# Patient Record
Sex: MALE | Race: WHITE | NOT HISPANIC OR LATINO
[De-identification: names, ages, dates, MRNs, and addresses within clinical notes are randomized per-mention and may not be internally consistent; named-entity substitution may affect disease eponyms.]

---

## 2017-02-16 ENCOUNTER — LABORATORY RESULT (OUTPATIENT)
Age: 72
End: 2017-02-16

## 2017-02-16 ENCOUNTER — APPOINTMENT (OUTPATIENT)
Dept: INTERNAL MEDICINE | Facility: CLINIC | Age: 72
End: 2017-02-16

## 2017-02-16 VITALS
SYSTOLIC BLOOD PRESSURE: 130 MMHG | BODY MASS INDEX: 28.04 KG/M2 | DIASTOLIC BLOOD PRESSURE: 76 MMHG | WEIGHT: 207 LBS | HEIGHT: 72 IN

## 2017-02-24 LAB
ALBUMIN SERPL ELPH-MCNC: 4.6 G/DL
ALP BLD-CCNC: 71 U/L
ALT SERPL-CCNC: 19 U/L
ANION GAP SERPL CALC-SCNC: 13 MMOL/L
AST SERPL-CCNC: 18 U/L
BILIRUB SERPL-MCNC: 0.5 MG/DL
BUN SERPL-MCNC: 13 MG/DL
CALCIUM SERPL-MCNC: 9.5 MG/DL
CHLORIDE SERPL-SCNC: 105 MMOL/L
CHOLEST SERPL-MCNC: 177 MG/DL
CHOLEST/HDLC SERPL: 2.7 RATIO
CK SERPL-CCNC: 125 U/L
CO2 SERPL-SCNC: 27 MMOL/L
CREAT SERPL-MCNC: 0.93 MG/DL
GLUCOSE SERPL-MCNC: 119 MG/DL
HBA1C MFR BLD HPLC: 6.3 %
HDLC SERPL-MCNC: 65 MG/DL
LDLC SERPL CALC-MCNC: 97 MG/DL
POTASSIUM SERPL-SCNC: 4.7 MMOL/L
PROT SERPL-MCNC: 7.2 G/DL
SODIUM SERPL-SCNC: 145 MMOL/L
TRIGL SERPL-MCNC: 73 MG/DL

## 2017-05-24 ENCOUNTER — LABORATORY RESULT (OUTPATIENT)
Age: 72
End: 2017-05-24

## 2017-05-24 ENCOUNTER — APPOINTMENT (OUTPATIENT)
Dept: INTERNAL MEDICINE | Facility: CLINIC | Age: 72
End: 2017-05-24

## 2017-05-24 VITALS
WEIGHT: 208 LBS | BODY MASS INDEX: 28.17 KG/M2 | HEIGHT: 72 IN | SYSTOLIC BLOOD PRESSURE: 120 MMHG | DIASTOLIC BLOOD PRESSURE: 72 MMHG

## 2017-05-24 DIAGNOSIS — R05 COUGH: ICD-10-CM

## 2017-05-30 LAB
ALBUMIN SERPL ELPH-MCNC: 4.2 G/DL
ALP BLD-CCNC: 86 U/L
ALT SERPL-CCNC: 21 U/L
ANION GAP SERPL CALC-SCNC: 16 MMOL/L
AST SERPL-CCNC: 16 U/L
BILIRUB SERPL-MCNC: 0.5 MG/DL
BUN SERPL-MCNC: 18 MG/DL
CALCIUM SERPL-MCNC: 9.8 MG/DL
CHLORIDE SERPL-SCNC: 101 MMOL/L
CHOLEST SERPL-MCNC: 151 MG/DL
CHOLEST/HDLC SERPL: 2.5 RATIO
CK SERPL-CCNC: 208 U/L
CO2 SERPL-SCNC: 25 MMOL/L
CREAT SERPL-MCNC: 1.1 MG/DL
GLUCOSE SERPL-MCNC: 122 MG/DL
HBA1C MFR BLD HPLC: 6.2 %
HDLC SERPL-MCNC: 61 MG/DL
LDLC SERPL CALC-MCNC: 78 MG/DL
POTASSIUM SERPL-SCNC: 5 MMOL/L
PROT SERPL-MCNC: 6.9 G/DL
SODIUM SERPL-SCNC: 142 MMOL/L
TRIGL SERPL-MCNC: 62 MG/DL

## 2017-09-20 ENCOUNTER — APPOINTMENT (OUTPATIENT)
Dept: INTERNAL MEDICINE | Facility: CLINIC | Age: 72
End: 2017-09-20
Payer: MEDICARE

## 2017-09-20 ENCOUNTER — LABORATORY RESULT (OUTPATIENT)
Age: 72
End: 2017-09-20

## 2017-09-20 VITALS
SYSTOLIC BLOOD PRESSURE: 130 MMHG | BODY MASS INDEX: 28.31 KG/M2 | WEIGHT: 209 LBS | DIASTOLIC BLOOD PRESSURE: 80 MMHG | HEIGHT: 72 IN

## 2017-09-20 PROCEDURE — G0008: CPT

## 2017-09-20 PROCEDURE — 90686 IIV4 VACC NO PRSV 0.5 ML IM: CPT

## 2017-09-20 PROCEDURE — 36415 COLL VENOUS BLD VENIPUNCTURE: CPT

## 2017-09-20 PROCEDURE — 93000 ELECTROCARDIOGRAM COMPLETE: CPT

## 2017-09-20 PROCEDURE — 94010 BREATHING CAPACITY TEST: CPT

## 2017-09-20 PROCEDURE — 99214 OFFICE O/P EST MOD 30 MIN: CPT | Mod: 25

## 2017-09-27 LAB
ALBUMIN SERPL ELPH-MCNC: 4.3 G/DL
ALP BLD-CCNC: 75 U/L
ALT SERPL-CCNC: 16 U/L
ANION GAP SERPL CALC-SCNC: 14 MMOL/L
AST SERPL-CCNC: 16 U/L
BASOPHILS # BLD AUTO: 0.03 K/UL
BASOPHILS NFR BLD AUTO: 0.4 %
BILIRUB SERPL-MCNC: 0.6 MG/DL
BUN SERPL-MCNC: 24 MG/DL
CALCIUM SERPL-MCNC: 9.7 MG/DL
CHLORIDE SERPL-SCNC: 104 MMOL/L
CHOLEST SERPL-MCNC: 190 MG/DL
CHOLEST/HDLC SERPL: 3.1 RATIO
CK SERPL-CCNC: 124 U/L
CO2 SERPL-SCNC: 24 MMOL/L
CREAT SERPL-MCNC: 1.03 MG/DL
EOSINOPHIL # BLD AUTO: 0.14 K/UL
EOSINOPHIL NFR BLD AUTO: 1.8 %
GLUCOSE SERPL-MCNC: 98 MG/DL
HBA1C MFR BLD HPLC: 6.4 %
HCT VFR BLD CALC: 44.7 %
HDLC SERPL-MCNC: 61 MG/DL
HGB BLD-MCNC: 14.2 G/DL
IMM GRANULOCYTES NFR BLD AUTO: 0.4 %
LDLC SERPL CALC-MCNC: 112 MG/DL
LYMPHOCYTES # BLD AUTO: 2.43 K/UL
LYMPHOCYTES NFR BLD AUTO: 30.5 %
MAN DIFF?: NORMAL
MCHC RBC-ENTMCNC: 29.2 PG
MCHC RBC-ENTMCNC: 31.8 GM/DL
MCV RBC AUTO: 91.8 FL
MONOCYTES # BLD AUTO: 0.76 K/UL
MONOCYTES NFR BLD AUTO: 9.5 %
NEUTROPHILS # BLD AUTO: 4.58 K/UL
NEUTROPHILS NFR BLD AUTO: 57.4 %
PLATELET # BLD AUTO: 292 K/UL
POTASSIUM SERPL-SCNC: 4.4 MMOL/L
PROT SERPL-MCNC: 7.1 G/DL
PSA SERPL-MCNC: 2.08 NG/ML
RBC # BLD: 4.87 M/UL
RBC # FLD: 14.4 %
SODIUM SERPL-SCNC: 142 MMOL/L
TRIGL SERPL-MCNC: 87 MG/DL
TSH SERPL-ACNC: 1.36 UIU/ML
WBC # FLD AUTO: 7.97 K/UL

## 2018-03-07 ENCOUNTER — APPOINTMENT (OUTPATIENT)
Dept: INTERNAL MEDICINE | Facility: CLINIC | Age: 73
End: 2018-03-07

## 2018-04-18 ENCOUNTER — LABORATORY RESULT (OUTPATIENT)
Age: 73
End: 2018-04-18

## 2018-04-18 ENCOUNTER — APPOINTMENT (OUTPATIENT)
Dept: INTERNAL MEDICINE | Facility: CLINIC | Age: 73
End: 2018-04-18
Payer: MEDICARE

## 2018-04-18 VITALS
DIASTOLIC BLOOD PRESSURE: 74 MMHG | WEIGHT: 212 LBS | SYSTOLIC BLOOD PRESSURE: 120 MMHG | BODY MASS INDEX: 28.71 KG/M2 | HEIGHT: 72 IN

## 2018-04-18 PROCEDURE — 36415 COLL VENOUS BLD VENIPUNCTURE: CPT

## 2018-04-18 PROCEDURE — 99214 OFFICE O/P EST MOD 30 MIN: CPT | Mod: 25

## 2018-04-22 LAB
ALBUMIN SERPL ELPH-MCNC: 4.1 G/DL
ALP BLD-CCNC: 65 U/L
ALT SERPL-CCNC: 18 U/L
ANION GAP SERPL CALC-SCNC: 13 MMOL/L
AST SERPL-CCNC: 9 U/L
BILIRUB SERPL-MCNC: 0.6 MG/DL
BUN SERPL-MCNC: 16 MG/DL
CALCIUM SERPL-MCNC: 9.4 MG/DL
CHLORIDE SERPL-SCNC: 101 MMOL/L
CHOLEST SERPL-MCNC: 147 MG/DL
CHOLEST/HDLC SERPL: 2.7 RATIO
CK SERPL-CCNC: 130 U/L
CO2 SERPL-SCNC: 27 MMOL/L
CREAT SERPL-MCNC: 1.05 MG/DL
CREAT SPEC-SCNC: 96 MG/DL
GLUCOSE SERPL-MCNC: 133 MG/DL
HBA1C MFR BLD HPLC: 6.5 %
HDLC SERPL-MCNC: 55 MG/DL
LDLC SERPL CALC-MCNC: 78 MG/DL
MICROALBUMIN 24H UR DL<=1MG/L-MCNC: 0.3 MG/DL
MICROALBUMIN/CREAT 24H UR-RTO: NORMAL
POTASSIUM SERPL-SCNC: 4.6 MMOL/L
PROT SERPL-MCNC: 6.8 G/DL
SODIUM SERPL-SCNC: 141 MMOL/L
TRIGL SERPL-MCNC: 69 MG/DL

## 2018-10-17 ENCOUNTER — APPOINTMENT (OUTPATIENT)
Dept: INTERNAL MEDICINE | Facility: CLINIC | Age: 73
End: 2018-10-17
Payer: MEDICARE

## 2018-10-17 VITALS
BODY MASS INDEX: 29.26 KG/M2 | DIASTOLIC BLOOD PRESSURE: 80 MMHG | SYSTOLIC BLOOD PRESSURE: 138 MMHG | WEIGHT: 216 LBS | HEIGHT: 72 IN

## 2018-10-17 DIAGNOSIS — Z00.00 ENCOUNTER FOR GENERAL ADULT MEDICAL EXAMINATION W/OUT ABNORMAL FINDINGS: ICD-10-CM

## 2018-10-17 DIAGNOSIS — R53.83 OTHER FATIGUE: ICD-10-CM

## 2018-10-17 DIAGNOSIS — Z87.01 PERSONAL HISTORY OF PNEUMONIA (RECURRENT): ICD-10-CM

## 2018-10-17 DIAGNOSIS — Z71.89 OTHER SPECIFIED COUNSELING: ICD-10-CM

## 2018-10-17 DIAGNOSIS — N40.0 BENIGN PROSTATIC HYPERPLASIA WITHOUT LOWER URINARY TRACT SYMPMS: ICD-10-CM

## 2018-10-17 PROCEDURE — 99214 OFFICE O/P EST MOD 30 MIN: CPT | Mod: 25

## 2018-10-17 PROCEDURE — 90715 TDAP VACCINE 7 YRS/> IM: CPT | Mod: GY

## 2018-10-17 PROCEDURE — 90686 IIV4 VACC NO PRSV 0.5 ML IM: CPT

## 2018-10-17 PROCEDURE — G0438: CPT

## 2018-10-17 PROCEDURE — G0008: CPT

## 2018-10-17 PROCEDURE — 36415 COLL VENOUS BLD VENIPUNCTURE: CPT

## 2018-10-17 PROCEDURE — 90472 IMMUNIZATION ADMIN EACH ADD: CPT | Mod: GY

## 2018-10-17 PROCEDURE — 93000 ELECTROCARDIOGRAM COMPLETE: CPT

## 2018-10-17 PROCEDURE — 94010 BREATHING CAPACITY TEST: CPT

## 2018-10-17 NOTE — HISTORY OF PRESENT ILLNESS
[FreeTextEntry1] : Diabetes, hyper cholesterol, and COPD [de-identified] : Wife is currently in home hospice care.\par Patient denies depression.\par Chronic fatigue\par Chronic mild SOB with exertion-no change.\par Patient has  not exercise for past 6 months secondary to wife's illness. Gaining weight recently.

## 2018-10-17 NOTE — PHYSICAL EXAM
[Testes Mass (___cm)] : there were no testicular masses [No Acute Distress] : no acute distress [Well Nourished] : well nourished [Well Developed] : well developed [Well-Appearing] : well-appearing [Normal Outer Ear/Nose] : the outer ears and nose were normal in appearance [Supple] : supple [No Respiratory Distress] : no respiratory distress  [Clear to Auscultation] : lungs were clear to auscultation bilaterally [Normal Rate] : normal rate  [Regular Rhythm] : with a regular rhythm [Soft] : abdomen soft [Non Tender] : non-tender [Normal Posterior Cervical Nodes] : no posterior cervical lymphadenopathy [Normal Anterior Cervical Nodes] : no anterior cervical lymphadenopathy [No CVA Tenderness] : no CVA  tenderness [No Spinal Tenderness] : no spinal tenderness [Cyanosis] : no cyanosis [Abnormal Temperature] : normal temperature [Normal Gait] : normal gait [Normal Affect] : the affect was normal [Normal Mood] : the mood was normal

## 2018-10-17 NOTE — HEALTH RISK ASSESSMENT
[Very Good] : ~his/her~ current health as very good [Good] : ~his/her~  mood as  good [] : No [No falls in past year] : Patient reported no falls in the past year [0] : 1) Little interest or pleasure doing things: Not at all (0) [1] : 2) Feeling down, depressed, or hopeless for several days (1) [de-identified] : 1 drink per day [Patient reported colonoscopy was normal] : Patient reported colonoscopy was normal [Change in mental status noted] : No change in mental status noted [Language] : denies difficulty with language [Behavior] : denies difficulty with behavior [Learning/Retaining New Information] : denies difficulty learning/retaining new information [Handling Complex Tasks] : denies difficulty handling complex tasks [Reasoning] : denies difficulty with reasoning [None] : None [With Significant Other] : lives with significant other [Retired] : retired [] :  [Feels Safe at Home] : Feels safe at home [Fully functional (bathing, dressing, toileting, transferring, walking, feeding)] : Fully functional (bathing, dressing, toileting, transferring, walking, feeding) [Fully functional (using the telephone, shopping, preparing meals, housekeeping, doing laundry, using] : Fully functional and needs no help or supervision to perform IADLs (using the telephone, shopping, preparing meals, housekeeping, doing laundry, using transportation, managing medications and managing finances) [Smoke Detector] : smoke detector [Seat Belt] :  uses seat belt [Designated Healthcare Proxy] : Designated healthcare proxy [Name: ___] : Health Care Proxy's Name: [unfilled]  [Relationship: ___] : Relationship: [unfilled]

## 2018-10-17 NOTE — HISTORY OF PRESENT ILLNESS
[FreeTextEntry1] : Diabetes, hyper cholesterol, and COPD [de-identified] : Wife is currently in home hospice care.\par Patient denies depression.\par Chronic fatigue\par Chronic mild SOB with exertion-no change.\par Patient has  not exercise for past 6 months secondary to wife's illness. Gaining weight recently.

## 2018-10-17 NOTE — HEALTH RISK ASSESSMENT
[Very Good] : ~his/her~ current health as very good [Good] : ~his/her~  mood as  good [] : No [No falls in past year] : Patient reported no falls in the past year [0] : 1) Little interest or pleasure doing things: Not at all (0) [1] : 2) Feeling down, depressed, or hopeless for several days (1) [de-identified] : 1 drink per day [Patient reported colonoscopy was normal] : Patient reported colonoscopy was normal [Change in mental status noted] : No change in mental status noted [Language] : denies difficulty with language [Behavior] : denies difficulty with behavior [Learning/Retaining New Information] : denies difficulty learning/retaining new information [Handling Complex Tasks] : denies difficulty handling complex tasks [Reasoning] : denies difficulty with reasoning [None] : None [With Significant Other] : lives with significant other [Retired] : retired [] :  [Feels Safe at Home] : Feels safe at home [Fully functional (bathing, dressing, toileting, transferring, walking, feeding)] : Fully functional (bathing, dressing, toileting, transferring, walking, feeding) [Fully functional (using the telephone, shopping, preparing meals, housekeeping, doing laundry, using] : Fully functional and needs no help or supervision to perform IADLs (using the telephone, shopping, preparing meals, housekeeping, doing laundry, using transportation, managing medications and managing finances) [Smoke Detector] : smoke detector [Seat Belt] :  uses seat belt [Designated Healthcare Proxy] : Designated healthcare proxy [Name: ___] : Health Care Proxy's Name: [unfilled]  [Relationship: ___] : Relationship: [unfilled]

## 2018-10-17 NOTE — ASSESSMENT
[FreeTextEntry1] : COPD-Stable. Continue same medications. Patient refuses pulmonary consult and screening CT of chest for lung cancer-too busy taking care of wife (in hospice care).\par Continue same cholesterol medication.  Fasting lipid.\par A1C\par Tdap\par RX shingrix

## 2018-10-19 LAB
ALBUMIN SERPL ELPH-MCNC: 4.4 G/DL
ALP BLD-CCNC: 70 U/L
ALT SERPL-CCNC: 21 U/L
ANION GAP SERPL CALC-SCNC: 11 MMOL/L
AST SERPL-CCNC: 16 U/L
BASOPHILS # BLD AUTO: 0.02 K/UL
BASOPHILS NFR BLD AUTO: 0.3 %
BILIRUB SERPL-MCNC: 0.5 MG/DL
BUN SERPL-MCNC: 25 MG/DL
CALCIUM SERPL-MCNC: 9.1 MG/DL
CHLORIDE SERPL-SCNC: 102 MMOL/L
CHOLEST SERPL-MCNC: 155 MG/DL
CHOLEST/HDLC SERPL: 2.8 RATIO
CK SERPL-CCNC: 112 U/L
CO2 SERPL-SCNC: 27 MMOL/L
CREAT SERPL-MCNC: 1.1 MG/DL
EOSINOPHIL # BLD AUTO: 0.08 K/UL
EOSINOPHIL NFR BLD AUTO: 1.2 %
GLUCOSE SERPL-MCNC: 152 MG/DL
HBA1C MFR BLD HPLC: 6.7 %
HCT VFR BLD CALC: 46.3 %
HDLC SERPL-MCNC: 55 MG/DL
HGB BLD-MCNC: 15.3 G/DL
IMM GRANULOCYTES NFR BLD AUTO: 0.3 %
LDLC SERPL CALC-MCNC: 89 MG/DL
LYMPHOCYTES # BLD AUTO: 1.44 K/UL
LYMPHOCYTES NFR BLD AUTO: 21.1 %
MAN DIFF?: NORMAL
MCHC RBC-ENTMCNC: 29.9 PG
MCHC RBC-ENTMCNC: 33 GM/DL
MCV RBC AUTO: 90.6 FL
MONOCYTES # BLD AUTO: 0.49 K/UL
MONOCYTES NFR BLD AUTO: 7.2 %
NEUTROPHILS # BLD AUTO: 4.77 K/UL
NEUTROPHILS NFR BLD AUTO: 69.9 %
PLATELET # BLD AUTO: 239 K/UL
POTASSIUM SERPL-SCNC: 4.2 MMOL/L
PROT SERPL-MCNC: 6.9 G/DL
PSA SERPL-MCNC: 2.13 NG/ML
RBC # BLD: 5.11 M/UL
RBC # FLD: 13.5 %
SODIUM SERPL-SCNC: 140 MMOL/L
TRIGL SERPL-MCNC: 55 MG/DL
TSH SERPL-ACNC: 1.39 UIU/ML
WBC # FLD AUTO: 6.82 K/UL

## 2019-02-11 ENCOUNTER — APPOINTMENT (OUTPATIENT)
Dept: INTERNAL MEDICINE | Facility: CLINIC | Age: 74
End: 2019-02-11
Payer: MEDICARE

## 2019-02-11 VITALS
SYSTOLIC BLOOD PRESSURE: 110 MMHG | BODY MASS INDEX: 29.66 KG/M2 | HEIGHT: 72 IN | WEIGHT: 219 LBS | DIASTOLIC BLOOD PRESSURE: 80 MMHG

## 2019-02-11 DIAGNOSIS — E78.00 PURE HYPERCHOLESTEROLEMIA, UNSPECIFIED: ICD-10-CM

## 2019-02-11 DIAGNOSIS — J44.9 CHRONIC OBSTRUCTIVE PULMONARY DISEASE, UNSPECIFIED: ICD-10-CM

## 2019-02-11 DIAGNOSIS — E11.9 TYPE 2 DIABETES MELLITUS W/OUT COMPLICATIONS: ICD-10-CM

## 2019-02-11 DIAGNOSIS — R06.02 SHORTNESS OF BREATH: ICD-10-CM

## 2019-02-11 PROCEDURE — 99214 OFFICE O/P EST MOD 30 MIN: CPT | Mod: 25

## 2019-02-11 PROCEDURE — 36415 COLL VENOUS BLD VENIPUNCTURE: CPT

## 2019-02-11 NOTE — PHYSICAL EXAM
[No Acute Distress] : no acute distress [Well Nourished] : well nourished [Well Developed] : well developed [Well-Appearing] : well-appearing [Normal Outer Ear/Nose] : the outer ears and nose were normal in appearance [Supple] : supple [No Respiratory Distress] : no respiratory distress  [Clear to Auscultation] : lungs were clear to auscultation bilaterally [Normal Rate] : normal rate  [Regular Rhythm] : with a regular rhythm [Soft] : abdomen soft [Non Tender] : non-tender [Normal Posterior Cervical Nodes] : no posterior cervical lymphadenopathy [Normal Anterior Cervical Nodes] : no anterior cervical lymphadenopathy [No CVA Tenderness] : no CVA  tenderness [No Spinal Tenderness] : no spinal tenderness [Cyanosis] : no cyanosis [Abnormal Temperature] : normal temperature [Normal Gait] : normal gait [Normal Affect] : the affect was normal [Normal Mood] : the mood was normal

## 2019-02-11 NOTE — HISTORY OF PRESENT ILLNESS
[FreeTextEntry1] : COPD, DM, and hypercholesterol\par  [de-identified] : SOB with exertion. No chest pain.

## 2019-02-11 NOTE — ASSESSMENT
[FreeTextEntry1] : wife  in 10/2018.\par A1C\par Continue same cholesterol medication.  Fasting lipid.\par COPD-stable.  O2 sat. after walking 40 yards=95%.  Patient refuses pulmonary consult at this time.

## 2019-02-13 LAB
ALBUMIN SERPL ELPH-MCNC: 4.2 G/DL
ALP BLD-CCNC: 58 U/L
ALT SERPL-CCNC: 23 U/L
ANION GAP SERPL CALC-SCNC: 10 MMOL/L
AST SERPL-CCNC: 15 U/L
BILIRUB SERPL-MCNC: 0.6 MG/DL
BUN SERPL-MCNC: 15 MG/DL
CALCIUM SERPL-MCNC: 9.5 MG/DL
CHLORIDE SERPL-SCNC: 103 MMOL/L
CHOLEST SERPL-MCNC: 141 MG/DL
CHOLEST/HDLC SERPL: 2.4 RATIO
CK SERPL-CCNC: 110 U/L
CO2 SERPL-SCNC: 29 MMOL/L
CREAT SERPL-MCNC: 1 MG/DL
CRP SERPL-MCNC: 0.24 MG/DL
GLUCOSE SERPL-MCNC: 115 MG/DL
HBA1C MFR BLD HPLC: 6.4 %
HDLC SERPL-MCNC: 58 MG/DL
LDLC SERPL CALC-MCNC: 69 MG/DL
POTASSIUM SERPL-SCNC: 4.8 MMOL/L
PROT SERPL-MCNC: 6.5 G/DL
SODIUM SERPL-SCNC: 142 MMOL/L
TRIGL SERPL-MCNC: 70 MG/DL

## 2019-06-18 ENCOUNTER — APPOINTMENT (OUTPATIENT)
Dept: INTERNAL MEDICINE | Facility: CLINIC | Age: 74
End: 2019-06-18

## 2020-04-24 ENCOUNTER — RX RENEWAL (OUTPATIENT)
Age: 75
End: 2020-04-24

## 2021-04-19 ENCOUNTER — RX RENEWAL (OUTPATIENT)
Age: 76
End: 2021-04-19

## 2023-05-05 ENCOUNTER — INPATIENT (INPATIENT)
Facility: HOSPITAL | Age: 78
LOS: 10 days | Discharge: NURSING, REHAB OR LONG-TERM CARE | DRG: 308 | End: 2023-05-16
Attending: FAMILY MEDICINE | Admitting: INTERNAL MEDICINE
Payer: MEDICARE

## 2023-05-05 VITALS
SYSTOLIC BLOOD PRESSURE: 133 MMHG | WEIGHT: 220.02 LBS | HEART RATE: 132 BPM | TEMPERATURE: 97 F | OXYGEN SATURATION: 92 % | RESPIRATION RATE: 18 BRPM | DIASTOLIC BLOOD PRESSURE: 51 MMHG | HEIGHT: 71 IN
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
## 2023-05-05 DIAGNOSIS — Z98.890 OTHER SPECIFIED POSTPROCEDURAL STATES: Chronic | ICD-10-CM

## 2023-05-05 DIAGNOSIS — I10 ESSENTIAL (PRIMARY) HYPERTENSION: ICD-10-CM

## 2023-05-05 DIAGNOSIS — J44.9 CHRONIC OBSTRUCTIVE PULMONARY DISEASE, UNSPECIFIED: ICD-10-CM

## 2023-05-05 DIAGNOSIS — Z29.9 ENCOUNTER FOR PROPHYLACTIC MEASURES, UNSPECIFIED: ICD-10-CM

## 2023-05-05 DIAGNOSIS — E86.0 DEHYDRATION: ICD-10-CM

## 2023-05-05 DIAGNOSIS — Z71.89 OTHER SPECIFIED COUNSELING: ICD-10-CM

## 2023-05-05 DIAGNOSIS — F03.90 UNSPECIFIED DEMENTIA, UNSPECIFIED SEVERITY, WITHOUT BEHAVIORAL DISTURBANCE, PSYCHOTIC DISTURBANCE, MOOD DISTURBANCE, AND ANXIETY: ICD-10-CM

## 2023-05-05 DIAGNOSIS — I48.91 UNSPECIFIED ATRIAL FIBRILLATION: ICD-10-CM

## 2023-05-05 DIAGNOSIS — N40.0 BENIGN PROSTATIC HYPERPLASIA WITHOUT LOWER URINARY TRACT SYMPTOMS: ICD-10-CM

## 2023-05-05 DIAGNOSIS — F10.10 ALCOHOL ABUSE, UNCOMPLICATED: ICD-10-CM

## 2023-05-05 DIAGNOSIS — E78.5 HYPERLIPIDEMIA, UNSPECIFIED: ICD-10-CM

## 2023-05-05 LAB
ALBUMIN SERPL ELPH-MCNC: 3.5 G/DL — SIGNIFICANT CHANGE UP (ref 3.3–5)
ALBUMIN SERPL ELPH-MCNC: 3.5 G/DL — SIGNIFICANT CHANGE UP (ref 3.3–5)
ALP SERPL-CCNC: 74 U/L — SIGNIFICANT CHANGE UP (ref 40–120)
ALP SERPL-CCNC: 76 U/L — SIGNIFICANT CHANGE UP (ref 40–120)
ALT FLD-CCNC: 21 U/L — SIGNIFICANT CHANGE UP (ref 12–78)
ALT FLD-CCNC: 22 U/L — SIGNIFICANT CHANGE UP (ref 12–78)
ANION GAP SERPL CALC-SCNC: 5 MMOL/L — SIGNIFICANT CHANGE UP (ref 5–17)
ANION GAP SERPL CALC-SCNC: 8 MMOL/L — SIGNIFICANT CHANGE UP (ref 5–17)
APPEARANCE UR: CLEAR — SIGNIFICANT CHANGE UP
APTT BLD: 33.3 SEC — SIGNIFICANT CHANGE UP (ref 27.5–35.5)
AST SERPL-CCNC: 20 U/L — SIGNIFICANT CHANGE UP (ref 15–37)
AST SERPL-CCNC: 31 U/L — SIGNIFICANT CHANGE UP (ref 15–37)
BASOPHILS # BLD AUTO: 0 K/UL — SIGNIFICANT CHANGE UP (ref 0–0.2)
BASOPHILS NFR BLD AUTO: 0 % — SIGNIFICANT CHANGE UP (ref 0–2)
BILIRUB DIRECT SERPL-MCNC: 0.3 MG/DL — SIGNIFICANT CHANGE UP (ref 0–0.3)
BILIRUB INDIRECT FLD-MCNC: 0.3 MG/DL — SIGNIFICANT CHANGE UP (ref 0.2–1)
BILIRUB SERPL-MCNC: 0.6 MG/DL — SIGNIFICANT CHANGE UP (ref 0.2–1.2)
BILIRUB SERPL-MCNC: 0.7 MG/DL — SIGNIFICANT CHANGE UP (ref 0.2–1.2)
BILIRUB UR-MCNC: NEGATIVE — SIGNIFICANT CHANGE UP
BUN SERPL-MCNC: 24 MG/DL — HIGH (ref 7–23)
BUN SERPL-MCNC: 28 MG/DL — HIGH (ref 7–23)
CALCIUM SERPL-MCNC: 8.4 MG/DL — LOW (ref 8.5–10.1)
CALCIUM SERPL-MCNC: 8.6 MG/DL — SIGNIFICANT CHANGE UP (ref 8.5–10.1)
CHLORIDE SERPL-SCNC: 108 MMOL/L — SIGNIFICANT CHANGE UP (ref 96–108)
CHLORIDE SERPL-SCNC: 109 MMOL/L — HIGH (ref 96–108)
CK SERPL-CCNC: 604 U/L — HIGH (ref 26–308)
CO2 SERPL-SCNC: 22 MMOL/L — SIGNIFICANT CHANGE UP (ref 22–31)
CO2 SERPL-SCNC: 27 MMOL/L — SIGNIFICANT CHANGE UP (ref 22–31)
COLOR SPEC: YELLOW — SIGNIFICANT CHANGE UP
CREAT SERPL-MCNC: 1.1 MG/DL — SIGNIFICANT CHANGE UP (ref 0.5–1.3)
CREAT SERPL-MCNC: 1.1 MG/DL — SIGNIFICANT CHANGE UP (ref 0.5–1.3)
DIFF PNL FLD: ABNORMAL
EGFR: 69 ML/MIN/1.73M2 — SIGNIFICANT CHANGE UP
EGFR: 69 ML/MIN/1.73M2 — SIGNIFICANT CHANGE UP
EOSINOPHIL # BLD AUTO: 0 K/UL — SIGNIFICANT CHANGE UP (ref 0–0.5)
EOSINOPHIL NFR BLD AUTO: 0 % — SIGNIFICANT CHANGE UP (ref 0–6)
ETHANOL SERPL-MCNC: <10 MG/DL — SIGNIFICANT CHANGE UP (ref 0–10)
GLUCOSE SERPL-MCNC: 108 MG/DL — HIGH (ref 70–99)
GLUCOSE SERPL-MCNC: 121 MG/DL — HIGH (ref 70–99)
GLUCOSE UR QL: NEGATIVE — SIGNIFICANT CHANGE UP
HCT VFR BLD CALC: 44.1 % — SIGNIFICANT CHANGE UP (ref 39–50)
HGB BLD-MCNC: 14.4 G/DL — SIGNIFICANT CHANGE UP (ref 13–17)
INR BLD: 1.16 RATIO — SIGNIFICANT CHANGE UP (ref 0.88–1.16)
KETONES UR-MCNC: ABNORMAL
LACTATE SERPL-SCNC: 1.7 MMOL/L — SIGNIFICANT CHANGE UP (ref 0.7–2)
LACTATE SERPL-SCNC: 2.4 MMOL/L — HIGH (ref 0.7–2)
LEUKOCYTE ESTERASE UR-ACNC: NEGATIVE — SIGNIFICANT CHANGE UP
LYMPHOCYTES # BLD AUTO: 0.55 K/UL — LOW (ref 1–3.3)
LYMPHOCYTES # BLD AUTO: 5 % — LOW (ref 13–44)
MAGNESIUM SERPL-MCNC: 1.9 MG/DL — SIGNIFICANT CHANGE UP (ref 1.6–2.6)
MCHC RBC-ENTMCNC: 30.4 PG — SIGNIFICANT CHANGE UP (ref 27–34)
MCHC RBC-ENTMCNC: 32.7 GM/DL — SIGNIFICANT CHANGE UP (ref 32–36)
MCV RBC AUTO: 93 FL — SIGNIFICANT CHANGE UP (ref 80–100)
MONOCYTES # BLD AUTO: 0.66 K/UL — SIGNIFICANT CHANGE UP (ref 0–0.9)
MONOCYTES NFR BLD AUTO: 6 % — SIGNIFICANT CHANGE UP (ref 2–14)
NEUTROPHILS # BLD AUTO: 9.82 K/UL — HIGH (ref 1.8–7.4)
NEUTROPHILS NFR BLD AUTO: 89 % — HIGH (ref 43–77)
NITRITE UR-MCNC: NEGATIVE — SIGNIFICANT CHANGE UP
NRBC # BLD: SIGNIFICANT CHANGE UP /100 WBCS (ref 0–0)
PH UR: 5 — SIGNIFICANT CHANGE UP (ref 5–8)
PHOSPHATE SERPL-MCNC: 2.8 MG/DL — SIGNIFICANT CHANGE UP (ref 2.5–4.5)
PLATELET # BLD AUTO: 231 K/UL — SIGNIFICANT CHANGE UP (ref 150–400)
POTASSIUM SERPL-MCNC: 3.8 MMOL/L — SIGNIFICANT CHANGE UP (ref 3.5–5.3)
POTASSIUM SERPL-MCNC: 4.2 MMOL/L — SIGNIFICANT CHANGE UP (ref 3.5–5.3)
POTASSIUM SERPL-SCNC: 3.8 MMOL/L — SIGNIFICANT CHANGE UP (ref 3.5–5.3)
POTASSIUM SERPL-SCNC: 4.2 MMOL/L — SIGNIFICANT CHANGE UP (ref 3.5–5.3)
PROT SERPL-MCNC: 6.6 G/DL — SIGNIFICANT CHANGE UP (ref 6–8.3)
PROT SERPL-MCNC: 6.9 G/DL — SIGNIFICANT CHANGE UP (ref 6–8.3)
PROT UR-MCNC: 30 MG/DL
PROTHROM AB SERPL-ACNC: 13.6 SEC — HIGH (ref 10.5–13.4)
RBC # BLD: 4.74 M/UL — SIGNIFICANT CHANGE UP (ref 4.2–5.8)
RBC # FLD: 13.4 % — SIGNIFICANT CHANGE UP (ref 10.3–14.5)
SODIUM SERPL-SCNC: 138 MMOL/L — SIGNIFICANT CHANGE UP (ref 135–145)
SODIUM SERPL-SCNC: 141 MMOL/L — SIGNIFICANT CHANGE UP (ref 135–145)
SP GR SPEC: 1.02 — SIGNIFICANT CHANGE UP (ref 1.01–1.02)
TROPONIN I, HIGH SENSITIVITY RESULT: 16.1 NG/L — SIGNIFICANT CHANGE UP
UROBILINOGEN FLD QL: NEGATIVE — SIGNIFICANT CHANGE UP
WBC # BLD: 11.03 K/UL — HIGH (ref 3.8–10.5)
WBC # FLD AUTO: 11.03 K/UL — HIGH (ref 3.8–10.5)

## 2023-05-05 PROCEDURE — 12002 RPR S/N/AX/GEN/TRNK2.6-7.5CM: CPT

## 2023-05-05 PROCEDURE — 71045 X-RAY EXAM CHEST 1 VIEW: CPT | Mod: 26

## 2023-05-05 PROCEDURE — 74177 CT ABD & PELVIS W/CONTRAST: CPT | Mod: 26,MA

## 2023-05-05 PROCEDURE — 99223 1ST HOSP IP/OBS HIGH 75: CPT

## 2023-05-05 PROCEDURE — 99285 EMERGENCY DEPT VISIT HI MDM: CPT | Mod: 25

## 2023-05-05 PROCEDURE — 70450 CT HEAD/BRAIN W/O DYE: CPT | Mod: 26,MA

## 2023-05-05 PROCEDURE — 72125 CT NECK SPINE W/O DYE: CPT | Mod: 26,MA

## 2023-05-05 PROCEDURE — 99222 1ST HOSP IP/OBS MODERATE 55: CPT

## 2023-05-05 PROCEDURE — 99223 1ST HOSP IP/OBS HIGH 75: CPT | Mod: GC

## 2023-05-05 RX ORDER — ACETAMINOPHEN 500 MG
1000 TABLET ORAL ONCE
Refills: 0 | Status: COMPLETED | OUTPATIENT
Start: 2023-05-05 | End: 2023-05-05

## 2023-05-05 RX ORDER — METOCLOPRAMIDE HCL 10 MG
10 TABLET ORAL ONCE
Refills: 0 | Status: DISCONTINUED | OUTPATIENT
Start: 2023-05-05 | End: 2023-05-05

## 2023-05-05 RX ORDER — DILTIAZEM HCL 120 MG
10 CAPSULE, EXT RELEASE 24 HR ORAL ONCE
Refills: 0 | Status: COMPLETED | OUTPATIENT
Start: 2023-05-05 | End: 2023-05-05

## 2023-05-05 RX ORDER — THIAMINE MONONITRATE (VIT B1) 100 MG
100 TABLET ORAL DAILY
Refills: 0 | Status: COMPLETED | OUTPATIENT
Start: 2023-05-05 | End: 2023-05-08

## 2023-05-05 RX ORDER — METOCLOPRAMIDE HCL 10 MG
5 TABLET ORAL ONCE
Refills: 0 | Status: COMPLETED | OUTPATIENT
Start: 2023-05-05 | End: 2023-05-06

## 2023-05-05 RX ORDER — BUPROPION HYDROCHLORIDE 150 MG/1
1 TABLET, EXTENDED RELEASE ORAL
Refills: 0 | DISCHARGE

## 2023-05-05 RX ORDER — DILTIAZEM HCL 120 MG
30 CAPSULE, EXT RELEASE 24 HR ORAL ONCE
Refills: 0 | Status: COMPLETED | OUTPATIENT
Start: 2023-05-05 | End: 2023-05-05

## 2023-05-05 RX ORDER — ATORVASTATIN CALCIUM 80 MG/1
10 TABLET, FILM COATED ORAL AT BEDTIME
Refills: 0 | Status: DISCONTINUED | OUTPATIENT
Start: 2023-05-05 | End: 2023-05-16

## 2023-05-05 RX ORDER — ENOXAPARIN SODIUM 100 MG/ML
100 INJECTION SUBCUTANEOUS EVERY 12 HOURS
Refills: 0 | Status: DISCONTINUED | OUTPATIENT
Start: 2023-05-05 | End: 2023-05-07

## 2023-05-05 RX ORDER — BUDESONIDE AND FORMOTEROL FUMARATE DIHYDRATE 160; 4.5 UG/1; UG/1
2 AEROSOL RESPIRATORY (INHALATION)
Refills: 0 | Status: DISCONTINUED | OUTPATIENT
Start: 2023-05-05 | End: 2023-05-07

## 2023-05-05 RX ORDER — FUROSEMIDE 40 MG
40 TABLET ORAL DAILY
Refills: 0 | Status: DISCONTINUED | OUTPATIENT
Start: 2023-05-05 | End: 2023-05-07

## 2023-05-05 RX ORDER — DILTIAZEM HCL 120 MG
60 CAPSULE, EXT RELEASE 24 HR ORAL EVERY 6 HOURS
Refills: 0 | Status: DISCONTINUED | OUTPATIENT
Start: 2023-05-05 | End: 2023-05-07

## 2023-05-05 RX ORDER — TAMSULOSIN HYDROCHLORIDE 0.4 MG/1
0.4 CAPSULE ORAL AT BEDTIME
Refills: 0 | Status: DISCONTINUED | OUTPATIENT
Start: 2023-05-05 | End: 2023-05-16

## 2023-05-05 RX ORDER — CLOPIDOGREL BISULFATE 75 MG/1
75 TABLET, FILM COATED ORAL DAILY
Refills: 0 | Status: DISCONTINUED | OUTPATIENT
Start: 2023-05-05 | End: 2023-05-05

## 2023-05-05 RX ORDER — SODIUM CHLORIDE 9 MG/ML
2300 INJECTION INTRAMUSCULAR; INTRAVENOUS; SUBCUTANEOUS ONCE
Refills: 0 | Status: COMPLETED | OUTPATIENT
Start: 2023-05-05 | End: 2023-05-05

## 2023-05-05 RX ORDER — ONDANSETRON 8 MG/1
4 TABLET, FILM COATED ORAL EVERY 8 HOURS
Refills: 0 | Status: DISCONTINUED | OUTPATIENT
Start: 2023-05-05 | End: 2023-05-16

## 2023-05-05 RX ORDER — CILOSTAZOL 100 MG/1
100 TABLET ORAL
Refills: 0 | Status: DISCONTINUED | OUTPATIENT
Start: 2023-05-05 | End: 2023-05-16

## 2023-05-05 RX ORDER — ASPIRIN/CALCIUM CARB/MAGNESIUM 324 MG
81 TABLET ORAL DAILY
Refills: 0 | Status: DISCONTINUED | OUTPATIENT
Start: 2023-05-05 | End: 2023-05-07

## 2023-05-05 RX ORDER — LANOLIN ALCOHOL/MO/W.PET/CERES
3 CREAM (GRAM) TOPICAL AT BEDTIME
Refills: 0 | Status: DISCONTINUED | OUTPATIENT
Start: 2023-05-05 | End: 2023-05-16

## 2023-05-05 RX ORDER — ACETAMINOPHEN 500 MG
650 TABLET ORAL EVERY 6 HOURS
Refills: 0 | Status: DISCONTINUED | OUTPATIENT
Start: 2023-05-05 | End: 2023-05-16

## 2023-05-05 RX ORDER — DULOXETINE HYDROCHLORIDE 30 MG/1
60 CAPSULE, DELAYED RELEASE ORAL DAILY
Refills: 0 | Status: DISCONTINUED | OUTPATIENT
Start: 2023-05-05 | End: 2023-05-16

## 2023-05-05 RX ORDER — FOLIC ACID 0.8 MG
1 TABLET ORAL DAILY
Refills: 0 | Status: DISCONTINUED | OUTPATIENT
Start: 2023-05-05 | End: 2023-05-16

## 2023-05-05 RX ORDER — LOSARTAN POTASSIUM 100 MG/1
50 TABLET, FILM COATED ORAL DAILY
Refills: 0 | Status: DISCONTINUED | OUTPATIENT
Start: 2023-05-05 | End: 2023-05-07

## 2023-05-05 RX ADMIN — Medication 2 MILLIGRAM(S): at 19:08

## 2023-05-05 RX ADMIN — Medication 400 MILLIGRAM(S): at 14:28

## 2023-05-05 RX ADMIN — Medication 400 MILLIGRAM(S): at 08:24

## 2023-05-05 RX ADMIN — Medication 10 MILLIGRAM(S): at 11:00

## 2023-05-05 RX ADMIN — Medication 10 MILLIGRAM(S): at 13:30

## 2023-05-05 RX ADMIN — Medication 30 MILLIGRAM(S): at 13:50

## 2023-05-05 RX ADMIN — SODIUM CHLORIDE 2300 MILLILITER(S): 9 INJECTION INTRAMUSCULAR; INTRAVENOUS; SUBCUTANEOUS at 08:24

## 2023-05-05 RX ADMIN — ONDANSETRON 4 MILLIGRAM(S): 8 TABLET, FILM COATED ORAL at 22:53

## 2023-05-05 RX ADMIN — ENOXAPARIN SODIUM 100 MILLIGRAM(S): 100 INJECTION SUBCUTANEOUS at 19:08

## 2023-05-05 NOTE — ED PROVIDER NOTE - CARE PLAN
Principal Discharge DX:	Dehydration  Secondary Diagnosis:	Fall in elderly patient  Secondary Diagnosis:	Laceration of right lower extremity   1 Principal Discharge DX:	Dehydration  Secondary Diagnosis:	Fall in elderly patient  Secondary Diagnosis:	Laceration of right lower extremity  Secondary Diagnosis:	Atrial fibrillation with RVR

## 2023-05-05 NOTE — ED ADULT NURSE NOTE - OBJECTIVE STATEMENT
a/ox2-3 patient BIBEMS s/p found on floor at home. Patient's son found patient on floor this AM, patient states he fell last night. Patient currently on plavix. Unknown if hit head, unknown if any LOC. Patient has laceration to right ankle. Patient has no aid overnight and states his aid only comes twice a week. Chornic wound to left a/ox2-3 patient BIBEMS s/p found on floor at home. Patient's son found patient on floor this AM, patient states he fell last night. Patient is poor historian and does not remember events leading up to fall. Patient currently on plavix. Unknown if hit head, unknown if any LOC. Patient has laceration to right ankle. Patient states has no aid overnight and his aid only comes twice a week. Chronic wound to left foot. Patient tachy @130bpm, rectal temp of 100.2. Patient states he has no complaints of pain or discomfort at this time.

## 2023-05-05 NOTE — H&P ADULT - NSHPSOCIALHISTORY_GEN_ALL_CORE
Tobacco: former 1ppd x 50 years, quit 5 years ago  EtOH:  6-7 beers daily  Recreational drug use: denies current use  Lives with: alone, has aide 2x week for 5 hours, supervises showers for falls, helps with medication compliance and drives pt to doctors appt and PT  Ambulates: rollator and cane  ADLs: mostly independent, aide as above

## 2023-05-05 NOTE — H&P ADULT - PROBLEM SELECTOR PLAN 4
Daughter denies that patient has ever been diagnosed with dementia. Pt AOx4 on exam, daughter reports he can sometimes be forgetful but no formal diagnosis of dementia has been made. Possibly 2/2 chronic alcohol abuse  - Neuro consult Daughter denies that patient has ever been diagnosed with dementia. Pt AOx4 on exam, daughter reports he can sometimes be forgetful but no formal diagnosis of dementia has been made. Possibly 2/2 chronic alcohol abuse

## 2023-05-05 NOTE — H&P ADULT - ATTENDING COMMENTS
78 y/o M w/ PMHx of  DM2, HTN, HLD, COPD, PVD, BPH presents from home s/p fall.  Found to be in AF w/ RVR. Admit for new onset AF.    HPI as above.     T(C): 37 (05-05-23 @ 17:38), Max: 37.9 (05-05-23 @ 08:19)  HR: 118 (05-05-23 @ 17:38) (108 - 132)  BP: 123/74 (05-05-23 @ 17:38) (123/74 - 141/84)  RR: 19 (05-05-23 @ 17:38) (18 - 19)  SpO2: 98% (05-05-23 @ 17:38) (92% - 98%)  Wt(kg): --    Physical Exam:   GENERAL: well-groomed, well-developed, NAD  HEENT: head NC/AT; EOM intact, PERRLA, conjunctiva & sclera clear; hearing grossly intact, moist mucous membranes  NECK: supple, no JVD  RESPIRATORY: CTA B/L, no wheezing, rales, rhonchi or rubs  CARDIOVASCULAR: S1&S2, irreg irreg  ABDOMEN: soft, non-tender, non-distended, + Bowel sounds x4 quadrants, no guarding, rebound or rigidity  MUSCULOSKELETAL: b/l LE edema  LYMPH: no cervical lymphadenopathy  VASCULAR: Radial pulses 2+ bilaterally, no varicose veins   SKIN: RLE wound  NEUROLOGIC: AA&O X3, CN2-12 intact w/ no focal deficits, no sensory loss, moving all extremities  Psych: Normal mood and affect, normal behavior    Plan:  Admit to tele  -started on lovenox  -cardio consulted  -check orthostatics, carotid duplex  --monitor renal fxn and electrolytes    Incidental imaging findings: Intraluminal thrombus noted on CT abd, vascular surgery consulted for recommendations.   1.8cm lung noudule, possible granuloma also noted, patient instructed to f.u with his PCP within 4 weeks of hospital discharge to discuss need for repeat imaging. F/u with PCP Sepideh Kearns

## 2023-05-05 NOTE — ED ADULT TRIAGE NOTE - CHIEF COMPLAINT QUOTE
Patient A/Ox3 (self, place and situation). Did not know year. BIBA from home for fall last night. Was on the floor all night and his son found him this AM. He does not remember falling, is unsure if he hit his head, and is unsure about LOC. On Plavix.  in field.

## 2023-05-05 NOTE — CONSULT NOTE ADULT - ASSESSMENT
Assessment/Plan:   This is a 78 y/o M with dementia, COPD, DM, HLD, peripheral neuropathy, and BPH brought to the ED after he was found by his son on the floor.  Was found to be tachycardic with EKG showing atrial fluter.  Given Cardizem 1 mg IV x 1 followed by 30 mg PO.  s/p IV resuscitation of 2.3L.  CPK noted to be elevated at 600's.    s/p Fall vs Syncope/New onset Atrial Flutter  - Unwitnessed fall.  Unfortunately, unable to determine whether mechanical fall or syncope  - Possibly, in the setting of tachycardia.  EKG showed atrial flutter with RVR, rates 130's.  s/p Cardizem 10 mg IV x 1 followed by 30 mg PO  - Rates improved but remains in flutter, rate 110's  - No known Hx of cardiac arrhythmias.  Follows with Dr. Alvarez, whom he saw recently (2 weeks ago)  - Would give Lopressor 5 mg IVP x 1.  Start Cardizem 60 mg q6H  - If remains tachy, would start Lopressor 25 mg q12H  - Discussed long term AC with daughter, who is amenable.  However, patient has Hx of frequent falls, though, last was >1 year ago  - For now, start FD Lovenox.  If family amenable to long term AC, would eventually transition to DOAC  - Obtain TSH  - Had a recent normal TTE (4/11/23).  No need to repeat.  Obtain records from Dr. Alvarez's office    - No evidence of volume overload except from his chronic BLE edema  - On home Lasix 40 mg and Metolazone 2.5 mg daily  - CxR not consistent with vascular congestion  - Would continue Lasix but in IV 40 mg daily.  Hold home Metolazone for now    - Monitor electrolytes, replete to keep K>4 and Mag>2  - Will continue to follow    Keya Watson DNP, NP-C, AGACNP-C  Cardiology  Spectra #[phone]

## 2023-05-05 NOTE — PATIENT PROFILE ADULT - FALL HARM RISK - RISK INTERVENTIONS

## 2023-05-05 NOTE — H&P ADULT - NSICDXPASTMEDICALHX_GEN_ALL_CORE_FT
PAST MEDICAL HISTORY:  COPD, moderate     DM (diabetes mellitus)     HLD (hyperlipidemia)     HTN (hypertension)

## 2023-05-05 NOTE — H&P ADULT - NSHPREVIEWOFSYSTEMS_GEN_ALL_CORE
CONSTITUTIONAL: denies fever, chills, fatigue, weakness  HEENT: denies blurred vision, sore throat  SKIN: denies new lesions, rash  CARDIOVASCULAR: denies chest pain, chest pressure, palpitations  RESPIRATORY: +chronic cough, denies shortness of breath, sputum production  GASTROINTESTINAL: denies nausea, vomiting, diarrhea, abdominal pain, melena or hematochezia  GENITOURINARY: denies dysuria, discharge  NEUROLOGICAL: denies numbness, headache, focal weakness  MUSCULOSKELETAL: +RLE pain at wound, denies new joint pain, muscle aches  HEMATOLOGIC: denies gross bleeding, bruising

## 2023-05-05 NOTE — H&P ADULT - PROBLEM SELECTOR PLAN 3
Pt endorsing drinking 6-7 beers per day. Counseled on how this is likely contributing to his gait instability.  - [facility] protocol ordered  - f/u B12, thiamine, folate levels  - monitor for signs/symptoms of withdrawal Pt endorsing drinking 6-7 beers per day. Counseled on how this is likely contributing to his gait instability.  - [facility] protocol ordered  - f/u B12, thiamine, folate levels  counselled on cessation  - monitor for signs/symptoms of withdrawal

## 2023-05-05 NOTE — ED PROVIDER NOTE - PHYSICAL EXAMINATION
Gen: alert, NAD  HEENT:  NC/AT, PERR, no exophthalmos  CV:  well perfused, rrr   Pulm:  normal RR, breathing comfortably, CTA b/l  Abd: s/nt/nd  MSK: moving all extremities  Neuro:  non-focal  Skin: RLE 4 cm laceration just above the medial malleolus  Psych: AOx2

## 2023-05-05 NOTE — H&P ADULT - NSHPOUTPATIENTPROVIDERS_GEN_ALL_CORE
1. The severity of signs/symptoms.(See ED/admit documents) PCP Dr. Bunn [phone]  Neuro Dr. Lawrence  Cardio Dr. Diaz

## 2023-05-05 NOTE — H&P ADULT - PROBLEM SELECTOR PLAN 1
Patient w/o known hx AF  s/p 10mg IV cardizem x 2 and 30mg PO Cardizem x 1 in ED w/ some improvement but still tachy.   Start cardizem 60mg q6h  per cardiology recommendations   Full dose a/c w/ lovenox   no role for TTE as patient has had one recently wnl  AM TSH  Replete lytes PRN   Hold home metolazone, resume lasix   Cardiology consulted, f/u recs

## 2023-05-05 NOTE — H&P ADULT - PROBLEM SELECTOR PLAN 2
Pt with hx of falls, known gait instability going to PT weekly, pt endorses drinking 6-7 beers daily, concerning for possible wernicke-korsakoff  - fall precautions  - b12, thiamine, folate levels ordered  - PT consult Pt with hx of falls, known gait instability going to PT weekly, pt endorses drinking 6-7 beers daily  - fall precautions  - b12, thiamine, folate levels ordered  -[facility] protocol    - PT consult

## 2023-05-05 NOTE — H&P ADULT - NSHPPHYSICALEXAM_GEN_ALL_CORE
T(C): 37 (05-05-23 @ 13:51), Max: 37.9 (05-05-23 @ 08:19)  HR: 108 (05-05-23 @ 13:51) (108 - 132)  BP: 133/75 (05-05-23 @ 13:51) (133/51 - 141/84)  RR: 18 (05-05-23 @ 13:51) (18 - 18)  SpO2: 96% (05-05-23 @ 13:51) (92% - 98%)    GENERAL: patient appears well, no acute distress, appropriate, pleasant  EYES: L eye scleral injection, Left lid erythema noted, no exudates  ENMT: oropharynx clear without erythema, no exudates, moist mucous membranes  LUNGS: good air entry bilaterally, clear to auscultation, symmetric breath sounds, no wheezing or rhonchi appreciated on room air  HEART: irregular, tachycardic, no murmurs noted, 2+ pitting edema b/l  GASTROINTESTINAL: abdomen is soft, nontender, nondistended, normoactive bowel sounds, no palpable masses  INTEGUMENT: good skin turgor, no lesions noted, b/l feet with chronic venous stasis dematitis, 2+ pitting edema b/l LE  MUSCULOSKELETAL: hammertoe deformities b/l, RLE wound s/p suturing, dressing with blood noted  NEUROLOGIC: awake, alert, oriented x4, CN2-12 intact, good muscle tone in 4 extremities, no obvious sensory deficits, strength 4/5 throughout, sensation intact to light touch throughout  PSYCHIATRIC: mood is good, affect is congruent, linear and logical thought process

## 2023-05-05 NOTE — CONSULT NOTE ADULT - SUBJECTIVE AND OBJECTIVE BOX
[facility] - Sarah English Pannella, Patel, Savella, Goodger  Office Number: [phone]    Initial Consult Note    CHIEF COMPLAINT: Patient is a 77y old  Male who presents with a chief complaint of     HPI:  This is a 78 y/o M with dementia, COPD, DM, HLD, peripheral neuropathy, and BPH brought to the ED after he was found by his son on the floor.      PAST MEDICAL & SURGICAL HISTORY:    SOCIAL HISTORY:  No tobacco, ethanol, or drug abuse.  FAMILY HISTORY:    No family history of acute MI or sudden cardiac death.  MEDICATIONS  (STANDING):    MEDICATIONS  (PRN):    Allergies    No Known Allergies    Intolerances    REVIEW OF SYSTEMS:  CONSTITUTIONAL: No weakness, fevers or chills  EYES/ENT: No visual changes;  No vertigo or throat pain   NECK: No pain or stiffness  RESPIRATORY: No cough, wheezing, hemoptysis; No shortness of breath  CARDIOVASCULAR: No chest pain or palpitations  GASTROINTESTINAL: No abdominal pain. No nausea, vomiting, or hematemesis; No diarrhea or constipation. No melena or hematochezia.  GENITOURINARY: No dysuria, frequency or hematuria  NEUROLOGICAL: No numbness or weakness  SKIN: No itching or rash  All other review of systems is negative unless indicated above  VITAL SIGNS:   Vital Signs Last 24 Hrs  T(C): 37 (05 May 2023 13:51), Max: 37.9 (05 May 2023 08:19)  T(F): 98.6 (05 May 2023 13:51), Max: 100.2 (05 May 2023 08:19)  HR: 108 (05 May 2023 13:51) (108 - 132)  BP: 133/75 (05 May 2023 13:51) (133/51 - 141/84)  BP(mean): --  RR: 18 (05 May 2023 13:51) (18 - 18)  SpO2: 96% (05 May 2023 13:51) (92% - 98%)    Parameters below as of 05 May 2023 13:51  Patient On (Oxygen Delivery Method): room air    I&O's Summary    On Exam:  Constitutional: NAD, alert and oriented x 3  Lungs:  Non-labored, breath sounds are clear bilaterally, No wheezing, rales or rhonchi  Cardiovascular: RRR.  S1 and S2 positive.  No murmurs, rubs, gallops or clicks  Gastrointestinal: Bowel Sounds present, soft, nontender.   Lymph: No peripheral edema. No cervical lymphadenopathy.  Neurological: Alert, no focal deficits  Skin: No rashes or ulcers   Psych:  Mood & affect appropriate.    LABS: All Labs Reviewed:                        14.4   11.03 )-----------( 231      ( 05 May 2023 08:00 )             44.1     05 May 2023 09:20    138    |  108    |  28     ----------------------------<  121    4.2     |  22     |  1.10     Ca    8.6        05 May 2023 09:20    TPro  6.9    /  Alb  3.5    /  TBili  0.7    /  DBili  x      /  AST  20     /  ALT  21     /  AlkPhos  76     05 May 2023 09:20    PT/INR - ( 05 May 2023 09:20 )   PT: 13.6 sec;   INR: 1.16 ratio         PTT - ( 05 May 2023 09:20 )  PTT:33.3 sec  CARDIAC MARKERS ( 05 May 2023 09:20 )  x     / x     / 604 U/L / x     / x        RADIOLOGY:    ACC: [id number] EXAM:  XR CHEST PORTABLE IMMED 1V   ORDERED BY: MING ANNE     PROCEDURE DATE:  05/05/2023      INTERPRETATION:  AP semierect chest on May 5, 2023 at 9:04 AM. Patient   has sepsis.    COMPARISON: None available.    Heart normal for projection.    There is slight left base atelectasis.    IMPRESSION: Slight left base atelectasis.    --- End of Report ---    EVANGELISTA RIOS MD; Attending Radiologist  This document has been electronically signed. May  5 2023 10:42AM  EKG:    Ventricular Rate 115 BPM    Atrial Rate 115 BPM    P-R Interval 200 ms    QRS Duration 124 ms    Q-T Interval 326 ms    QTC Calculation(Bazett) 450 ms    R Axis -20 degrees    T Axis -2 degrees    Diagnosis Line Sinus tachycardia  Nonspecific intraventricular conduction delay  Confirmed by MISSAEL BERMEO ([id number]) on 5/5/2023 11:05:32 AM    Assessment/Plan:   This is a 78 y/o M with dementia, COPD, DM, HLD, peripheral neuropathy, and BPH brought to the ED after he was found by his son on the floor.      s/p Fall vs Syncope  -     Keya Grantad DNP, NP-C, AGACNP-C  Cardiology  Spectra #[phone]       [facility] - Sarah English, Rodriguez Bermeo Savella, Goodger  Office Number: [phone]    Initial Consult Note    CHIEF COMPLAINT: Patient is a 77y old  Male who presents with a chief complaint of     HPI:  This is a 78 y/o M with dementia, COPD, DM, HLD, peripheral neuropathy, and BPH brought to the ED after he was found by his son on the floor.  Was found to be tachycardic, though, EKG showed Sinus tach.  Given Cardizem 1 mg IV x 1 followed by 30 mg PO.  s/p IV resuscitation of 2.3L.  CPK noted to be elevated at 600's.    PAST MEDICAL & SURGICAL HISTORY:    SOCIAL HISTORY:  No tobacco, ethanol, or drug abuse.  FAMILY HISTORY:    No family history of acute MI or sudden cardiac death.  MEDICATIONS  (STANDING):    MEDICATIONS  (PRN):    Allergies    No Known Allergies    Intolerances    REVIEW OF SYSTEMS:  CONSTITUTIONAL: No weakness, fevers or chills  EYES/ENT: No visual changes;  No vertigo or throat pain   NECK: No pain or stiffness  RESPIRATORY: No cough, wheezing, hemoptysis; No shortness of breath  CARDIOVASCULAR: No chest pain or palpitations  GASTROINTESTINAL: No abdominal pain. No nausea, vomiting, or hematemesis; No diarrhea or constipation. No melena or hematochezia.  GENITOURINARY: No dysuria, frequency or hematuria  NEUROLOGICAL: No numbness or weakness  SKIN: No itching or rash  All other review of systems is negative unless indicated above  VITAL SIGNS:   Vital Signs Last 24 Hrs  T(C): 37 (05 May 2023 13:51), Max: 37.9 (05 May 2023 08:19)  T(F): 98.6 (05 May 2023 13:51), Max: 100.2 (05 May 2023 08:19)  HR: 108 (05 May 2023 13:51) (108 - 132)  BP: 133/75 (05 May 2023 13:51) (133/51 - 141/84)  BP(mean): --  RR: 18 (05 May 2023 13:51) (18 - 18)  SpO2: 96% (05 May 2023 13:51) (92% - 98%)    Parameters below as of 05 May 2023 13:51  Patient On (Oxygen Delivery Method): room air    I&O's Summary    On Exam:  Constitutional: NAD, alert and oriented x 3  Lungs:  Non-labored, breath sounds are clear bilaterally, No wheezing, rales or rhonchi  Cardiovascular: RRR.  S1 and S2 positive.  No murmurs, rubs, gallops or clicks  Gastrointestinal: Bowel Sounds present, soft, nontender.   Lymph: No peripheral edema. No cervical lymphadenopathy.  Neurological: Alert, no focal deficits  Skin: No rashes or ulcers   Psych:  Mood & affect appropriate.    LABS: All Labs Reviewed:                        14.4   11.03 )-----------( 231      ( 05 May 2023 08:00 )             44.1     05 May 2023 09:20    138    |  108    |  28     ----------------------------<  121    4.2     |  22     |  1.10     Ca    8.6        05 May 2023 09:20    TPro  6.9    /  Alb  3.5    /  TBili  0.7    /  DBili  x      /  AST  20     /  ALT  21     /  AlkPhos  76     05 May 2023 09:20    PT/INR - ( 05 May 2023 09:20 )   PT: 13.6 sec;   INR: 1.16 ratio         PTT - ( 05 May 2023 09:20 )  PTT:33.3 sec  CARDIAC MARKERS ( 05 May 2023 09:20 )  x     / x     / 604 U/L / x     / x        RADIOLOGY:    ACC: [id number] EXAM:  XR CHEST PORTABLE IMMED 1V   ORDERED BY: MING ANNE     PROCEDURE DATE:  05/05/2023      INTERPRETATION:  AP semierect chest on May 5, 2023 at 9:04 AM. Patient   has sepsis.    COMPARISON: None available.    Heart normal for projection.    There is slight left base atelectasis.    IMPRESSION: Slight left base atelectasis.    --- End of Report ---    EVANGELISTA RIOS MD; Attending Radiologist  This document has been electronically signed. May  5 2023 10:42AM  EKG:    Ventricular Rate 115 BPM    Atrial Rate 115 BPM    P-R Interval 200 ms    QRS Duration 124 ms    Q-T Interval 326 ms    QTC Calculation(Bazett) 450 ms    R Axis -20 degrees    T Axis -2 degrees    Diagnosis Line Sinus tachycardia  Nonspecific intraventricular conduction delay  Confirmed by MISSAEL BERMEO ([id number]) on 5/5/2023 11:05:32 AM    Assessment/Plan:   This is a 78 y/o M with dementia, COPD, DM, HLD, peripheral neuropathy, and BPH brought to the ED after he was found by his son on the floor.  Was found to be tachycardic, though, EKG showed Sinus tach.  Given Cardizem 1 mg IV x 1 followed by 30 mg PO.  s/p IV resuscitation of 2.3L.  CPK noted to be elevated at 600's.    s/p Fall vs Syncope  -     Keya [place] DNP, NP-C, AGACNP-C  Cardiology  Spectra #[phone]       [facility] - Sarah English, Rodriguez Bermeo Savella, Goodger  Office Number: [phone]    Initial Consult Note    CHIEF COMPLAINT: Patient is a 77y old  Male who presents with a chief complaint of     HPI:  This is a 78 y/o M with dementia, COPD, DM, HLD, peripheral neuropathy, and BPH brought to the ED after he was found by his son on the floor. Denies CP, palpitations, SOB, PADGETT or orthopnea.  Patient is awake and alert but non-historian.  Lives by himself with an aide 3xweek during the day.  Daughter at bedside, thinks patient fell before 7 pm last night because he usually closes the front door around 7 pm before going to bed.  However, this morning, his door was open when his aide got there.  He was found on the floor awake and alert.  During the fall, he injured his lower leg resulting to a laceration requiring sutures in the ED.  No complaints upon evaluation.    Was found to be tachycardic, EKG showed atrial flutter.  Given Cardizem 1 mg IV x 1 followed by 30 mg PO.  s/p IV resuscitation of 2.3L.  CPK noted to be elevated at 600's.     PAST MEDICAL & SURGICAL HISTORY:    SOCIAL HISTORY:  No tobacco, ethanol, or drug abuse.  FAMILY HISTORY:    No family history of acute MI or sudden cardiac death.  MEDICATIONS  (STANDING):    MEDICATIONS  (PRN):    Allergies    No Known Allergies    Intolerances    REVIEW OF SYSTEMS:  CONSTITUTIONAL: No weakness, fevers or chills  EYES/ENT: No visual changes;  No vertigo or throat pain   NECK: No pain or stiffness  RESPIRATORY: No cough, wheezing, hemoptysis; No shortness of breath  CARDIOVASCULAR: No chest pain or palpitations  GASTROINTESTINAL: No abdominal pain. No nausea, vomiting, or hematemesis; No diarrhea or constipation. No melena or hematochezia.  GENITOURINARY: No dysuria, frequency or hematuria  NEUROLOGICAL: No numbness or weakness  SKIN: No itching or rash  All other review of systems is negative unless indicated above  VITAL SIGNS:   Vital Signs Last 24 Hrs  T(C): 37 (05 May 2023 13:51), Max: 37.9 (05 May 2023 08:19)  T(F): 98.6 (05 May 2023 13:51), Max: 100.2 (05 May 2023 08:19)  HR: 108 (05 May 2023 13:51) (108 - 132)  BP: 133/75 (05 May 2023 13:51) (133/51 - 141/84)  BP(mean): --  RR: 18 (05 May 2023 13:51) (18 - 18)  SpO2: 96% (05 May 2023 13:51) (92% - 98%)    Parameters below as of 05 May 2023 13:51  Patient On (Oxygen Delivery Method): room air    I&O's Summary    On Exam:  Constitutional: NAD, alert and oriented x 2, obese  Lungs:  Non-labored, breath sounds are clear bilaterally, No wheezing, rales or rhonchi  Cardiovascular: IRRR.  S1 and S2 positive.  No murmurs, rubs, gallops or clicks  Gastrointestinal: Bowel Sounds present, soft, nontender.   Lymph: 2+ BLE edema. No cervical lymphadenopathy.  Neurological: Alert, no focal deficits  Skin: No rashes.  +RLE ulcers s/p suture;  Left plantar ulcer  Psych:  Mood & affect appropriate.    LABS: All Labs Reviewed:                        14.4   11.03 )-----------( 231      ( 05 May 2023 08:00 )             44.1     05 May 2023 09:20    138    |  108    |  28     ----------------------------<  121    4.2     |  22     |  1.10     Ca    8.6        05 May 2023 09:20    TPro  6.9    /  Alb  3.5    /  TBili  0.7    /  DBili  x      /  AST  20     /  ALT  21     /  AlkPhos  76     05 May 2023 09:20    PT/INR - ( 05 May 2023 09:20 )   PT: 13.6 sec;   INR: 1.16 ratio         PTT - ( 05 May 2023 09:20 )  PTT:33.3 sec  CARDIAC MARKERS ( 05 May 2023 09:20 )  x     / x     / 604 U/L / x     / x        RADIOLOGY:    ACC: [id number] EXAM:  XR CHEST PORTABLE IMMED 1V   ORDERED BY: MING ANNE     PROCEDURE DATE:  05/05/2023      INTERPRETATION:  AP semierect chest on May 5, 2023 at 9:04 AM. Patient   has sepsis.    COMPARISON: None available.    Heart normal for projection.    There is slight left base atelectasis.    IMPRESSION: Slight left base atelectasis.    --- End of Report ---    EVANGELISTA RIOS MD; Attending Radiologist  This document has been electronically signed. May  5 2023 10:42AM  EKG:    Ventricular Rate 115 BPM    Atrial Rate 115 BPM    P-R Interval 200 ms    QRS Duration 124 ms    Q-T Interval 326 ms    QTC Calculation(Bazett) 450 ms    R Axis -20 degrees    T Axis -2 degrees    Diagnosis Line Sinus tachycardia  Nonspecific intraventricular conduction delay  Confirmed by MISSAEL BERMEO ([id number]) on 5/5/2023 11:05:32 AM

## 2023-05-05 NOTE — CONSULT NOTE ADULT - NS ATTEND AMEND GEN_ALL_CORE FT
Agree with the above. Follow up as outpatient.
Admitted with rapid AFL.  To start ccb as above for hr control, and DOAC for stroke risk reduction.  Can check echo.  Monitor on telemetry.  Discussed with daughter in detail.  To follow while admitted.

## 2023-05-05 NOTE — CONSULT NOTE ADULT - SUBJECTIVE AND OBJECTIVE BOX
SURGERY PA CONSULT NOTE:    CHIEF COMPLAINT:  Patient is a 77y old  Male who presents with a chief complaint of AF w/ RVR (05 May 2023 14:47)      HPI FROM ED:  HPI:  76 y/o M w/ PMHx of DM2, PVD, HTN, HLD, COPD, BPH presents from home s/p fall. Daughter at bedside supplements history. Patient has some forgetfulness but no documented history of dementia. Patient was found down by aide, has a life alert that he did not press. Unknown if LOC or head trauma. Estimated that patient was down from 6pm to 6am. Patient reports he spent the night on the floor, last thing he remembers is "feeling crummy on my couch" and then being woken up by his aide this morning. He also has a laceration of RLE from the fall which was repaired in the ED. Of note, patient does not have hx of AF but was found to be in AF w/ RVR at time of admission. Pt reports he drinks 6-7 beers ([employer]) each day. He has a history of falls and goes to PT weekly for gait/stability training. He has an aide who comes to the home 2x a week for 5 hours to supervise showers and prevent falls, help with medication management, and drives him to doctors appointments and physical therapy. At home pt uses a rollator and cane.     In the ED   Vitals: 133/51, , T 97, RR 12   Labs: WBC 11, , Lactate 2.4,   EKG: atrial flutter with RVR, rates 130's  Imaging  CXR: slight left base atelectasis     In the ED given:  Meds Given: 2.3L NS, 2g Ofirmev, 10mg IV Cardizem x2, 30mg PO Cardizem x1    (05 May 2023 14:47)    Interval HPI:  77yoM with PMHx prediabetes, PVD (s/p angiogram this summer with unknown intervention), HTN, HLD, COPD, BPH, prior smoker, presents to ED s/p fall at home. Patient found to be in a fib with RVR while in the ED. CT A/P performed showed incidental finding of fusiform infrarenal AAA measuring 3.8cm. Per patient and daughter at bedside patient has no hx of AAA, however does follow with outpatient vascular surgeon (neither patient nor daughter can remember MD's name) for PVD, patient had angiogram performed summer of 2022 of the lower extremity unknown if any particular intervention was done. Patient as well does physical therapy for mobility. Admits SOB and PADGETT. Patient denies any previous cardiac events, denies hx of cardiac stents/bypass. Patient denies n/v/d.     PAST MEDICAL HISTORY:  HTN (hypertension)  HLD (hyperlipidemia)  DM (diabetes mellitus)  COPD, moderate  S/P primary angioplasty    REVIEW OF SYSTEMS:  Constitutional: Denies fever, fatigue or weight loss.  Skin: Denies rash.  Eyes: Denies recent vision problems or eye pain.  ENT: Denies congestion, ear pain, or sore throat.  Endocrine: Denies thyroid problems.  Cardiovascular: Denies chest pain or palpation.  Respiratory: Denies cough, shortness of breath, congestion, or wheezing.  Gastrointestinal: Denies abdominal pain, nausea, vomiting, or diarrhea.  Genitourinary: Denies dysuria.  Musculoskeletal: +fall, + weakness, +difficulty ambulating. Denies joint swelling.  Neurologic: + fall, unknown LOC. Denies headache.      MEDICATIONS:  Home Medications:  Anoro Ellipta 62.5 mcg-25 mcg/inh inhalation powder: 1 puff(s) inhaled once a day (05 May 2023 17:07)  aspirin 81 mg oral tablet: 1 orally once a day (05 May 2023 17:07)  atorvastatin 10 mg oral tablet: 1 orally once a day (05 May 2023 17:07)  Breo Ellipta 200 mcg-25 mcg/inh inhalation powder: 1 puff(s) inhaled once a day (05 May 2023 17:07)  cilostazol 100 mg oral tablet: 1 tab(s) orally 2 times a day (05 May 2023 17:07)  DULoxetine 60 mg oral delayed release capsule: 1 orally once a day (05 May 2023 17:07)  Lasix 40 mg oral tablet: 1 tab(s) orally once a day (05 May 2023 17:07)  losartan 50 mg oral tablet: 1 tab(s) orally once a day (05 May 2023 17:07)  metOLazone 5 mg oral tablet: 1 tab(s) orally once a day (05 May 2023 17:07)  Plavix 75 mg oral tablet: 1 orally once a day (05 May 2023 17:07)  tamsulosin 0.4 mg oral capsule: 1 cap(s) orally once a day (05 May 2023 17:07)    MEDICATIONS  (STANDING):  aspirin enteric coated 81 milliGRAM(s) Oral daily  atorvastatin 10 milliGRAM(s) Oral at bedtime  budesonide 160 MICROgram(s)/formoterol 4.5 MICROgram(s) Inhaler 2 Puff(s) Inhalation two times a day  cilostazol 100 milliGRAM(s) Oral two times a day  clopidogrel Tablet 75 milliGRAM(s) Oral daily  diltiazem    Tablet 60 milliGRAM(s) Oral every 6 hours  DULoxetine 60 milliGRAM(s) Oral daily  enoxaparin Injectable 100 milliGRAM(s) SubCutaneous every 12 hours  folic acid 1 milliGRAM(s) Oral daily  furosemide    Tablet 40 milliGRAM(s) Oral daily  losartan 50 milliGRAM(s) Oral daily  multivitamin 1 Tablet(s) Oral daily  tamsulosin 0.4 milliGRAM(s) Oral at bedtime  thiamine 100 milliGRAM(s) Oral daily    MEDICATIONS  (PRN):  acetaminophen     Tablet .. 650 milliGRAM(s) Oral every 6 hours PRN Temp greater or equal to 38C (100.4F), Mild Pain (1 - 3)  aluminum hydroxide/magnesium hydroxide/simethicone Suspension 30 milliLiter(s) Oral every 4 hours PRN Dyspepsia  LORazepam   Injectable 1 milliGRAM(s) IV Push every 2 hours PRN CIWA-Ar score increase by 2 points and a total score of 7 or less  LORazepam   Injectable 1 milliGRAM(s) IV Push every 1 hour PRN CIWA-Ar score 8 or greater  melatonin 3 milliGRAM(s) Oral at bedtime PRN Insomnia  ondansetron Injectable 4 milliGRAM(s) IV Push every 8 hours PRN Nausea and/or Vomiting    ALLERGIES:  No Known Allergies    Intolerances    SOCIAL HISTORY:  Tobacco: former 1ppd x 40 years, quit ~10 years ago  EtOH:  6-7 beers daily  Recreational drug use: denies current use  Lives with: alone, has aide 2x week for 5 hours, supervises showers for falls, helps with medication compliance and drives pt to doctors appt and PT  Ambulates: rollator and cane  ADLs: mostly independent, aide as above (05 May 2023 14:47)    FAMILY HISTORY:  Family hx of DM    PHYSICAL EXAM:  GENERAL: No acute distress, lying comfortably in bed  HEAD:  Atraumatic, Normocephalic  CHEST/LUNG: Non-labored respirations, no accessory muscle use  HEART: irregularly, irregular rhythm on monitor. no JVD  ABDOMEN: Soft, non-tender, non-distended, ~3.5cm pulsatile mass of the mid abdomen  EXT: b/l lower extremities with 2+ pitting edema. Hemosiderin deposit of the lower extremities b/l, dressing of the R shin/calf medial with sanguinous drainage. Palpable DP pulses b/l  NEUROLOGY: no focal deficits    VITAL SIGNS:  Vital Signs Last 24 Hrs  T(C): 37 (05 May 2023 17:38), Max: 37.9 (05 May 2023 08:19)  T(F): 98.6 (05 May 2023 17:38), Max: 100.2 (05 May 2023 08:19)  HR: 118 (05 May 2023 17:38) (108 - 132)  BP: 123/74 (05 May 2023 17:38) (123/74 - 141/84)  RR: 19 (05 May 2023 17:38) (18 - 19)  SpO2: 98% (05 May 2023 17:38) (92% - 98%)    Parameters below as of 05 May 2023 17:38  Patient On (Oxygen Delivery Method): room air    LABS:                        14.4   11.03 )-----------( 231      ( 05 May 2023 08:00 )             44.1     05-    141  |  109<H>  |  24<H>  ----------------------------<  108<H>  3.8   |  27  |  1.10    Ca    8.4<L>      05 May 2023 17:49  Phos  2.8     05-05  Mg     1.9     05-05    TPro  6.6  /  Alb  3.5  /  TBili  0.6  /  DBili  0.3  /  AST  31  /  ALT  22  /  AlkPhos  74  05-05    PT/INR - ( 05 May 2023 09:20 )   PT: 13.6 sec;   INR: 1.16 ratio         PTT - ( 05 May 2023 09:20 )  PTT:33.3 sec  Urinalysis Basic - ( 05 May 2023 12:19 )    Color: Yellow / Appearance: Clear / S.020 / pH: x  Gluc: x / Ketone: Trace  / Bili: Negative / Urobili: Negative   Blood: x / Protein: 30 mg/dL / Nitrite: Negative   Leuk Esterase: Negative / RBC: 0-2 /HPF / WBC 0-2   Sq Epi: x / Non Sq Epi: x / Bacteria: Occasional      LIVER FUNCTIONS - ( 05 May 2023 17:49 )  Alb: 3.5 g/dL / Pro: 6.6 g/dL / ALK PHOS: 74 U/L / ALT: 22 U/L / AST: 31 U/L / GGT: x           RADIOLOGY & ADDITIONAL STUDIES:  < from: CT Head No Cont (23 @ 14:42) >  ACC: [id number] EXAM:  CT CERVICAL SPINE   ORDERED BY: MING ANNE     ACC: [id number] EXAM:  CT BRAIN   ORDERED BY: MING ANNE     PROCEDURE DATE:  2023          INTERPRETATION:  INDICATIONS:  Fall, head trauma    CT brain:  TECHNIQUE:  Serial axial images were obtained from the skull base to the   vertex without intravenous contrast.    COMPARISON EXAMINATION: None.    FINDINGS:  Ventricles and sulci:  Ventricles are prominent out of proportion to the   sulci specifically in the region of the frontal horns. Findings suggest   frontotemporal dementia. Clinical correlation recommended.  Intra-axial:  No mass, blood or abnormal attenuation is seen.  Extra-axial:  No mass or collection is seen.  Visualized sinuses: Bilateral maxillary mucosal thickening  Visualized mastoids:  Clear.  Calvarium:  Normal.  Miscellaneous:  None.        CT cervical spine:  TECHNIQUE:  Axial images were obtained using multislice helical   technique.  Reformatted coronal and sagittal images were performed.    COMPARISON EXAMINATION:  None.    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  Intervertebral disc spaces: Disc space narrowing C3-4 with endplate   sclerosis. Similar finding at C6-7. Uncovertebral joint and facet   hypertrophic change leads to significant left neural foraminal narrowing   C4-5  Miscellaneous:  None.    IMPRESSION:    BRAIN CT :Findings suggest frontotemporal dementia withasymmetric   dilatation of the frontal horns of the lateral ventricle.  CERVICAL SPINE CT: No acute fracture or traumatic subluxation.   Degenerative changes as described    --- End of Report ---            CANDE WATTERS MD; Attending Radiologist  This document has been electronically signed. May  5 2023  3:36PM    < end of copied text >  < from: CT Abdomen and Pelvis w/ IV Cont (23 @ 14:42) >    ACC: [id number] EXAM:  CT ABDOMEN AND PELVIS IC   ORDERED BY: MING LUPE OMID     PROCEDURE DATE:  2023          INTERPRETATION:  CLINICAL INFORMATION: Fall, back pain    COMPARISON: None.    CONTRAST/COMPLICATIONS:  IV Contrast: Omnipaque 350  90cc administered   10 cc discarded  Oral Contrast: NONE  Complications: None reported at time of study completion    PROCEDURE:  CT of the Abdomen and Pelvis was performed.  Sagittal and coronal reformats were performed.    FINDINGS:  LOWER CHEST: 1.8cm completely calcified nodule left lower lobe likely   granuloma. Bibasilar fibroatelectatic changes. Coronary artery   calcification..    LIVER: Hepatic cysts and too small to characterize hypodensities.  BILE DUCTS: Normal caliber.  GALLBLADDER: Within normal limits.  SPLEEN: Within normal limits.  PANCREAS: Within normal limits.  ADRENALS: Within normal limits.  KIDNEYS/URETERS: Within normal limits.    BLADDER: Within normal limits.  REPRODUCTIVE ORGANS: Enlarged prostate    BOWEL: No bowel obstruction. Colonic diverticula. Appendix no appendicitis  PERITONEUM: No ascites.  VESSELS: Fusiform infrarenal abdominal aortic aneurysm with peripheral   calcification, organized eccentric intraluminal thrombus, measuring 3.8   cm. No periaortic hematoma.  RETROPERITONEUM/LYMPH NODES: No lymphadenopathy.  ABDOMINAL WALL: Tiny fat-containing umbilical hernia. Mild widening   bilateral inguinal ring with fat..  BONES: Advanced multilevel degenerative spondylosis and disc disease   thoracolumbar spine. No fractures    IMPRESSION:  No acute findings. Specifically, no sequelae of trauma.    Colonic diverticulosis without diverticulitis.    3.8 cm fusiform infrarenal abdominal aortic aneurysm. No leakage    Additional findings as discussed    --- End of Report ---            JAVIER BROWNING MD; Attending Radiologist  This document has been electronically signed. May  5 2023  3:09PM    < end of copied text >    ASSESSMENT:  77yoM with hx of prediabetes, HTN, HLD, COPD, PVD, BPH, former smoker, presents to plv ed s/p fall, foud to be in afib with RVR. Patient with no known hx of a fib. Vascular surgery consulted for infrarenal AAA seen on CT A/P measuring 3.8cm. Patient follows with outside vascular surgeon currently on ASA, plavix and cilostazol.     PLAN:  Care per primary team  Appreciate cardio recs regarding new dx afib w/ RVR  Continue A/C & antiplatelets for new afib  Rec routine monitoring of AAA upon d/c    To be discussed with Dr. Nichole

## 2023-05-05 NOTE — H&P ADULT - ASSESSMENT
78 y/o M w/ PMHx of  DM2, HTN, HLD, COPD, PVD, BPH presents from home s/p fall.  Found to be in AF w/ RVR. Admit for new onset AF.

## 2023-05-05 NOTE — H&P ADULT - HISTORY OF PRESENT ILLNESS
78 y/o M w/ PMHx of DM2, PVD, HTN, HLD, COPD, BPH presents from home s/p fall. Daughter at bedside supplements history. Patient has some forgetfulness but no documented history of dementia. Patient was found down by aide, has a lifealert that he did not press. Unknown if LOC or head trauma. Estimated that patient was down from 6pm to 6am. Patient reports he spent the night on the floor, last thing he remembers is "feeling crummy on my couch" and then being woken up by his aide this morning. He also has a laceration of RLE from the fall which was repaired in the ED. Of note, patient does not have hx of AF but was found to be in AF w/ RVR at time of admission. Pt reports he drinks 6-7 beers ([employer]) each day. He has a history of falls and goes to PT weekly for gait/stability training. He has an aide who comes to the home 2x a week for 5 hours to supervise showers and prevent falls, help with medication management, and drives him to doctors appointments and physical therapy. At home pt uses a rollator and cane.       In the ED   Vitals: 133/51, , T 97, RR 12   Labs: WBC 11, , Lactate 2.4,   EKG: atrial flutter with RVR, rates 130's  Imaging  CXR: slight left base atelectasis     In the ED given:  Meds Given: 2.3L NS, 2g Ofirmev, 10mg IV Cardizem x2, 30mg PO Cardizem x1    78 y/o M w/ PMHx of DM2, PVD, HTN, HLD, COPD, BPH presents from home s/p fall. Daughter at bedside supplements history. Patient has some forgetfulness but no documented history of dementia. Patient was found down by aide, has a life alert that he did not press. Unknown if LOC or head trauma. Estimated that patient was down from 6pm to 6am. Patient reports he spent the night on the floor, last thing he remembers is "feeling crummy on my couch" and then being woken up by his aide this morning. He also has a laceration of RLE from the fall which was repaired in the ED. Of note, patient does not have hx of AF but was found to be in AF w/ RVR at time of admission. Pt reports he drinks 6-7 beers ([employer]) each day. He has a history of falls and goes to PT weekly for gait/stability training. He has an aide who comes to the home 2x a week for 5 hours to supervise showers and prevent falls, help with medication management, and drives him to doctors appointments and physical therapy. At home pt uses a rollator and cane.       In the ED   Vitals: 133/51, , T 97, RR 12   Labs: WBC 11, , Lactate 2.4,   EKG: atrial flutter with RVR, rates 130's  Imaging  CXR: slight left base atelectasis     In the ED given:  Meds Given: 2.3L NS, 2g Ofirmev, 10mg IV Cardizem x2, 30mg PO Cardizem x1

## 2023-05-05 NOTE — ED ADULT NURSE NOTE - NSIMPLEMENTINTERV_GEN_ALL_ED
Implemented All Fall with Harm Risk Interventions:  [place] to call system. Call bell, personal items and telephone within reach. Instruct patient to call for assistance. Room bathroom lighting operational. Non-slip footwear when patient is off stretcher. Physically safe environment: no spills, clutter or unnecessary equipment. Stretcher in lowest position, wheels locked, appropriate side rails in place. Provide visual cue, wrist band, yellow gown, etc. Monitor gait and stability. Monitor for mental status changes and reorient to person, place, and time. Review medications for side effects contributing to fall risk. Reinforce activity limits and safety measures with patient and family. Provide visual clues: red socks.

## 2023-05-05 NOTE — ED PROVIDER NOTE - OBJECTIVE STATEMENT
pt unable to provide any HPI likely 2/2 to dementia, son found him on the floor this morning when he came to see him, as per son pt must have fallen at some point in the night and couldn't get up. pt has an alert bracelet but did not push it for help. pt states that he was on the floor all night and didn't sleep. pt denies any pain or discomfort at this time.

## 2023-05-05 NOTE — H&P ADULT - PROBLEM SELECTOR PLAN 9
Engaged in discussion with pt and daughter in the ED, pt states he would not like to ever require a feeding tube. Explained what a DNR/DNI would mean and pt endorses understanding as he had to do this with his wife. He would not like to make a decision at this time but is amenable to being seen by palliative team to discuss HCP/advanced directives as he does not have documented health care proxy or advanced directives. Pt is full code at this time. Patient would like to remain FULL CODE until he decides further

## 2023-05-06 LAB
A1C WITH ESTIMATED AVERAGE GLUCOSE RESULT: 6.4 % — HIGH (ref 4–5.6)
ANION GAP SERPL CALC-SCNC: 6 MMOL/L — SIGNIFICANT CHANGE UP (ref 5–17)
BASOPHILS # BLD AUTO: 0.02 K/UL — SIGNIFICANT CHANGE UP (ref 0–0.2)
BASOPHILS NFR BLD AUTO: 0.3 % — SIGNIFICANT CHANGE UP (ref 0–2)
BUN SERPL-MCNC: 18 MG/DL — SIGNIFICANT CHANGE UP (ref 7–23)
CALCIUM SERPL-MCNC: 8.7 MG/DL — SIGNIFICANT CHANGE UP (ref 8.5–10.1)
CHLORIDE SERPL-SCNC: 111 MMOL/L — HIGH (ref 96–108)
CHOLEST SERPL-MCNC: 126 MG/DL — SIGNIFICANT CHANGE UP
CK SERPL-CCNC: 2002 U/L — HIGH (ref 26–308)
CO2 SERPL-SCNC: 27 MMOL/L — SIGNIFICANT CHANGE UP (ref 22–31)
CREAT SERPL-MCNC: 0.95 MG/DL — SIGNIFICANT CHANGE UP (ref 0.5–1.3)
CULTURE RESULTS: SIGNIFICANT CHANGE UP
EGFR: 82 ML/MIN/1.73M2 — SIGNIFICANT CHANGE UP
EOSINOPHIL # BLD AUTO: 0 K/UL — SIGNIFICANT CHANGE UP (ref 0–0.5)
EOSINOPHIL NFR BLD AUTO: 0 % — SIGNIFICANT CHANGE UP (ref 0–6)
ESTIMATED AVERAGE GLUCOSE: 137 MG/DL — HIGH (ref 68–114)
FOLATE SERPL-MCNC: 12 NG/ML — SIGNIFICANT CHANGE UP
GLUCOSE SERPL-MCNC: 127 MG/DL — HIGH (ref 70–99)
HCT VFR BLD CALC: 39.6 % — SIGNIFICANT CHANGE UP (ref 39–50)
HCV AB S/CO SERPL IA: 0.13 S/CO — SIGNIFICANT CHANGE UP (ref 0–0.99)
HCV AB SERPL-IMP: SIGNIFICANT CHANGE UP
HDLC SERPL-MCNC: 49 MG/DL — SIGNIFICANT CHANGE UP
HGB BLD-MCNC: 13.1 G/DL — SIGNIFICANT CHANGE UP (ref 13–17)
IMM GRANULOCYTES NFR BLD AUTO: 0.3 % — SIGNIFICANT CHANGE UP (ref 0–0.9)
LIPID PNL WITH DIRECT LDL SERPL: 64 MG/DL — SIGNIFICANT CHANGE UP
LYMPHOCYTES # BLD AUTO: 0.57 K/UL — LOW (ref 1–3.3)
LYMPHOCYTES # BLD AUTO: 7.9 % — LOW (ref 13–44)
MAGNESIUM SERPL-MCNC: 2.2 MG/DL — SIGNIFICANT CHANGE UP (ref 1.6–2.6)
MCHC RBC-ENTMCNC: 30.9 PG — SIGNIFICANT CHANGE UP (ref 27–34)
MCHC RBC-ENTMCNC: 33.1 GM/DL — SIGNIFICANT CHANGE UP (ref 32–36)
MCV RBC AUTO: 93.4 FL — SIGNIFICANT CHANGE UP (ref 80–100)
MONOCYTES # BLD AUTO: 1 K/UL — HIGH (ref 0–0.9)
MONOCYTES NFR BLD AUTO: 13.9 % — SIGNIFICANT CHANGE UP (ref 2–14)
NEUTROPHILS # BLD AUTO: 5.56 K/UL — SIGNIFICANT CHANGE UP (ref 1.8–7.4)
NEUTROPHILS NFR BLD AUTO: 77.6 % — HIGH (ref 43–77)
NON HDL CHOLESTEROL: 77 MG/DL — SIGNIFICANT CHANGE UP
NRBC # BLD: 0 /100 WBCS — SIGNIFICANT CHANGE UP (ref 0–0)
PHOSPHATE SERPL-MCNC: 2.8 MG/DL — SIGNIFICANT CHANGE UP (ref 2.5–4.5)
PLATELET # BLD AUTO: 205 K/UL — SIGNIFICANT CHANGE UP (ref 150–400)
POTASSIUM SERPL-MCNC: 3.8 MMOL/L — SIGNIFICANT CHANGE UP (ref 3.5–5.3)
POTASSIUM SERPL-SCNC: 3.8 MMOL/L — SIGNIFICANT CHANGE UP (ref 3.5–5.3)
RBC # BLD: 4.24 M/UL — SIGNIFICANT CHANGE UP (ref 4.2–5.8)
RBC # FLD: 13.6 % — SIGNIFICANT CHANGE UP (ref 10.3–14.5)
SODIUM SERPL-SCNC: 144 MMOL/L — SIGNIFICANT CHANGE UP (ref 135–145)
SPECIMEN SOURCE: SIGNIFICANT CHANGE UP
TRIGL SERPL-MCNC: 67 MG/DL — SIGNIFICANT CHANGE UP
TSH SERPL-MCNC: 0.89 UIU/ML — SIGNIFICANT CHANGE UP (ref 0.36–3.74)
VIT B12 SERPL-MCNC: 298 PG/ML — SIGNIFICANT CHANGE UP (ref 232–1245)
WBC # BLD: 7.17 K/UL — SIGNIFICANT CHANGE UP (ref 3.8–10.5)
WBC # FLD AUTO: 7.17 K/UL — SIGNIFICANT CHANGE UP (ref 3.8–10.5)

## 2023-05-06 PROCEDURE — 93880 EXTRACRANIAL BILAT STUDY: CPT | Mod: 26

## 2023-05-06 PROCEDURE — 93010 ELECTROCARDIOGRAM REPORT: CPT

## 2023-05-06 PROCEDURE — 99233 SBSQ HOSP IP/OBS HIGH 50: CPT

## 2023-05-06 PROCEDURE — 71045 X-RAY EXAM CHEST 1 VIEW: CPT | Mod: 26

## 2023-05-06 RX ORDER — IPRATROPIUM/ALBUTEROL SULFATE 18-103MCG
3 AEROSOL WITH ADAPTER (GRAM) INHALATION EVERY 6 HOURS
Refills: 0 | Status: DISCONTINUED | OUTPATIENT
Start: 2023-05-06 | End: 2023-05-16

## 2023-05-06 RX ORDER — DILTIAZEM HCL 120 MG
10 CAPSULE, EXT RELEASE 24 HR ORAL EVERY 4 HOURS
Refills: 0 | Status: DISCONTINUED | OUTPATIENT
Start: 2023-05-06 | End: 2023-05-16

## 2023-05-06 RX ADMIN — ENOXAPARIN SODIUM 100 MILLIGRAM(S): 100 INJECTION SUBCUTANEOUS at 05:50

## 2023-05-06 RX ADMIN — TAMSULOSIN HYDROCHLORIDE 0.4 MILLIGRAM(S): 0.4 CAPSULE ORAL at 23:00

## 2023-05-06 RX ADMIN — BUDESONIDE AND FORMOTEROL FUMARATE DIHYDRATE 2 PUFF(S): 160; 4.5 AEROSOL RESPIRATORY (INHALATION) at 05:51

## 2023-05-06 RX ADMIN — Medication 1 MILLIGRAM(S): at 12:04

## 2023-05-06 RX ADMIN — Medication 60 MILLIGRAM(S): at 17:20

## 2023-05-06 RX ADMIN — Medication 60 MILLIGRAM(S): at 23:12

## 2023-05-06 RX ADMIN — ATORVASTATIN CALCIUM 10 MILLIGRAM(S): 80 TABLET, FILM COATED ORAL at 22:58

## 2023-05-06 RX ADMIN — Medication 5 MILLIGRAM(S): at 01:07

## 2023-05-06 RX ADMIN — Medication 100 MILLIGRAM(S): at 12:05

## 2023-05-06 RX ADMIN — CILOSTAZOL 100 MILLIGRAM(S): 100 TABLET ORAL at 17:20

## 2023-05-06 RX ADMIN — BUDESONIDE AND FORMOTEROL FUMARATE DIHYDRATE 2 PUFF(S): 160; 4.5 AEROSOL RESPIRATORY (INHALATION) at 22:55

## 2023-05-06 RX ADMIN — DULOXETINE HYDROCHLORIDE 60 MILLIGRAM(S): 30 CAPSULE, DELAYED RELEASE ORAL at 12:04

## 2023-05-06 RX ADMIN — ENOXAPARIN SODIUM 100 MILLIGRAM(S): 100 INJECTION SUBCUTANEOUS at 17:21

## 2023-05-06 RX ADMIN — Medication 3 MILLILITER(S): at 12:16

## 2023-05-06 RX ADMIN — Medication 81 MILLIGRAM(S): at 12:05

## 2023-05-06 RX ADMIN — LOSARTAN POTASSIUM 50 MILLIGRAM(S): 100 TABLET, FILM COATED ORAL at 05:51

## 2023-05-06 RX ADMIN — Medication 60 MILLIGRAM(S): at 05:51

## 2023-05-06 RX ADMIN — Medication 1 TABLET(S): at 12:04

## 2023-05-06 RX ADMIN — Medication 40 MILLIGRAM(S): at 05:51

## 2023-05-06 NOTE — PHYSICAL THERAPY INITIAL EVALUATION ADULT - PERTINENT HX OF CURRENT PROBLEM, REHAB EVAL
78 y/o M w/ PMHx of DM2, PVD, HTN, HLD, COPD, BPH presents from home s/p fall. Patient was found down by aide, has a life alert that he did not press. Unknown if LOC or head trauma. Estimated that patient was down from 6pm to 6am. Patient reports he spent the night on the floor, last thing he remembers is "feeling crummy on my couch" and then being woken up by his aide this morning. He also has a laceration of RLE from the fall which was repaired in the ED. Of note, patient does not have hx of AF but was found to be in AF w/ RVR at time of admission. Pt reports he drinks 6-7 beers ([employer]) each day. He has a history of falls and goes to PT weekly for gait/stability training. He has an aide who comes to the home 2x a week x 5 hours. At home pt uses a rollator and cane. CXR: slight left base atelectasis. BRAIN CT :Findings suggest frontotemporal dementia with asymmetric dilatation of the frontal horns of the lateral ventricle. C-SPINE CT: No acute fracture or traumatic subluxation

## 2023-05-06 NOTE — PROGRESS NOTE ADULT - SUBJECTIVE AND OBJECTIVE BOX
Examined the patient at bedside in [facility]. Patient had an episode of vomiting, unable to tolerate po medication. Tachy overnight, 106- 121 BPM. on 3L of O2, with no complaints of SOB. HDS, 13.1/39.6. Pain controlled. Denies active fever, chills, nausea vomiting, chest pain.     Vital Signs Last 24 Hrs  T(C): 37.3 (06 May 2023 04:00), Max: 37.3 (05 May 2023 23:27)  T(F): 99.2 (06 May 2023 04:00), Max: 99.2 (05 May 2023 23:27)  HR: 93 (06 May 2023 04:00) (93 - 122)  BP: 159/98 (06 May 2023 04:00) (109/72 - 159/98)  BP(mean): --  RR: 18 (06 May 2023 04:00) (16 - 19)  SpO2: 93% (06 May 2023 04:00) (93% - 98%)    Parameters below as of 06 May 2023 04:00  Patient On (Oxygen Delivery Method): nasal cannula  O2 Flow (L/min): 3    MEDICATIONS  (STANDING):  aspirin enteric coated 81 milliGRAM(s) Oral daily  atorvastatin 10 milliGRAM(s) Oral at bedtime  budesonide 160 MICROgram(s)/formoterol 4.5 MICROgram(s) Inhaler 2 Puff(s) Inhalation two times a day  cilostazol 100 milliGRAM(s) Oral two times a day  diltiazem    Tablet 60 milliGRAM(s) Oral every 6 hours  DULoxetine 60 milliGRAM(s) Oral daily  enoxaparin Injectable 100 milliGRAM(s) SubCutaneous every 12 hours  folic acid 1 milliGRAM(s) Oral daily  furosemide    Tablet 40 milliGRAM(s) Oral daily  losartan 50 milliGRAM(s) Oral daily  multivitamin 1 Tablet(s) Oral daily  tamsulosin 0.4 milliGRAM(s) Oral at bedtime  thiamine 100 milliGRAM(s) Oral daily    MEDICATIONS  (PRN):  acetaminophen     Tablet .. 650 milliGRAM(s) Oral every 6 hours PRN Temp greater or equal to 38C (100.4F), Mild Pain (1 - 3)  aluminum hydroxide/magnesium hydroxide/simethicone Suspension 30 milliLiter(s) Oral every 4 hours PRN Dyspepsia  diltiazem Injectable 10 milliGRAM(s) IV Push every 4 hours PRN HR>130  LORazepam   Injectable 1 milliGRAM(s) IV Push every 2 hours PRN CIWA-Ar score increase by 2 points and a total score of 7 or less  LORazepam   Injectable 1 milliGRAM(s) IV Push every 1 hour PRN CIWA-Ar score 8 or greater  melatonin 3 milliGRAM(s) Oral at bedtime PRN Insomnia  ondansetron Injectable 4 milliGRAM(s) IV Push every 8 hours PRN Nausea and/or Vomiting    RADIOLOGY & ADDITIONAL STUDIES:  < from: CT Head No Cont (05.05.23 @ 14:42) >  ACC: [id number] EXAM:  CT CERVICAL SPINE   ORDERED BY: MING ANNE     ACC: [id number] EXAM:  CT BRAIN   ORDERED BY: MING ANNE     PROCEDURE DATE:  05/05/2023          INTERPRETATION:  INDICATIONS:  Fall, head trauma    CT brain:  TECHNIQUE:  Serial axial images were obtained from the skull base to the   vertex without intravenous contrast.    COMPARISON EXAMINATION: None.    FINDINGS:  Ventricles and sulci:  Ventricles are prominent out of proportion to the   sulci specifically in the region of the frontal horns. Findings suggest   frontotemporal dementia. Clinical correlation recommended.  Intra-axial:  No mass, blood or abnormal attenuation is seen.  Extra-axial:  No mass or collection is seen.  Visualized sinuses: Bilateral maxillary mucosal thickening  Visualized mastoids:  Clear.  Calvarium:  Normal.  Miscellaneous:  None.        CT cervical spine:  TECHNIQUE:  Axial images were obtained using multislice helical   technique.  Reformatted coronal and sagittal images were performed.    COMPARISON EXAMINATION:  None.    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  Intervertebral disc spaces: Disc space narrowing C3-4 with endplate   sclerosis. Similar finding at C6-7. Uncovertebral joint and facet   hypertrophic change leads to significant left neural foraminal narrowing   C4-5  Miscellaneous:  None.    IMPRESSION:    BRAIN CT :Findings suggest frontotemporal dementia withasymmetric   dilatation of the frontal horns of the lateral ventricle.  CERVICAL SPINE CT: No acute fracture or traumatic subluxation.   Degenerative changes as described    --- End of Report ---    CANDE WATTERS MD; Attending Radiologist  This document has been electronically signed. May  5 2023  3:36PM    < end of copied text >  < from: CT Abdomen and Pelvis w/ IV Cont (05.05.23 @ 14:42) >    ACC: [id number] EXAM:  CT ABDOMEN AND PELVIS IC   ORDERED BY: MING LUPE OMID     PROCEDURE DATE:  05/05/2023          INTERPRETATION:  CLINICAL INFORMATION: Fall, back pain    COMPARISON: None.    CONTRAST/COMPLICATIONS:  IV Contrast: Omnipaque 350  90cc administered   10 cc discarded  Oral Contrast: NONE  Complications: None reported at time of study completion    PROCEDURE:  CT of the Abdomen and Pelvis was performed.  Sagittal and coronal reformats were performed.    FINDINGS:  LOWER CHEST: 1.8cm completely calcified nodule left lower lobe likely   granuloma. Bibasilar fibroatelectatic changes. Coronary artery   calcification..    LIVER: Hepatic cysts and too small to characterize hypodensities.  BILE DUCTS: Normal caliber.  GALLBLADDER: Within normal limits.  SPLEEN: Within normal limits.  PANCREAS: Within normal limits.  ADRENALS: Within normal limits.  KIDNEYS/URETERS: Within normal limits.    BLADDER: Within normal limits.  REPRODUCTIVE ORGANS: Enlarged prostate    BOWEL: No bowel obstruction. Colonic diverticula. Appendix no appendicitis  PERITONEUM: No ascites.  VESSELS: Fusiform infrarenal abdominal aortic aneurysm with peripheral   calcification, organized eccentric intraluminal thrombus, measuring 3.8   cm. No periaortic hematoma.  RETROPERITONEUM/LYMPH NODES: No lymphadenopathy.  ABDOMINAL WALL: Tiny fat-containing umbilical hernia. Mild widening   bilateral inguinal ring with fat..  BONES: Advanced multilevel degenerative spondylosis and disc disease   thoracolumbar spine. No fractures    IMPRESSION:  No acute findings. Specifically, no sequelae of trauma.    Colonic diverticulosis without diverticulitis.    3.8 cm fusiform infrarenal abdominal aortic aneurysm. No leakage    Additional findings as discussed    --- End of Report ---            JAVIRE BROWNING MD; Attending Radiologist  This document has been electronically signed. May  5 2023  3:09PM    < end of copied text >

## 2023-05-06 NOTE — CONSULT NOTE ADULT - SUBJECTIVE AND OBJECTIVE BOX
History of Present Illness: The patient is a 77 year old male with a history of HTN, HL, DM, PAD, BPH, COPD, alcohol abuse who presents with a fall. The patient is lethargic and unable to provide much history. He was found down by aide. He reportedly drinks 6-7 beers each day.    Past Medical/Surgical History:  HTN, HL, DM, PAD, BPH, COPD, alcohol abuse    Medications:  Home Medications:  Anoro Ellipta 62.5 mcg-25 mcg/inh inhalation powder: 1 puff(s) inhaled once a day (05 May 2023 17:07)  aspirin 81 mg oral tablet: 1 orally once a day (05 May 2023 17:07)  atorvastatin 10 mg oral tablet: 1 orally once a day (05 May 2023 17:07)  Breo Ellipta 200 mcg-25 mcg/inh inhalation powder: 1 puff(s) inhaled once a day (05 May 2023 17:07)  cilostazol 100 mg oral tablet: 1 tab(s) orally 2 times a day (05 May 2023 17:07)  DULoxetine 60 mg oral delayed release capsule: 1 orally once a day (05 May 2023 17:07)  Lasix 40 mg oral tablet: 1 tab(s) orally once a day (05 May 2023 17:07)  losartan 50 mg oral tablet: 1 tab(s) orally once a day (05 May 2023 17:07)  metOLazone 5 mg oral tablet: 1 tab(s) orally once a day (05 May 2023 17:07)  Plavix 75 mg oral tablet: 1 orally once a day (05 May 2023 17:07)  tamsulosin 0.4 mg oral capsule: 1 cap(s) orally once a day (05 May 2023 17:07)      Family History: Non-contributory family history of premature cardiovascular atherosclerotic disease    Social History: +alcohol    Review of Systems:  General: No fevers, chills, weight gain  Skin: No rashes, color changes  Cardiovascular: No chest pain, orthopnea  Respiratory: No shortness of breath, cough  Gastrointestinal: No nausea, abdominal pain  Genitourinary: No incontinence, pain with urination  Musculoskeletal: No pain, swelling, decreased range of motion  Neurological: No headache, weakness  Psychiatric: No depression, anxiety  Endocrine: No weight gain, increased thirst  All other systems are comprehensively negative.    Physical Exam:  Vitals:        Vital Signs Last 24 Hrs  T(C): 37.3 (06 May 2023 04:00), Max: 37.9 (05 May 2023 08:19)  T(F): 99.2 (06 May 2023 04:00), Max: 100.2 (05 May 2023 08:19)  HR: 93 (06 May 2023 04:00) (93 - 122)  BP: 159/98 (06 May 2023 04:00) (109/72 - 159/98)  BP(mean): --  RR: 18 (06 May 2023 04:00) (16 - 19)  SpO2: 93% (06 May 2023 04:00) (93% - 98%)  Parameters below as of 06 May 2023 04:00  Patient On (Oxygen Delivery Method): nasal cannula  O2 Flow (L/min): 3  General: Lethargic  HEENT: MMM  Neck: No JVD, no carotid bruit  Lungs: CTAB  CV: RRR, nl S1/S2, no M/R/G  Abdomen: S/NT/ND, +BS  Extremities: No LE edema, no cyanosis  Neuro: AAOx0  Skin: No rash    Labs:                        14.4   11.03 )-----------( 231      ( 05 May 2023 08:00 )             44.1     05-05    141  |  109<H>  |  24<H>  ----------------------------<  108<H>  3.8   |  27  |  1.10    Ca    8.4<L>      05 May 2023 17:49  Phos  2.8     05-05  Mg     1.9     05-05    TPro  6.6  /  Alb  3.5  /  TBili  0.6  /  DBili  0.3  /  AST  31  /  ALT  22  /  AlkPhos  74  05-05    CARDIAC MARKERS ( 05 May 2023 09:20 )  x     / x     / 604 U/L / x     / x          PT/INR - ( 05 May 2023 09:20 )   PT: 13.6 sec;   INR: 1.16 ratio         PTT - ( 05 May 2023 09:20 )  PTT:33.3 sec    ECG/Telemetry: Atrial flutter with variable block, RBBB

## 2023-05-06 NOTE — CHART NOTE - NSCHARTNOTEFT_GEN_A_CORE
Called by RN with 2 episodes of NBNB vomiting. As per RN, patient is too lethargic to receive PO meds at this time. Seen and examined at bedside. Pt is lethargic but able to be awaken upon sternal rub. Attempted to obtain ROS but patient instantly fell back to sleep and starting snoring. Called by RN with 2 episodes of NBNB vomiting and tachycardia with HR in 100s -115s. As per RN, patient is too lethargic to receive PO meds at this time including his PO cardizem 60mg q6h. Seen and examined at bedside. Pt is lethargic but able to be awaken upon sternal rub. Attempted to obtain ROS but patient instantly fell back to sleep and starting snoring.      T(C): 37.3 (05-06-23 @ 01:33), Max: 37.9 (05-05-23 @ 08:19)  HR: 106 (05-06-23 @ 01:33) (106 - 132)  BP: 109/72 (05-06-23 @ 01:33) (109/72 - 141/84)  RR: 18 (05-06-23 @ 01:33) (18 - 19)  SpO2: 96% (05-06-23 @ 01:33) (92% - 98%)  Wt(kg): --    Physical Exam:  Gen: obese male, lethargic but arousable upon sternal rub, snoring, sleeping comfortable, NAD  HEENT: NCAT, PEERLA b/l, EOMI b/l, no conjunctival erythema  Cardio: regular rate and rhythm, +s1s2, no murmurs, rubs, or gallops  Pulm: CTA b/l, no wheezes, rales or rhonchi  Abdomen: soft, nontender, nondistended, +BS x4 quadrants, no guarding  Extremities: hammertoe deformities b/l, RLE wound s/p suturing, dressing with blood noted  Neuro: AAOx0  Skin: warm and dry    Assessment/Plan  78 y/o M w/ PMHx of  DM2, HTN, HLD, COPD, PVD, BPH presents from home s/p fall.  Found to be in AF w/ RVR. Admit for new onset AF. Called by RN for vomiting and tachycardia.    - Patient lethargic and vomiting x2 with CIWA score of told, will hold ativan for now due to lethargy and no other signs of withdrawal  - BP borderline soft at 109/72, with HR 100s -115s  - will hold PO meds for now including cardizem 60mg q6h due to lethargy  - RN to attempt to administer PO when patient is more awake/alert  - RN to call if changes Called by RN with 2 episodes of NBNB vomiting and tachycardia with HR in 100s -115s. As per RN, patient is too lethargic to receive PO meds at this time including his PO cardizem 60mg q6h. Seen and examined at bedside. Pt is lethargic but able to be awaken upon sternal rub. Attempted to obtain ROS but patient instantly fell back to sleep and starting snoring.      T(C): 37.3 (05-06-23 @ 01:33), Max: 37.9 (05-05-23 @ 08:19)  HR: 106 (05-06-23 @ 01:33) (106 - 132)  BP: 109/72 (05-06-23 @ 01:33) (109/72 - 141/84)  RR: 18 (05-06-23 @ 01:33) (18 - 19)  SpO2: 96% (05-06-23 @ 01:33) (92% - 98%)  Wt(kg): --    Physical Exam:  Gen: obese male, lethargic but arousable upon sternal rub, snoring, sleeping comfortable, NAD  HEENT: NCAT, PEERLA b/l, EOMI b/l, no conjunctival erythema  Cardio: regular rate and rhythm, +s1s2, no murmurs, rubs, or gallops  Pulm: CTA b/l, no wheezes, rales or rhonchi  Abdomen: soft, nontender, nondistended, +BS x4 quadrants, no guarding  Extremities: hammertoe deformities b/l, RLE wound s/p suturing, dressing with blood noted  Neuro: AAOx0  Skin: warm and dry    Assessment/Plan  78 y/o M w/ PMHx of  DM2, HTN, HLD, COPD, PVD, BPH presents from home s/p fall.  Found to be in AF w/ RVR. Admit for new onset AF. Called by RN for vomiting and tachycardia.    - Patient lethargic and vomiting x2 with CIWA score of 8, however will hold ativan for now due to lethargy and no other signs of withdrawal  - BP borderline soft at 109/72, with HR 100s -115s  - will hold PO meds for now including cardizem 60mg q6h due to lethargy  - RN to attempt to administer PO when patient is more awake/alert  - RN to call if changes Called by RN with 2 episodes of NBNB vomiting and tachycardia with HR in 100s -115s. As per RN, patient is too lethargic to receive PO meds at this time including his PO cardizem 60mg q6h. Seen and examined at bedside. Pt is lethargic but able to be awaken upon sternal rub. Attempted to obtain ROS but patient instantly fell back to sleep and starting snoring.      T(C): 37.3 (05-06-23 @ 01:33), Max: 37.9 (05-05-23 @ 08:19)  HR: 106 (05-06-23 @ 01:33) (106 - 132)  BP: 109/72 (05-06-23 @ 01:33) (109/72 - 141/84)  RR: 18 (05-06-23 @ 01:33) (18 - 19)  SpO2: 96% (05-06-23 @ 01:33) (92% - 98%)  Wt(kg): --    Physical Exam:  Gen: obese male, lethargic but arousable upon sternal rub, snoring, sleeping comfortable, NAD  HEENT: NCAT, PEERLA b/l, EOMI b/l, no conjunctival erythema  Cardio: regular rate and rhythm, +s1s2, no murmurs, rubs, or gallops  Pulm: CTA b/l, no wheezes, rales or rhonchi  Abdomen: soft, nontender, nondistended, +BS x4 quadrants, no guarding  Extremities: hammertoe deformities b/l, RLE wound s/p suturing, dressing with blood noted  Neuro: AAOx0  Skin: warm and dry    Assessment/Plan  76 y/o M w/ PMHx of  DM2, HTN, HLD, COPD, PVD, BPH presents from home s/p fall.  Found to be in AF w/ RVR. Admit for new onset AF. Called by RN for vomiting and tachycardia.    - Patient lethargic and vomiting x2 with CIWA score of 8, however will hold ativan for now due to lethargy and no other signs of acute withdrawal  - BP borderline soft at 109/72, with HR 100s -115s  - will hold PO meds for now including cardizem 60mg q6h due to lethargy  - RN to attempt to administer PO when patient is more awake/alert  - RN to call if changes

## 2023-05-06 NOTE — PROGRESS NOTE ADULT - ASSESSMENT
77yoM with hx of prediabetes, HTN, HLD, COPD, PVD, BPH, former smoker, presents to plv ed s/p fall, foud to be in afib with RVR. Patient with no known hx of a fib. Vascular surgery consulted for infrarenal AAA seen on CT A/P measuring 3.8cm. Patient follows with outside vascular surgeon currently on ASA, plavix and cilostazol.     PLAN  Appreciate cardio recs regarding new dx afib w/ RVR  Continue A/C & antiplatelets for new afib  Rec routine monitoring of AAA upon d/c  No acute vascular surgery intervention required at this time  To follow up as outpatient   To be discussed with attending.

## 2023-05-06 NOTE — PROGRESS NOTE ADULT - ASSESSMENT
78 y/o M with history of DM, HTN. HLD, COPD, BPH, PVD who was brought in for evaluation of fall. Found to have new onset aflutter with RVR.     A/P:  Fall, ? syncope   aflutter RVR, new onset   HTN    - laceration repaired in ER    - cardiology following    - cont cardizem 60mg QID     - plavix held per cardio     - cont losartan and lasix     - ECHO pending     - ? long term A/C candidate given risk of falls.     - TSH 0.89     3.8cm AAA, infrarenal     - seen by vascular surgery. cont asa/pletal    - routine monitoring outpatient     COPD     - CXR ordered    - cont symbicort and nebs prn    DVT proph: FD lovenox BID  78 y/o M with history of DM, HTN. HLD, COPD, BPH, PVD who was brought in for evaluation of fall. Found to have new onset aflutter with RVR.     A/P:  Fall, ? syncope   aflutter RVR, new onset   HTN    - laceration repaired in ER    - cardiology following    - cont cardizem 60mg QID     - plavix held per cardio     - cont losartan and lasix     - ECHO pending     - ? long term A/C candidate given risk of falls.     - TSH 0.89     3.8cm AAA, infrarenal     - seen by vascular surgery. cont asa/pletal    - routine monitoring outpatient     - patient reports he is aware of the aneurysm    COPD     - CXR ordered    - cont symbicort and nebs prn    - on 3-4 LNC at bedside. wean as tolerated.     - family brought in breo     alcohol dependence     - CIWA score was 8 earlier     - ativan decreased. patient was lethargic from 2mg dose from the ER.     - addiction medication consulted: Dr Valdivia     DVT proph: FD lovenox BID   son updated at bedside.

## 2023-05-06 NOTE — PHYSICAL THERAPY INITIAL EVALUATION ADULT - ADDITIONAL COMMENTS
Pt lives in a house, stays on main level.  Pt is primarily a household ambulator.  Pt has a home health aide 5 hours/day x 2 days/wk.

## 2023-05-06 NOTE — PROGRESS NOTE ADULT - SUBJECTIVE AND OBJECTIVE BOX
Patient is a 77y old  Male who presents with a chief complaint of AF w/ RVR (06 May 2023 09:46)    INTERVAL HPI/OVERNIGHT EVENTS: Patient was seen and examined. Patient seen at bedside earlier and was lethargic. received ativan earlier due to high CIWA score 8    I&O's Summary      LABS:                        13.1   7.17  )-----------( 205      ( 06 May 2023 08:40 )             39.6     05-06    144  |  111<H>  |  18  ----------------------------<  127<H>  3.8   |  27  |  0.95    Ca    8.7      06 May 2023 08:40  Phos  2.8     05-06  Mg     2.2     05-06    TPro  6.6  /  Alb  3.5  /  TBili  0.6  /  DBili  0.3  /  AST  31  /  ALT  22  /  AlkPhos  74  05-05    PT/INR - ( 05 May 2023 09:20 )   PT: 13.6 sec;   INR: 1.16 ratio         PTT - ( 05 May 2023 09:20 )  PTT:33.3 sec  Urinalysis Basic - ( 05 May 2023 12:19 )    Color: Yellow / Appearance: Clear / S.020 / pH: x  Gluc: x / Ketone: Trace  / Bili: Negative / Urobili: Negative   Blood: x / Protein: 30 mg/dL / Nitrite: Negative   Leuk Esterase: Negative / RBC: 0-2 /HPF / WBC 0-2   Sq Epi: x / Non Sq Epi: x / Bacteria: Occasional      CAPILLARY BLOOD GLUCOSE            Urinalysis Basic - ( 05 May 2023 12:19 )    Color: Yellow / Appearance: Clear / S.020 / pH: x  Gluc: x / Ketone: Trace  / Bili: Negative / Urobili: Negative   Blood: x / Protein: 30 mg/dL / Nitrite: Negative   Leuk Esterase: Negative / RBC: 0-2 /HPF / WBC 0-2   Sq Epi: x / Non Sq Epi: x / Bacteria: Occasional        MEDICATIONS  (STANDING):  aspirin enteric coated 81 milliGRAM(s) Oral daily  atorvastatin 10 milliGRAM(s) Oral at bedtime  budesonide 160 MICROgram(s)/formoterol 4.5 MICROgram(s) Inhaler 2 Puff(s) Inhalation two times a day  cilostazol 100 milliGRAM(s) Oral two times a day  diltiazem    Tablet 60 milliGRAM(s) Oral every 6 hours  DULoxetine 60 milliGRAM(s) Oral daily  enoxaparin Injectable 100 milliGRAM(s) SubCutaneous every 12 hours  folic acid 1 milliGRAM(s) Oral daily  furosemide    Tablet 40 milliGRAM(s) Oral daily  losartan 50 milliGRAM(s) Oral daily  multivitamin 1 Tablet(s) Oral daily  tamsulosin 0.4 milliGRAM(s) Oral at bedtime  thiamine 100 milliGRAM(s) Oral daily    MEDICATIONS  (PRN):  acetaminophen     Tablet .. 650 milliGRAM(s) Oral every 6 hours PRN Temp greater or equal to 38C (100.4F), Mild Pain (1 - 3)  albuterol/ipratropium for Nebulization 3 milliLiter(s) Nebulizer every 6 hours PRN Shortness of Breath  aluminum hydroxide/magnesium hydroxide/simethicone Suspension 30 milliLiter(s) Oral every 4 hours PRN Dyspepsia  diltiazem Injectable 10 milliGRAM(s) IV Push every 4 hours PRN HR>130  LORazepam   Injectable 1 milliGRAM(s) IV Push every 1 hour PRN CIWA-Ar score 8 or greater  melatonin 3 milliGRAM(s) Oral at bedtime PRN Insomnia  ondansetron Injectable 4 milliGRAM(s) IV Push every 8 hours PRN Nausea and/or Vomiting      REVIEW OF SYSTEMS:  CONSTITUTIONAL: No fever or chills  HEENT:  No headache, no sore throat  RESPIRATORY: No cough, wheezing, or shortness of breath  CARDIOVASCULAR: No chest pain, palpitations  GASTROINTESTINAL: No abdominal pain, nausea, vomiting, or diarrhea  GENITOURINARY: No dysuria, frequency, or hematuria  NEUROLOGICAL: no focal weakness or dizziness  MUSCULOSKELETAL: no myalgias  PSYCH: no recent changes in mood    RADIOLOGY & ADDITIONAL TESTS:    Imaging Personally Reviewed:  [x] YES  [ ] NO    Consultant(s) Notes Reviewed:  [x] YES  [ ] NO    PHYSICAL EXAM:  T(C): 36.5 (23 @ 11:56), Max: 37.3 (23 @ 23:27)  HR: 95 (23 @ 11:56) (93 - 122)  BP: 100/59 (23 @ 11:56) (100/59 - 159/98)  RR: 20 (23 @ 11:56) (16 - 20)  SpO2: 95% (23 @ 11:56) (93% - 98%)    GENERAL: NAD, well-developed, well-groomed  HEENT:  anicteric, moist mucous membranes  CHEST/LUNG:  CTA b/l, no rales, wheezes, or rhonchi  HEART:  RRR, S1, S2  ABDOMEN:  BS+, soft, nontender, nondistended  EXTREMITIES: no edema, cyanosis, or calf tenderness  NERVOUS SYSTEM: answers questions and follows commands appropriately  PSYCH: normal affect    Care Discussed with Consultants/Other Providers [x] YES  [ ] NO Patient is a 77y old  Male who presents with a chief complaint of AF w/ RVR (06 May 2023 09:46)    INTERVAL HPI/OVERNIGHT EVENTS: Patient was seen and examined. Patient seen at bedside earlier and was lethargic. received ativan earlier due to high CIWA score 8    I&O's Summary      LABS:                        13.1   7.17  )-----------( 205      ( 06 May 2023 08:40 )             39.6     05-06    144  |  111<H>  |  18  ----------------------------<  127<H>  3.8   |  27  |  0.95    Ca    8.7      06 May 2023 08:40  Phos  2.8     05-06  Mg     2.2     05-06    TPro  6.6  /  Alb  3.5  /  TBili  0.6  /  DBili  0.3  /  AST  31  /  ALT  22  /  AlkPhos  74  05-05    PT/INR - ( 05 May 2023 09:20 )   PT: 13.6 sec;   INR: 1.16 ratio         PTT - ( 05 May 2023 09:20 )  PTT:33.3 sec  Urinalysis Basic - ( 05 May 2023 12:19 )    Color: Yellow / Appearance: Clear / S.020 / pH: x  Gluc: x / Ketone: Trace  / Bili: Negative / Urobili: Negative   Blood: x / Protein: 30 mg/dL / Nitrite: Negative   Leuk Esterase: Negative / RBC: 0-2 /HPF / WBC 0-2   Sq Epi: x / Non Sq Epi: x / Bacteria: Occasional      CAPILLARY BLOOD GLUCOSE    Urinalysis Basic - ( 05 May 2023 12:19 )    Color: Yellow / Appearance: Clear / S.020 / pH: x  Gluc: x / Ketone: Trace  / Bili: Negative / Urobili: Negative   Blood: x / Protein: 30 mg/dL / Nitrite: Negative   Leuk Esterase: Negative / RBC: 0-2 /HPF / WBC 0-2   Sq Epi: x / Non Sq Epi: x / Bacteria: Occasional    MEDICATIONS  (STANDING):  aspirin enteric coated 81 milliGRAM(s) Oral daily  atorvastatin 10 milliGRAM(s) Oral at bedtime  budesonide 160 MICROgram(s)/formoterol 4.5 MICROgram(s) Inhaler 2 Puff(s) Inhalation two times a day  cilostazol 100 milliGRAM(s) Oral two times a day  diltiazem    Tablet 60 milliGRAM(s) Oral every 6 hours  DULoxetine 60 milliGRAM(s) Oral daily  enoxaparin Injectable 100 milliGRAM(s) SubCutaneous every 12 hours  folic acid 1 milliGRAM(s) Oral daily  furosemide    Tablet 40 milliGRAM(s) Oral daily  losartan 50 milliGRAM(s) Oral daily  multivitamin 1 Tablet(s) Oral daily  tamsulosin 0.4 milliGRAM(s) Oral at bedtime  thiamine 100 milliGRAM(s) Oral daily    MEDICATIONS  (PRN):  acetaminophen     Tablet .. 650 milliGRAM(s) Oral every 6 hours PRN Temp greater or equal to 38C (100.4F), Mild Pain (1 - 3)  albuterol/ipratropium for Nebulization 3 milliLiter(s) Nebulizer every 6 hours PRN Shortness of Breath  aluminum hydroxide/magnesium hydroxide/simethicone Suspension 30 milliLiter(s) Oral every 4 hours PRN Dyspepsia  diltiazem Injectable 10 milliGRAM(s) IV Push every 4 hours PRN HR>130  LORazepam   Injectable 1 milliGRAM(s) IV Push every 1 hour PRN CIWA-Ar score 8 or greater  melatonin 3 milliGRAM(s) Oral at bedtime PRN Insomnia  ondansetron Injectable 4 milliGRAM(s) IV Push every 8 hours PRN Nausea and/or Vomiting      REVIEW OF SYSTEMS:  CONSTITUTIONAL: No fever or chills  HEENT:  No headache, no sore throat  RESPIRATORY: No cough, wheezing, or shortness of breath  CARDIOVASCULAR: No chest pain, palpitations  GASTROINTESTINAL: No abdominal pain, nausea, vomiting, or diarrhea  GENITOURINARY: No dysuria, frequency, or hematuria  NEUROLOGICAL: no focal weakness or dizziness  MUSCULOSKELETAL: no myalgias  PSYCH: no recent changes in mood    RADIOLOGY & ADDITIONAL TESTS:    Imaging Personally Reviewed:  [x] YES  [ ] NO    Consultant(s) Notes Reviewed:  [x] YES  [ ] NO    PHYSICAL EXAM:  T(C): 36.5 (23 @ 11:56), Max: 37.3 (23 @ 23:27)  HR: 95 (23 @ 11:56) (93 - 122)  BP: 100/59 (23 @ 11:56) (100/59 - 159/98)  RR: 20 (23 @ 11:56) (16 - 20)  SpO2: 95% (23 @ 11:56) (93% - 98%)    GENERAL: NAD, well-developed, well-groomed  HEENT:  anicteric, moist mucous membranes  CHEST/LUNG:  CTA b/l, no rales, wheezes, or rhonchi  HEART:  RRR, S1, S2  ABDOMEN:  BS+, soft, nontender, nondistended  EXTREMITIES: no edema, cyanosis, or calf tenderness  NERVOUS SYSTEM: answers questions and follows commands appropriately  PSYCH: normal affect    Care Discussed with Consultants/Other Providers [x] YES  [ ] NO Patient is a 77y old  Male who presents with a chief complaint of AF w/ RVR (06 May 2023 09:46)    INTERVAL HPI/OVERNIGHT EVENTS: Patient was seen and examined. Patient seen at bedside earlier and was lethargic. received ativan earlier in the ER due to high CIWA score 8    He was reassessed in the     I&O's Summary      LABS:                        13.1   7.17  )-----------( 205      ( 06 May 2023 08:40 )             39.6     05-06    144  |  111<H>  |  18  ----------------------------<  127<H>  3.8   |  27  |  0.95    Ca    8.7      06 May 2023 08:40  Phos  2.8     05-  Mg     2.2     05-06    TPro  6.6  /  Alb  3.5  /  TBili  0.6  /  DBili  0.3  /  AST  31  /  ALT  22  /  AlkPhos  74  05-05    PT/INR - ( 05 May 2023 09:20 )   PT: 13.6 sec;   INR: 1.16 ratio         PTT - ( 05 May 2023 09:20 )  PTT:33.3 sec  Urinalysis Basic - ( 05 May 2023 12:19 )    Color: Yellow / Appearance: Clear / S.020 / pH: x  Gluc: x / Ketone: Trace  / Bili: Negative / Urobili: Negative   Blood: x / Protein: 30 mg/dL / Nitrite: Negative   Leuk Esterase: Negative / RBC: 0-2 /HPF / WBC 0-2   Sq Epi: x / Non Sq Epi: x / Bacteria: Occasional      CAPILLARY BLOOD GLUCOSE    Urinalysis Basic - ( 05 May 2023 12:19 )    Color: Yellow / Appearance: Clear / S.020 / pH: x  Gluc: x / Ketone: Trace  / Bili: Negative / Urobili: Negative   Blood: x / Protein: 30 mg/dL / Nitrite: Negative   Leuk Esterase: Negative / RBC: 0-2 /HPF / WBC 0-2   Sq Epi: x / Non Sq Epi: x / Bacteria: Occasional    MEDICATIONS  (STANDING):  aspirin enteric coated 81 milliGRAM(s) Oral daily  atorvastatin 10 milliGRAM(s) Oral at bedtime  budesonide 160 MICROgram(s)/formoterol 4.5 MICROgram(s) Inhaler 2 Puff(s) Inhalation two times a day  cilostazol 100 milliGRAM(s) Oral two times a day  diltiazem    Tablet 60 milliGRAM(s) Oral every 6 hours  DULoxetine 60 milliGRAM(s) Oral daily  enoxaparin Injectable 100 milliGRAM(s) SubCutaneous every 12 hours  folic acid 1 milliGRAM(s) Oral daily  furosemide    Tablet 40 milliGRAM(s) Oral daily  losartan 50 milliGRAM(s) Oral daily  multivitamin 1 Tablet(s) Oral daily  tamsulosin 0.4 milliGRAM(s) Oral at bedtime  thiamine 100 milliGRAM(s) Oral daily    MEDICATIONS  (PRN):  acetaminophen     Tablet .. 650 milliGRAM(s) Oral every 6 hours PRN Temp greater or equal to 38C (100.4F), Mild Pain (1 - 3)  albuterol/ipratropium for Nebulization 3 milliLiter(s) Nebulizer every 6 hours PRN Shortness of Breath  aluminum hydroxide/magnesium hydroxide/simethicone Suspension 30 milliLiter(s) Oral every 4 hours PRN Dyspepsia  diltiazem Injectable 10 milliGRAM(s) IV Push every 4 hours PRN HR>130  LORazepam   Injectable 1 milliGRAM(s) IV Push every 1 hour PRN CIWA-Ar score 8 or greater  melatonin 3 milliGRAM(s) Oral at bedtime PRN Insomnia  ondansetron Injectable 4 milliGRAM(s) IV Push every 8 hours PRN Nausea and/or Vomiting      REVIEW OF SYSTEMS:  CONSTITUTIONAL: No fever or chills  HEENT:  No headache, no sore throat  RESPIRATORY: No cough, wheezing, or shortness of breath  CARDIOVASCULAR: No chest pain, palpitations  GASTROINTESTINAL: No abdominal pain, nausea, vomiting, or diarrhea  GENITOURINARY: No dysuria, frequency, or hematuria  NEUROLOGICAL: no focal weakness or dizziness  MUSCULOSKELETAL: no myalgias  PSYCH: no recent changes in mood    RADIOLOGY & ADDITIONAL TESTS:    Imaging Personally Reviewed:  [x] YES  [ ] NO    Consultant(s) Notes Reviewed:  [x] YES  [ ] NO    PHYSICAL EXAM:  T(C): 36.5 (23 @ 11:56), Max: 37.3 (23 @ 23:27)  HR: 95 (23 @ 11:56) (93 - 122)  BP: 100/59 (23 @ 11:56) (100/59 - 159/98)  RR: 20 (23 @ 11:56) (16 - 20)  SpO2: 95% (23 @ 11:56) (93% - 98%)    GENERAL: NAD, well-developed, well-groomed  HEENT:  anicteric, moist mucous membranes  CHEST/LUNG:  CTA b/l, no rales, wheezes, or rhonchi  HEART:  RRR, S1, S2  ABDOMEN:  BS+, soft, nontender, nondistended  EXTREMITIES: no edema, cyanosis, or calf tenderness  NERVOUS SYSTEM: answers questions and follows commands appropriately  PSYCH: normal affect    Care Discussed with Consultants/Other Providers [x] YES  [ ] NO Patient is a 77y old  Male who presents with a chief complaint of AF w/ RVR (06 May 2023 09:46)    INTERVAL HPI/OVERNIGHT EVENTS: Patient was seen and examined. Patient seen at bedside earlier and was lethargic. received ativan earlier in the ER due to high CIWA score 8    He was reassessed in the afternoon with son at bedside. more alert and answering appropriately. Patient admits to daily beer drinking. drinks about 6-10 beers a day. Denies any urges to drink upon waking. Reports that he has never been through a detox program or was ever hospitalized for withdrawal in the past.     He has difficulty with balance at home per discussion with family. Has fallen often in the past.   on breo at home for COPD. not on home oxygen    I&O's Summary      LABS:                        13.1   7.17  )-----------( 205      ( 06 May 2023 08:40 )             39.6     05-06    144  |  111<H>  |  18  ----------------------------<  127<H>  3.8   |  27  |  0.95    Ca    8.7      06 May 2023 08:40  Phos  2.8     05-06  Mg     2.2     05-06    TPro  6.6  /  Alb  3.5  /  TBili  0.6  /  DBili  0.3  /  AST  31  /  ALT  22  /  AlkPhos  74  05-05    PT/INR - ( 05 May 2023 09:20 )   PT: 13.6 sec;   INR: 1.16 ratio         PTT - ( 05 May 2023 09:20 )  PTT:33.3 sec  Urinalysis Basic - ( 05 May 2023 12:19 )    Color: Yellow / Appearance: Clear / S.020 / pH: x  Gluc: x / Ketone: Trace  / Bili: Negative / Urobili: Negative   Blood: x / Protein: 30 mg/dL / Nitrite: Negative   Leuk Esterase: Negative / RBC: 0-2 /HPF / WBC 0-2   Sq Epi: x / Non Sq Epi: x / Bacteria: Occasional      CAPILLARY BLOOD GLUCOSE    Urinalysis Basic - ( 05 May 2023 12:19 )    Color: Yellow / Appearance: Clear / S.020 / pH: x  Gluc: x / Ketone: Trace  / Bili: Negative / Urobili: Negative   Blood: x / Protein: 30 mg/dL / Nitrite: Negative   Leuk Esterase: Negative / RBC: 0-2 /HPF / WBC 0-2   Sq Epi: x / Non Sq Epi: x / Bacteria: Occasional    MEDICATIONS  (STANDING):  aspirin enteric coated 81 milliGRAM(s) Oral daily  atorvastatin 10 milliGRAM(s) Oral at bedtime  budesonide 160 MICROgram(s)/formoterol 4.5 MICROgram(s) Inhaler 2 Puff(s) Inhalation two times a day  cilostazol 100 milliGRAM(s) Oral two times a day  diltiazem    Tablet 60 milliGRAM(s) Oral every 6 hours  DULoxetine 60 milliGRAM(s) Oral daily  enoxaparin Injectable 100 milliGRAM(s) SubCutaneous every 12 hours  folic acid 1 milliGRAM(s) Oral daily  furosemide    Tablet 40 milliGRAM(s) Oral daily  losartan 50 milliGRAM(s) Oral daily  multivitamin 1 Tablet(s) Oral daily  tamsulosin 0.4 milliGRAM(s) Oral at bedtime  thiamine 100 milliGRAM(s) Oral daily    MEDICATIONS  (PRN):  acetaminophen     Tablet .. 650 milliGRAM(s) Oral every 6 hours PRN Temp greater or equal to 38C (100.4F), Mild Pain (1 - 3)  albuterol/ipratropium for Nebulization 3 milliLiter(s) Nebulizer every 6 hours PRN Shortness of Breath  aluminum hydroxide/magnesium hydroxide/simethicone Suspension 30 milliLiter(s) Oral every 4 hours PRN Dyspepsia  diltiazem Injectable 10 milliGRAM(s) IV Push every 4 hours PRN HR>130  LORazepam   Injectable 1 milliGRAM(s) IV Push every 1 hour PRN CIWA-Ar score 8 or greater  melatonin 3 milliGRAM(s) Oral at bedtime PRN Insomnia  ondansetron Injectable 4 milliGRAM(s) IV Push every 8 hours PRN Nausea and/or Vomiting      REVIEW OF SYSTEMS:  CONSTITUTIONAL: No fever or chills  HEENT:  No headache, no sore throat  RESPIRATORY: No cough, wheezing, or shortness of breath  CARDIOVASCULAR: No chest pain, palpitations  GASTROINTESTINAL: No abdominal pain, nausea, vomiting, or diarrhea  GENITOURINARY: No dysuria, frequency, or hematuria  NEUROLOGICAL: no focal weakness or dizziness  MUSCULOSKELETAL: no myalgias  PSYCH: no recent changes in mood    RADIOLOGY & ADDITIONAL TESTS:    Imaging Personally Reviewed:  [x] YES  [ ] NO    Consultant(s) Notes Reviewed:  [x] YES  [ ] NO    PHYSICAL EXAM:  T(C): 36.5 (23 @ 11:56), Max: 37.3 (23 @ 23:27)  HR: 95 (23 @ 11:56) (93 - 122)  BP: 100/59 (23 @ 11:56) (100/59 - 159/98)  RR: 20 (23 @ 11:56) (16 - 20)  SpO2: 95% (23 @ 11:56) (93% - 98%)    GENERAL: NAD, well-developed, well-groomed  HEENT:  anicteric, moist mucous membranes  CHEST/LUNG:  Diminished breath sounds b/l, no rales, wheezes, or rhonchi  HEART:  irregular   ABDOMEN:  BS+, soft, nontender, nondistended  EXTREMITIES: no edema, cyanosis, or calf tenderness  NERVOUS SYSTEM: answers questions and follows commands appropriately  PSYCH: normal affect    Care Discussed with Consultants/Other Providers [x] YES  [ ] NO

## 2023-05-06 NOTE — CONSULT NOTE ADULT - ASSESSMENT
The patient is a 77 year old male with a history of HTN, HL, DM, PAD, BPH, COPD, alcohol abuse who presents with a fall.    Plan:  - ECG and telemetry with underlying atrial flutter  - Check echo  - Continue aspirin 81 mg daily  - For now, will hold clopidogrel  - For now, continue full dose enoxaparin. However, I am concerned regarding use of long-term anticoagulation given ongoing alcohol use, gait instability, falls. Risks may outweigh benefits. Will discuss further with patient when he is less lethargic.  - Continue cilostazol 100 mg bid  - Continue atorvastatin 10 mg daily  - Continue diltiazem 60 mg q6h and transition to  mg daily if able to take PO  - Will order diltiazem 10 mg IV q4h prn HR >130 as unclear if he can take PO at this time due to lethargy  - Continue losartan 50 mg daily  - Continue furosemide 40 mg daily

## 2023-05-07 LAB
ALBUMIN SERPL ELPH-MCNC: 3 G/DL — LOW (ref 3.3–5)
ALP SERPL-CCNC: 59 U/L — SIGNIFICANT CHANGE UP (ref 40–120)
ALT FLD-CCNC: 26 U/L — SIGNIFICANT CHANGE UP (ref 12–78)
ANION GAP SERPL CALC-SCNC: 6 MMOL/L — SIGNIFICANT CHANGE UP (ref 5–17)
AST SERPL-CCNC: 45 U/L — HIGH (ref 15–37)
BILIRUB SERPL-MCNC: 0.7 MG/DL — SIGNIFICANT CHANGE UP (ref 0.2–1.2)
BUN SERPL-MCNC: 23 MG/DL — SIGNIFICANT CHANGE UP (ref 7–23)
CALCIUM SERPL-MCNC: 7.9 MG/DL — LOW (ref 8.5–10.1)
CHLORIDE SERPL-SCNC: 105 MMOL/L — SIGNIFICANT CHANGE UP (ref 96–108)
CO2 SERPL-SCNC: 29 MMOL/L — SIGNIFICANT CHANGE UP (ref 22–31)
CREAT SERPL-MCNC: 0.91 MG/DL — SIGNIFICANT CHANGE UP (ref 0.5–1.3)
EGFR: 87 ML/MIN/1.73M2 — SIGNIFICANT CHANGE UP
GLUCOSE SERPL-MCNC: 166 MG/DL — HIGH (ref 70–99)
HCT VFR BLD CALC: 33.6 % — LOW (ref 39–50)
HCT VFR BLD CALC: 35.1 % — LOW (ref 39–50)
HCT VFR BLD CALC: 35.2 % — LOW (ref 39–50)
HGB BLD-MCNC: 11 G/DL — LOW (ref 13–17)
HGB BLD-MCNC: 11.5 G/DL — LOW (ref 13–17)
HGB BLD-MCNC: 11.6 G/DL — LOW (ref 13–17)
MCHC RBC-ENTMCNC: 30.3 PG — SIGNIFICANT CHANGE UP (ref 27–34)
MCHC RBC-ENTMCNC: 30.4 PG — SIGNIFICANT CHANGE UP (ref 27–34)
MCHC RBC-ENTMCNC: 30.9 PG — SIGNIFICANT CHANGE UP (ref 27–34)
MCHC RBC-ENTMCNC: 32.7 GM/DL — SIGNIFICANT CHANGE UP (ref 32–36)
MCHC RBC-ENTMCNC: 32.8 GM/DL — SIGNIFICANT CHANGE UP (ref 32–36)
MCHC RBC-ENTMCNC: 33 GM/DL — SIGNIFICANT CHANGE UP (ref 32–36)
MCV RBC AUTO: 92.6 FL — SIGNIFICANT CHANGE UP (ref 80–100)
MCV RBC AUTO: 92.8 FL — SIGNIFICANT CHANGE UP (ref 80–100)
MCV RBC AUTO: 93.9 FL — SIGNIFICANT CHANGE UP (ref 80–100)
NRBC # BLD: 0 /100 WBCS — SIGNIFICANT CHANGE UP (ref 0–0)
PLATELET # BLD AUTO: 167 K/UL — SIGNIFICANT CHANGE UP (ref 150–400)
PLATELET # BLD AUTO: 167 K/UL — SIGNIFICANT CHANGE UP (ref 150–400)
PLATELET # BLD AUTO: 178 K/UL — SIGNIFICANT CHANGE UP (ref 150–400)
POTASSIUM SERPL-MCNC: 3.5 MMOL/L — SIGNIFICANT CHANGE UP (ref 3.5–5.3)
POTASSIUM SERPL-SCNC: 3.5 MMOL/L — SIGNIFICANT CHANGE UP (ref 3.5–5.3)
PROT SERPL-MCNC: 5.8 G/DL — LOW (ref 6–8.3)
RBC # BLD: 3.62 M/UL — LOW (ref 4.2–5.8)
RBC # BLD: 3.75 M/UL — LOW (ref 4.2–5.8)
RBC # BLD: 3.79 M/UL — LOW (ref 4.2–5.8)
RBC # FLD: 13.2 % — SIGNIFICANT CHANGE UP (ref 10.3–14.5)
RBC # FLD: 13.2 % — SIGNIFICANT CHANGE UP (ref 10.3–14.5)
RBC # FLD: 13.3 % — SIGNIFICANT CHANGE UP (ref 10.3–14.5)
SODIUM SERPL-SCNC: 140 MMOL/L — SIGNIFICANT CHANGE UP (ref 135–145)
WBC # BLD: 5.97 K/UL — SIGNIFICANT CHANGE UP (ref 3.8–10.5)
WBC # BLD: 6.74 K/UL — SIGNIFICANT CHANGE UP (ref 3.8–10.5)
WBC # BLD: 6.97 K/UL — SIGNIFICANT CHANGE UP (ref 3.8–10.5)
WBC # FLD AUTO: 5.97 K/UL — SIGNIFICANT CHANGE UP (ref 3.8–10.5)
WBC # FLD AUTO: 6.74 K/UL — SIGNIFICANT CHANGE UP (ref 3.8–10.5)
WBC # FLD AUTO: 6.97 K/UL — SIGNIFICANT CHANGE UP (ref 3.8–10.5)

## 2023-05-07 PROCEDURE — 99233 SBSQ HOSP IP/OBS HIGH 50: CPT

## 2023-05-07 RX ORDER — FLUTICASONE FUROATE AND VILANTEROL TRIFENATATE 100; 25 UG/1; UG/1
1 POWDER RESPIRATORY (INHALATION) DAILY
Refills: 0 | Status: DISCONTINUED | OUTPATIENT
Start: 2023-05-07 | End: 2023-05-16

## 2023-05-07 RX ORDER — PANTOPRAZOLE SODIUM 20 MG/1
40 TABLET, DELAYED RELEASE ORAL EVERY 12 HOURS
Refills: 0 | Status: DISCONTINUED | OUTPATIENT
Start: 2023-05-07 | End: 2023-05-16

## 2023-05-07 RX ORDER — ONDANSETRON 8 MG/1
4 TABLET, FILM COATED ORAL ONCE
Refills: 0 | Status: DISCONTINUED | OUTPATIENT
Start: 2023-05-07 | End: 2023-05-16

## 2023-05-07 RX ORDER — METOCLOPRAMIDE HCL 10 MG
10 TABLET ORAL ONCE
Refills: 0 | Status: DISCONTINUED | OUTPATIENT
Start: 2023-05-07 | End: 2023-05-07

## 2023-05-07 RX ORDER — DILTIAZEM HCL 120 MG
120 CAPSULE, EXT RELEASE 24 HR ORAL DAILY
Refills: 0 | Status: DISCONTINUED | OUTPATIENT
Start: 2023-05-08 | End: 2023-05-11

## 2023-05-07 RX ORDER — METOCLOPRAMIDE HCL 10 MG
10 TABLET ORAL ONCE
Refills: 0 | Status: COMPLETED | OUTPATIENT
Start: 2023-05-07 | End: 2023-05-07

## 2023-05-07 RX ORDER — SODIUM CHLORIDE 9 MG/ML
1000 INJECTION INTRAMUSCULAR; INTRAVENOUS; SUBCUTANEOUS
Refills: 0 | Status: DISCONTINUED | OUTPATIENT
Start: 2023-05-07 | End: 2023-05-16

## 2023-05-07 RX ADMIN — Medication 40 MILLIGRAM(S): at 05:21

## 2023-05-07 RX ADMIN — CILOSTAZOL 100 MILLIGRAM(S): 100 TABLET ORAL at 17:44

## 2023-05-07 RX ADMIN — ATORVASTATIN CALCIUM 10 MILLIGRAM(S): 80 TABLET, FILM COATED ORAL at 21:29

## 2023-05-07 RX ADMIN — PANTOPRAZOLE SODIUM 40 MILLIGRAM(S): 20 TABLET, DELAYED RELEASE ORAL at 17:44

## 2023-05-07 RX ADMIN — Medication 60 MILLIGRAM(S): at 05:21

## 2023-05-07 RX ADMIN — Medication 10 MILLIGRAM(S): at 14:02

## 2023-05-07 RX ADMIN — ONDANSETRON 4 MILLIGRAM(S): 8 TABLET, FILM COATED ORAL at 11:48

## 2023-05-07 RX ADMIN — BUDESONIDE AND FORMOTEROL FUMARATE DIHYDRATE 2 PUFF(S): 160; 4.5 AEROSOL RESPIRATORY (INHALATION) at 08:14

## 2023-05-07 RX ADMIN — SODIUM CHLORIDE 100 MILLILITER(S): 9 INJECTION INTRAMUSCULAR; INTRAVENOUS; SUBCUTANEOUS at 09:32

## 2023-05-07 RX ADMIN — Medication 1 MILLIGRAM(S): at 13:15

## 2023-05-07 RX ADMIN — DULOXETINE HYDROCHLORIDE 60 MILLIGRAM(S): 30 CAPSULE, DELAYED RELEASE ORAL at 12:34

## 2023-05-07 RX ADMIN — Medication 100 MILLIGRAM(S): at 12:34

## 2023-05-07 RX ADMIN — TAMSULOSIN HYDROCHLORIDE 0.4 MILLIGRAM(S): 0.4 CAPSULE ORAL at 21:29

## 2023-05-07 RX ADMIN — Medication 1 MILLIGRAM(S): at 12:34

## 2023-05-07 RX ADMIN — LOSARTAN POTASSIUM 50 MILLIGRAM(S): 100 TABLET, FILM COATED ORAL at 05:21

## 2023-05-07 RX ADMIN — Medication 1 TABLET(S): at 12:35

## 2023-05-07 RX ADMIN — CILOSTAZOL 100 MILLIGRAM(S): 100 TABLET ORAL at 05:21

## 2023-05-07 RX ADMIN — ENOXAPARIN SODIUM 100 MILLIGRAM(S): 100 INJECTION SUBCUTANEOUS at 05:21

## 2023-05-07 NOTE — CONSULT NOTE ADULT - SUBJECTIVE AND OBJECTIVE BOX
[facility]  Sammy Munguia PA-C  [address] [place] [address] [id number] [phone]      Chief Complaint:  Patient is a 77y old  Male who presents with a chief complaint of AF w/ RVR (07 May 2023 09:50)      HPI:78 y/o M w/ PMHx of DM2, PVD, HTN, HLD, COPD, BPH presents from home s/p fall. Daughter at bedside supplements history. Patient has some forgetfulness but no documented history of dementia. Patient was found down by aide, has a life alert that he did not press. Unknown if LOC or head trauma. Estimated that patient was down from 6pm to 6am. Patient reports he spent the night on the floor, last thing he remembers is "feeling crummy on my couch" and then being woken up by his aide this morning. He also has a laceration of RLE from the fall which was repaired in the ED. Of note, patient does not have hx of AF but was found to be in AF w/ RVR at time of admission. Pt reports he drinks 6-7 beers ([employer]) each day. He has a history of falls and goes to PT weekly for gait/stability training. He has an aide who comes to the home 2x a week for 5 hours to supervise showers and prevent falls, help with medication management, and drives him to doctors appointments and physical therapy. At home pt uses a rollator and cane.     Allergies:  No Known Allergies      Medications:  acetaminophen     Tablet .. 650 milliGRAM(s) Oral every 6 hours PRN  albuterol/ipratropium for Nebulization 3 milliLiter(s) Nebulizer every 6 hours PRN  aluminum hydroxide/magnesium hydroxide/simethicone Suspension 30 milliLiter(s) Oral every 4 hours PRN  atorvastatin 10 milliGRAM(s) Oral at bedtime  cilostazol 100 milliGRAM(s) Oral two times a day  diltiazem Injectable 10 milliGRAM(s) IV Push every 4 hours PRN  DULoxetine 60 milliGRAM(s) Oral daily  fluticasone furoate/vilanterol 200-25 MICROgram(s) Inhaler 1 Puff(s) Inhalation daily  folic acid 1 milliGRAM(s) Oral daily  LORazepam   Injectable 1 milliGRAM(s) IV Push every 1 hour PRN  melatonin 3 milliGRAM(s) Oral at bedtime PRN  multivitamin 1 Tablet(s) Oral daily  ondansetron Injectable 4 milliGRAM(s) IV Push once  ondansetron Injectable 4 milliGRAM(s) IV Push every 8 hours PRN  pantoprazole  Injectable 40 milliGRAM(s) IV Push every 12 hours  sodium chloride 0.9%. 1000 milliLiter(s) IV Continuous <Continuous>  tamsulosin 0.4 milliGRAM(s) Oral at bedtime  thiamine 100 milliGRAM(s) Oral daily      PMHX/PSHX:  HTN (hypertension)    HLD (hyperlipidemia)    DM (diabetes mellitus)    Dementia    COPD, moderate    S/P primary angioplasty        Family history:  No pertinent family history in first degree relatives        Social History:     ROS:     General:  no fevers, chills, night sweats, fatigue,   Eyes:  Good vision, no reported pain  ENT:  No sore throat, pain, runny nose, dysphagia  CV:  No pain, palpitations, hypo/hypertension  Resp:  No dyspnea, cough, tachypnea, wheezing  GI:  No pain, No nausea, No vomiting, No diarrhea, No constipation, No weight loss, No fever, No pruritis, No rectal bleeding, No tarry stools, No dysphagia,  :  No pain, bleeding, incontinence, nocturia  Muscle:  No pain, weakness  Neuro:  No weakness, tingling, memory problems  Psych:  No fatigue, insomnia, mood problems, depression  Endocrine:  No polyuria, polydipsia, cold/heat intolerance  Heme:  No petechiae, ecchymosis, easy bruisability  Skin:  No rash, tattoos, scars, edema      PHYSICAL EXAM:   Vital Signs:  Vital Signs Last 24 Hrs  T(C): 36.4 (07 May 2023 05:29), Max: 36.7 (06 May 2023 20:56)  T(F): 97.6 (07 May 2023 05:29), Max: 98 (06 May 2023 20:56)  HR: 86 (07 May 2023 05:29) (83 - 86)  BP: 97/62 (07 May 2023 05:29) (90/51 - 97/62)  BP(mean): --  RR: 17 (07 May 2023 05:29) (17 - 19)  SpO2: 91% (07 May 2023 05:29) (91% - 91%)    Parameters below as of 07 May 2023 05:29  Patient On (Oxygen Delivery Method): nasal cannula  O2 Flow (L/min): 3    Daily     Daily Weight in k.8 (07 May 2023 05:29)    GENERAL:  Appears stated age,   HEENT:  NC/AT,    CHEST:  Full & symmetric excursion,   HEART:  Regular rhythm  ABDOMEN:  Soft, non-tender, non-distended,   EXTEREMITIES:  no cyanosis,clubbing or edema  SKIN:  No rash  NEURO:  Alert,    LABS:                        11.0   5.97  )-----------( 178      ( 07 May 2023 17:51 )             33.6     05-07    140  |  105  |  23  ----------------------------<  166<H>  3.5   |  29  |  0.91    Ca    7.9<L>      07 May 2023 07:32  Phos  2.8     05-06  Mg     2.2     05-06    TPro  5.8<L>  /  Alb  3.0<L>  /  TBili  0.7  /  DBili  x   /  AST  45<H>  /  ALT  26  /  AlkPhos  59  05-07    LIVER FUNCTIONS - ( 07 May 2023 07:32 )  Alb: 3.0 g/dL / Pro: 5.8 g/dL / ALK PHOS: 59 U/L / ALT: 26 U/L / AST: 45 U/L / GGT: x                   Imaging:

## 2023-05-07 NOTE — CARE COORDINATION ASSESSMENT. - ASSESSMENT CONCERNS TO BE ADDRESSED
Pt is a 77 year old male who lives home alone and has private hire aide 2x weekly for 5 hours.  SW spoke to patient daughter who is at bedside.  Pt is not feeling well and not up to talk.  Daughter states that patient ambulates with walker at home and has no steps.  He has been going to outpatient PT but does not seem to be improving.  Daughter states he has balance issues and fell at home.  Daughter reports patient drinks 4-5 beers daily.  He is unwilling to stop and does not want to talk about it.  Daughter is agreeable to [facility] if needed and list provided for choices.  SW to follow./care coordination

## 2023-05-07 NOTE — PROGRESS NOTE ADULT - ASSESSMENT
The patient is a 77 year old male with a history of HTN, HL, DM, PAD, BPH, COPD, alcohol abuse who presents with a fall.    Plan:  - ECG and telemetry with underlying atrial flutter  - Check echo  - Continue aspirin 81 mg daily  - For now, will hold clopidogrel  - For now, continue full dose enoxaparin. However, I am concerned regarding use of long-term anticoagulation given ongoing alcohol use, gait instability, falls. Risks may outweigh benefits.  - Continue cilostazol 100 mg bid  - Continue atorvastatin 10 mg daily  - Will transition to diltiazem  mg daily starting tomorrow  - BP on low side with uptrending CPK - restart IV fluids at 100 cc/hr  - Hold losartan, furosemide The patient is a 77 year old male with a history of HTN, HL, DM, PAD, BPH, COPD, alcohol abuse who presents with a fall.    Plan:  - ECG and telemetry with underlying atrial flutter  - Check echo  - Continue aspirin 81 mg daily  - For now, will hold clopidogrel  - For now, continue full dose enoxaparin. However, I am concerned regarding use of long-term anticoagulation given ongoing alcohol use, gait instability, falls. Risks may outweigh benefits.  - Continue cilostazol 100 mg bid  - Continue atorvastatin 10 mg daily  - Will transition to diltiazem  mg daily starting tomorrow  - BP on low side with uptrending CPK - restart IV fluids at 100 cc/hr  - Hold losartan, furosemide    ADDENDUM:  - There was reportedly hematemesis with vomiting. Will discontinue anticoagulation. Given bleeding and prior concerns with alcohol use, gait instability, falls, the patient is a poor candidate for long term anticoagulation and risks outweigh benefits.  - Hold aspirin and clopidogrel. When hemoglobin stable, will resume aspirin 81 mg daily.  - Monitor hemoglobin and transfuse as needed

## 2023-05-07 NOTE — PROGRESS NOTE ADULT - SUBJECTIVE AND OBJECTIVE BOX
Patient is a 77y old  Male who presents with a chief complaint of AF w/ RVR (06 May 2023 12:09)    INTERVAL HPI/OVERNIGHT EVENTS: Patient was seen and examined. D/w RN, patient had two episodes of brownish emesis. Currently tired at bedside. on 3LNC, 91%   no additional ativan needed overnight.     I&O's Summary    06 May 2023 07:01  -  07 May 2023 07:00  --------------------------------------------------------  IN: 0 mL / OUT: 700 mL / NET: -700 mL        LABS:                        11.6   6.97  )-----------( 167      ( 07 May 2023 07:32 )             35.2     05-07    140  |  105  |  23  ----------------------------<  166<H>  3.5   |  29  |  0.91    Ca    7.9<L>      07 May 2023 07:32  Phos  2.8     05-06  Mg     2.2     05-06    TPro  5.8<L>  /  Alb  3.0<L>  /  TBili  0.7  /  DBili  x   /  AST  45<H>  /  ALT  26  /  AlkPhos  59  05-07      Urinalysis Basic - ( 05 May 2023 12:19 )    Color: Yellow / Appearance: Clear / S.020 / pH: x  Gluc: x / Ketone: Trace  / Bili: Negative / Urobili: Negative   Blood: x / Protein: 30 mg/dL / Nitrite: Negative   Leuk Esterase: Negative / RBC: 0-2 /HPF / WBC 0-2   Sq Epi: x / Non Sq Epi: x / Bacteria: Occasional      CAPILLARY BLOOD GLUCOSE      Urinalysis Basic - ( 05 May 2023 12:19 )    Color: Yellow / Appearance: Clear / S.020 / pH: x  Gluc: x / Ketone: Trace  / Bili: Negative / Urobili: Negative   Blood: x / Protein: 30 mg/dL / Nitrite: Negative   Leuk Esterase: Negative / RBC: 0-2 /HPF / WBC 0-2   Sq Epi: x / Non Sq Epi: x / Bacteria: Occasional        MEDICATIONS  (STANDING):  atorvastatin 10 milliGRAM(s) Oral at bedtime  budesonide 160 MICROgram(s)/formoterol 4.5 MICROgram(s) Inhaler 2 Puff(s) Inhalation two times a day  cilostazol 100 milliGRAM(s) Oral two times a day  DULoxetine 60 milliGRAM(s) Oral daily  folic acid 1 milliGRAM(s) Oral daily  multivitamin 1 Tablet(s) Oral daily  pantoprazole  Injectable 40 milliGRAM(s) IV Push every 12 hours  sodium chloride 0.9%. 1000 milliLiter(s) (100 mL/Hr) IV Continuous <Continuous>  tamsulosin 0.4 milliGRAM(s) Oral at bedtime  thiamine 100 milliGRAM(s) Oral daily    MEDICATIONS  (PRN):  acetaminophen     Tablet .. 650 milliGRAM(s) Oral every 6 hours PRN Temp greater or equal to 38C (100.4F), Mild Pain (1 - 3)  albuterol/ipratropium for Nebulization 3 milliLiter(s) Nebulizer every 6 hours PRN Shortness of Breath  aluminum hydroxide/magnesium hydroxide/simethicone Suspension 30 milliLiter(s) Oral every 4 hours PRN Dyspepsia  diltiazem Injectable 10 milliGRAM(s) IV Push every 4 hours PRN HR>130  LORazepam   Injectable 1 milliGRAM(s) IV Push every 1 hour PRN CIWA-Ar score 8 or greater  melatonin 3 milliGRAM(s) Oral at bedtime PRN Insomnia  ondansetron Injectable 4 milliGRAM(s) IV Push every 8 hours PRN Nausea and/or Vomiting      REVIEW OF SYSTEMS:  CONSTITUTIONAL: No fever or chills  HEENT:  No headache, no sore throat  RESPIRATORY: No cough, wheezing, or shortness of breath  CARDIOVASCULAR: No chest pain, palpitations  GASTROINTESTINAL: No abdominal pain, nausea, + vomiting  GENITOURINARY: No dysuria, frequency, or hematuria  NEUROLOGICAL: no focal weakness or dizziness  MUSCULOSKELETAL: no myalgias  PSYCH: no recent changes in mood    RADIOLOGY & ADDITIONAL TESTS:    Imaging Personally Reviewed:  [x] YES  [ ] NO    Consultant(s) Notes Reviewed:  [x] YES  [ ] NO    PHYSICAL EXAM:  T(C): 36.4 (23 @ 05:29), Max: 36.7 (23 @ 20:56)  HR: 86 (23 @ 05:29) (83 - 99)  BP: 97/62 (23 @ 05:29) (90/51 - 125/89)  RR: 17 (23 @ 05:29) (17 - 20)  SpO2: 91% (23 @ 05:29) (91% - 96%)    GENERAL: NAD, well-developed, well-groomed  HEENT:  anicteric, dry mucous membranes  CHEST/LUNG:  CTA b/l, no rales, wheezes, or rhonchi  HEART:  irregular   ABDOMEN:  BS+, soft, nontender, nondistended  EXTREMITIES: no edema, cyanosis, or calf tenderness  NERVOUS SYSTEM: answers questions and follows commands appropriately  PSYCH: normal affect    Care Discussed with Consultants/Other Providers [x] YES  [ ] NO

## 2023-05-07 NOTE — CONSULT NOTE ADULT - ASSESSMENT
afib  gi bleed    hgb now stable  diet as tolerated  no signs of portal htn  suspect esophagitis  proton pump inhibitor bid  if hgb stable in am then resume a/c  will follow

## 2023-05-07 NOTE — PROGRESS NOTE ADULT - SUBJECTIVE AND OBJECTIVE BOX
Chief Complaint: Fall    Interval Events: Nauseous and vomiting this morning.    Review of Systems:  General: No fevers, chills, weight gain  Skin: No rashes, color changes  Cardiovascular: No chest pain, orthopnea  Respiratory: No shortness of breath, cough  Gastrointestinal: +nausea, abdominal pain  Genitourinary: No incontinence, pain with urination  Musculoskeletal: No pain, swelling, decreased range of motion  Neurological: No headache, weakness  Psychiatric: No depression, anxiety  Endocrine: No weight gain, increased thirst  All other systems are comprehensively negative.    Physical Exam:  Vitals:        Vital Signs Last 24 Hrs  T(C): 36.4 (07 May 2023 05:29), Max: 36.7 (06 May 2023 20:56)  T(F): 97.6 (07 May 2023 05:29), Max: 98 (06 May 2023 20:56)  HR: 86 (07 May 2023 05:29) (83 - 99)  BP: 97/62 (07 May 2023 05:29) (90/51 - 125/89)  BP(mean): --  RR: 17 (07 May 2023 05:29) (17 - 20)  SpO2: 91% (07 May 2023 05:29) (91% - 96%)  Parameters below as of 07 May 2023 05:29  Patient On (Oxygen Delivery Method): nasal cannula  O2 Flow (L/min): 3  General: NAD  HEENT: MMM  Neck: No JVD, no carotid bruit  Lungs: CTAB  CV: Irregular, nl S1/S2, no M/R/G  Abdomen: S/NT/ND, +BS  Extremities: No LE edema, no cyanosis  Neuro: AAOx3, non-focal  Skin: No rash    Labs:                        13.1   7.17  )-----------( 205      ( 06 May 2023 08:40 )             39.6     05-06    144  |  111<H>  |  18  ----------------------------<  127<H>  3.8   |  27  |  0.95    Ca    8.7      06 May 2023 08:40  Phos  2.8     05-06  Mg     2.2     05-06    TPro  6.6  /  Alb  3.5  /  TBili  0.6  /  DBili  0.3  /  AST  31  /  ALT  22  /  AlkPhos  74  05-05    CARDIAC MARKERS ( 06 May 2023 08:40 )  x     / x     / 2002 U/L / x     / x      CARDIAC MARKERS ( 05 May 2023 09:20 )  x     / x     / 604 U/L / x     / x          PT/INR - ( 05 May 2023 09:20 )   PT: 13.6 sec;   INR: 1.16 ratio         PTT - ( 05 May 2023 09:20 )  PTT:33.3 sec    ECG/Telemetry: Atrial flutter

## 2023-05-07 NOTE — CARE COORDINATION ASSESSMENT. - NSPASTMEDSURGHISTORY_GEN_ALL_CORE_FT
PAST MEDICAL & SURGICAL HISTORY:  COPD, moderate      DM (diabetes mellitus)      HLD (hyperlipidemia)      HTN (hypertension)      S/P primary angioplasty

## 2023-05-07 NOTE — CARE COORDINATION ASSESSMENT. - NSCAREPROVIDERS_GEN_ALL_CORE_FT
CARE PROVIDERS:  Accepting Physician: Amol Breaux  Admitting: Amol Breaux  Attending: Amol Breaux  Consultant: Tonya Chandler  Consultant: Bobby Vargas  Consultant: Alida Irving  Covering Team: Juan Whitfield  Covering Team: Christine Cao  Covering Team: Tonya Chandler  Covering Team: Jose Raul Castro ED Attending: Erica Breaux ED Nurse: Love Fong  Nurse: Amrita Cody  Nurse: Jonna Pimentel  Nurse: Sandra Lua  Nurse: Marysol Suárez  Ordered: ADM, User  Ordered: Doctor, Unknown  Override: Jonna Pimentel  Override: Davidson Moulotn  PCA/Nursing Assistant: Carmen Burch  PCA/Nursing Assistant: Maria Teresa Galicia  Primary Team: Cassie Garrison  Primary Team: Fanny Fraga  Primary Team: Laura Berry  Primary Team: Dutch Roque  Registered Dietitian: Kavita Engel  : Raymond Saldana

## 2023-05-07 NOTE — PROGRESS NOTE ADULT - ASSESSMENT
76 y/o M with history of DM, HTN. HLD, COPD, BPH, PVD who was brought in for evaluation of fall. Found to have new onset aflutter with RVR. hospitalization complicated by hematemesis.     A/P:  Fall   aflutter RVR, new onset   HTN  hematemesis     - cardiology following    - cont cardizem changed to daily    - plavix held per cardio     - losartan and lasix held. BP low     - ECHO pending     - d/w cardio, given hematemesis x 2 episodes. will hold FD lovenox and ASA 81mg for now. GI consulted Dr Collins. monitor CBC, transfuse if hgb <7 . start Protonix 40mg IV BID    - TSH 0.89     3.8cm AAA, infrarenal     - seen by vascular surgery. cont pletal. ASA held given hematemesis. restart when able.     - routine monitoring outpatient     - patient reports he is aware of the aneurysm    COPD     - cont symbicort and nebs prn    - on 3 LNC at bedside. wean as tolerated.     - family brought in breo     alcohol dependence     - CIWA score was 8 earlier     - ativan decreased. patient was lethargic from 2mg dose from the ER. so far did not require any additional dosing    - addiction medication consulted: Dr Valdivia     gait abnormality     - d/w son, reports balance concerns in the past. Neuro consulted Dr Chandler.     DVT proph: FD lovenox BID held. ASA held   son Guzman updated over the phone [phone]. HCP completed yesterday as Guzman as primary and Krystal as alternative.     monitor CBC today.   78 y/o M with history of DM, HTN. HLD, COPD, BPH, PVD who was brought in for evaluation of fall. Found to have new onset aflutter with RVR. hospitalization complicated by hematemesis.     A/P:  Fall   aflutter RVR, new onset   HTN  hematemesis     - cardiology following    - cont cardizem changed to daily    - plavix held per cardio     - losartan and lasix held. BP low     - ECHO pending     - d/w cardio, given hematemesis x 2 episodes. will hold FD lovenox and ASA 81mg for now. GI consulted Dr Collins. monitor CBC, transfuse if hgb <7 . start Protonix 40mg IV BID    - TSH 0.89     3.8cm AAA, infrarenal     - seen by vascular surgery. cont pletal. ASA held given hematemesis. restart when able.     - routine monitoring outpatient     - patient reports he is aware of the aneurysm    COPD     - cont symbicort and nebs prn    - on 3 LNC at bedside. wean as tolerated.     - family brought in breo     alcohol dependence     - CIWA score was 8 earlier     - ativan decreased. patient was lethargic from 2mg dose from the ER. so far did not require any additional dosing    - addiction medication consulted: Dr Valdivia     gait abnormality     - d/w son, reports balance concerns in the past. Neuro consulted Dr Chandler.     - ? frontotemporal dementia. Will order MRI brain     DVT proph: FD lovenox BID held. ASA held   sue Hinds updated over the phone [phone]. HCP completed yesterday as Guzman as primary and Krystal as alternative.     monitor CBC today. Spoke with daughter. Blood consent obtained at bedside with daughter witness.

## 2023-05-08 LAB
ALBUMIN SERPL ELPH-MCNC: 2.7 G/DL — LOW (ref 3.3–5)
ALP SERPL-CCNC: 50 U/L — SIGNIFICANT CHANGE UP (ref 40–120)
ALT FLD-CCNC: 22 U/L — SIGNIFICANT CHANGE UP (ref 12–78)
ANION GAP SERPL CALC-SCNC: 3 MMOL/L — LOW (ref 5–17)
APPEARANCE UR: CLEAR — SIGNIFICANT CHANGE UP
AST SERPL-CCNC: 32 U/L — SIGNIFICANT CHANGE UP (ref 15–37)
B PERT DNA SPEC QL NAA+PROBE: SIGNIFICANT CHANGE UP
BILIRUB SERPL-MCNC: 0.6 MG/DL — SIGNIFICANT CHANGE UP (ref 0.2–1.2)
BILIRUB UR-MCNC: NEGATIVE — SIGNIFICANT CHANGE UP
BUN SERPL-MCNC: 22 MG/DL — SIGNIFICANT CHANGE UP (ref 7–23)
C PNEUM DNA SPEC QL NAA+PROBE: SIGNIFICANT CHANGE UP
CALCIUM SERPL-MCNC: 7.8 MG/DL — LOW (ref 8.5–10.1)
CHLORIDE SERPL-SCNC: 108 MMOL/L — SIGNIFICANT CHANGE UP (ref 96–108)
CO2 SERPL-SCNC: 31 MMOL/L — SIGNIFICANT CHANGE UP (ref 22–31)
COLOR SPEC: YELLOW — SIGNIFICANT CHANGE UP
CREAT SERPL-MCNC: 0.9 MG/DL — SIGNIFICANT CHANGE UP (ref 0.5–1.3)
DIFF PNL FLD: ABNORMAL
EGFR: 88 ML/MIN/1.73M2 — SIGNIFICANT CHANGE UP
FLUAV H1 2009 PAND RNA SPEC QL NAA+PROBE: SIGNIFICANT CHANGE UP
FLUAV H1 RNA SPEC QL NAA+PROBE: SIGNIFICANT CHANGE UP
FLUAV H3 RNA SPEC QL NAA+PROBE: SIGNIFICANT CHANGE UP
FLUAV SUBTYP SPEC NAA+PROBE: SIGNIFICANT CHANGE UP
FLUBV RNA SPEC QL NAA+PROBE: SIGNIFICANT CHANGE UP
GLUCOSE SERPL-MCNC: 107 MG/DL — HIGH (ref 70–99)
GLUCOSE UR QL: NEGATIVE — SIGNIFICANT CHANGE UP
HADV DNA SPEC QL NAA+PROBE: SIGNIFICANT CHANGE UP
HCOV PNL SPEC NAA+PROBE: SIGNIFICANT CHANGE UP
HCT VFR BLD CALC: 32.9 % — LOW (ref 39–50)
HGB BLD-MCNC: 10.6 G/DL — LOW (ref 13–17)
HMPV RNA SPEC QL NAA+PROBE: SIGNIFICANT CHANGE UP
HPIV1 RNA SPEC QL NAA+PROBE: SIGNIFICANT CHANGE UP
HPIV2 RNA SPEC QL NAA+PROBE: SIGNIFICANT CHANGE UP
HPIV3 RNA SPEC QL NAA+PROBE: SIGNIFICANT CHANGE UP
HPIV4 RNA SPEC QL NAA+PROBE: SIGNIFICANT CHANGE UP
KETONES UR-MCNC: NEGATIVE — SIGNIFICANT CHANGE UP
LEUKOCYTE ESTERASE UR-ACNC: ABNORMAL
MCHC RBC-ENTMCNC: 30.1 PG — SIGNIFICANT CHANGE UP (ref 27–34)
MCHC RBC-ENTMCNC: 32.2 GM/DL — SIGNIFICANT CHANGE UP (ref 32–36)
MCV RBC AUTO: 93.5 FL — SIGNIFICANT CHANGE UP (ref 80–100)
NITRITE UR-MCNC: NEGATIVE — SIGNIFICANT CHANGE UP
NRBC # BLD: 0 /100 WBCS — SIGNIFICANT CHANGE UP (ref 0–0)
PH UR: 8 — SIGNIFICANT CHANGE UP (ref 5–8)
PLATELET # BLD AUTO: 163 K/UL — SIGNIFICANT CHANGE UP (ref 150–400)
POTASSIUM SERPL-MCNC: 3.5 MMOL/L — SIGNIFICANT CHANGE UP (ref 3.5–5.3)
POTASSIUM SERPL-SCNC: 3.5 MMOL/L — SIGNIFICANT CHANGE UP (ref 3.5–5.3)
PROT SERPL-MCNC: 5.5 G/DL — LOW (ref 6–8.3)
PROT UR-MCNC: 15
RAPID RVP RESULT: DETECTED
RBC # BLD: 3.52 M/UL — LOW (ref 4.2–5.8)
RBC # FLD: 13.2 % — SIGNIFICANT CHANGE UP (ref 10.3–14.5)
RV+EV RNA SPEC QL NAA+PROBE: SIGNIFICANT CHANGE UP
SARS-COV-2 RNA SPEC QL NAA+PROBE: DETECTED
SARS-COV-2 RNA SPEC QL NAA+PROBE: DETECTED
SODIUM SERPL-SCNC: 142 MMOL/L — SIGNIFICANT CHANGE UP (ref 135–145)
SP GR SPEC: 1.01 — SIGNIFICANT CHANGE UP (ref 1.01–1.02)
UROBILINOGEN FLD QL: NEGATIVE — SIGNIFICANT CHANGE UP
WBC # BLD: 6.25 K/UL — SIGNIFICANT CHANGE UP (ref 3.8–10.5)
WBC # FLD AUTO: 6.25 K/UL — SIGNIFICANT CHANGE UP (ref 3.8–10.5)

## 2023-05-08 PROCEDURE — 70551 MRI BRAIN STEM W/O DYE: CPT | Mod: 26

## 2023-05-08 PROCEDURE — 71045 X-RAY EXAM CHEST 1 VIEW: CPT | Mod: 26

## 2023-05-08 PROCEDURE — 99233 SBSQ HOSP IP/OBS HIGH 50: CPT

## 2023-05-08 RX ORDER — DEXAMETHASONE 0.5 MG/5ML
6 ELIXIR ORAL DAILY
Refills: 0 | Status: COMPLETED | OUTPATIENT
Start: 2023-05-08 | End: 2023-05-14

## 2023-05-08 RX ORDER — REMDESIVIR 5 MG/ML
200 INJECTION INTRAVENOUS EVERY 24 HOURS
Refills: 0 | Status: COMPLETED | OUTPATIENT
Start: 2023-05-08 | End: 2023-05-08

## 2023-05-08 RX ORDER — REMDESIVIR 5 MG/ML
100 INJECTION INTRAVENOUS EVERY 24 HOURS
Refills: 0 | Status: COMPLETED | OUTPATIENT
Start: 2023-05-09 | End: 2023-05-12

## 2023-05-08 RX ORDER — REMDESIVIR 5 MG/ML
INJECTION INTRAVENOUS
Refills: 0 | Status: COMPLETED | OUTPATIENT
Start: 2023-05-08 | End: 2023-05-12

## 2023-05-08 RX ORDER — IBUPROFEN 200 MG
400 TABLET ORAL EVERY 8 HOURS
Refills: 0 | Status: DISCONTINUED | OUTPATIENT
Start: 2023-05-08 | End: 2023-05-16

## 2023-05-08 RX ADMIN — DULOXETINE HYDROCHLORIDE 60 MILLIGRAM(S): 30 CAPSULE, DELAYED RELEASE ORAL at 11:18

## 2023-05-08 RX ADMIN — Medication 6 MILLIGRAM(S): at 21:42

## 2023-05-08 RX ADMIN — Medication 400 MILLIGRAM(S): at 16:19

## 2023-05-08 RX ADMIN — Medication 1 TABLET(S): at 11:18

## 2023-05-08 RX ADMIN — PANTOPRAZOLE SODIUM 40 MILLIGRAM(S): 20 TABLET, DELAYED RELEASE ORAL at 17:14

## 2023-05-08 RX ADMIN — CILOSTAZOL 100 MILLIGRAM(S): 100 TABLET ORAL at 17:14

## 2023-05-08 RX ADMIN — FLUTICASONE FUROATE AND VILANTEROL TRIFENATATE 1 PUFF(S): 100; 25 POWDER RESPIRATORY (INHALATION) at 05:17

## 2023-05-08 RX ADMIN — PANTOPRAZOLE SODIUM 40 MILLIGRAM(S): 20 TABLET, DELAYED RELEASE ORAL at 05:17

## 2023-05-08 RX ADMIN — Medication 1 MILLIGRAM(S): at 11:18

## 2023-05-08 RX ADMIN — ATORVASTATIN CALCIUM 10 MILLIGRAM(S): 80 TABLET, FILM COATED ORAL at 21:42

## 2023-05-08 RX ADMIN — Medication 120 MILLIGRAM(S): at 05:17

## 2023-05-08 RX ADMIN — Medication 650 MILLIGRAM(S): at 12:56

## 2023-05-08 RX ADMIN — CILOSTAZOL 100 MILLIGRAM(S): 100 TABLET ORAL at 05:17

## 2023-05-08 RX ADMIN — TAMSULOSIN HYDROCHLORIDE 0.4 MILLIGRAM(S): 0.4 CAPSULE ORAL at 21:42

## 2023-05-08 RX ADMIN — REMDESIVIR 200 MILLIGRAM(S): 5 INJECTION INTRAVENOUS at 16:19

## 2023-05-08 RX ADMIN — Medication 650 MILLIGRAM(S): at 16:00

## 2023-05-08 RX ADMIN — Medication 100 MILLIGRAM(S): at 11:18

## 2023-05-08 NOTE — PROGRESS NOTE ADULT - ASSESSMENT
afib  gi bleed    hgb noted  diet as tolerated  no signs of portal htn  suspect esophagitis  proton pump inhibitor bid  ok to start clear liquids today  anti-emetics prn  Cont to hold off on a/c  d/w family at bedside    Advanced care planning was discussed with patient and family.  Advanced care planning forms were reviewed and discussed.  Risks, benefits and alternatives of gastroenterologic procedures were discussed in detail and all questions were answered.    30 minutes spent.

## 2023-05-08 NOTE — PROVIDER CONTACT NOTE (OTHER) - SITUATION
Pt had one episode of dark emesis
Pt temp 103 rectal
Pt lethargic with one episode of vomiting. Pt not able to tolerate PO meds. Pt due for cardizem PO. /72 and HR 100s-120s. Pt Rass score -2 and CIWA of 8.
pt vomited one episode of coffee ground emesis @ 655am

## 2023-05-08 NOTE — PROGRESS NOTE ADULT - ASSESSMENT
76 y/o M with history of DM, HTN. HLD, COPD, BPH, PVD who was brought in for evaluation of fall. Found to have new onset aflutter with RVR. hospitalization complicated by hematemesis.     A/P:  Fall   aflutter RVR, new onset   HTN  hematemesis     - cardiology following    - cont cardizem daily    - plavix held per cardio     - losartan and lasix held. BP low     - ECHO pending     - cardio following, given hematemesis. Will hold FD lovenox and ASA 81mg for now.     - GI following Dr Collins. ?esophagitis. Monitor CBC, transfuse if hgb <7 . cont Protonix 40mg IV BID. consider clears if no plans for intervention. will discuss with GI.     - TSH 0.89     3.8cm AAA, infrarenal     - seen by vascular surgery. cont pletal. ASA held given hematemesis. restart when able.     - routine monitoring outpatient     - patient reports he is aware of the aneurysm    COPD     - cont nebs prn    - on 3 LNC at bedside. wean as tolerated.     - family brought in breo     alcohol dependence     - ativan prn     gait abnormality     - d/w son, reports balance concerns in the past. Neuro following, Dr Chandler.     - ? frontotemporal dementia. MRI brain with no acute infarct.     DVT proph: FD lovenox BID held given hematemesis. ASA held   daughter updated at bedside.   cont PT.

## 2023-05-08 NOTE — PROGRESS NOTE ADULT - SUBJECTIVE AND OBJECTIVE BOX
Patient is a 77y old  Male who presents with a chief complaint of AF w/ RVR (07 May 2023 18:27)      FROM ADMISSION H+P:   HPI:  76 y/o M w/ PMHx of DM2, PVD, HTN, HLD, COPD, BPH presents from home s/p fall. Daughter at bedside supplements history. Patient has some forgetfulness but no documented history of dementia. Patient was found down by aide, has a life alert that he did not press. Unknown if LOC or head trauma. Estimated that patient was down from 6pm to 6am. Patient reports he spent the night on the floor, last thing he remembers is "feeling crummy on my couch" and then being woken up by his aide this morning. He also has a laceration of RLE from the fall which was repaired in the ED. Of note, patient does not have hx of AF but was found to be in AF w/ RVR at time of admission. Pt reports he drinks 6-7 beers ([employer]) each day. He has a history of falls and goes to PT weekly for gait/stability training. He has an aide who comes to the home 2x a week for 5 hours to supervise showers and prevent falls, help with medication management, and drives him to doctors appointments and physical therapy. At home pt uses a rollator and cane.       In the ED   Vitals: 133/51, , T 97, RR 12   Labs: WBC 11, , Lactate 2.4,   EKG: atrial flutter with RVR, rates 130's  Imaging  CXR: slight left base atelectasis     In the ED given:  Meds Given: 2.3L NS, 2g Ofirmev, 10mg IV Cardizem x2, 30mg PO Cardizem x1    (05 May 2023 14:47)      INTERVAL HPI/OVERNIGHT EVENTS: Patient was seen and examined. No acute events occurred overnight. No new complaints.    I&O's Summary    07 May 2023 07:01  -  08 May 2023 07:00  --------------------------------------------------------  IN: 1200 mL / OUT: 0 mL / NET: 1200 mL        LABS:                        10.6   6.25  )-----------( 163      ( 08 May 2023 06:55 )             32.9     05-08    142  |  108  |  22  ----------------------------<  107<H>  3.5   |  31  |  0.90    Ca    7.8<L>      08 May 2023 06:55    TPro  5.5<L>  /  Alb  2.7<L>  /  TBili  0.6  /  DBili  x   /  AST  32  /  ALT  22  /  AlkPhos  50  05-08        CAPILLARY BLOOD GLUCOSE                MEDICATIONS  (STANDING):  atorvastatin 10 milliGRAM(s) Oral at bedtime  cilostazol 100 milliGRAM(s) Oral two times a day  diltiazem    milliGRAM(s) Oral daily  DULoxetine 60 milliGRAM(s) Oral daily  fluticasone furoate/vilanterol 200-25 MICROgram(s) Inhaler 1 Puff(s) Inhalation daily  folic acid 1 milliGRAM(s) Oral daily  multivitamin 1 Tablet(s) Oral daily  ondansetron Injectable 4 milliGRAM(s) IV Push once  pantoprazole  Injectable 40 milliGRAM(s) IV Push every 12 hours  sodium chloride 0.9%. 1000 milliLiter(s) (100 mL/Hr) IV Continuous <Continuous>  tamsulosin 0.4 milliGRAM(s) Oral at bedtime    MEDICATIONS  (PRN):  acetaminophen     Tablet .. 650 milliGRAM(s) Oral every 6 hours PRN Temp greater or equal to 38C (100.4F), Mild Pain (1 - 3)  albuterol/ipratropium for Nebulization 3 milliLiter(s) Nebulizer every 6 hours PRN Shortness of Breath  aluminum hydroxide/magnesium hydroxide/simethicone Suspension 30 milliLiter(s) Oral every 4 hours PRN Dyspepsia  diltiazem Injectable 10 milliGRAM(s) IV Push every 4 hours PRN HR>130  LORazepam   Injectable 1 milliGRAM(s) IV Push every 1 hour PRN CIWA-Ar score 8 or greater  melatonin 3 milliGRAM(s) Oral at bedtime PRN Insomnia  ondansetron Injectable 4 milliGRAM(s) IV Push every 8 hours PRN Nausea and/or Vomiting      REVIEW OF SYSTEMS:  CONSTITUTIONAL: No fever or chills  HEENT:  No headache, no sore throat  RESPIRATORY: No cough, wheezing, or shortness of breath  CARDIOVASCULAR: No chest pain, palpitations  GASTROINTESTINAL: No abdominal pain, nausea, vomiting, or diarrhea  GENITOURINARY: No dysuria, frequency, or hematuria  NEUROLOGICAL: no focal weakness or dizziness  MUSCULOSKELETAL: no myalgias  PSYCH: no recent changes in mood    RADIOLOGY & ADDITIONAL TESTS:    Imaging Personally Reviewed:  [x] YES  [ ] NO    Consultant(s) Notes Reviewed:  [x] YES  [ ] NO    PHYSICAL EXAM:  T(C): 36.6 (05-08-23 @ 05:16), Max: 36.6 (05-07-23 @ 20:45)  HR: 121 (05-08-23 @ 10:52) (80 - 121)  BP: 98/60 (05-08-23 @ 05:16) (98/60 - 101/69)  RR: 18 (05-08-23 @ 05:16) (18 - 18)  SpO2: 92% (05-08-23 @ 05:16) (92% - 92%)    GENERAL: NAD, well-developed, well-groomed  HEENT:  anicteric, moist mucous membranes  CHEST/LUNG:  CTA b/l, no rales, wheezes, or rhonchi  HEART:  RRR, S1, S2  ABDOMEN:  BS+, soft, nontender, nondistended  EXTREMITIES: no edema, cyanosis, or calf tenderness  NERVOUS SYSTEM: answers questions and follows commands appropriately  PSYCH: normal affect    Care Discussed with Consultants/Other Providers [x] YES  [ ] NO Patient is a 77y old  Male who presents with a chief complaint of AF w/ RVR (07 May 2023 18:27)    INTERVAL HPI/OVERNIGHT EVENTS: Patient was seen and examined. daughter at bedside. reports feeling better. on NC. denies any further vomiting/hematemesis. On IVF. NPO.     I&O's Summary    07 May 2023 07:01  -  08 May 2023 07:00  --------------------------------------------------------  IN: 1200 mL / OUT: 0 mL / NET: 1200 mL        LABS:                        10.6   6.25  )-----------( 163      ( 08 May 2023 06:55 )             32.9     05-08    142  |  108  |  22  ----------------------------<  107<H>  3.5   |  31  |  0.90    Ca    7.8<L>      08 May 2023 06:55    TPro  5.5<L>  /  Alb  2.7<L>  /  TBili  0.6  /  DBili  x   /  AST  32  /  ALT  22  /  AlkPhos  50  05-08        CAPILLARY BLOOD GLUCOSE    MEDICATIONS  (STANDING):  atorvastatin 10 milliGRAM(s) Oral at bedtime  cilostazol 100 milliGRAM(s) Oral two times a day  diltiazem    milliGRAM(s) Oral daily  DULoxetine 60 milliGRAM(s) Oral daily  fluticasone furoate/vilanterol 200-25 MICROgram(s) Inhaler 1 Puff(s) Inhalation daily  folic acid 1 milliGRAM(s) Oral daily  multivitamin 1 Tablet(s) Oral daily  ondansetron Injectable 4 milliGRAM(s) IV Push once  pantoprazole  Injectable 40 milliGRAM(s) IV Push every 12 hours  sodium chloride 0.9%. 1000 milliLiter(s) (100 mL/Hr) IV Continuous <Continuous>  tamsulosin 0.4 milliGRAM(s) Oral at bedtime    MEDICATIONS  (PRN):  acetaminophen     Tablet .. 650 milliGRAM(s) Oral every 6 hours PRN Temp greater or equal to 38C (100.4F), Mild Pain (1 - 3)  albuterol/ipratropium for Nebulization 3 milliLiter(s) Nebulizer every 6 hours PRN Shortness of Breath  aluminum hydroxide/magnesium hydroxide/simethicone Suspension 30 milliLiter(s) Oral every 4 hours PRN Dyspepsia  diltiazem Injectable 10 milliGRAM(s) IV Push every 4 hours PRN HR>130  LORazepam   Injectable 1 milliGRAM(s) IV Push every 1 hour PRN CIWA-Ar score 8 or greater  melatonin 3 milliGRAM(s) Oral at bedtime PRN Insomnia  ondansetron Injectable 4 milliGRAM(s) IV Push every 8 hours PRN Nausea and/or Vomiting      REVIEW OF SYSTEMS:  CONSTITUTIONAL: No fever or chills  HEENT:  No headache, no sore throat  RESPIRATORY: No cough, wheezing, or shortness of breath  CARDIOVASCULAR: No chest pain, palpitations  GASTROINTESTINAL: No abdominal pain, nausea, vomiting, or diarrhea  GENITOURINARY: No dysuria, frequency, or hematuria  NEUROLOGICAL: no focal weakness or dizziness  MUSCULOSKELETAL: no myalgias  PSYCH: no recent changes in mood    RADIOLOGY & ADDITIONAL TESTS:    Imaging Personally Reviewed:  [x] YES  [ ] NO    Consultant(s) Notes Reviewed:  [x] YES  [ ] NO    PHYSICAL EXAM:  T(C): 36.6 (05-08-23 @ 05:16), Max: 36.6 (05-07-23 @ 20:45)  HR: 121 (05-08-23 @ 10:52) (80 - 121)  BP: 98/60 (05-08-23 @ 05:16) (98/60 - 101/69)  RR: 18 (05-08-23 @ 05:16) (18 - 18)  SpO2: 92% (05-08-23 @ 05:16) (92% - 92%)    GENERAL: NAD, well-developed, well-groomed  HEENT:  anicteric, dry mucous membranes  CHEST/LUNG:  CTA b/l, no rales, wheezes, or rhonchi  HEART:  irregular   ABDOMEN:  BS+, soft, nontender, nondistended  EXTREMITIES: no edema, cyanosis, or calf tenderness  NERVOUS SYSTEM: answers questions and follows commands appropriately  PSYCH: normal affect    Care Discussed with Consultants/Other Providers [x] YES  [ ] NO

## 2023-05-08 NOTE — PROVIDER CONTACT NOTE (OTHER) - ASSESSMENT
/72   Temp 97.8   RR 18   No other complaints
Pt lethargic with one episode of vomiting. Pt not able to tolerate PO meds. Pt due for Cardizem PO. /72 and HR 100s-120s. Pt Rass score -2 and CIWA of 8. Pt O2 sat 96% on 3L NC. Pt given 1x dose PRN reglan
Pt alert denies pain or any other distress.

## 2023-05-08 NOTE — PROGRESS NOTE ADULT - ASSESSMENT
The patient is a 77 year old male with a history of HTN, HL, DM, PAD, BPH, COPD, alcohol abuse who presents with a fall.    Plan:  - ECG and telemetry with underlying atrial flutter  - Check echo  - Hold aspirin and clopidogrel. When hemoglobin stable, will resume aspirin 81 mg daily.  - There was reportedly hematemesis with vomiting. Will discontinue anticoagulation. Given bleeding and prior concerns with alcohol use, gait instability, falls, the patient is a poor candidate for long term anticoagulation and risks outweigh benefits.  - Continue cilostazol 100 mg bid  - Continue atorvastatin 10 mg daily  - Continue diltiazem  mg daily  - IV fluids as needed  - Hold losartan, furosemide  - Monitor hemoglobin and transfuse as needed

## 2023-05-08 NOTE — PROVIDER CONTACT NOTE (OTHER) - REASON
Pt temp 103 rectal
coffee ground emesis
coffee ground emesis
Pt lethargic with one episode of vomiting. Pt Rass score -2 and CIWA of 8

## 2023-05-08 NOTE — CONSULT NOTE ADULT - SUBJECTIVE AND OBJECTIVE BOX
[facility] DIV[facility]FECTIOUS DISEASE  Dutch Laguna MD PhD, Kavitha Chapman MD, Casi Breaux MD, Mu Frias MD, Doug Wall MD  and providing coverage with Kb Watts MD  Providing Infectious Disease Consultations at [facility], [facility], [place], [place], [facility], [facility]'s    Office# [phone] to schedule follow up appointments  Answering Service for urgent calls or New Consults [phone]  Cell# to text for urgent issues Dutch Laguna [phone]     HPI:  78 y/o M w/ PMHx of DM2, PVD, HTN, HLD, COPD, BPH presents from home s/p fall. Daughter reports that the next door neighbor has COVID and has been visiting her dad all the time despite this. Patient has some forgetfulness but no documented history of dementia and children report very clear at baseline. Patient was found down by aide, has a life alert that he did not press. Patient does not have hx of AF but was found to be in AF w/ RVR at time of admission. Pt reports he drinks 6-7 beers ([employer]) each day. Report of cough, fever and now positive COVID test.      In the ED   Vitals: 133/51, , T 97, RR 12   Labs: WBC 11, , Lactate 2.4,   EKG: atrial flutter with RVR, rates 130's  Imaging  CXR: slight left base atelectasis     In the ED given:  Meds Given: 2.3L NS, 2g Ofirmev, 10mg IV Cardizem x2, 30mg PO Cardizem x1    (05 May 2023 14:47)      PAST MEDICAL & SURGICAL HISTORY:  HTN (hypertension)      HLD (hyperlipidemia)      DM (diabetes mellitus)      COPD, moderate      S/P primary angioplasty          Antimicrobials  remdesivir  IVPB   IV Intermittent   remdesivir  IVPB 200 milliGRAM(s) IV Intermittent every 24 hours      Immunological      Other  acetaminophen     Tablet .. 650 milliGRAM(s) Oral every 6 hours PRN  albuterol/ipratropium for Nebulization 3 milliLiter(s) Nebulizer every 6 hours PRN  aluminum hydroxide/magnesium hydroxide/simethicone Suspension 30 milliLiter(s) Oral every 4 hours PRN  atorvastatin 10 milliGRAM(s) Oral at bedtime  cilostazol 100 milliGRAM(s) Oral two times a day  diltiazem    milliGRAM(s) Oral daily  diltiazem Injectable 10 milliGRAM(s) IV Push every 4 hours PRN  DULoxetine 60 milliGRAM(s) Oral daily  fluticasone furoate/vilanterol 200-25 MICROgram(s) Inhaler 1 Puff(s) Inhalation daily  folic acid 1 milliGRAM(s) Oral daily  ibuprofen  Tablet. 400 milliGRAM(s) Oral every 8 hours PRN  LORazepam   Injectable 1 milliGRAM(s) IV Push every 1 hour PRN  melatonin 3 milliGRAM(s) Oral at bedtime PRN  multivitamin 1 Tablet(s) Oral daily  ondansetron Injectable 4 milliGRAM(s) IV Push once  ondansetron Injectable 4 milliGRAM(s) IV Push every 8 hours PRN  pantoprazole  Injectable 40 milliGRAM(s) IV Push every 12 hours  sodium chloride 0.9%. 1000 milliLiter(s) IV Continuous <Continuous>  tamsulosin 0.4 milliGRAM(s) Oral at bedtime      Allergies    No Known Allergies    Intolerances        SOCIAL HISTORY:  Social History:  Tobacco: former 1ppd x 50 years, quit 5 years ago  EtOH:  6-7 beers daily  Recreational drug use: denies current use  Lives with: alone, has aide 2x week for 5 hours, supervises showers for falls, helps with medication compliance and drives pt to doctors appt and PT  Ambulates: rollator and cane  ADLs: mostly independent, aide as above (05 May 2023 14:47)      FAMILY HISTORY:  No pertinent family history in first degree relatives        ROS:    EYES:  Negative  blurry vision or double vision  GASTROINTESTINAL:  Negative for nausea, vomiting, diarrhea  -otherwise negative except for subjective    Vital Signs Last 24 Hrs  T(C): 38.3 (08 May 2023 15:55), Max: 39.4 (08 May 2023 12:56)  T(F): 101 (08 May 2023 15:55), Max: 103 (08 May 2023 12:56)  HR: 109 (08 May 2023 15:55) (80 - 130)  BP: 92/57 (08 May 2023 15:55) (92/57 - 120/63)  BP(mean): --  RR: 19 (08 May 2023 15:55) (18 - 19)  SpO2: 94% (08 May 2023 15:55) (90% - 95%)    Parameters below as of 08 May 2023 15:55  Patient On (Oxygen Delivery Method): nasal cannula        PE:  In no distress  HEENT:  NC, PERRL, sclerae anicteric, conjunctivae clear, EOMI.  Sinuses nontender, no nasal exudate.  No buccal or pharyngeal lesions, erythema or exudate  Neck:  Supple, no adenopathy  Lungs:  No accessory muscle use, bilaterally clear to auscultation  Cor:  distant  Abd:  Symmetric, normoactive BS.  Soft, nontender, no masses, guarding or rebound.  Liver and spleen not enlarged  Extrem:  No cyanosis or edema  Skin:  No rashes.  Neuro: grossly intact  Musc: moving all limbs freely, no focal deficits        LABS:                        10.6   6.25  )-----------( 163      ( 08 May 2023 06:55 )             32.9       WBC Count: 6.25 K/uL (23 @ 06:55)  WBC Count: 5.97 K/uL (23 @ 17:51)  WBC Count: 6.74 K/uL (23 @ 11:35)  WBC Count: 6.97 K/uL (23 @ 07:32)  WBC Count: 7.17 K/uL (23 @ 08:40)  WBC Count: 11.03 K/uL (23 @ 08:00)          142  |  108  |  22  ----------------------------<  107<H>  3.5   |  31  |  0.90    Ca    7.8<L>      08 May 2023 06:55    TPro  5.5<L>  /  Alb  2.7<L>  /  TBili  0.6  /  DBili  x   /  AST  32  /  ALT  22  /  AlkPhos  50        Creatinine, Serum: 0.90 mg/dL (23 @ 06:55)  Creatinine, Serum: 0.91 mg/dL (23 @ 07:32)  Creatinine, Serum: 0.95 mg/dL (23 @ 08:40)  Creatinine, Serum: 1.10 mg/dL (23 @ 17:49)  Creatinine, Serum: 1.10 mg/dL (23 @ 09:20)      Urinalysis Basic - ( 08 May 2023 13:26 )    Color: Yellow / Appearance: Clear / S.010 / pH: x  Gluc: x / Ketone: Negative  / Bili: Negative / Urobili: Negative   Blood: x / Protein: 15 / Nitrite: Negative   Leuk Esterase: Trace / RBC: 3-5 /HPF / WBC 11-25   Sq Epi: x / Non Sq Epi: x / Bacteria: Many              MICROBIOLOGY:      RADIOLOGY & ADDITIONAL STUDIES:    --

## 2023-05-08 NOTE — CONSULT NOTE ADULT - ASSESSMENT
[facility]  Chart Reviewed-Full Consult to follow for any immediate concerns please fell free to contact us directly at  [phone] and have us paged or text my cell # [phone]  Dutch Laguna MD PhD   78 y/o M w/ PMHx of DM2, PVD, HTN, HLD, COPD, BPH presents from home s/p fall. Daughter reports that the next door neighbor has COVID and has been visiting her dad all the time despite this. Patient has some forgetfulness but no documented history of dementia and children report very clear at baseline. Patient was found down by aide, has a life alert that he did not press. Patient does not have hx of AF but was found to be in AF w/ RVR at time of admission. Pt reports he drinks 6-7 beers ([employer]) each day. Report of cough, fever and now positive COVID test. Pt vaccinated and boosted.    RECOMMENDATIONS  1-COVID acute COVID and within first 10 days but hypoxic so recommend  Remdesivir started 5/8 so x 5 days with last day 5/12  Dexamethasone 6mg daily started 5/8 x 6 days so last day 5/13 but recs to follow on dosing and duration  VTE prophylaxis -consider full dose in this high risk patient (LMWH)-primary to assess risks  pulmonary support  will assess for any secondary process including secondary infection.    Thank you for consulting us and involving us in the management of this most interesting and challenging case.  We will follow along in the care of this patient. Please call us at [phone] or text me directly on my cell# at [phone] with any concerns.

## 2023-05-08 NOTE — PROGRESS NOTE ADULT - SUBJECTIVE AND OBJECTIVE BOX
[facility]  Sammy Munguia PA-C  [address] [place] [id number] [phone]      HPI:  76 y/o M w/ PMHx of DM2, PVD, HTN, HLD, COPD, BPH presents from home s/p fall. Daughter at bedside supplements history. Patient has some forgetfulness but no documented history of dementia. Patient was found down by aide, has a life alert that he did not press. Unknown if LOC or head trauma. Estimated that patient was down from 6pm to 6am. Patient reports he spent the night on the floor, last thing he remembers is "feeling crummy on my couch" and then being woken up by his aide this morning. He also has a laceration of RLE from the fall which was repaired in the ED. Of note, patient does not have hx of AF but was found to be in AF w/ RVR at time of admission. Pt reports he drinks 6-7 beers ([employer]) each day. He has a history of falls and goes to PT weekly for gait/stability training. He has an aide who comes to the home 2x a week for 5 hours to supervise showers and prevent falls, help with medication management, and drives him to doctors appointments and physical therapy. At home pt uses a rollator and cane.     INTERVAL HPI:  Pt s/e by GI PA  Reported no nausea this morning    MEDICATIONS  (STANDING):  atorvastatin 10 milliGRAM(s) Oral at bedtime  cilostazol 100 milliGRAM(s) Oral two times a day  diltiazem    milliGRAM(s) Oral daily  DULoxetine 60 milliGRAM(s) Oral daily  fluticasone furoate/vilanterol 200-25 MICROgram(s) Inhaler 1 Puff(s) Inhalation daily  folic acid 1 milliGRAM(s) Oral daily  multivitamin 1 Tablet(s) Oral daily  ondansetron Injectable 4 milliGRAM(s) IV Push once  pantoprazole  Injectable 40 milliGRAM(s) IV Push every 12 hours  sodium chloride 0.9%. 1000 milliLiter(s) (100 mL/Hr) IV Continuous <Continuous>  tamsulosin 0.4 milliGRAM(s) Oral at bedtime    MEDICATIONS  (PRN):  acetaminophen     Tablet .. 650 milliGRAM(s) Oral every 6 hours PRN Temp greater or equal to 38C (100.4F), Mild Pain (1 - 3)  albuterol/ipratropium for Nebulization 3 milliLiter(s) Nebulizer every 6 hours PRN Shortness of Breath  aluminum hydroxide/magnesium hydroxide/simethicone Suspension 30 milliLiter(s) Oral every 4 hours PRN Dyspepsia  diltiazem Injectable 10 milliGRAM(s) IV Push every 4 hours PRN HR>130  LORazepam   Injectable 1 milliGRAM(s) IV Push every 1 hour PRN CIWA-Ar score 8 or greater  melatonin 3 milliGRAM(s) Oral at bedtime PRN Insomnia  ondansetron Injectable 4 milliGRAM(s) IV Push every 8 hours PRN Nausea and/or Vomiting      Allergies    No Known Allergies      PHYSICAL EXAM:   Vital Signs Last 24 Hrs  T(C): 39.4 (08 May 2023 12:56), Max: 39.4 (08 May 2023 12:56)  T(F): 103 (08 May 2023 12:56), Max: 103 (08 May 2023 12:56)  HR: 106 (08 May 2023 12:56) (80 - 130)  BP: 106/66 (08 May 2023 12:56) (98/60 - 120/63)  BP(mean): --  RR: 18 (08 May 2023 12:56) (18 - 18)  SpO2: 90% (08 May 2023 12:56) (90% - 95%)    Parameters below as of 08 May 2023 12:56  Patient On (Oxygen Delivery Method): nasal cannula  O2 Flow (L/min): 3    Daily     Daily Weight in k.7 (08 May 2023 05:16)I&O's Summary    07 May 2023 07:01  -  08 May 2023 07:00  --------------------------------------------------------  IN: 1200 mL / OUT: 0 mL / NET: 1200 mL        GENERAL:  Appears stated age  HEENT:  NC/AT  CHEST:  Full & symmetric excursion  HEART:  Regular rhythm  ABDOMEN:  Soft, non-tender, non-distended  EXTEREMITIES:  no cyanosis, clubbing or edema  SKIN:  No rash  NEURO:  Alert      LABS:                        10.6   6.25  )-----------( 163      ( 08 May 2023 06:55 )             32.9         142  |  108  |  22  ----------------------------<  107<H>  3.5   |  31  |  0.90    Ca    7.8<L>      08 May 2023 06:55    TPro  5.5<L>  /  Alb  2.7<L>  /  TBili  0.6  /  DBili  x   /  AST  32  /  ALT  22  /  AlkPhos  50

## 2023-05-08 NOTE — PROGRESS NOTE ADULT - SUBJECTIVE AND OBJECTIVE BOX
Neurology Follow up note    ABRIL POMPA77yMale    HPI:  76 y/o M w/ PMHx of DM2, PVD, HTN, HLD, COPD, BPH presents from home s/p fall. Daughter at bedside supplements history. Patient has some forgetfulness but no documented history of dementia. Patient was found down by aide, has a life alert that he did not press. Unknown if LOC or head trauma. Estimated that patient was down from 6pm to 6am. Patient reports he spent the night on the floor, last thing he remembers is "feeling crummy on my couch" and then being woken up by his aide this morning. He also has a laceration of RLE from the fall which was repaired in the ED. Of note, patient does not have hx of AF but was found to be in AF w/ RVR at time of admission. Pt reports he drinks 6-7 beers ([employer]) each day. He has a history of falls and goes to PT weekly for gait/stability training. He has an aide who comes to the home 2x a week for 5 hours to supervise showers and prevent falls, help with medication management, and drives him to doctors appointments and physical therapy. At home pt uses a rollator and cane.       In the ED   Vitals: 133/51, , T 97, RR 12   Labs: WBC 11, , Lactate 2.4,   EKG: atrial flutter with RVR, rates 130's  Imaging  CXR: slight left base atelectasis     In the ED given:  Meds Given: 2.3L NS, 2g Ofirmev, 10mg IV Cardizem x2, 30mg PO Cardizem x1    (05 May 2023 14:47)      Interval History -no new events    Patient is seen, chart was reviewed and case was discussed with the treatment team.  Pt is not in any distress.   Lying on bed comfortably.       Vital Signs Last 24 Hrs  T(C): 38.3 (08 May 2023 15:55), Max: 39.4 (08 May 2023 12:56)  T(F): 101 (08 May 2023 15:55), Max: 103 (08 May 2023 12:56)  HR: 109 (08 May 2023 15:55) (80 - 130)  BP: 92/57 (08 May 2023 15:55) (92/57 - 120/63)  BP(mean): --  RR: 19 (08 May 2023 15:55) (18 - 19)  SpO2: 94% (08 May 2023 15:55) (90% - 95%)    Parameters below as of 08 May 2023 15:55  Patient On (Oxygen Delivery Method): nasal cannula            REVIEW OF SYSTEMS:    Constitutional: No , weight loss or fatigue  Eyes: No eye pain, visual disturbances, or discharge  ENT:  No difficulty hearing, tinnitus, vertigo  Neck: No pain or stiffness  Respiratory: No wheezing, chills or hemoptysis  Cardiovascular: No chest pain, palpitations, shortness of breath, dizziness or leg swelling  Gastrointestinal: No abdominal or epigastric pain. No nausea, vomiting or hematemesis;  Genitourinary: No frequency, hematuria or incontinence  Neurological: No headaches, , loss of strength, numbness or tremors  Psychiatric: No depression, anxiety, mood swings or difficulty sleeping  Musculoskeletal: No joint pain or swelling; No muscle, back or extremity pain  Skin: No itching, burning, rashes or lesions   Lymph Nodes: No enlarged glands  Endocrine: No heat or cold intolerance; No hair loss,   Allergy and Immunologic: No hives or eczema    On Neurological Examination:    Mental Status - Pt is alert, awake, oriented X3. H. Follows commands well and able to answer questions appropriately.Mood and affect  normal    Speech -  Normal.     Cranial Nerves - Pupils 3 mm equal and reactive to light, extraocular eye movements intact. Pt has no visual field deficit.  Pt has no  facial asymmetry. Facial sensation is intact.Tongue - is in midline.    Muscle tone - is normal     Motor Exam - 5/5 of UE  LE ;3-4/5 No drift.     Sensory Exam -  Pt withdraws all extremities equally on stimulation. No asymmetry seen. No complaints of tingling, numbness.    coordination:    Finger to nose: normal    Heel to shin: normal    Deep tendon Reflexes - 2 plus all over.     .    Neck Supple -  Yes.     MEDICATIONS    acetaminophen     Tablet .. 650 milliGRAM(s) Oral every 6 hours PRN  albuterol/ipratropium for Nebulization 3 milliLiter(s) Nebulizer every 6 hours PRN  aluminum hydroxide/magnesium hydroxide/simethicone Suspension 30 milliLiter(s) Oral every 4 hours PRN  atorvastatin 10 milliGRAM(s) Oral at bedtime  cilostazol 100 milliGRAM(s) Oral two times a day  diltiazem    milliGRAM(s) Oral daily  diltiazem Injectable 10 milliGRAM(s) IV Push every 4 hours PRN  DULoxetine 60 milliGRAM(s) Oral daily  fluticasone furoate/vilanterol 200-25 MICROgram(s) Inhaler 1 Puff(s) Inhalation daily  folic acid 1 milliGRAM(s) Oral daily  ibuprofen  Tablet. 400 milliGRAM(s) Oral every 8 hours PRN  LORazepam   Injectable 1 milliGRAM(s) IV Push every 1 hour PRN  melatonin 3 milliGRAM(s) Oral at bedtime PRN  multivitamin 1 Tablet(s) Oral daily  ondansetron Injectable 4 milliGRAM(s) IV Push once  ondansetron Injectable 4 milliGRAM(s) IV Push every 8 hours PRN  pantoprazole  Injectable 40 milliGRAM(s) IV Push every 12 hours  remdesivir  IVPB   IV Intermittent   remdesivir  IVPB 200 milliGRAM(s) IV Intermittent every 24 hours  sodium chloride 0.9%. 1000 milliLiter(s) IV Continuous <Continuous>  tamsulosin 0.4 milliGRAM(s) Oral at bedtime      Allergies    No Known Allergies    Intolerances        LABS:  CBC Full  -  ( 08 May 2023 06:55 )  WBC Count : 6.25 K/uL  RBC Count : 3.52 M/uL  Hemoglobin : 10.6 g/dL  Hematocrit : 32.9 %  Platelet Count - Automated : 163 K/uL  Mean Cell Volume : 93.5 fl  Mean Cell Hemoglobin : 30.1 pg  Mean Cell Hemoglobin Concentration : 32.2 gm/dL  Auto Neutrophil # : x      Urinalysis Basic - ( 08 May 2023 13:26 )    Color: Yellow / Appearance: Clear / S.010 / pH: x  Gluc: x / Ketone: Negative  / Bili: Negative / Urobili: Negative   Blood: x / Protein: 15 / Nitrite: Negative   Leuk Esterase: Trace / RBC: 3-5 /HPF / WBC 11-25   Sq Epi: x / Non Sq Epi: x / Bacteria: Many      -    142  |  108  |  22  ----------------------------<  107<H>  3.5   |  31  |  0.90    Ca    7.8<L>      08 May 2023 06:55    TPro  5.5<L>  /  Alb  2.7<L>  /  TBili  0.6  /  DBili  x   /  AST  32  /  ALT  22  /  AlkPhos  50  05-08    Hemoglobin A1C:     Vitamin B12     RADIOLOGY    ASSESSMENT AND PLAN:     SEEN FOR UNSTEADY GAIT   PN  TREMOR >PD    BRAIN MR NO ACUTE CVA  Physical therapy evaluation.  OOB to chair/ambulation with assistance only.  Pain is accessed and addressed.  Would continue to follow.

## 2023-05-08 NOTE — PROGRESS NOTE ADULT - SUBJECTIVE AND OBJECTIVE BOX
Chief Complaint: Fall    Interval Events: No events overnight.    Review of Systems:  General: No fevers, chills, weight gain  Skin: No rashes, color changes  Cardiovascular: No chest pain, orthopnea  Respiratory: No shortness of breath, cough  Gastrointestinal: No nausea, abdominal pain  Genitourinary: No incontinence, pain with urination  Musculoskeletal: No pain, swelling, decreased range of motion  Neurological: No headache, weakness  Psychiatric: No depression, anxiety  Endocrine: No weight gain, increased thirst  All other systems are comprehensively negative.    Physical Exam:  Vital Signs Last 24 Hrs  T(C): 36.6 (08 May 2023 05:16), Max: 36.6 (07 May 2023 20:45)  T(F): 97.9 (08 May 2023 05:16), Max: 97.9 (08 May 2023 05:16)  HR: 84 (08 May 2023 05:16) (80 - 84)  BP: 98/60 (08 May 2023 05:16) (98/60 - 101/69)  BP(mean): --  RR: 18 (08 May 2023 05:16) (18 - 18)  SpO2: 92% (08 May 2023 05:16) (92% - 92%)  Parameters below as of 08 May 2023 05:16  Patient On (Oxygen Delivery Method): nasal cannula  O2 Flow (L/min): 3  General: NAD  HEENT: MMM  Neck: No JVD, no carotid bruit  Lungs: CTAB  CV: Irregular, nl S1/S2, no M/R/G  Abdomen: S/NT/ND, +BS  Extremities: No LE edema, no cyanosis  Neuro: AAOx3, non-focal  Skin: No rash    Labs:             05-08    142  |  108  |  22  ----------------------------<  107<H>  3.5   |  31  |  0.90    Ca    7.8<L>      08 May 2023 06:55    TPro  5.5<L>  /  Alb  2.7<L>  /  TBili  0.6  /  DBili  x   /  AST  32  /  ALT  22  /  AlkPhos  50  05-08                        10.6   6.25  )-----------( 163      ( 08 May 2023 06:55 )             32.9       ECG/Telemetry: Atrial flutter

## 2023-05-08 NOTE — PROVIDER CONTACT NOTE (OTHER) - ACTION/TREATMENT ORDERED:
MD Fraga made aware. Given Tylenol administered . UA and Ucx ordered. MD Fraga made aware. Given Tylenol administered . UA,  Ucx , blood cx and CXR ordered. MD Fraga/RN updated the son by the bedside with the plan of care.

## 2023-05-09 LAB
ALBUMIN SERPL ELPH-MCNC: 2.8 G/DL — LOW (ref 3.3–5)
ALP SERPL-CCNC: 53 U/L — SIGNIFICANT CHANGE UP (ref 40–120)
ALT FLD-CCNC: 24 U/L — SIGNIFICANT CHANGE UP (ref 12–78)
ANION GAP SERPL CALC-SCNC: 4 MMOL/L — LOW (ref 5–17)
AST SERPL-CCNC: 31 U/L — SIGNIFICANT CHANGE UP (ref 15–37)
BASOPHILS # BLD AUTO: 0 K/UL — SIGNIFICANT CHANGE UP (ref 0–0.2)
BASOPHILS NFR BLD AUTO: 0 % — SIGNIFICANT CHANGE UP (ref 0–2)
BILIRUB SERPL-MCNC: 0.5 MG/DL — SIGNIFICANT CHANGE UP (ref 0.2–1.2)
BUN SERPL-MCNC: 20 MG/DL — SIGNIFICANT CHANGE UP (ref 7–23)
CALCIUM SERPL-MCNC: 8.2 MG/DL — LOW (ref 8.5–10.1)
CHLORIDE SERPL-SCNC: 105 MMOL/L — SIGNIFICANT CHANGE UP (ref 96–108)
CO2 SERPL-SCNC: 30 MMOL/L — SIGNIFICANT CHANGE UP (ref 22–31)
CREAT SERPL-MCNC: 0.88 MG/DL — SIGNIFICANT CHANGE UP (ref 0.5–1.3)
CULTURE RESULTS: SIGNIFICANT CHANGE UP
EGFR: 89 ML/MIN/1.73M2 — SIGNIFICANT CHANGE UP
EOSINOPHIL # BLD AUTO: 0 K/UL — SIGNIFICANT CHANGE UP (ref 0–0.5)
EOSINOPHIL NFR BLD AUTO: 0 % — SIGNIFICANT CHANGE UP (ref 0–6)
FERRITIN SERPL-MCNC: 453 NG/ML — HIGH (ref 30–400)
GLUCOSE SERPL-MCNC: 132 MG/DL — HIGH (ref 70–99)
HCT VFR BLD CALC: 32.7 % — LOW (ref 39–50)
HGB BLD-MCNC: 10.6 G/DL — LOW (ref 13–17)
LYMPHOCYTES # BLD AUTO: 0.28 K/UL — LOW (ref 1–3.3)
LYMPHOCYTES # BLD AUTO: 3 % — LOW (ref 13–44)
MCHC RBC-ENTMCNC: 29.9 PG — SIGNIFICANT CHANGE UP (ref 27–34)
MCHC RBC-ENTMCNC: 32.4 GM/DL — SIGNIFICANT CHANGE UP (ref 32–36)
MCV RBC AUTO: 92.4 FL — SIGNIFICANT CHANGE UP (ref 80–100)
MONOCYTES # BLD AUTO: 0.57 K/UL — SIGNIFICANT CHANGE UP (ref 0–0.9)
MONOCYTES NFR BLD AUTO: 6 % — SIGNIFICANT CHANGE UP (ref 2–14)
NEUTROPHILS # BLD AUTO: 8.49 K/UL — HIGH (ref 1.8–7.4)
NEUTROPHILS NFR BLD AUTO: 90 % — HIGH (ref 43–77)
NRBC # BLD: SIGNIFICANT CHANGE UP /100 WBCS (ref 0–0)
PLATELET # BLD AUTO: 164 K/UL — SIGNIFICANT CHANGE UP (ref 150–400)
POTASSIUM SERPL-MCNC: 3.5 MMOL/L — SIGNIFICANT CHANGE UP (ref 3.5–5.3)
POTASSIUM SERPL-SCNC: 3.5 MMOL/L — SIGNIFICANT CHANGE UP (ref 3.5–5.3)
PROCALCITONIN SERPL-MCNC: 0.1 NG/ML — HIGH
PROT SERPL-MCNC: 5.8 G/DL — LOW (ref 6–8.3)
RBC # BLD: 3.54 M/UL — LOW (ref 4.2–5.8)
RBC # FLD: 13.1 % — SIGNIFICANT CHANGE UP (ref 10.3–14.5)
SODIUM SERPL-SCNC: 139 MMOL/L — SIGNIFICANT CHANGE UP (ref 135–145)
SPECIMEN SOURCE: SIGNIFICANT CHANGE UP
WBC # BLD: 9.43 K/UL — SIGNIFICANT CHANGE UP (ref 3.8–10.5)
WBC # FLD AUTO: 9.43 K/UL — SIGNIFICANT CHANGE UP (ref 3.8–10.5)

## 2023-05-09 PROCEDURE — 99233 SBSQ HOSP IP/OBS HIGH 50: CPT

## 2023-05-09 RX ADMIN — CILOSTAZOL 100 MILLIGRAM(S): 100 TABLET ORAL at 05:55

## 2023-05-09 RX ADMIN — Medication 120 MILLIGRAM(S): at 05:54

## 2023-05-09 RX ADMIN — Medication 6 MILLIGRAM(S): at 05:55

## 2023-05-09 RX ADMIN — REMDESIVIR 200 MILLIGRAM(S): 5 INJECTION INTRAVENOUS at 14:47

## 2023-05-09 RX ADMIN — Medication 3 MILLIGRAM(S): at 11:25

## 2023-05-09 RX ADMIN — Medication 1 TABLET(S): at 12:19

## 2023-05-09 RX ADMIN — FLUTICASONE FUROATE AND VILANTEROL TRIFENATATE 1 PUFF(S): 100; 25 POWDER RESPIRATORY (INHALATION) at 05:55

## 2023-05-09 RX ADMIN — PANTOPRAZOLE SODIUM 40 MILLIGRAM(S): 20 TABLET, DELAYED RELEASE ORAL at 05:54

## 2023-05-09 RX ADMIN — PANTOPRAZOLE SODIUM 40 MILLIGRAM(S): 20 TABLET, DELAYED RELEASE ORAL at 17:14

## 2023-05-09 RX ADMIN — ATORVASTATIN CALCIUM 10 MILLIGRAM(S): 80 TABLET, FILM COATED ORAL at 21:41

## 2023-05-09 RX ADMIN — DULOXETINE HYDROCHLORIDE 60 MILLIGRAM(S): 30 CAPSULE, DELAYED RELEASE ORAL at 11:25

## 2023-05-09 RX ADMIN — Medication 1 MILLIGRAM(S): at 11:25

## 2023-05-09 RX ADMIN — CILOSTAZOL 100 MILLIGRAM(S): 100 TABLET ORAL at 17:14

## 2023-05-09 RX ADMIN — TAMSULOSIN HYDROCHLORIDE 0.4 MILLIGRAM(S): 0.4 CAPSULE ORAL at 21:41

## 2023-05-09 NOTE — PROGRESS NOTE ADULT - SUBJECTIVE AND OBJECTIVE BOX
[facility]  Dutch Laguna MD PhD, Kavitha Chapman MD, Casi Breaux MD, Mu Frias MD, Doug Wall MD  and providing coverage with Kb Watts MD  Providing Infectious Disease Consultations at [facility], [facility], [place], [place], [facility], [facility]'s    Office# [phone] to schedule follow up appointments  Answering Service for urgent calls or New Consults [phone]  Cell# to text for urgent issues Dutch Laguna [phone]     infectious diseases progress note:    ABRIL POMPA is a 77y y. o. Male patient    Overnight and events of the last 24hrs reviewed    Allergies    No Known Allergies    Intolerances        ANTIBIOTICS/RELEVANT:  antimicrobials  remdesivir  IVPB   IV Intermittent   remdesivir  IVPB 100 milliGRAM(s) IV Intermittent every 24 hours    immunologic:    OTHER:  acetaminophen     Tablet .. 650 milliGRAM(s) Oral every 6 hours PRN  albuterol/ipratropium for Nebulization 3 milliLiter(s) Nebulizer every 6 hours PRN  aluminum hydroxide/magnesium hydroxide/simethicone Suspension 30 milliLiter(s) Oral every 4 hours PRN  atorvastatin 10 milliGRAM(s) Oral at bedtime  cilostazol 100 milliGRAM(s) Oral two times a day  dexAMETHasone     Tablet 6 milliGRAM(s) Oral daily  diltiazem    milliGRAM(s) Oral daily  diltiazem Injectable 10 milliGRAM(s) IV Push every 4 hours PRN  DULoxetine 60 milliGRAM(s) Oral daily  fluticasone furoate/vilanterol 200-25 MICROgram(s) Inhaler 1 Puff(s) Inhalation daily  folic acid 1 milliGRAM(s) Oral daily  ibuprofen  Tablet. 400 milliGRAM(s) Oral every 8 hours PRN  LORazepam   Injectable 1 milliGRAM(s) IV Push every 1 hour PRN  melatonin 3 milliGRAM(s) Oral at bedtime PRN  multivitamin 1 Tablet(s) Oral daily  ondansetron Injectable 4 milliGRAM(s) IV Push once  ondansetron Injectable 4 milliGRAM(s) IV Push every 8 hours PRN  pantoprazole  Injectable 40 milliGRAM(s) IV Push every 12 hours  sodium chloride 0.9%. 1000 milliLiter(s) IV Continuous <Continuous>  tamsulosin 0.4 milliGRAM(s) Oral at bedtime      Objective:  Vital Signs Last 24 Hrs  T(C): 37.1 (09 May 2023 06:07), Max: 39.4 (08 May 2023 12:56)  T(F): 98.8 (09 May 2023 06:07), Max: 103 (08 May 2023 12:56)  HR: 102 (09 May 2023 06:07) (90 - 130)  BP: 113/63 (09 May 2023 06:07) (92/57 - 120/63)  BP(mean): --  RR: 19 (09 May 2023 06:07) (18 - 19)  SpO2: 93% (09 May 2023 06:07) (90% - 95%)    Parameters below as of 09 May 2023 08:44  Patient On (Oxygen Delivery Method): nasal cannula        T(C): 37.1 (23 @ 06:07), Max: 39.4 (23 @ 12:56)  T(C): 37.1 (23 @ 06:07), Max: 39.4 (23 @ 12:56)  T(C): 37.1 (23 @ 06:07), Max: 39.4 (23 @ 12:56)    PHYSICAL EXAM:  HEENT: NC atraumatic  Neck: supple  Respiratory: no accessory muscle use, breathing comfortably  Cardiovascular: distant  Gastrointestinal: normal appearing, nondistended  Extremities: no clubbing, no cyanosis,        LABS:                          10.6   9.43  )-----------( 164      ( 09 May 2023 06:50 )             32.7       WBC  9.43  @ 06:50  6.25 05- @ 06:55  5.97 - @ 17:51  6.74 05 @ 11:35  6.97  @ 07:32  7.17 05- @ 08:40  11.03 - @ 08:00      05-09    139  |  105  |  20  ----------------------------<  132<H>  3.5   |  30  |  0.88    Ca    8.2<L>      09 May 2023 06:50    TPro  5.8<L>  /  Alb  2.8<L>  /  TBili  0.5  /  DBili  x   /  AST  31  /  ALT  24  /  AlkPhos  53        Creatinine, Serum: 0.88 mg/dL (23 @ 06:50)  Creatinine, Serum: 0.90 mg/dL (23 @ 06:55)  Creatinine, Serum: 0.91 mg/dL (23 @ 07:32)  Creatinine, Serum: 0.95 mg/dL (23 @ 08:40)  Creatinine, Serum: 1.10 mg/dL (23 @ 17:49)  Creatinine, Serum: 1.10 mg/dL (23 @ 09:20)        Urinalysis Basic - ( 08 May 2023 13:26 )    Color: Yellow / Appearance: Clear / S.010 / pH: x  Gluc: x / Ketone: Negative  / Bili: Negative / Urobili: Negative   Blood: x / Protein: 15 / Nitrite: Negative   Leuk Esterase: Trace / RBC: 3-5 /HPF / WBC 11-25   Sq Epi: x / Non Sq Epi: x / Bacteria: Many            INFLAMMATORY MARKERS      MICROBIOLOGY:      Influenza B (RapRVP): NotDetec ( @ 13:00)          RADIOLOGY & ADDITIONAL STUDIES:

## 2023-05-09 NOTE — PROGRESS NOTE ADULT - ASSESSMENT
76 y/o M w/ PMHx of DM2, PVD, HTN, HLD, COPD, BPH presents from home s/p fall. Daughter reports that the next door neighbor has COVID and has been visiting her dad all the time despite this. Patient has some forgetfulness but no documented history of dementia and children report very clear at baseline. Patient was found down by aide, has a life alert that he did not press. Patient does not have hx of AF but was found to be in AF w/ RVR at time of admission. Pt reports he drinks 6-7 beers ([employer]) each day. Report of cough, fever and now positive COVID test. Pt vaccinated and boosted.    RECOMMENDATIONS  1-COVID acute COVID and within first 10 days but hypoxic so recommend    -Remdesivir started 5/8 so x 5 days with last day 5/12  -Dexamethasone 6mg daily started 5/8 x 6 days so last day 5/13 but recs to follow on dosing and duration  VTE prophylaxis -consider full dose in this high risk patient (LMWH)-primary to assess risks  pulmonary support  will assess for any secondary process including secondary infection  5/9-stable on NC-4L pt reporting improvement    Thank you for consulting us and involving us in the management of this most interesting and challenging case.  We will follow along in the care of this patient. Please call us at [phone] or text me directly on my cell# at [phone] with any concerns.

## 2023-05-09 NOTE — PROGRESS NOTE ADULT - SUBJECTIVE AND OBJECTIVE BOX
[facility]  Sammy Munguia PA-C  [address] [place] [address]  [phone]      INTERVAL HPI/OVERNIGHT EVENTS:  Pt s/e  Reports no new GI events  No further nausea/vomiting  Now covid positive    MEDICATIONS  (STANDING):  atorvastatin 10 milliGRAM(s) Oral at bedtime  cilostazol 100 milliGRAM(s) Oral two times a day  dexAMETHasone     Tablet 6 milliGRAM(s) Oral daily  diltiazem    milliGRAM(s) Oral daily  DULoxetine 60 milliGRAM(s) Oral daily  fluticasone furoate/vilanterol 200-25 MICROgram(s) Inhaler 1 Puff(s) Inhalation daily  folic acid 1 milliGRAM(s) Oral daily  multivitamin 1 Tablet(s) Oral daily  ondansetron Injectable 4 milliGRAM(s) IV Push once  pantoprazole  Injectable 40 milliGRAM(s) IV Push every 12 hours  remdesivir  IVPB   IV Intermittent   remdesivir  IVPB 100 milliGRAM(s) IV Intermittent every 24 hours  sodium chloride 0.9%. 1000 milliLiter(s) (100 mL/Hr) IV Continuous <Continuous>  tamsulosin 0.4 milliGRAM(s) Oral at bedtime    MEDICATIONS  (PRN):  acetaminophen     Tablet .. 650 milliGRAM(s) Oral every 6 hours PRN Temp greater or equal to 38C (100.4F), Mild Pain (1 - 3)  albuterol/ipratropium for Nebulization 3 milliLiter(s) Nebulizer every 6 hours PRN Shortness of Breath  aluminum hydroxide/magnesium hydroxide/simethicone Suspension 30 milliLiter(s) Oral every 4 hours PRN Dyspepsia  diltiazem Injectable 10 milliGRAM(s) IV Push every 4 hours PRN HR>130  ibuprofen  Tablet. 400 milliGRAM(s) Oral every 8 hours PRN Temp greater or equal to 38C (100.4F)  LORazepam   Injectable 1 milliGRAM(s) IV Push every 1 hour PRN CIWA-Ar score 8 or greater  melatonin 3 milliGRAM(s) Oral at bedtime PRN Insomnia  ondansetron Injectable 4 milliGRAM(s) IV Push every 8 hours PRN Nausea and/or Vomiting      Allergies    No Known Allergies    PHYSICAL EXAM:   Vital Signs:  Vital Signs Last 24 Hrs  T(C): 36.4 (09 May 2023 11:35), Max: 38.3 (08 May 2023 15:55)  T(F): 97.5 (09 May 2023 11:35), Max: 101 (08 May 2023 15:55)  HR: 80 (09 May 2023 11:35) (80 - 109)  BP: 105/61 (09 May 2023 11:35) (92/57 - 113/63)  BP(mean): --  RR: 18 (09 May 2023 11:35) (18 - 19)  SpO2: 90% (09 May 2023 11:35) (90% - 94%)    Parameters below as of 09 May 2023 11:35  Patient On (Oxygen Delivery Method): nasal cannula  O2 Flow (L/min): 4    GENERAL:  Appears stated age  HEENT:  NC/AT  CHEST:  Full & symmetric excursion  HEART:  Regular rhythm  ABDOMEN:  Soft, non-tender, non-distended  EXTEREMITIES:  no cyanosis  SKIN:  No rash  NEURO:  Alert      LABS:                        10.6   9.43  )-----------( 164      ( 09 May 2023 06:50 )             32.7     05    139  |  105  |  20  ----------------------------<  132<H>  3.5   |  30  |  0.88    Ca    8.2<L>      09 May 2023 06:50    TPro  5.8<L>  /  Alb  2.8<L>  /  TBili  0.5  /  DBili  x   /  AST  31  /  ALT  24  /  AlkPhos  53  05-09      Urinalysis Basic - ( 08 May 2023 13:26 )    Color: Yellow / Appearance: Clear / S.010 / pH: x  Gluc: x / Ketone: Negative  / Bili: Negative / Urobili: Negative   Blood: x / Protein: 15 / Nitrite: Negative   Leuk Esterase: Trace / RBC: 3-5 /HPF / WBC 11-25   Sq Epi: x / Non Sq Epi: x / Bacteria: Many

## 2023-05-09 NOTE — PROGRESS NOTE ADULT - SUBJECTIVE AND OBJECTIVE BOX
Patient is a 77y old  Male who presents with a chief complaint of AF w/ RVR (09 May 2023 10:46)    INTERVAL HPI/OVERNIGHT EVENTS: Patient was seen and examined. comfortable on 4LNC. COVID+ on remdesivir/decadron. Denies any chest pain. reports tolerating clears (did eat chips and crackers overnight). fever resolved. denies any chest pain.     I&O's Summary    08 May 2023 07:01  -  09 May 2023 07:00  --------------------------------------------------------  IN: 0 mL / OUT: 200 mL / NET: -200 mL    09 May 2023 07:01  -  09 May 2023 13:17  --------------------------------------------------------  IN: 250 mL / OUT: 0 mL / NET: 250 mL        LABS:                        10.6   9.43  )-----------( 164      ( 09 May 2023 06:50 )             32.7         139  |  105  |  20  ----------------------------<  132<H>  3.5   |  30  |  0.88    Ca    8.2<L>      09 May 2023 06:50    TPro  5.8<L>  /  Alb  2.8<L>  /  TBili  0.5  /  DBili  x   /  AST  31  /  ALT  24  /  AlkPhos  53  05-09      Urinalysis Basic - ( 08 May 2023 13:26 )    Color: Yellow / Appearance: Clear / S.010 / pH: x  Gluc: x / Ketone: Negative  / Bili: Negative / Urobili: Negative   Blood: x / Protein: 15 / Nitrite: Negative   Leuk Esterase: Trace / RBC: 3-5 /HPF / WBC 11-25   Sq Epi: x / Non Sq Epi: x / Bacteria: Many      CAPILLARY BLOOD GLUCOSE            Urinalysis Basic - ( 08 May 2023 13:26 )    Color: Yellow / Appearance: Clear / S.010 / pH: x  Gluc: x / Ketone: Negative  / Bili: Negative / Urobili: Negative   Blood: x / Protein: 15 / Nitrite: Negative   Leuk Esterase: Trace / RBC: 3-5 /HPF / WBC 11-25   Sq Epi: x / Non Sq Epi: x / Bacteria: Many        MEDICATIONS  (STANDING):  atorvastatin 10 milliGRAM(s) Oral at bedtime  cilostazol 100 milliGRAM(s) Oral two times a day  dexAMETHasone     Tablet 6 milliGRAM(s) Oral daily  diltiazem    milliGRAM(s) Oral daily  DULoxetine 60 milliGRAM(s) Oral daily  fluticasone furoate/vilanterol 200-25 MICROgram(s) Inhaler 1 Puff(s) Inhalation daily  folic acid 1 milliGRAM(s) Oral daily  multivitamin 1 Tablet(s) Oral daily  ondansetron Injectable 4 milliGRAM(s) IV Push once  pantoprazole  Injectable 40 milliGRAM(s) IV Push every 12 hours  remdesivir  IVPB   IV Intermittent   remdesivir  IVPB 100 milliGRAM(s) IV Intermittent every 24 hours  sodium chloride 0.9%. 1000 milliLiter(s) (100 mL/Hr) IV Continuous <Continuous>  tamsulosin 0.4 milliGRAM(s) Oral at bedtime    MEDICATIONS  (PRN):  acetaminophen     Tablet .. 650 milliGRAM(s) Oral every 6 hours PRN Temp greater or equal to 38C (100.4F), Mild Pain (1 - 3)  albuterol/ipratropium for Nebulization 3 milliLiter(s) Nebulizer every 6 hours PRN Shortness of Breath  aluminum hydroxide/magnesium hydroxide/simethicone Suspension 30 milliLiter(s) Oral every 4 hours PRN Dyspepsia  diltiazem Injectable 10 milliGRAM(s) IV Push every 4 hours PRN HR>130  ibuprofen  Tablet. 400 milliGRAM(s) Oral every 8 hours PRN Temp greater or equal to 38C (100.4F)  LORazepam   Injectable 1 milliGRAM(s) IV Push every 1 hour PRN CIWA-Ar score 8 or greater  melatonin 3 milliGRAM(s) Oral at bedtime PRN Insomnia  ondansetron Injectable 4 milliGRAM(s) IV Push every 8 hours PRN Nausea and/or Vomiting      REVIEW OF SYSTEMS:  CONSTITUTIONAL: + fever, no chills  HEENT:  No headache, no sore throat  RESPIRATORY: No cough, wheezing, or shortness of breath  CARDIOVASCULAR: No chest pain, palpitations  GASTROINTESTINAL: No abdominal pain, nausea, vomiting, or diarrhea  GENITOURINARY: No dysuria, frequency, or hematuria  NEUROLOGICAL: no focal weakness or dizziness  MUSCULOSKELETAL: no myalgias  PSYCH: no recent changes in mood    RADIOLOGY & ADDITIONAL TESTS:    Imaging Personally Reviewed:  [x] YES  [ ] NO    Consultant(s) Notes Reviewed:  [x] YES  [ ] NO    PHYSICAL EXAM:  T(C): 36.4 (23 @ 11:35), Max: 38.3 (23 @ 15:55)  HR: 80 (23 @ 11:35) (80 - 109)  BP: 105/61 (23 @ 11:35) (92/57 - 113/63)  RR: 18 (23 @ 11:35) (18 - 19)  SpO2: 90% (23 @ 11:35) (90% - 94%)    GENERAL: NAD, well-developed, well-groomed  HEENT:  anicteric, moist mucous membranes  CHEST/LUNG:  CTA b/l, no rales, wheezes, or rhonchi  HEART:  RRR, S1, S2  ABDOMEN:  BS+, soft, nontender, nondistended  EXTREMITIES: no edema, cyanosis, or calf tenderness  NERVOUS SYSTEM: answers questions and follows commands appropriately  PSYCH: normal affect    Care Discussed with Consultants/Other Providers [x] YES  [ ] NO

## 2023-05-09 NOTE — PROGRESS NOTE ADULT - SUBJECTIVE AND OBJECTIVE BOX
Chief Complaint: Fall    Interval Events: No events overnight.    Review of Systems:  General: No fevers, chills, weight gain  Skin: No rashes, color changes  Cardiovascular: No chest pain, orthopnea  Respiratory: No shortness of breath, cough  Gastrointestinal: No nausea, abdominal pain  Genitourinary: No incontinence, pain with urination  Musculoskeletal: No pain, swelling, decreased range of motion  Neurological: No headache, weakness  Psychiatric: No depression, anxiety  Endocrine: No weight gain, increased thirst  All other systems are comprehensively negative.    Physical Exam:  Vital Signs Last 24 Hrs  T(C): 37.1 (09 May 2023 06:07), Max: 39.4 (08 May 2023 12:56)  T(F): 98.8 (09 May 2023 06:07), Max: 103 (08 May 2023 12:56)  HR: 102 (09 May 2023 06:07) (90 - 130)  BP: 113/63 (09 May 2023 06:07) (92/57 - 120/63)  BP(mean): --  RR: 19 (09 May 2023 06:07) (18 - 19)  SpO2: 93% (09 May 2023 06:07) (90% - 95%)  Parameters below as of 09 May 2023 06:07  Patient On (Oxygen Delivery Method): nasal cannula  O2 Flow (L/min): 4  General: NAD  HEENT: MMM  Neck: No JVD, no carotid bruit  Lungs: CTAB  CV: Irregular, nl S1/S2, no M/R/G  Abdomen: S/NT/ND, +BS  Extremities: No LE edema, no cyanosis  Neuro: AAOx3, non-focal  Skin: No rash    Labs:             05-08    142  |  108  |  22  ----------------------------<  107<H>  3.5   |  31  |  0.90    Ca    7.8<L>      08 May 2023 06:55    TPro  5.5<L>  /  Alb  2.7<L>  /  TBili  0.6  /  DBili  x   /  AST  32  /  ALT  22  /  AlkPhos  50  05-08                        10.6   6.25  )-----------( 163      ( 08 May 2023 06:55 )             32.9       ECG/Telemetry: Atrial flutter

## 2023-05-09 NOTE — PROGRESS NOTE ADULT - ASSESSMENT
The patient is a 77 year old male with a history of HTN, HL, DM, PAD, BPH, COPD, alcohol abuse who presents with a fall.    Plan:  - ECG and telemetry with underlying atrial flutter  - Check echo  - Hold aspirin and clopidogrel. When hemoglobin stable, will resume aspirin 81 mg daily.  - There was reportedly hematemesis with vomiting. Will discontinue anticoagulation. Given bleeding and prior concerns with alcohol use, gait instability, falls, the patient is a poor candidate for long term anticoagulation and risks outweigh benefits.  - Continue cilostazol 100 mg bid  - Continue atorvastatin 10 mg daily  - Continue diltiazem  mg daily  - Hold losartan, furosemide  - Monitor hemoglobin and transfuse as needed  - COVID positive  - ID follow-up

## 2023-05-09 NOTE — PROGRESS NOTE ADULT - ASSESSMENT
76 y/o M with history of DM, HTN. HLD, COPD, BPH, PVD who was brought in for evaluation of fall. Found to have new onset aflutter with RVR. hospitalization complicated by hematemesis and COVID+ 5/8/2023    A/P:  COVID +     - started remdesivir and decadron 5/8.    - plan for ?5 day course. ID following.     - cont isolation.      Fall   aflutter RVR, new onset   HTN  hematemesis     - cardiology following    - cont cardizem changed to daily. HR improved.     - plavix held per cardio     - losartan and lasix held. BP low     - ECHO pending     - GI following. cont Protonix 40mg IV BID. transfuse as needed. diet advanced.     - TSH 0.89     3.8cm AAA, infrarenal     - seen by vascular surgery. cont pletal. ASA held given hematemesis. restart when able.     - routine monitoring outpatient     - patient reports he is aware of the aneurysm    COPD     - cont symbicort and nebs prn    - on 4 LNC at bedside. wean as tolerated.     - family brought in breo     alcohol dependence     - CIWA     - ativan 1mg prn. did not appear to require any additional dosing. appears stable.     gait abnormality     - neurology following. continue PT    - ? frontotemporal dementia. MRI brain reviewed.     DVT proph: FD lovenox BID held. ASA held, consider restarting ASA 81mg and DVT prophylaxis tomorrow AM if hgb remains stable.   son Guzman updated over the phone [phone] on 5/9. HCP completed yesterday as Guzman as primary and Krystal as alternative.

## 2023-05-10 ENCOUNTER — TRANSCRIPTION ENCOUNTER (OUTPATIENT)
Age: 78
End: 2023-05-10

## 2023-05-10 DIAGNOSIS — U07.1 COVID-19: ICD-10-CM

## 2023-05-10 DIAGNOSIS — K92.0 HEMATEMESIS: ICD-10-CM

## 2023-05-10 DIAGNOSIS — R93.89 ABNORMAL FINDINGS ON DIAGNOSTIC IMAGING OF OTHER SPECIFIED BODY STRUCTURES: ICD-10-CM

## 2023-05-10 LAB
ANION GAP SERPL CALC-SCNC: 4 MMOL/L — LOW (ref 5–17)
BUN SERPL-MCNC: 22 MG/DL — SIGNIFICANT CHANGE UP (ref 7–23)
CALCIUM SERPL-MCNC: 8.3 MG/DL — LOW (ref 8.5–10.1)
CHLORIDE SERPL-SCNC: 107 MMOL/L — SIGNIFICANT CHANGE UP (ref 96–108)
CO2 SERPL-SCNC: 30 MMOL/L — SIGNIFICANT CHANGE UP (ref 22–31)
CREAT SERPL-MCNC: 0.81 MG/DL — SIGNIFICANT CHANGE UP (ref 0.5–1.3)
CULTURE RESULTS: SIGNIFICANT CHANGE UP
CULTURE RESULTS: SIGNIFICANT CHANGE UP
EGFR: 91 ML/MIN/1.73M2 — SIGNIFICANT CHANGE UP
GLUCOSE SERPL-MCNC: 147 MG/DL — HIGH (ref 70–99)
HCT VFR BLD CALC: 30.6 % — LOW (ref 39–50)
HGB BLD-MCNC: 10.5 G/DL — LOW (ref 13–17)
MCHC RBC-ENTMCNC: 31 PG — SIGNIFICANT CHANGE UP (ref 27–34)
MCHC RBC-ENTMCNC: 34.3 GM/DL — SIGNIFICANT CHANGE UP (ref 32–36)
MCV RBC AUTO: 90.3 FL — SIGNIFICANT CHANGE UP (ref 80–100)
NRBC # BLD: 0 /100 WBCS — SIGNIFICANT CHANGE UP (ref 0–0)
PLATELET # BLD AUTO: 176 K/UL — SIGNIFICANT CHANGE UP (ref 150–400)
POTASSIUM SERPL-MCNC: 3.3 MMOL/L — LOW (ref 3.5–5.3)
POTASSIUM SERPL-SCNC: 3.3 MMOL/L — LOW (ref 3.5–5.3)
RBC # BLD: 3.39 M/UL — LOW (ref 4.2–5.8)
RBC # FLD: 12.6 % — SIGNIFICANT CHANGE UP (ref 10.3–14.5)
SODIUM SERPL-SCNC: 141 MMOL/L — SIGNIFICANT CHANGE UP (ref 135–145)
SPECIMEN SOURCE: SIGNIFICANT CHANGE UP
SPECIMEN SOURCE: SIGNIFICANT CHANGE UP
VIT B1 SERPL-MCNC: 112.8 NMOL/L — SIGNIFICANT CHANGE UP (ref 66.5–200)
WBC # BLD: 11.66 K/UL — HIGH (ref 3.8–10.5)
WBC # FLD AUTO: 11.66 K/UL — HIGH (ref 3.8–10.5)

## 2023-05-10 PROCEDURE — 99233 SBSQ HOSP IP/OBS HIGH 50: CPT | Mod: GC

## 2023-05-10 RX ORDER — POTASSIUM CHLORIDE 20 MEQ
40 PACKET (EA) ORAL EVERY 4 HOURS
Refills: 0 | Status: COMPLETED | OUTPATIENT
Start: 2023-05-10 | End: 2023-05-10

## 2023-05-10 RX ORDER — POLYETHYLENE GLYCOL 3350 17 G/17G
17 POWDER, FOR SOLUTION ORAL
Refills: 0 | Status: DISCONTINUED | OUTPATIENT
Start: 2023-05-10 | End: 2023-05-16

## 2023-05-10 RX ORDER — ASPIRIN/CALCIUM CARB/MAGNESIUM 324 MG
81 TABLET ORAL DAILY
Refills: 0 | Status: DISCONTINUED | OUTPATIENT
Start: 2023-05-10 | End: 2023-05-16

## 2023-05-10 RX ADMIN — Medication 1 MILLIGRAM(S): at 11:57

## 2023-05-10 RX ADMIN — DULOXETINE HYDROCHLORIDE 60 MILLIGRAM(S): 30 CAPSULE, DELAYED RELEASE ORAL at 11:57

## 2023-05-10 RX ADMIN — FLUTICASONE FUROATE AND VILANTEROL TRIFENATATE 1 PUFF(S): 100; 25 POWDER RESPIRATORY (INHALATION) at 05:39

## 2023-05-10 RX ADMIN — Medication 81 MILLIGRAM(S): at 11:57

## 2023-05-10 RX ADMIN — Medication 6 MILLIGRAM(S): at 05:37

## 2023-05-10 RX ADMIN — TAMSULOSIN HYDROCHLORIDE 0.4 MILLIGRAM(S): 0.4 CAPSULE ORAL at 20:59

## 2023-05-10 RX ADMIN — CILOSTAZOL 100 MILLIGRAM(S): 100 TABLET ORAL at 05:36

## 2023-05-10 RX ADMIN — POLYETHYLENE GLYCOL 3350 17 GRAM(S): 17 POWDER, FOR SOLUTION ORAL at 12:29

## 2023-05-10 RX ADMIN — PANTOPRAZOLE SODIUM 40 MILLIGRAM(S): 20 TABLET, DELAYED RELEASE ORAL at 05:37

## 2023-05-10 RX ADMIN — REMDESIVIR 200 MILLIGRAM(S): 5 INJECTION INTRAVENOUS at 16:01

## 2023-05-10 RX ADMIN — Medication 40 MILLIEQUIVALENT(S): at 13:30

## 2023-05-10 RX ADMIN — CILOSTAZOL 100 MILLIGRAM(S): 100 TABLET ORAL at 17:20

## 2023-05-10 RX ADMIN — Medication 1 TABLET(S): at 11:57

## 2023-05-10 RX ADMIN — PANTOPRAZOLE SODIUM 40 MILLIGRAM(S): 20 TABLET, DELAYED RELEASE ORAL at 17:19

## 2023-05-10 RX ADMIN — ATORVASTATIN CALCIUM 10 MILLIGRAM(S): 80 TABLET, FILM COATED ORAL at 20:59

## 2023-05-10 RX ADMIN — Medication 40 MILLIEQUIVALENT(S): at 09:26

## 2023-05-10 RX ADMIN — Medication 120 MILLIGRAM(S): at 05:37

## 2023-05-10 NOTE — DISCHARGE NOTE PROVIDER - NSDCCPCAREPLAN_GEN_ALL_CORE_FT
PRINCIPAL DISCHARGE DIAGNOSIS  Diagnosis: Atrial fibrillation with RVR  Assessment and Plan of Treatment: - During your hopspitalization, you were found to have new onset atrial flutter/fibrillation.  - You were treated with Diltiazem 240 mg PO daily.   - After weighing risks and benefits and discussing options with the pt and your son, anticoagulation was not started due to frequent falls and alcohol abuse, as the risks outweigh the benefits.   - You were treated with Aspirin 81 mg daily.   - Echocardiogram:  TTE: LVEF 65%, Normal left ventricular systolic function. Increased left ventricular wall thickness.  - START TAKING _____  FOLLOW UP with your PCP within 1 week.  FOLLOW UP with Cardiology within 1 week.      SECONDARY DISCHARGE DIAGNOSES  Diagnosis: COVID-19  Assessment and Plan of Treatment: You tested positive for COVID on 5/8/2023.  You were treated with Remdesivir started 5/8 x 5 days, last day 5/12, and  Dexamethasone 6mg daily started 5/8, last day 5/12.  FOLLOW UP with your PCP within 1 week.    Diagnosis: Dementia  Assessment and Plan of Treatment: Your MRI brain: no acute infarct or hemorrhage, severe ventricular greater than sulcal prominence, may reflect central greater than cortical atrophy, also consistent with communicating hydrocephalus. With chronic microvascular ischemic changes.   You were evaluated by the Neurology team, who suspect possible frontotemporal dementia, possible component of Parkinson Disease for your in gait instability.   - FOLLOW UP with neurology for evaluation to start trial of sinemet outpaitent.    Diagnosis: Fall in elderly patient  Assessment and Plan of Treatment: You fell down and was brought to the hospital.   You had an MRI brain which showed no acute infarct or hemorrhage.   FOLLOW UP with your PCP within 1 week.    Diagnosis: HTN (hypertension)  Assessment and Plan of Treatment: High blood pressure is a chronic problem.   Your blood pressures were low during your hospitalization, so your home blood pressure medications were held.   - START taking ___      Diagnosis: Imaging abnormality  Assessment and Plan of Treatment: Imaging showed you have a 3.8 cm Abdominal Aortic Aneurysm, infrarenal   - You were evaluated by vascular surgery.   - Continue cilostazol 100 mg twice per day.   - patient reports he is aware of the aneurysm.  - FOLLOW UP with vascular surgery for routine monitoring of your aneurysm.    Diagnosis: Laceration of right lower extremity  Assessment and Plan of Treatment:     Diagnosis: Atrial fibrillation with RVR  Assessment and Plan of Treatment:      PRINCIPAL DISCHARGE DIAGNOSIS  Diagnosis: Atrial fibrillation with RVR  Assessment and Plan of Treatment: - During your hopspitalization, you were found to have new onset atrial flutter/fibrillation.  - You were treated with Diltiazem 240 mg PO daily.   - After weighing risks and benefits and discussing options with the pt and your son, anticoagulation was not started due to frequent falls and alcohol abuse, as the risks outweigh the benefits.   - You were treated with Aspirin 81 mg daily.   - Echocardiogram:  TTE: LVEF 65%, Normal left ventricular systolic function. Increased left ventricular wall thickness.  - CONTINUE Aspirin and ___________  - FOLLOW UP with your PCP within 1 week.  - FOLLOW UP with Cardiology within 1 week.      SECONDARY DISCHARGE DIAGNOSES  Diagnosis: Fall in elderly patient  Assessment and Plan of Treatment: You fell down and was brought to the hospital.   You had an MRI brain which showed no acute infarct or hemorrhage.   - FOLLOW UP with your PCP within 1 week.    Diagnosis: COVID-19  Assessment and Plan of Treatment: You tested positive for COVID on 5/8/2023.  You were treated with Remdesivir started 5/8 x 5 days, last day 5/12, and  Dexamethasone 6mg daily started 5/8, last day 5/12.  You were saturating fine on room air   - FOLLOW UP with your PCP within 1 week.    Diagnosis: Atrial flutter  Assessment and Plan of Treatment:     Diagnosis: Dementia  Assessment and Plan of Treatment: Your MRI brain: no acute infarct or hemorrhage, severe ventricular greater than sulcal prominence, may reflect central greater than cortical atrophy, also consistent with communicating hydrocephalus. With chronic microvascular ischemic changes.   You were evaluated by the Neurology team, who suspect possible frontotemporal dementia, possible component of Parkinson Disease for your in gait instability.   - CONTINUE Sinemet as prescribed   - FOLLOW UP with Neurology for further evaluation    Diagnosis: Imaging abnormality  Assessment and Plan of Treatment: Imaging showed you have a 3.8 cm Abdominal Aortic Aneurysm, infrarenal   - You were evaluated by vascular surgery.   - Continue cilostazol 100 mg twice per day.   - FOLLOW UP with vascular surgery for routine monitoring of your aneurysm.    Diagnosis: HTN (hypertension)  Assessment and Plan of Treatment: High blood pressure is a chronic problem.   Your blood pressures were low during your hospitalization, so your home blood pressure medications were held.   - START taking ___       PRINCIPAL DISCHARGE DIAGNOSIS  Diagnosis: Atrial fibrillation with RVR  Assessment and Plan of Treatment: - During your hopspitalization, you were found to have new onset atrial flutter/fibrillation.  - You were treated with Diltiazem 240 mg PO daily.   - After weighing risks and benefits and discussing options with the pt and your son, anticoagulation was not started due to frequent falls and alcohol abuse, as the risks outweigh the benefits.   - You were treated with Aspirin 81 mg daily.   - Echocardiogram:  TTE: LVEF 65%, Normal left ventricular systolic function. Increased left ventricular wall thickness.  - CONTINUE Aspirin 81 mg daily  - CONTINUE Cilostazol 100 mg twice per day  - CONTINUE Diltiazem  mg once per day  - CONTINUE Metoprolol tartrate 12.5 mg twice per day  - CONTINUE Atorvastatin 10 mg daily  - DO NOT TAKE YOUR OLD LOSARTAN MEDICATION  - FOLLOW UP with your PCP within 1 week.  - FOLLOW UP with Cardiology within 1 week.      SECONDARY DISCHARGE DIAGNOSES  Diagnosis: Fall in elderly patient  Assessment and Plan of Treatment: You fell down and was brought to the hospital.   You had an MRI brain which showed no acute infarct or hemorrhage.   - FOLLOW UP with your PCP within 1 week.    Diagnosis: COVID-19  Assessment and Plan of Treatment: You tested positive for COVID on 5/8/2023.  You were treated with Remdesivir started 5/8 x 5 days, last day 5/12, and  Dexamethasone 6mg daily started 5/8, last day 5/12.  You were saturating fine on room air   - FOLLOW UP with your PCP within 1 week.    Diagnosis: Dementia  Assessment and Plan of Treatment: Your MRI brain: no acute infarct or hemorrhage, severe ventricular greater than sulcal prominence, may reflect central greater than cortical atrophy, also consistent with communicating hydrocephalus. With chronic microvascular ischemic changes.   You were evaluated by the Neurology team, who suspect possible frontotemporal dementia, possible component of Parkinson Disease for your in gait instability.   - CONTINUE Sinemet as prescribed   - FOLLOW UP with Neurology for further evaluation    Diagnosis: Imaging abnormality  Assessment and Plan of Treatment: Imaging showed you have a 3.8 cm Abdominal Aortic Aneurysm, infrarenal   - You were evaluated by vascular surgery.   - Continue cilostazol 100 mg twice per day.   - FOLLOW UP with vascular surgery for routine monitoring of your aneurysm.    Diagnosis: HTN (hypertension)  Assessment and Plan of Treatment: High blood pressure is a chronic problem.   Your blood pressures were low during your hospitalization, so your home blood pressure medications were held.   - CONTINUE Diltiazem  mg once per day  - CONTINUE Metoprolol tartrate 12.5 mg twice per day      Diagnosis: Atrial flutter  Assessment and Plan of Treatment:      PRINCIPAL DISCHARGE DIAGNOSIS  Diagnosis: Atrial fibrillation with RVR  Assessment and Plan of Treatment: - During your hopspitalization, you were found to have new onset atrial flutter/fibrillation.  - You were treated with Diltiazem 240 mg PO daily.   - After weighing risks and benefits and discussing options with the pt and your son, anticoagulation was not started due to frequent falls and alcohol abuse, as the risks outweigh the benefits.   - You were treated with Aspirin 81 mg daily.   - Echocardiogram:  TTE: LVEF 65%, Normal left ventricular systolic function. Increased left ventricular wall thickness.  - CONTINUE Aspirin 81 mg daily  - CONTINUE Cilostazol 100 mg twice per day  - CONTINUE Diltiazem  mg once per day  - CONTINUE Metoprolol tartrate 12.5 mg twice per day  - CONTINUE Atorvastatin 10 mg daily  - DO NOT TAKE YOUR OLD LOSARTAN MEDICATION  - FOLLOW UP with your PCP within 1 week.  - FOLLOW UP with Cardiology within 1 week.      SECONDARY DISCHARGE DIAGNOSES  Diagnosis: Fall in elderly patient  Assessment and Plan of Treatment: You fell down and was brought to the hospital.   You had an MRI brain which showed no acute infarct or hemorrhage.   - FOLLOW UP with your PCP within 1 week.    Diagnosis: COVID-19  Assessment and Plan of Treatment: You tested positive for COVID on 5/8/2023.  You were treated with Remdesivir started 5/8 x 5 days, last day 5/12, and  Dexamethasone 6mg daily started 5/8, last day 5/12.  You were saturating fine on room air   - FOLLOW UP with your PCP within 1 week.    Diagnosis: Dementia  Assessment and Plan of Treatment: Your MRI brain: no acute infarct or hemorrhage, severe ventricular greater than sulcal prominence, may reflect central greater than cortical atrophy, also consistent with communicating hydrocephalus. With chronic microvascular ischemic changes.   You were evaluated by the Neurology team, who suspect possible frontotemporal dementia, possible component of Parkinson Disease for your in gait instability.   - CONTINUE Sinemet as prescribed   - FOLLOW UP with Neurology for further evaluation    Diagnosis: Imaging abnormality  Assessment and Plan of Treatment: Imaging showed you have a 3.8 cm Abdominal Aortic Aneurysm, infrarenal   - You were evaluated by vascular surgery.   - Continue cilostazol 100 mg twice per day.   - FOLLOW UP with vascular surgery for routine monitoring of your aneurysm.    Diagnosis: HTN (hypertension)  Assessment and Plan of Treatment: High blood pressure is a chronic problem.   Your blood pressures were low during your hospitalization, so your home blood pressure medications were held.   - CONTINUE Diltiazem  mg once per day  - CONTINUE Metoprolol tartrate 12.5 mg twice per day      Diagnosis: Hematemesis  Assessment and Plan of Treatment: There was a concern for GI bleed during your hospital stay. Your CBC was monitored and was stable. GI evaluated you and there was no urgent need for endoscopy. Continue protonix 40mg twice a day. Continue ASA and cilastozol.

## 2023-05-10 NOTE — PROGRESS NOTE ADULT - ASSESSMENT
The patient is a 77 year old male with a history of HTN, HL, DM, PAD, BPH, COPD, alcohol abuse who presents with a fall.    Plan:  - ECG and telemetry with underlying atrial flutter  - Echo technically difficult with grossly normal LV systolic function  - Hold aspirin and clopidogrel. When hemoglobin stable, will resume aspirin 81 mg daily.  - There was reportedly hematemesis with vomiting. Will discontinue anticoagulation. Given bleeding and prior concerns with alcohol use, gait instability, falls, the patient is a poor candidate for long term anticoagulation and risks outweigh benefits.  - Continue cilostazol 100 mg bid  - Continue atorvastatin 10 mg daily  - Continue diltiazem  mg daily  - Hold losartan, furosemide  - Monitor hemoglobin and transfuse as needed  - COVID positive  - ID follow-up

## 2023-05-10 NOTE — PROGRESS NOTE ADULT - ASSESSMENT
afib  gi bleed    hgb noted  diet as tolerated  no signs of portal htn  suspect esophagitis  proton pump inhibitor bid  ok to start clear liquids today  anti-emetics prn  Cont to hold off on a/c  d/w family at bedside    I reviewed the overnight course of events on the unit, re-confirming the patient history. I discussed the care with the patient and their family  Differential diagnosis and plan of care discussed with patient after the evaluation  40 minutes spent on total encounter of which more than fifty percent of the encounter was spent counseling and/or coordinating care by the attending physician.  Advanced care planning was discussed with patient and family.  Advanced care planning forms were reviewed and discussed.  Risks, benefits and alternatives of gastroenterologic procedures were discussed in detail and all questions were answered.

## 2023-05-10 NOTE — PROGRESS NOTE ADULT - SUBJECTIVE AND OBJECTIVE BOX
Neurology Follow up note    ABRIL [id number]    HPI:  76 y/o M w/ PMHx of DM2, PVD, HTN, HLD, COPD, BPH presents from home s/p fall. Daughter at bedside supplements history. Patient has some forgetfulness but no documented history of dementia. Patient was found down by aide, has a life alert that he did not press. Unknown if LOC or head trauma. Estimated that patient was down from 6pm to 6am. Patient reports he spent the night on the floor, last thing he remembers is "feeling crummy on my couch" and then being woken up by his aide this morning. He also has a laceration of RLE from the fall which was repaired in the ED. Of note, patient does not have hx of AF but was found to be in AF w/ RVR at time of admission. Pt reports he drinks 6-7 beers ([employer]) each day. He has a history of falls and goes to PT weekly for gait/stability training. He has an aide who comes to the home 2x a week for 5 hours to supervise showers and prevent falls, help with medication management, and drives him to doctors appointments and physical therapy. At home pt uses a rollator and cane.       In the ED   Vitals: 133/51, , T 97, RR 12   Labs: WBC 11, , Lactate 2.4,   EKG: atrial flutter with RVR, rates 130's  Imaging  CXR: slight left base atelectasis     In the ED given:  Meds Given: 2.3L NS, 2g Ofirmev, 10mg IV Cardizem x2, 30mg PO Cardizem x1    (05 May 2023 14:47)      Interval History -no new events    Patient is seen, chart was reviewed and case was discussed with the treatment team.  Pt is not in any distress.   Lying on bed comfortably.       Vital Signs Last 24 Hrs  T(C): 36.7 (10 May 2023 12:00), Max: 36.9 (09 May 2023 19:45)  T(F): 98 (10 May 2023 12:00), Max: 98.4 (09 May 2023 19:45)  HR: 101 (10 May 2023 12:00) (94 - 101)  BP: 98/59 (10 May 2023 12:00) (98/59 - 116/65)  BP(mean): --  RR: 18 (10 May 2023 12:00) (18 - 18)  SpO2: 93% (10 May 2023 12:00) (92% - 96%)    Parameters below as of 10 May 2023 12:00  Patient On (Oxygen Delivery Method): nasal cannula  O2 Flow (L/min): 2              REVIEW OF SYSTEMS:    Constitutional: No , weight loss or fatigue  Eyes: No eye pain, visual disturbances, or discharge  ENT:  No difficulty hearing, tinnitus, vertigo  Neck: No pain or stiffness  Respiratory: No wheezing, chills or hemoptysis  Cardiovascular: No chest pain, palpitations, shortness of breath, dizziness or leg swelling  Gastrointestinal: No abdominal or epigastric pain. No nausea, vomiting or hematemesis;  Genitourinary: No frequency, hematuria or incontinence  Neurological: No headaches, , loss of strength, numbness or tremors  Psychiatric: No depression, anxiety, mood swings or difficulty sleeping  Musculoskeletal: No joint pain or swelling; No muscle, back or extremity pain  Skin: No itching, burning, rashes or lesions   Lymph Nodes: No enlarged glands  Endocrine: No heat or cold intolerance; No hair loss,   Allergy and Immunologic: No hives or eczema    On Neurological Examination:    Mental Status - Pt is alert, awake, oriented X3. H. Follows commands well and able to answer questions appropriately.Mood and affect  normal    Speech -  Normal.     Cranial Nerves - Pupils 3 mm equal and reactive to light, extraocular eye movements intact. Pt has no visual field deficit.  Pt has no  facial asymmetry. Facial sensation is intact.Tongue - is in midline.    Muscle tone - is normal     Motor Exam - 5/5 of UE  LE ;3-4/5 No drift.     Sensory Exam -  Pt withdraws all extremities equally on stimulation. No asymmetry seen. No complaints of tingling, numbness.    coordination:    Finger to nose: normal    Heel to shin: normal    Deep tendon Reflexes - 2 plus all over.     .    Neck Supple -  Yes.    MEDICATIONS  (STANDING):  aspirin  chewable 81 milliGRAM(s) Oral daily  atorvastatin 10 milliGRAM(s) Oral at bedtime  cilostazol 100 milliGRAM(s) Oral two times a day  dexAMETHasone     Tablet 6 milliGRAM(s) Oral daily  diltiazem    milliGRAM(s) Oral daily  DULoxetine 60 milliGRAM(s) Oral daily  fluticasone furoate/vilanterol 200-25 MICROgram(s) Inhaler 1 Puff(s) Inhalation daily  folic acid 1 milliGRAM(s) Oral daily  multivitamin 1 Tablet(s) Oral daily  ondansetron Injectable 4 milliGRAM(s) IV Push once  pantoprazole  Injectable 40 milliGRAM(s) IV Push every 12 hours  polyethylene glycol 3350 17 Gram(s) Oral two times a day  potassium chloride    Tablet ER 40 milliEquivalent(s) Oral every 4 hours  remdesivir  IVPB 100 milliGRAM(s) IV Intermittent every 24 hours  remdesivir  IVPB   IV Intermittent   sodium chloride 0.9%. 1000 milliLiter(s) (100 mL/Hr) IV Continuous <Continuous>  tamsulosin 0.4 milliGRAM(s) Oral at bedtime    MEDICATIONS  (PRN):  acetaminophen     Tablet .. 650 milliGRAM(s) Oral every 6 hours PRN Temp greater or equal to 38C (100.4F), Mild Pain (1 - 3)  albuterol/ipratropium for Nebulization 3 milliLiter(s) Nebulizer every 6 hours PRN Shortness of Breath  aluminum hydroxide/magnesium hydroxide/simethicone Suspension 30 milliLiter(s) Oral every 4 hours PRN Dyspepsia  diltiazem Injectable 10 milliGRAM(s) IV Push every 4 hours PRN HR>130  ibuprofen  Tablet. 400 milliGRAM(s) Oral every 8 hours PRN Temp greater or equal to 38C (100.4F)  LORazepam   Injectable 1 milliGRAM(s) IV Push every 1 hour PRN CIWA-Ar score 8 or greater  melatonin 3 milliGRAM(s) Oral at bedtime PRN Insomnia  ondansetron Injectable 4 milliGRAM(s) IV Push every 8 hours PRN Nausea and/or Vomiting      Allergies    No Known Allergies    Intolerances    05-10    141  |  107  |  22  ----------------------------<  147<H>  3.3<L>   |  30  |  0.81    Ca    8.3<L>      10 May 2023 06:40    TPro  5.8<L>  /  Alb  2.8<L>  /  TBili  0.5  /  DBili  x   /  AST  31  /  ALT  24  /  AlkPhos  53  05-09          Hemoglobin A1C:     Vitamin B12     RADIOLOGY    ASSESSMENT AND PLAN:     SEEN FOR UNSTEADY GAIT   PN  TREMOR ?PD  sars-covid 2    on remdesivir  trial of sinemet after remdesivir  BRAIN MR NO ACUTE CVA  Physical therapy evaluation.  OOB to chair/ambulation with assistance only.  Pain is accessed and addressed.  Would continue to follow.

## 2023-05-10 NOTE — DISCHARGE NOTE PROVIDER - NSDCMRMEDTOKEN_GEN_ALL_CORE_FT
Anoro Ellipta 62.5 mcg-25 mcg/inh inhalation powder: 1 puff(s) inhaled once a day  aspirin 81 mg oral tablet: 1 orally once a day  atorvastatin 10 mg oral tablet: 1 orally once a day  Breo Ellipta 200 mcg-25 mcg/inh inhalation powder: 1 puff(s) inhaled once a day  cilostazol 100 mg oral tablet: 1 tab(s) orally 2 times a day  DULoxetine 60 mg oral delayed release capsule: 1 orally once a day  Lasix 40 mg oral tablet: 1 tab(s) orally once a day  losartan 50 mg oral tablet: 1 tab(s) orally once a day  metOLazone 5 mg oral tablet: 1 tab(s) orally once a day  Plavix 75 mg oral tablet: 1 orally once a day  tamsulosin 0.4 mg oral capsule: 1 cap(s) orally once a day   Anoro Ellipta 62.5 mcg-25 mcg/inh inhalation powder: 1 puff(s) inhaled once a day  aspirin 81 mg oral tablet: 1 orally once a day  atorvastatin 10 mg oral tablet: 1 orally once a day  Breo Ellipta 200 mcg-25 mcg/inh inhalation powder: 1 puff(s) inhaled once a day  carbidopa-levodopa 25 mg-100 mg oral tablet: 1 tab(s) orally 3 times a day  cilostazol 100 mg oral tablet: 1 tab(s) orally 2 times a day  dilTIAZem 240 mg/24 hours oral capsule, extended release: 1 cap(s) orally once a day  DULoxetine 60 mg oral delayed release capsule: 1 orally once a day  folic acid 1 mg oral tablet: 1 tab(s) orally once a day  Lasix 40 mg oral tablet: 1 tab(s) orally once a day  metoprolol tartrate 25 mg oral tablet: 0.5 tab(s) orally every 12 hours  Multiple Vitamins oral tablet: 1 tab(s) orally once a day  polyethylene glycol 3350 oral powder for reconstitution: 17 gram(s) orally 2 times a day  tamsulosin 0.4 mg oral capsule: 1 cap(s) orally once a day   Anoro Ellipta 62.5 mcg-25 mcg/inh inhalation powder: 1 puff(s) inhaled once a day  aspirin 81 mg oral tablet: 1 orally once a day  atorvastatin 10 mg oral tablet: 1 orally once a day  Breo Ellipta 200 mcg-25 mcg/inh inhalation powder: 1 puff(s) inhaled once a day  carbidopa-levodopa 25 mg-100 mg oral tablet: 1 tab(s) orally 3 times a day  cilostazol 100 mg oral tablet: 1 tab(s) orally 2 times a day  dilTIAZem 240 mg/24 hours oral capsule, extended release: 1 cap(s) orally once a day  DULoxetine 60 mg oral delayed release capsule: 1 orally once a day  folic acid 1 mg oral tablet: 1 tab(s) orally once a day  Lasix 40 mg oral tablet: 1 tab(s) orally once a day  metoprolol tartrate 25 mg oral tablet: 0.5 tab(s) orally every 12 hours  Multiple Vitamins oral tablet: 1 tab(s) orally once a day  pantoprazole 40 mg oral delayed release tablet: 40 milligram(s) orally 2 times a day  polyethylene glycol 3350 oral powder for reconstitution: 17 gram(s) orally 2 times a day  tamsulosin 0.4 mg oral capsule: 1 cap(s) orally once a day

## 2023-05-10 NOTE — DISCHARGE NOTE PROVIDER - PROVIDER TOKENS
PROVIDER:[TOKEN:[[id number]],FOLLOWUP:[1 week],ESTABLISHEDPATIENT:[T]],PROVIDER:[TOKEN:[[id number]],FOLLOWUP:[2 weeks]] PROVIDER:[TOKEN:[[id number]],FOLLOWUP:[1 week],ESTABLISHEDPATIENT:[T]],PROVIDER:[TOKEN:[[id number]],FOLLOWUP:[2 weeks]],PROVIDER:[TOKEN:[[id number]],FOLLOWUP:[1 week]],PROVIDER:[TOKEN:[[id number]],FOLLOWUP:[1 week]]

## 2023-05-10 NOTE — PROGRESS NOTE ADULT - PROBLEM SELECTOR PLAN 11
3.8cm AAA, infrarenal   - seen by vascular surgery. continue cilostazol.   - ASA initially held given hematemesis, will restart today  - routine monitoring outpatient   - patient reports he is aware of the aneurysm

## 2023-05-10 NOTE — PROGRESS NOTE ADULT - PROBLEM SELECTOR PLAN 1
- new onset AFib on admission  -Received in ED: Cardizem 10 mg IV x2, Cardizem PO 30 mg  - Continue Diltiazem  mg PO daily  - AFib is rate controlled  - HOLD AC due to frequent falls and alcohol abuse, risks outweigh benefits of anticoagulation  - Will start Aspirin 81 mg PO daily  - Discussed risks and benefits of AC with pt and son, aware   - TTE: LVEF 65%, Normal left ventricular systolic function. Increased left ventricular wall thickness.  - TSH wnl  - Cardiology following

## 2023-05-10 NOTE — PROGRESS NOTE ADULT - ASSESSMENT
76 y/o M w/ PMHx of DM2, PVD, HTN, HLD, COPD, BPH presents from home s/p fall. Daughter reports that the next door neighbor has COVID and has been visiting her dad all the time despite this. Patient has some forgetfulness but no documented history of dementia and children report very clear at baseline. Patient was found down by aide, has a life alert that he did not press. Patient does not have hx of AF but was found to be in AF w/ RVR at time of admission. Pt reports he drinks 6-7 beers ([employer]) each day. Report of cough, fever and now positive COVID test. Pt vaccinated and boosted.    RECOMMENDATIONS  1-COVID acute COVID and within first 10 days but hypoxic so recommend    -Remdesivir started 5/8 so x 5 days with last day 5/12  -Dexamethasone 6mg daily started 5/8 x 6 days so last day 5/13  VTE prophylaxis -consider full dose in this high risk patient (LMWH)-primary to assess risks  pulmonary support  will assess for any secondary process including secondary infection  5/9-stable on NC-4L pt reporting improvement  5/10 sats >90% on RA    Thank you for consulting us and involving us in the management of this most interesting and challenging case.  We will follow along in the care of this patient. Please call us at [phone] or text me directly on my cell# at [phone] with any concerns.

## 2023-05-10 NOTE — PROGRESS NOTE ADULT - PROBLEM SELECTOR PLAN 12
Hold lovenox in setting of hematemesis  - will restart ASA 81 today      # Dispo planning  - COVID positive  - PT recommends KIM  - Updated son at bedside.

## 2023-05-10 NOTE — DISCHARGE NOTE PROVIDER - HOSPITAL COURSE
ADMISSION DATE:  05-05-23    ---  FROM ADMISSION H+P:   HPI:  76 y/o M w/ PMHx of DM2, PVD, HTN, HLD, COPD, BPH presents from home s/p fall. Daughter at bedside supplements history. Patient has some forgetfulness but no documented history of dementia. Patient was found down by aide, has a life alert that he did not press. Unknown if LOC or head trauma. Estimated that patient was down from 6pm to 6am. Patient reports he spent the night on the floor, last thing he remembers is "feeling crummy on my couch" and then being woken up by his aide this morning. He also has a laceration of RLE from the fall which was repaired in the ED. Of note, patient does not have hx of AF but was found to be in AF w/ RVR at time of admission. Pt reports he drinks 6-7 beers ([employer]) each day. He has a history of falls and goes to PT weekly for gait/stability training. He has an aide who comes to the home 2x a week for 5 hours to supervise showers and prevent falls, help with medication management, and drives him to doctors appointments and physical therapy. At home pt uses a rollator and cane.       In the ED   Vitals: 133/51, , T 97, RR 12   Labs: WBC 11, , Lactate 2.4,   EKG: atrial flutter with RVR, rates 130's  Imaging  CXR: slight left base atelectasis     In the ED given:  Meds Given: 2.3L NS, 2g Ofirmev, 10mg IV Cardizem x2, 30mg PO Cardizem x1    (05 May 2023 14:47)      ---  HOSPITAL COURSE/PERTINENT LABS/PROCEDURES PERFORMED/PENDING TESTS:   Pt was admitted for       Patient is stable for discharge as per primary medical team and consultants.    PT consulted, recommends discharge ______    Patient showed improvement throughout hospitalization. Patient was seen and examined on day of discharge. Patient was medically optimized for discharge with close outpatient follow up.    ---  PATIENT CONDITION:  - stable    --  VITALS:   T(C): 36.7 (05-10-23 @ 12:00), Max: 36.9 (05-09-23 @ 19:45)  HR: 106 (05-10-23 @ 13:33) (94 - 106)  BP: 110/60 (05-10-23 @ 14:51) (98/59 - 116/65)  RR: 18 (05-10-23 @ 12:00) (18 - 18)  SpO2: 91% (05-10-23 @ 14:51) (91% - 96%)    PHYSICAL EXAM ON DAY OF DISCHARGE:    ---  CONSULTANTS:   -    ---  ADVANCED CARE PLANNING:  - Code status:      - [facility] completed:      [  ] NO     [  ] YES    ---  TIME SPENT:  I, the attending physician, was physically present for the key portions of the evaluation and management (E/M) service provided. The total amount of time spent reviewing the hospital notes, laboratory values, imaging findings, assessing/counseling the patient, discussing with consultant physicians, social work, nursing staff was -- minutes       ADMISSION DATE:  05-05-23    ---  FROM ADMISSION H+P:   HPI:  76 y/o M w/ PMHx of DM2, PVD, HTN, HLD, COPD, BPH presents from home s/p fall. Daughter at bedside supplements history. Patient has some forgetfulness but no documented history of dementia. Patient was found down by aide, has a life alert that he did not press. Unknown if LOC or head trauma. Estimated that patient was down from 6pm to 6am. Patient reports he spent the night on the floor, last thing he remembers is "feeling crummy on my couch" and then being woken up by his aide this morning. He also has a laceration of RLE from the fall which was repaired in the ED. Of note, patient does not have hx of AF but was found to be in AF w/ RVR at time of admission. Pt reports he drinks 6-7 beers ([employer]) each day. He has a history of falls and goes to PT weekly for gait/stability training. He has an aide who comes to the home 2x a week for 5 hours to supervise showers and prevent falls, help with medication management, and drives him to doctors appointments and physical therapy. At home pt uses a rollator and cane.       In the ED   Vitals: 133/51, , T 97, RR 12   Labs: WBC 11, , Lactate 2.4,   EKG: atrial flutter with RVR, rates 130's  Imaging  CXR: slight left base atelectasis     In the ED given:  Meds Given: 2.3L NS, 2g Ofirmev, 10mg IV Cardizem x2, 30mg PO Cardizem x1    (05 May 2023 14:47)      ---  HOSPITAL COURSE/PERTINENT LABS/PROCEDURES PERFORMED/PENDING TESTS:   76 y/o M with history of DM, HTN. HLD, COPD, BPH, PVD who was brought in for evaluation of fall. Found to have new onset aflutter with RVR. Hospitalization complicated by hematemesis and COVID+ 5/8/2023. For the fall, he had an MRI brain which showed no acute infarct or hemorrhage. For his atrial flutter/fibrillation, he was treated with Diltiazem 240 mg PO daily. After weighing risks and benefits and discussing options with the pt and his son, anticoagulation was not started due to frequent falls and alcohol abuse, as the risks outweigh the benefits. he was continued on Aspirin 81 mg daily. He had an echocardiogram:  TTE: LVEF 65%, Normal left ventricular systolic function. Increased left ventricular wall thickness.  For COVID infection, he was treated with: Remdesivir started 5/8 x 5 days, last day 5/12, and  Dexamethasone 6mg daily started 5/8, last day 5/12.   He was constipated during the hospitalization, requiring dulcolax suppository.  His  MRI brain: no acute infarct or hemorrhage, severe ventricular greater than sulcal prominence, may reflect central greater than cortical atrophy, also consistent with communicating hydrocephalus. With chronic microvascular ischemic changes. The Neurology team evaluated the pt and suspects possible frontotemporal dementia, possible component of Parkinson Disease in gait instability. They recommend start trial of sinemet after remdesivir completion, and follow up with Neurology outpatient.     UPDATED 5/12    Patient is stable for discharge as per primary medical team and consultants.    PT consulted, recommends discharge ______    Patient showed improvement throughout hospitalization. Patient was seen and examined on day of discharge. Patient was medically optimized for discharge with close outpatient follow up.    ---  PATIENT CONDITION:  - stable    --  VITALS:   T(C): 36.7 (05-10-23 @ 12:00), Max: 36.9 (05-09-23 @ 19:45)  HR: 106 (05-10-23 @ 13:33) (94 - 106)  BP: 110/60 (05-10-23 @ 14:51) (98/59 - 116/65)  RR: 18 (05-10-23 @ 12:00) (18 - 18)  SpO2: 91% (05-10-23 @ 14:51) (91% - 96%)    PHYSICAL EXAM ON DAY OF DISCHARGE:    ---  CONSULTANTS:   -    ---  ADVANCED CARE PLANNING:  - Code status:      [facility]      [  ] NO     [  ] YES    ---  TIME SPENT:  I, the attending physician, was physically present for the key portions of the evaluation and management (E/M) service provided. The total amount of time spent reviewing the hospital notes, laboratory values, imaging findings, assessing/counseling the patient, discussing with consultant physicians, social work, nursing staff was -- minutes       ADMISSION DATE:  05-05-23    ---  FROM ADMISSION H+P:   HPI:  78 y/o M w/ PMHx of DM2, PVD, HTN, HLD, COPD, BPH presents from home s/p fall. Daughter at bedside supplements history. Patient has some forgetfulness but no documented history of dementia. Patient was found down by aide, has a life alert that he did not press. Unknown if LOC or head trauma. Estimated that patient was down from 6pm to 6am. Patient reports he spent the night on the floor, last thing he remembers is "feeling crummy on my couch" and then being woken up by his aide this morning. He also has a laceration of RLE from the fall which was repaired in the ED. Of note, patient does not have hx of AF but was found to be in AF w/ RVR at time of admission. Pt reports he drinks 6-7 beers ([employer]) each day. He has a history of falls and goes to PT weekly for gait/stability training. He has an aide who comes to the home 2x a week for 5 hours to supervise showers and prevent falls, help with medication management, and drives him to doctors appointments and physical therapy. At home pt uses a rollator and cane.       In the ED   Vitals: 133/51, , T 97, RR 12   Labs: WBC 11, , Lactate 2.4,   EKG: atrial flutter with RVR, rates 130's  Imaging  CXR: slight left base atelectasis     In the ED given:  Meds Given: 2.3L NS, 2g Ofirmev, 10mg IV Cardizem x2, 30mg PO Cardizem x1    (05 May 2023 14:47)      ---  HOSPITAL COURSE/PERTINENT LABS/PROCEDURES PERFORMED/PENDING TESTS:   78 y/o M with history of DM, HTN. HLD, COPD, BPH, PVD who was brought in for evaluation of fall. Found to have new onset aflutter with RVR. Hospitalization complicated by hematemesis and COVID+ 5/8/2023. For the fall, he had an MRI brain which showed no acute infarct or hemorrhage. For his atrial flutter/fibrillation, he was treated with Diltiazem 240 mg PO daily. After weighing risks and benefits and discussing options with the pt and his son, anticoagulation was not started due to frequent falls and alcohol abuse, as the risks outweigh the benefits. he was continued on Aspirin 81 mg daily. He had an echocardiogram:  TTE: LVEF 65%, Normal left ventricular systolic function. Increased left ventricular wall thickness.  For COVID infection, he was treated with: Remdesivir started 5/8 x 5 days, last day 5/12, and  Dexamethasone 6mg daily started 5/8, last day 5/12.  He was weaned off of oxygen to room air.  His  MRI brain: no acute infarct or hemorrhage, severe ventricular greater than sulcal prominence, may reflect central greater than cortical atrophy, also consistent with communicating hydrocephalus. With chronic microvascular ischemic changes. The Neurology team evaluated the pt and suspects possible frontotemporal dementia, possible component of Parkinson Disease in gait instability. They recommend start trial of sinemet after remdesivir completion, and follow up with Neurology outpatient.     UPDATED 5/13    Patient is stable for discharge as per primary medical team and consultants.    PT consulted, recommends discharge ______    Patient showed improvement throughout hospitalization. Patient was seen and examined on day of discharge. Patient was medically optimized for discharge with close outpatient follow up.    ---  PATIENT CONDITION:  - stable    --  VITALS:   T(C): 36.7 (05-10-23 @ 12:00), Max: 36.9 (05-09-23 @ 19:45)  HR: 106 (05-10-23 @ 13:33) (94 - 106)  BP: 110/60 (05-10-23 @ 14:51) (98/59 - 116/65)  RR: 18 (05-10-23 @ 12:00) (18 - 18)  SpO2: 91% (05-10-23 @ 14:51) (91% - 96%)    PHYSICAL EXAM ON DAY OF DISCHARGE:    ---  CONSULTANTS:   -    ---  ADVANCED CARE PLANNING:  - Code status:      [facility]      [  ] NO     [  ] YES    ---  TIME SPENT:  I, the attending physician, was physically present for the key portions of the evaluation and management (E/M) service provided. The total amount of time spent reviewing the hospital notes, laboratory values, imaging findings, assessing/counseling the patient, discussing with consultant physicians, social work, nursing staff was -- minutes       ADMISSION DATE:  05-05-23    ---  FROM ADMISSION H+P:   HPI:  78 y/o M w/ PMHx of DM2, PVD, HTN, HLD, COPD, BPH presents from home s/p fall. Daughter at bedside supplements history. Patient has some forgetfulness but no documented history of dementia. Patient was found down by aide, has a life alert that he did not press. Unknown if LOC or head trauma. Estimated that patient was down from 6pm to 6am. Patient reports he spent the night on the floor, last thing he remembers is "feeling crummy on my couch" and then being woken up by his aide this morning. He also has a laceration of RLE from the fall which was repaired in the ED. Of note, patient does not have hx of AF but was found to be in AF w/ RVR at time of admission. Pt reports he drinks 6-7 beers ([employer]) each day. He has a history of falls and goes to PT weekly for gait/stability training. He has an aide who comes to the home 2x a week for 5 hours to supervise showers and prevent falls, help with medication management, and drives him to doctors appointments and physical therapy. At home pt uses a rollator and cane.       In the ED   Vitals: 133/51, , T 97, RR 12   Labs: WBC 11, , Lactate 2.4,   EKG: atrial flutter with RVR, rates 130's  Imaging  CXR: slight left base atelectasis     In the ED given:  Meds Given: 2.3L NS, 2g Ofirmev, 10mg IV Cardizem x2, 30mg PO Cardizem x1    (05 May 2023 14:47)      ---  HOSPITAL COURSE/PERTINENT LABS/PROCEDURES PERFORMED/PENDING TESTS:   78 y/o M with history of DM, HTN. HLD, COPD, BPH, PVD who was brought in for evaluation of fall. Found to have new onset aflutter with RVR. Hospitalization complicated by hematemesis and COVID+ 5/8/2023. For the fall, he had an MRI brain which showed no acute infarct or hemorrhage. For his atrial flutter/fibrillation, he was treated with Diltiazem 240 mg PO daily. After weighing risks and benefits and discussing options with the pt and his son, anticoagulation was not started due to frequent falls and alcohol abuse, as the risks outweigh the benefits. he was continued on Aspirin 81 mg daily. He had an echocardiogram:  TTE: LVEF 65%, Normal left ventricular systolic function. Increased left ventricular wall thickness.  For COVID infection, he was treated with: Remdesivir started 5/8 x 5 days, last day 5/12, and  Dexamethasone 6mg daily started 5/8, last day 5/13.  He was weaned off of oxygen to room air.  His  MRI brain: no acute infarct or hemorrhage, severe ventricular greater than sulcal prominence, may reflect central greater than cortical atrophy, also consistent with communicating hydrocephalus. With chronic microvascular ischemic changes. The Neurology team evaluated the pt and suspects possible frontotemporal dementia, possible component of Parkinson Disease in gait instability. Pt had a trial of sinemet after remdesivir completion, and follow up with Neurology outpatient.     Patient is stable for discharge as per primary medical team and consultants.    PT consulted, recommends discharge to [facility].     Patient showed improvement throughout hospitalization. Patient was seen and examined on day of discharge. Patient was medically optimized for discharge with close outpatient follow up.    ---  PATIENT CONDITION:  - stable    --  Vital Signs Last 24 Hrs  T(C): 36.9 (14 May 2023 05:16), Max: 37 (13 May 2023 11:37)  T(F): 98.4 (14 May 2023 05:16), Max: 98.6 (13 May 2023 11:37)  HR: 90 (14 May 2023 05:16) (90 - 104)  BP: 123/77 (14 May 2023 05:16) (118/74 - 123/77)  BP(mean): --  RR: 18 (14 May 2023 05:16) (17 - 18)  SpO2: 98% (14 May 2023 05:16) (90% - 98%)    Parameters below as of 13 May 2023 19:34  Patient On (Oxygen Delivery Method): room air      PHYSICAL EXAM ON DAY OF DISCHARGE:  GENERAL: awake, alert, NAD  HEENT:  anicteric, moist mucous membranes  CHEST/LUNG:  coarse breath sounds BL  HEART: irregular rhythm, no m/r/g  ABDOMEN:  BS+, soft, nontender, +distended  EXTREMITIES: 1+ bilat LE pitting edema, No calf tenderness  NERVOUS SYSTEM: answers questions and follows commands appropriately  PSYCH: normal affect    ---  CONSULTANTS:   -     ---  ADVANCED CARE PLANNING:  - Code status:      - [facility] completed:      [  ] NO     [  ] YES    ---  TIME SPENT:  I, the attending physician, was physically present for the key portions of the evaluation and management (E/M) service provided. The total amount of time spent reviewing the hospital notes, laboratory values, imaging findings, assessing/counseling the patient, discussing with consultant physicians, social work, nursing staff was -- minutes       ADMISSION DATE:  05-05-23    ---  FROM ADMISSION H+P:   HPI:  76 y/o M w/ PMHx of DM2, PVD, HTN, HLD, COPD, BPH presents from home s/p fall. Daughter at bedside supplements history. Patient has some forgetfulness but no documented history of dementia. Patient was found down by aide, has a life alert that he did not press. Unknown if LOC or head trauma. Estimated that patient was down from 6pm to 6am. Patient reports he spent the night on the floor, last thing he remembers is "feeling crummy on my couch" and then being woken up by his aide this morning. He also has a laceration of RLE from the fall which was repaired in the ED. Of note, patient does not have hx of AF but was found to be in AF w/ RVR at time of admission. Pt reports he drinks 6-7 beers ([employer]) each day. He has a history of falls and goes to PT weekly for gait/stability training. He has an aide who comes to the home 2x a week for 5 hours to supervise showers and prevent falls, help with medication management, and drives him to doctors appointments and physical therapy. At home pt uses a rollator and cane.       In the ED   Vitals: 133/51, , T 97, RR 12   Labs: WBC 11, , Lactate 2.4,   EKG: atrial flutter with RVR, rates 130's  Imaging  CXR: slight left base atelectasis     In the ED given:  Meds Given: 2.3L NS, 2g Ofirmev, 10mg IV Cardizem x2, 30mg PO Cardizem x1    (05 May 2023 14:47)      ---  HOSPITAL COURSE/PERTINENT LABS/PROCEDURES PERFORMED/PENDING TESTS:   76 y/o M with history of DM, HTN. HLD, COPD, BPH, PVD who was brought in for evaluation of fall. Found to have new onset aflutter with RVR. Hospitalization complicated by hematemesis and COVID+ 5/8/2023. For the fall, he had an MRI brain which showed no acute infarct or hemorrhage. For his atrial flutter/fibrillation, he was treated with Diltiazem 240 mg PO daily. After weighing risks and benefits and discussing options with the pt and his son, anticoagulation was not started due to frequent falls and alcohol abuse, as the risks outweigh the benefits. he was continued on Aspirin 81 mg daily. He had an echocardiogram:  TTE: LVEF 65%, Normal left ventricular systolic function. Increased left ventricular wall thickness.  For COVID infection, he was treated with: Remdesivir started 5/8 x 5 days, last day 5/12, and  Dexamethasone 6mg daily started 5/8, last day 5/12.  He was weaned off of oxygen to room air.  His  MRI brain: no acute infarct or hemorrhage, severe ventricular greater than sulcal prominence, may reflect central greater than cortical atrophy, also consistent with communicating hydrocephalus. With chronic microvascular ischemic changes. The Neurology team evaluated the pt and suspects possible frontotemporal dementia, possible component of Parkinson Disease in gait instability. Pt had a trial of sinemet after remdesivir completion, and follow up with Neurology outpatient.     UPDATED 5/14    Patient is stable for discharge as per primary medical team and consultants.    PT consulted, recommends discharge to [facility].     Patient showed improvement throughout hospitalization. Patient was seen and examined on day of discharge. Patient was medically optimized for discharge with close outpatient follow up.    ---  PATIENT CONDITION:  - stable    --  Vital Signs Last 24 Hrs  T(C): 36.9 (14 May 2023 05:16), Max: 37 (13 May 2023 11:37)  T(F): 98.4 (14 May 2023 05:16), Max: 98.6 (13 May 2023 11:37)  HR: 90 (14 May 2023 05:16) (90 - 104)  BP: 123/77 (14 May 2023 05:16) (118/74 - 123/77)  BP(mean): --  RR: 18 (14 May 2023 05:16) (17 - 18)  SpO2: 98% (14 May 2023 05:16) (90% - 98%)    Parameters below as of 13 May 2023 19:34  Patient On (Oxygen Delivery Method): room air      PHYSICAL EXAM ON DAY OF DISCHARGE:  GENERAL: awake, alert, NAD  HEENT:  anicteric, moist mucous membranes  CHEST/LUNG:  coarse breath sounds BL  HEART: irregular rhythm, no m/r/g  ABDOMEN:  BS+, soft, nontender, +distended  EXTREMITIES: 1+ bilat LE pitting edema, No calf tenderness  NERVOUS SYSTEM: answers questions and follows commands appropriately  PSYCH: normal affect    ---  CONSULTANTS:   -     ---  ADVANCED CARE PLANNING:  - Code status:      - [facility] completed:      [  ] NO     [  ] YES    ---  TIME SPENT:  I, the attending physician, was physically present for the key portions of the evaluation and management (E/M) service provided. The total amount of time spent reviewing the hospital notes, laboratory values, imaging findings, assessing/counseling the patient, discussing with consultant physicians, social work, nursing staff was -- minutes       ADMISSION DATE:  05-05-23    ---  FROM ADMISSION H+P:   HPI:  78 y/o M w/ PMHx of DM2, PVD, HTN, HLD, COPD, BPH presents from home s/p fall. Daughter at bedside supplements history. Patient has some forgetfulness but no documented history of dementia. Patient was found down by aide, has a life alert that he did not press. Unknown if LOC or head trauma. Estimated that patient was down from 6pm to 6am. Patient reports he spent the night on the floor, last thing he remembers is "feeling crummy on my couch" and then being woken up by his aide this morning. He also has a laceration of RLE from the fall which was repaired in the ED. Of note, patient does not have hx of AF but was found to be in AF w/ RVR at time of admission. Pt reports he drinks 6-7 beers ([employer]) each day. He has a history of falls and goes to PT weekly for gait/stability training. He has an aide who comes to the home 2x a week for 5 hours to supervise showers and prevent falls, help with medication management, and drives him to doctors appointments and physical therapy. At home pt uses a rollator and cane.       In the ED   Vitals: 133/51, , T 97, RR 12   Labs: WBC 11, , Lactate 2.4,   EKG: atrial flutter with RVR, rates 130's  Imaging  CXR: slight left base atelectasis     In the ED given:  Meds Given: 2.3L NS, 2g Ofirmev, 10mg IV Cardizem x2, 30mg PO Cardizem x1    (05 May 2023 14:47)      ---  HOSPITAL COURSE/PERTINENT LABS/PROCEDURES PERFORMED/PENDING TESTS:   78 y/o M with history of DM, HTN. HLD, COPD, BPH, PVD who was brought in for evaluation of fall. Found to have new onset aflutter with RVR. Hospitalization complicated by hematemesis and COVID+ 5/8/2023. For the fall, he had an MRI brain which showed no acute infarct or hemorrhage. For his atrial flutter/fibrillation, he was treated with Diltiazem 240 mg PO daily. After weighing risks and benefits and discussing options with the pt and his son, anticoagulation was not started due to frequent falls and alcohol abuse, as the risks outweigh the benefits. he was continued on Aspirin 81 mg daily. He had an echocardiogram:  TTE: LVEF 65%, Normal left ventricular systolic function. Increased left ventricular wall thickness.  For COVID infection, he was treated with: Remdesivir started 5/8 x 5 days, last day 5/12, and  Dexamethasone 6mg daily started 5/8, last day 5/12.  He was weaned off of oxygen to room air.  His  MRI brain: no acute infarct or hemorrhage, severe ventricular greater than sulcal prominence, may reflect central greater than cortical atrophy, also consistent with communicating hydrocephalus. With chronic microvascular ischemic changes. The Neurology team evaluated the pt and suspects possible frontotemporal dementia, possible component of Parkinson Disease in gait instability. Pt had a trial of sinemet after remdesivir completion, and follow up with Neurology outpatient.     UPDATED 5/14    Patient is stable for discharge as per primary medical team and consultants.    PT consulted, recommends discharge to [facility].     Patient showed improvement throughout hospitalization. Patient was seen and examined on day of discharge. Patient was medically optimized for discharge with close outpatient follow up.    ---  PATIENT CONDITION:  - stable    --  Vital Signs Last 24 Hrs  T(C): 36.9 (14 May 2023 05:16), Max: 37 (13 May 2023 11:37)  T(F): 98.4 (14 May 2023 05:16), Max: 98.6 (13 May 2023 11:37)  HR: 90 (14 May 2023 05:16) (90 - 104)  BP: 123/77 (14 May 2023 05:16) (118/74 - 123/77)  BP(mean): --  RR: 18 (14 May 2023 05:16) (17 - 18)  SpO2: 98% (14 May 2023 05:16) (90% - 98%)    Parameters below as of 13 May 2023 19:34  Patient On (Oxygen Delivery Method): room air      PHYSICAL EXAM ON DAY OF DISCHARGE:    ---  CONSULTANTS:   -  Cardio: Dr. Vargas   - ID: Dr. Laguna  - GI: Dr. Collins  - Vascular: Dr. Nichole  - Neuro: Dr. Chandler   ---  TIME SPENT:  I, the attending physician, was physically present for the key portions of the evaluation and management (E/M) service provided. The total amount of time spent reviewing the hospital notes, laboratory values, imaging findings, assessing/counseling the patient, discussing with consultant physicians, social work, nursing staff was -- minutes       ADMISSION DATE:  05-05-23    ---  FROM ADMISSION H+P:   HPI:  78 y/o M w/ PMHx of DM2, PVD, HTN, HLD, COPD, BPH presents from home s/p fall. Daughter at bedside supplements history. Patient has some forgetfulness but no documented history of dementia. Patient was found down by aide, has a life alert that he did not press. Unknown if LOC or head trauma. Estimated that patient was down from 6pm to 6am. Patient reports he spent the night on the floor, last thing he remembers is "feeling crummy on my couch" and then being woken up by his aide this morning. He also has a laceration of RLE from the fall which was repaired in the ED. Of note, patient does not have hx of AF but was found to be in AF w/ RVR at time of admission. Pt reports he drinks 6-7 beers ([employer]) each day. He has a history of falls and goes to PT weekly for gait/stability training. He has an aide who comes to the home 2x a week for 5 hours to supervise showers and prevent falls, help with medication management, and drives him to doctors appointments and physical therapy. At home pt uses a rollator and cane.       In the ED   Vitals: 133/51, , T 97, RR 12   Labs: WBC 11, , Lactate 2.4,   EKG: atrial flutter with RVR, rates 130's  Imaging  CXR: slight left base atelectasis     In the ED given:  Meds Given: 2.3L NS, 2g Ofirmev, 10mg IV Cardizem x2, 30mg PO Cardizem x1    (05 May 2023 14:47)      ---  HOSPITAL COURSE/PERTINENT LABS/PROCEDURES PERFORMED/PENDING TESTS:   78 y/o M with history of DM, HTN. HLD, COPD, BPH, PVD who was brought in for evaluation of fall. Found to have new onset aflutter with RVR. Hospitalization complicated by hematemesis and COVID+ 5/8/2023. For the fall, he had an MRI brain which showed no acute infarct or hemorrhage. For his atrial flutter/fibrillation, he was treated with Diltiazem 240 mg PO daily. After weighing risks and benefits and discussing options with the pt and his son, anticoagulation was not started due to frequent falls and alcohol abuse, as the risks outweigh the benefits. he was continued on Aspirin 81 mg daily. He had an echocardiogram:  TTE: LVEF 65%, Normal left ventricular systolic function. Increased left ventricular wall thickness.  For COVID infection, he was treated with: Remdesivir started 5/8 x 5 days, last day 5/12, and  Dexamethasone 6mg daily started 5/8, last day 5/12.  He was weaned off of oxygen to room air.  His  MRI brain: no acute infarct or hemorrhage, severe ventricular greater than sulcal prominence, may reflect central greater than cortical atrophy, also consistent with communicating hydrocephalus. With chronic microvascular ischemic changes. The Neurology team evaluated the pt and suspects possible frontotemporal dementia, possible component of Parkinson Disease in gait instability. Pt was started on a trial of sinemet after remdesivir completion, and follow up with Neurology outpatient.     Patient is stable for discharge as per primary medical team and consultants.    PT consulted, recommends discharge to [facility].     Patient showed improvement throughout hospitalization. Patient was seen and examined on day of discharge. Patient was medically optimized for discharge with close outpatient follow up.    ---  PATIENT CONDITION:  - stable    --  Vital Signs Last 24 Hrs  T(C): 36.6 (15 May 2023 11:37), Max: 36.7 (14 May 2023 20:24)  T(F): 97.8 (15 May 2023 11:37), Max: 98.1 (15 May 2023 06:04)  HR: 82 (15 May 2023 11:37) (78 - 83)  BP: 109/71 (15 May 2023 11:37) (109/71 - 125/55)  RR: 17 (15 May 2023 11:37) (17 - 18)  SpO2: 94% (15 May 2023 11:37) (91% - 95%)    O2 Parameters below as of 15 May 2023 11:37  Patient On (Oxygen Delivery Method): room air      PHYSICAL EXAM ON DAY OF DISCHARGE:  GENERAL: NAD, pleasant male   HEENT:  anicteric, moist mucous membranes  CHEST/LUNG:  CTA b/l, no rales, wheezes, or rhonchi  HEART:  irregular rhythm, S1, S2  ABDOMEN:  BS+, soft, nontender, nondistended  EXTREMITIES: non pitting edema, no cyanosis, or calf tenderness  NERVOUS SYSTEM: answers questions and follows commands appropriately    ---  CONSULTANTS:   -  Cardio: Dr. Vargas   - ID: Dr. Laguna  - GI: Dr. Collins  - Vascular: Dr. Nichole  - Neuro: Dr. Chandler   ---  TIME SPENT:  I, the attending physician, was physically present for the key portions of the evaluation and management (E/M) service provided. The total amount of time spent reviewing the hospital notes, laboratory values, imaging findings, assessing/counseling the patient, discussing with consultant physicians, social work, nursing staff was 48 minutes       ADMISSION DATE:  05-05-23    ---  FROM ADMISSION H+P:   HPI:  78 y/o M w/ PMHx of DM2, PVD, HTN, HLD, COPD, BPH presents from home s/p fall. Daughter at bedside supplements history. Patient has some forgetfulness but no documented history of dementia. Patient was found down by aide, has a life alert that he did not press. Unknown if LOC or head trauma. Estimated that patient was down from 6pm to 6am. Patient reports he spent the night on the floor, last thing he remembers is "feeling crummy on my couch" and then being woken up by his aide this morning. He also has a laceration of RLE from the fall which was repaired in the ED. Of note, patient does not have hx of AF but was found to be in AF w/ RVR at time of admission. Pt reports he drinks 6-7 beers ([employer]) each day. He has a history of falls and goes to PT weekly for gait/stability training. He has an aide who comes to the home 2x a week for 5 hours to supervise showers and prevent falls, help with medication management, and drives him to doctors appointments and physical therapy. At home pt uses a rollator and cane.       In the ED   Vitals: 133/51, , T 97, RR 12   Labs: WBC 11, , Lactate 2.4,   EKG: atrial flutter with RVR, rates 130's  Imaging  CXR: slight left base atelectasis     In the ED given:  Meds Given: 2.3L NS, 2g Ofirmev, 10mg IV Cardizem x2, 30mg PO Cardizem x1    (05 May 2023 14:47)      ---  HOSPITAL COURSE/PERTINENT LABS/PROCEDURES PERFORMED/PENDING TESTS:   78 y/o M with history of DM, HTN. HLD, COPD, BPH, PVD who was brought in for evaluation of fall. Found to have new onset aflutter with RVR. Hospitalization complicated by hematemesis and COVID+ 5/8/2023. For the fall, he had an MRI brain which showed no acute infarct or hemorrhage. For his atrial flutter/fibrillation, he was treated with Diltiazem 240 mg PO daily. After weighing risks and benefits and discussing options with the pt and his son, anticoagulation was not started due to frequent falls and alcohol abuse, as the risks outweigh the benefits. he was continued on Aspirin 81 mg daily. He had an echocardiogram:  TTE: LVEF 65%, Normal left ventricular systolic function. Increased left ventricular wall thickness.  For COVID infection, he was treated with: Remdesivir started 5/8 x 5 days, last day 5/12, and  Dexamethasone 6mg daily started 5/8, last day 5/12.  He was weaned off of oxygen to room air.  His  MRI brain: no acute infarct or hemorrhage, severe ventricular greater than sulcal prominence, may reflect central greater than cortical atrophy, also consistent with communicating hydrocephalus. With chronic microvascular ischemic changes. The Neurology team evaluated the pt and suspects possible frontotemporal dementia, possible component of Parkinson Disease in gait instability. Pt was started on a trial of sinemet after remdesivir completion, and follow up with Neurology outpatient.     Patient is stable for discharge as per primary medical team and consultants.    PT consulted, recommends discharge to [facility]     Patient showed improvement throughout hospitalization. Patient was seen and examined on day of discharge. Patient was medically optimized for discharge with close outpatient follow up.    ---  PATIENT CONDITION:  - stable    --  Vital Signs Last 24 Hrs  T(C): 36.6 (16 May 2023 06:05), Max: 36.7 (15 May 2023 20:46)  T(F): 97.9 (16 May 2023 06:05), Max: 98 (15 May 2023 20:46)  HR: 103 (16 May 2023 06:05) (62 - 103)  BP: 121/82 (16 May 2023 06:05) (105/65 - 121/82)  RR: 17 (15 May 2023 20:46) (17 - 17)  SpO2: 93% (16 May 2023 06:05) (90% - 95%)    O2 Parameters below as of 16 May 2023 06:05  Patient On (Oxygen Delivery Method): room air      PHYSICAL EXAM ON DAY OF DISCHARGE:  GENERAL: NAD, pleasant male   HEENT:  anicteric, moist mucous membranes  CHEST/LUNG:  CTA b/l, no rales, wheezes, or rhonchi  HEART:  irregular rhythm, S1, S2  ABDOMEN:  BS+, soft, nontender, nondistended  EXTREMITIES: non pitting edema, no cyanosis, or calf tenderness  NERVOUS SYSTEM: answers questions and follows commands appropriately    ---  CONSULTANTS:   -  Cardio: Dr. Vargas   - ID: Dr. Laguna  - GI: Dr. Collins  - Vascular: Dr. Nichole  - Neuro: Dr. Chandler   ---  TIME SPENT:  I, the attending physician, was physically present for the key portions of the evaluation and management (E/M) service provided. The total amount of time spent reviewing the hospital notes, laboratory values, imaging findings, assessing/counseling the patient, discussing with consultant physicians, social work, nursing staff was 48 minutes       ADMISSION DATE:  05-05-23    ---  FROM ADMISSION H+P:   HPI:  78 y/o M w/ PMHx of DM2, PVD, HTN, HLD, COPD, BPH presents from home s/p fall. Daughter at bedside supplements history. Patient has some forgetfulness but no documented history of dementia. Patient was found down by aide, has a life alert that he did not press. Unknown if LOC or head trauma. Estimated that patient was down from 6pm to 6am. Patient reports he spent the night on the floor, last thing he remembers is "feeling crummy on my couch" and then being woken up by his aide this morning. He also has a laceration of RLE from the fall which was repaired in the ED. Of note, patient does not have hx of AF but was found to be in AF w/ RVR at time of admission. Pt reports he drinks 6-7 beers ([employer]) each day. He has a history of falls and goes to PT weekly for gait/stability training. He has an aide who comes to the home 2x a week for 5 hours to supervise showers and prevent falls, help with medication management, and drives him to doctors appointments and physical therapy. At home pt uses a rollator and cane.       In the ED   Vitals: 133/51, , T 97, RR 12   Labs: WBC 11, , Lactate 2.4,   EKG: atrial flutter with RVR, rates 130's  Imaging  CXR: slight left base atelectasis     In the ED given:  Meds Given: 2.3L NS, 2g Ofirmev, 10mg IV Cardizem x2, 30mg PO Cardizem x1    (05 May 2023 14:47)      ---  HOSPITAL COURSE/PERTINENT LABS/PROCEDURES PERFORMED/PENDING TESTS:   78 y/o M with history of DM, HTN. HLD, COPD, BPH, PVD who was brought in for evaluation of fall. Found to have new onset aflutter with RVR. Hospitalization complicated by hematemesis and COVID+ 5/8/2023. For the fall, he had an MRI brain which showed no acute infarct or hemorrhage. For his atrial flutter/fibrillation, he was treated with Diltiazem 240 mg PO daily. After weighing risks and benefits and discussing options with the pt and his son, anticoagulation was not started due to frequent falls and alcohol abuse, as the risks outweigh the benefits. he was continued on Aspirin 81 mg daily. He had an echocardiogram:  TTE: LVEF 65%, Normal left ventricular systolic function. Increased left ventricular wall thickness.  For COVID infection, he was treated with: Remdesivir started 5/8 x 5 days, last day 5/12, and  Dexamethasone 6mg daily started 5/8, last day 5/13.  He was weaned off of oxygen to room air.  His  MRI brain: no acute infarct or hemorrhage, severe ventricular greater than sulcal prominence, may reflect central greater than cortical atrophy, also consistent with communicating hydrocephalus. With chronic microvascular ischemic changes. The Neurology team evaluated the pt and suspects possible frontotemporal dementia, possible component of Parkinson Disease in gait instability. Pt was started on a trial of sinemet after remdesivir completion, and follow up with Neurology outpatient.     Patient is stable for discharge as per primary medical team and consultants.    PT consulted, recommends discharge to [facility]     Patient showed improvement throughout hospitalization. Patient was seen and examined on day of discharge. Patient was medically optimized for discharge with close outpatient follow up.    ---  PATIENT CONDITION:  - stable    --  Vital Signs Last 24 Hrs  T(C): 36.6 (16 May 2023 06:05), Max: 36.7 (15 May 2023 20:46)  T(F): 97.9 (16 May 2023 06:05), Max: 98 (15 May 2023 20:46)  HR: 103 (16 May 2023 06:05) (62 - 103)  BP: 121/82 (16 May 2023 06:05) (105/65 - 121/82)  RR: 17 (15 May 2023 20:46) (17 - 17)  SpO2: 93% (16 May 2023 06:05) (90% - 95%)    O2 Parameters below as of 16 May 2023 06:05  Patient On (Oxygen Delivery Method): room air      PHYSICAL EXAM ON DAY OF DISCHARGE:  GENERAL: NAD, pleasant male   HEENT:  anicteric, moist mucous membranes  CHEST/LUNG:  CTA b/l, no rales, wheezes, or rhonchi  HEART:  irregular rhythm, S1, S2  ABDOMEN:  BS+, soft, nontender, nondistended  EXTREMITIES: non pitting edema, no cyanosis, or calf tenderness  NERVOUS SYSTEM: answers questions and follows commands appropriately    ---  CONSULTANTS:   -  Cardio: Dr. Vargas   - ID: Dr. Laguna  - GI: Dr. Collins  - Vascular: Dr. Nichole  - Neuro: Dr. Chandler   ---  TIME SPENT:  I, the attending physician, was physically present for the key portions of the evaluation and management (E/M) service provided. The total amount of time spent reviewing the hospital notes, laboratory values, imaging findings, assessing/counseling the patient, discussing with consultant physicians, social work, nursing staff was 44 minutes

## 2023-05-10 NOTE — DISCHARGE NOTE PROVIDER - CARE PROVIDER_API CALL
Som Bunn (DO)  Internal Medicine; Nephrology  [address] [place] [address]  Phone: [phone]  Fax: [phone]  Established Patient  Follow Up Time: 1 week    Bettie Jose)  Vascular Surgery  [address] [place] [address]  Phone: [phone]  Fax: [phone]  Follow Up Time: 2 weeks   Som Bunn (DO)  Internal Medicine; Nephrology  [address] [place] [address]  Phone: [phone]  Fax: [phone]  Established Patient  Follow Up Time: 1 week    Bettie Jose (MD)  Vascular Surgery  [address] [place] [address]  Phone: [phone]  Fax: [phone]  Follow Up Time: 2 weeks    Bobby Vargas)  Cardiovascular Disease; Internal Medicine  [address]  [place] [address]  Phone: [phone]  Fax: [phone]  Follow Up Time: 1 week    Tonya Chandler  NEUROLOGY  [address] [place] [id number]  Phone: [phone]  Fax: [phone]  Follow Up Time: 1 week

## 2023-05-10 NOTE — DISCHARGE NOTE PROVIDER - ATTENDING DISCHARGE PHYSICAL EXAMINATION:
GENERAL: NAD, pleasant male   HEENT:  anicteric, moist mucous membranes  CHEST/LUNG:  CTA b/l, no rales, wheezes, or rhonchi  HEART:  irregular rhythm, S1, S2  ABDOMEN:  BS+, soft, nontender, nondistended  EXTREMITIES: non pitting edema, no cyanosis, or calf tenderness  NERVOUS SYSTEM: answers questions and follows commands

## 2023-05-10 NOTE — PROGRESS NOTE ADULT - SUBJECTIVE AND OBJECTIVE BOX
Patient is a 77y old  Male who presents with a chief complaint of AF w/ RVR (10 May 2023 12:57)      TELE: Rapid Afib rate 104, trigeminy    INTERVAL HPI/OVERNIGHT EVENTS: No acute overnight events. Pt seen and examined at the bedside this AM. He has no complaints at this time, states that he feels good. Denies SOB. CP palpitations, abd pain, HA.    MEDICATIONS  (STANDING):  aspirin  chewable 81 milliGRAM(s) Oral daily  atorvastatin 10 milliGRAM(s) Oral at bedtime  cilostazol 100 milliGRAM(s) Oral two times a day  dexAMETHasone     Tablet 6 milliGRAM(s) Oral daily  diltiazem    milliGRAM(s) Oral daily  DULoxetine 60 milliGRAM(s) Oral daily  fluticasone furoate/vilanterol 200-25 MICROgram(s) Inhaler 1 Puff(s) Inhalation daily  folic acid 1 milliGRAM(s) Oral daily  multivitamin 1 Tablet(s) Oral daily  ondansetron Injectable 4 milliGRAM(s) IV Push once  pantoprazole  Injectable 40 milliGRAM(s) IV Push every 12 hours  polyethylene glycol 3350 17 Gram(s) Oral two times a day  remdesivir  IVPB 100 milliGRAM(s) IV Intermittent every 24 hours  remdesivir  IVPB   IV Intermittent   sodium chloride 0.9%. 1000 milliLiter(s) (100 mL/Hr) IV Continuous <Continuous>  tamsulosin 0.4 milliGRAM(s) Oral at bedtime    MEDICATIONS  (PRN):  acetaminophen     Tablet .. 650 milliGRAM(s) Oral every 6 hours PRN Temp greater or equal to 38C (100.4F), Mild Pain (1 - 3)  albuterol/ipratropium for Nebulization 3 milliLiter(s) Nebulizer every 6 hours PRN Shortness of Breath  aluminum hydroxide/magnesium hydroxide/simethicone Suspension 30 milliLiter(s) Oral every 4 hours PRN Dyspepsia  diltiazem Injectable 10 milliGRAM(s) IV Push every 4 hours PRN HR>130  ibuprofen  Tablet. 400 milliGRAM(s) Oral every 8 hours PRN Temp greater or equal to 38C (100.4F)  LORazepam   Injectable 1 milliGRAM(s) IV Push every 1 hour PRN CIWA-Ar score 8 or greater  melatonin 3 milliGRAM(s) Oral at bedtime PRN Insomnia  ondansetron Injectable 4 milliGRAM(s) IV Push every 8 hours PRN Nausea and/or Vomiting      Allergies    No Known Allergies    Intolerances        REVIEW OF SYSTEMS:  CONSTITUTIONAL: No fever  HEENT:  No headache  RESPIRATORY: No cough or shortness of breath  CARDIOVASCULAR: No chest pain, palpitations  GASTROINTESTINAL: No abd pain  NEUROLOGICAL: no dizziness      Vital Signs Last 24 Hrs  T(C): 36.7 (10 May 2023 12:00), Max: 36.9 (09 May 2023 19:45)  T(F): 98 (10 May 2023 12:00), Max: 98.4 (09 May 2023 19:45)  HR: 106 (10 May 2023 13:33) (94 - 106)  BP: 98/59 (10 May 2023 12:00) (98/59 - 116/65)  BP(mean): --  RR: 18 (10 May 2023 12:00) (18 - 18)  SpO2: 91% (10 May 2023 13:33) (91% - 96%)    Parameters below as of 10 May 2023 13:33  Patient On (Oxygen Delivery Method): room air,Pt has o2 out of nostril        PHYSICAL EXAM:  GENERAL: NAD, well-developed  HEENT:  anicteric, moist mucous membranes  CHEST/LUNG:  CTA b/l, no rales, wheezes, or rhonchi  HEART: tachycardia, no m/r/g  ABDOMEN:  BS+, soft, nontender, nondistended  EXTREMITIES: no edema,  or calf tenderness  NERVOUS SYSTEM: answers questions and follows commands appropriately  PSYCH: normal affect    LABS:                        10.5   11.66 )-----------( 176      ( 10 May 2023 06:40 )             30.6     CBC Full  -  ( 10 May 2023 06:40 )  WBC Count : 11.66 K/uL  Hemoglobin : 10.5 g/dL  Hematocrit : 30.6 %  Platelet Count - Automated : 176 K/uL  Mean Cell Volume : 90.3 fl  Mean Cell Hemoglobin : 31.0 pg  Mean Cell Hemoglobin Concentration : 34.3 gm/dL  Auto Neutrophil # : x  Auto Lymphocyte # : x  Auto Monocyte # : x  Auto Eosinophil # : x  Auto Basophil # : x  Auto Neutrophil % : x  Auto Lymphocyte % : x  Auto Monocyte % : x  Auto Eosinophil % : x  Auto Basophil % : x    10 May 2023 06:40    141    |  107    |  22     ----------------------------<  147    3.3     |  30     |  0.81     Ca    8.3        10 May 2023 06:40          CAPILLARY BLOOD GLUCOSE            Culture - Blood (collected 05-08-23 @ 13:50)  Source: .Blood Blood  Preliminary Report (05-09-23 @ 19:02):    No growth to date.    Culture - Blood (collected 05-08-23 @ 13:45)  Source: .Blood Blood  Preliminary Report (05-09-23 @ 19:02):    No growth to date.    Culture - Urine (collected 05-08-23 @ 13:26)  Source: Clean Catch Clean Catch (Midstream)  Final Report (05-09-23 @ 15:23):    >=3 organisms. Probable collection contamination.    Culture - Urine (collected 05-05-23 @ 12:19)  Source: Clean Catch Clean Catch (Midstream)  Final Report (05-06-23 @ 16:08):    <10,000 CFU/mL Normal Urogenital Naheed    Culture - Blood (collected 05-05-23 @ 08:15)  Source: .Blood Blood-Peripheral  Final Report (05-10-23 @ 12:01):    No Growth Final    Culture - Blood (collected 05-05-23 @ 08:00)  Source: .Blood Blood-Peripheral  Final Report (05-10-23 @ 12:01):    No Growth Final        RADIOLOGY & ADDITIONAL TESTS:  Personally reviewed.     Consultant(s) Notes Reviewed:  [x] YES  [ ] NO

## 2023-05-10 NOTE — PROGRESS NOTE ADULT - SUBJECTIVE AND OBJECTIVE BOX
Chief Complaint: Fall    Interval Events: No events overnight.    Review of Systems:  General: No fevers, chills, weight gain  Skin: No rashes, color changes  Cardiovascular: No chest pain, orthopnea  Respiratory: No shortness of breath, cough  Gastrointestinal: No nausea, abdominal pain  Genitourinary: No incontinence, pain with urination  Musculoskeletal: No pain, swelling, decreased range of motion  Neurological: No headache, weakness  Psychiatric: No depression, anxiety  Endocrine: No weight gain, increased thirst  All other systems are comprehensively negative.    Physical Exam:  Vital Signs Last 24 Hrs  T(C): 36.8 (10 May 2023 04:37), Max: 36.9 (09 May 2023 19:45)  T(F): 98.2 (10 May 2023 04:37), Max: 98.4 (09 May 2023 19:45)  HR: 94 (10 May 2023 04:37) (80 - 100)  BP: 111/69 (10 May 2023 04:37) (105/61 - 116/65)  BP(mean): --  RR: 18 (10 May 2023 04:37) (18 - 18)  SpO2: 92% (10 May 2023 04:37) (90% - 96%)  Parameters below as of 10 May 2023 04:37  O2 Flow (L/min): 4  General: NAD  HEENT: MMM  Neck: No JVD, no carotid bruit  Lungs: CTAB  CV: Irregular, nl S1/S2, no M/R/G  Abdomen: S/NT/ND, +BS  Extremities: No LE edema, no cyanosis  Neuro: AAOx3, non-focal  Skin: No rash    Labs:             05-10    141  |  107  |  22  ----------------------------<  147<H>  3.3<L>   |  30  |  0.81    Ca    8.3<L>      10 May 2023 06:40    TPro  5.8<L>  /  Alb  2.8<L>  /  TBili  0.5  /  DBili  x   /  AST  31  /  ALT  24  /  AlkPhos  53  05-09                        10.5   11.66 )-----------( 176      ( 10 May 2023 06:40 )             30.6     ECG/Telemetry: Atrial flutter

## 2023-05-10 NOTE — PROGRESS NOTE ADULT - SUBJECTIVE AND OBJECTIVE BOX
[facility] DIV[facility] of INFECTIOUS DISEASE  Dutch Laguna MD PhD, Kavitha Chapman MD, Casi Breaux MD, Mu Frias MD, Doug Wall MD  and providing coverage with Kb Watts MD  Providing Infectious Disease Consultations at [facility], [facility], [place], [facility], [facility]'s    Office# [phone] to schedule follow up appointments  Answering Service for urgent calls or New Consults [phone]  Cell# to text for urgent issues Dutch Laguna [phone]     infectious diseases progress note:    ABRIL POMPA is a 77y y. o. Male patient    Overnight and events of the last 24hrs reviewed    Allergies    No Known Allergies    Intolerances        ANTIBIOTICS/RELEVANT:  antimicrobials  remdesivir  IVPB 100 milliGRAM(s) IV Intermittent every 24 hours  remdesivir  IVPB   IV Intermittent     immunologic:    OTHER:  acetaminophen     Tablet .. 650 milliGRAM(s) Oral every 6 hours PRN  albuterol/ipratropium for Nebulization 3 milliLiter(s) Nebulizer every 6 hours PRN  aluminum hydroxide/magnesium hydroxide/simethicone Suspension 30 milliLiter(s) Oral every 4 hours PRN  aspirin  chewable 81 milliGRAM(s) Oral daily  atorvastatin 10 milliGRAM(s) Oral at bedtime  cilostazol 100 milliGRAM(s) Oral two times a day  dexAMETHasone     Tablet 6 milliGRAM(s) Oral daily  diltiazem    milliGRAM(s) Oral daily  diltiazem Injectable 10 milliGRAM(s) IV Push every 4 hours PRN  DULoxetine 60 milliGRAM(s) Oral daily  fluticasone furoate/vilanterol 200-25 MICROgram(s) Inhaler 1 Puff(s) Inhalation daily  folic acid 1 milliGRAM(s) Oral daily  ibuprofen  Tablet. 400 milliGRAM(s) Oral every 8 hours PRN  LORazepam   Injectable 1 milliGRAM(s) IV Push every 1 hour PRN  melatonin 3 milliGRAM(s) Oral at bedtime PRN  multivitamin 1 Tablet(s) Oral daily  ondansetron Injectable 4 milliGRAM(s) IV Push once  ondansetron Injectable 4 milliGRAM(s) IV Push every 8 hours PRN  pantoprazole  Injectable 40 milliGRAM(s) IV Push every 12 hours  polyethylene glycol 3350 17 Gram(s) Oral two times a day  sodium chloride 0.9%. 1000 milliLiter(s) IV Continuous <Continuous>  tamsulosin 0.4 milliGRAM(s) Oral at bedtime      Objective:  Vital Signs Last 24 Hrs  T(C): 36.7 (10 May 2023 12:00), Max: 36.9 (09 May 2023 19:45)  T(F): 98 (10 May 2023 12:00), Max: 98.4 (09 May 2023 19:45)  HR: 106 (10 May 2023 13:33) (94 - 106)  BP: 110/60 (10 May 2023 14:51) (98/59 - 116/65)  BP(mean): --  RR: 18 (10 May 2023 12:00) (18 - 18)  SpO2: 91% (10 May 2023 14:51) (91% - 96%)    Parameters below as of 10 May 2023 14:51  Patient On (Oxygen Delivery Method): room air        T(C): 36.7 (05-10-23 @ 12:00), Max: 38.3 (05-08-23 @ 15:55)  T(C): 36.7 (05-10-23 @ 12:00), Max: 39.4 (05-08-23 @ 12:56)  T(C): 36.7 (05-10-23 @ 12:00), Max: 39.4 (05-08-23 @ 12:56)    PHYSICAL EXAM:  HEENT: NC atraumatic  Neck: supple  Respiratory: no accessory muscle use, breathing comfortably  Cardiovascular: distant  Gastrointestinal: normal appearing, nondistended  Extremities: no clubbing, no cyanosis,        LABS:                          10.5   11.66 )-----------( 176      ( 10 May 2023 06:40 )             30.6       WBC  11.66 05-10 @ 06:40  9.43 05-09 @ 06:50  6.25 05-08 @ 06:55  5.97 05-07 @ 17:51  6.74 05-07 @ 11:35  6.97 05-07 @ 07:32  7.17 05-06 @ 08:40  11.03 05-05 @ 08:00      05-10    141  |  107  |  22  ----------------------------<  147<H>  3.3<L>   |  30  |  0.81    Ca    8.3<L>      10 May 2023 06:40    TPro  5.8<L>  /  Alb  2.8<L>  /  TBili  0.5  /  DBili  x   /  AST  31  /  ALT  24  /  AlkPhos  53  05-09      Creatinine, Serum: 0.81 mg/dL (05-10-23 @ 06:40)  Creatinine, Serum: 0.88 mg/dL (05-09-23 @ 06:50)  Creatinine, Serum: 0.90 mg/dL (05-08-23 @ 06:55)  Creatinine, Serum: 0.91 mg/dL (05-07-23 @ 07:32)  Creatinine, Serum: 0.95 mg/dL (05-06-23 @ 08:40)  Creatinine, Serum: 1.10 mg/dL (05-05-23 @ 17:49)  Creatinine, Serum: 1.10 mg/dL (05-05-23 @ 09:20)                INFLAMMATORY MARKERS      MICROBIOLOGY:              RADIOLOGY & ADDITIONAL STUDIES:

## 2023-05-10 NOTE — PROGRESS NOTE ADULT - SUBJECTIVE AND OBJECTIVE BOX
[facility]  Sammy Munguia PA-C  [address] [place] [address]  [phone]      INTERVAL HPI/OVERNIGHT EVENTS:  Pt s/e with family member at bedside  Reports no nausea/vomiting  Tolerating regular diet    MEDICATIONS  (STANDING):  aspirin  chewable 81 milliGRAM(s) Oral daily  atorvastatin 10 milliGRAM(s) Oral at bedtime  cilostazol 100 milliGRAM(s) Oral two times a day  dexAMETHasone     Tablet 6 milliGRAM(s) Oral daily  diltiazem    milliGRAM(s) Oral daily  DULoxetine 60 milliGRAM(s) Oral daily  fluticasone furoate/vilanterol 200-25 MICROgram(s) Inhaler 1 Puff(s) Inhalation daily  folic acid 1 milliGRAM(s) Oral daily  multivitamin 1 Tablet(s) Oral daily  ondansetron Injectable 4 milliGRAM(s) IV Push once  pantoprazole  Injectable 40 milliGRAM(s) IV Push every 12 hours  polyethylene glycol 3350 17 Gram(s) Oral two times a day  potassium chloride    Tablet ER 40 milliEquivalent(s) Oral every 4 hours  remdesivir  IVPB 100 milliGRAM(s) IV Intermittent every 24 hours  remdesivir  IVPB   IV Intermittent   sodium chloride 0.9%. 1000 milliLiter(s) (100 mL/Hr) IV Continuous <Continuous>  tamsulosin 0.4 milliGRAM(s) Oral at bedtime    MEDICATIONS  (PRN):  acetaminophen     Tablet .. 650 milliGRAM(s) Oral every 6 hours PRN Temp greater or equal to 38C (100.4F), Mild Pain (1 - 3)  albuterol/ipratropium for Nebulization 3 milliLiter(s) Nebulizer every 6 hours PRN Shortness of Breath  aluminum hydroxide/magnesium hydroxide/simethicone Suspension 30 milliLiter(s) Oral every 4 hours PRN Dyspepsia  diltiazem Injectable 10 milliGRAM(s) IV Push every 4 hours PRN HR>130  ibuprofen  Tablet. 400 milliGRAM(s) Oral every 8 hours PRN Temp greater or equal to 38C (100.4F)  LORazepam   Injectable 1 milliGRAM(s) IV Push every 1 hour PRN CIWA-Ar score 8 or greater  melatonin 3 milliGRAM(s) Oral at bedtime PRN Insomnia  ondansetron Injectable 4 milliGRAM(s) IV Push every 8 hours PRN Nausea and/or Vomiting      Allergies    No Known Allergies    PHYSICAL EXAM:   Vital Signs:  Vital Signs Last 24 Hrs  T(C): 36.7 (10 May 2023 12:00), Max: 36.9 (09 May 2023 19:45)  T(F): 98 (10 May 2023 12:00), Max: 98.4 (09 May 2023 19:45)  HR: 101 (10 May 2023 12:00) (94 - 101)  BP: 98/59 (10 May 2023 12:00) (98/59 - 116/65)  BP(mean): --  RR: 18 (10 May 2023 12:00) (18 - 18)  SpO2: 93% (10 May 2023 12:00) (92% - 96%)    Parameters below as of 10 May 2023 12:00  Patient On (Oxygen Delivery Method): nasal cannula  O2 Flow (L/min): 2    Daily     Daily Weight in k (10 May 2023 04:37)    GENERAL:  Appears stated age  HEENT:  NC/AT  CHEST:  Full & symmetric excursion  HEART:  Regular rhythm  ABDOMEN:  Soft, non-tender, non-distended  EXTREMITIES:  no cyanosis  SKIN:  No rash  NEURO:  Alert      LABS:                        10.5   11.66 )-----------( 176      ( 10 May 2023 06:40 )             30.6     05-10    141  |  107  |  22  ----------------------------<  147<H>  3.3<L>   |  30  |  0.81    Ca    8.3<L>      10 May 2023 06:40    TPro  5.8<L>  /  Alb  2.8<L>  /  TBili  0.5  /  DBili  x   /  AST  31  /  ALT  24  /  AlkPhos  53  05-09      Urinalysis Basic - ( 08 May 2023 13:26 )    Color: Yellow / Appearance: Clear / S.010 / pH: x  Gluc: x / Ketone: Negative  / Bili: Negative / Urobili: Negative   Blood: x / Protein: 15 / Nitrite: Negative   Leuk Esterase: Trace / RBC: 3-5 /HPF / WBC 11-25   Sq Epi: x / Non Sq Epi: x / Bacteria: Many

## 2023-05-10 NOTE — PROGRESS NOTE ADULT - PROBLEM SELECTOR PLAN 3
- Pt had multiple episodes of hematemesis on 5/7  - GI following, believes due to esophagitis  - continue pantoprozole 40 mg IV BID  - Hgb stable  - Transfuse as needed

## 2023-05-10 NOTE — PROGRESS NOTE ADULT - PROBLEM SELECTOR PLAN 6
- cont symbicort and nebs prn  - on 4 LNC at bedside this AM. wean as tolerated.   - family brought in breo

## 2023-05-10 NOTE — PROGRESS NOTE ADULT - ASSESSMENT
76 y/o M with history of DM, HTN. HLD, COPD, BPH, PVD who was brought in for evaluation of fall. Found to have new onset aflutter with RVR. hospitalization complicated by hematemesis and COVID+ 5/8/2023.

## 2023-05-10 NOTE — PROGRESS NOTE ADULT - PROBLEM SELECTOR PLAN 2
- Does not use O2 at home  - started remdesivir and decadron 5/8.  - plan for ?5 day course.   - ID following.   - isolation.  - O2 sat 92% on 2 L, 91% on RA  - leukocytosis, likely due to decadron

## 2023-05-11 LAB
ALBUMIN SERPL ELPH-MCNC: 2.9 G/DL — LOW (ref 3.3–5)
ALP SERPL-CCNC: 57 U/L — SIGNIFICANT CHANGE UP (ref 40–120)
ALT FLD-CCNC: 27 U/L — SIGNIFICANT CHANGE UP (ref 12–78)
ANION GAP SERPL CALC-SCNC: 2 MMOL/L — LOW (ref 5–17)
AST SERPL-CCNC: 19 U/L — SIGNIFICANT CHANGE UP (ref 15–37)
BASOPHILS # BLD AUTO: 0.01 K/UL — SIGNIFICANT CHANGE UP (ref 0–0.2)
BASOPHILS NFR BLD AUTO: 0.1 % — SIGNIFICANT CHANGE UP (ref 0–2)
BILIRUB SERPL-MCNC: 0.4 MG/DL — SIGNIFICANT CHANGE UP (ref 0.2–1.2)
BUN SERPL-MCNC: 22 MG/DL — SIGNIFICANT CHANGE UP (ref 7–23)
CALCIUM SERPL-MCNC: 8.4 MG/DL — LOW (ref 8.5–10.1)
CHLORIDE SERPL-SCNC: 108 MMOL/L — SIGNIFICANT CHANGE UP (ref 96–108)
CO2 SERPL-SCNC: 30 MMOL/L — SIGNIFICANT CHANGE UP (ref 22–31)
CREAT SERPL-MCNC: 0.77 MG/DL — SIGNIFICANT CHANGE UP (ref 0.5–1.3)
EGFR: 92 ML/MIN/1.73M2 — SIGNIFICANT CHANGE UP
EOSINOPHIL # BLD AUTO: 0 K/UL — SIGNIFICANT CHANGE UP (ref 0–0.5)
EOSINOPHIL NFR BLD AUTO: 0 % — SIGNIFICANT CHANGE UP (ref 0–6)
GLUCOSE SERPL-MCNC: 119 MG/DL — HIGH (ref 70–99)
HCT VFR BLD CALC: 31.8 % — LOW (ref 39–50)
HGB BLD-MCNC: 10.8 G/DL — LOW (ref 13–17)
IMM GRANULOCYTES NFR BLD AUTO: 1.3 % — HIGH (ref 0–0.9)
LYMPHOCYTES # BLD AUTO: 0.75 K/UL — LOW (ref 1–3.3)
LYMPHOCYTES # BLD AUTO: 5 % — LOW (ref 13–44)
MCHC RBC-ENTMCNC: 30.3 PG — SIGNIFICANT CHANGE UP (ref 27–34)
MCHC RBC-ENTMCNC: 34 GM/DL — SIGNIFICANT CHANGE UP (ref 32–36)
MCV RBC AUTO: 89.1 FL — SIGNIFICANT CHANGE UP (ref 80–100)
MONOCYTES # BLD AUTO: 0.87 K/UL — SIGNIFICANT CHANGE UP (ref 0–0.9)
MONOCYTES NFR BLD AUTO: 5.8 % — SIGNIFICANT CHANGE UP (ref 2–14)
NEUTROPHILS # BLD AUTO: 13.08 K/UL — HIGH (ref 1.8–7.4)
NEUTROPHILS NFR BLD AUTO: 87.8 % — HIGH (ref 43–77)
NRBC # BLD: 0 /100 WBCS — SIGNIFICANT CHANGE UP (ref 0–0)
PLATELET # BLD AUTO: 202 K/UL — SIGNIFICANT CHANGE UP (ref 150–400)
POTASSIUM SERPL-MCNC: 3.8 MMOL/L — SIGNIFICANT CHANGE UP (ref 3.5–5.3)
POTASSIUM SERPL-SCNC: 3.8 MMOL/L — SIGNIFICANT CHANGE UP (ref 3.5–5.3)
PROT SERPL-MCNC: 5.9 G/DL — LOW (ref 6–8.3)
RBC # BLD: 3.57 M/UL — LOW (ref 4.2–5.8)
RBC # FLD: 12.8 % — SIGNIFICANT CHANGE UP (ref 10.3–14.5)
SODIUM SERPL-SCNC: 140 MMOL/L — SIGNIFICANT CHANGE UP (ref 135–145)
WBC # BLD: 14.9 K/UL — HIGH (ref 3.8–10.5)
WBC # FLD AUTO: 14.9 K/UL — HIGH (ref 3.8–10.5)

## 2023-05-11 PROCEDURE — 99233 SBSQ HOSP IP/OBS HIGH 50: CPT | Mod: GC

## 2023-05-11 RX ORDER — DILTIAZEM HCL 120 MG
240 CAPSULE, EXT RELEASE 24 HR ORAL DAILY
Refills: 0 | Status: DISCONTINUED | OUTPATIENT
Start: 2023-05-12 | End: 2023-05-16

## 2023-05-11 RX ORDER — DILTIAZEM HCL 120 MG
120 CAPSULE, EXT RELEASE 24 HR ORAL ONCE
Refills: 0 | Status: COMPLETED | OUTPATIENT
Start: 2023-05-11 | End: 2023-05-11

## 2023-05-11 RX ADMIN — Medication 120 MILLIGRAM(S): at 06:37

## 2023-05-11 RX ADMIN — Medication 1 TABLET(S): at 11:22

## 2023-05-11 RX ADMIN — POLYETHYLENE GLYCOL 3350 17 GRAM(S): 17 POWDER, FOR SOLUTION ORAL at 06:37

## 2023-05-11 RX ADMIN — POLYETHYLENE GLYCOL 3350 17 GRAM(S): 17 POWDER, FOR SOLUTION ORAL at 17:28

## 2023-05-11 RX ADMIN — PANTOPRAZOLE SODIUM 40 MILLIGRAM(S): 20 TABLET, DELAYED RELEASE ORAL at 17:28

## 2023-05-11 RX ADMIN — CILOSTAZOL 100 MILLIGRAM(S): 100 TABLET ORAL at 06:37

## 2023-05-11 RX ADMIN — REMDESIVIR 200 MILLIGRAM(S): 5 INJECTION INTRAVENOUS at 16:55

## 2023-05-11 RX ADMIN — Medication 120 MILLIGRAM(S): at 09:28

## 2023-05-11 RX ADMIN — TAMSULOSIN HYDROCHLORIDE 0.4 MILLIGRAM(S): 0.4 CAPSULE ORAL at 21:45

## 2023-05-11 RX ADMIN — FLUTICASONE FUROATE AND VILANTEROL TRIFENATATE 1 PUFF(S): 100; 25 POWDER RESPIRATORY (INHALATION) at 06:38

## 2023-05-11 RX ADMIN — PANTOPRAZOLE SODIUM 40 MILLIGRAM(S): 20 TABLET, DELAYED RELEASE ORAL at 06:37

## 2023-05-11 RX ADMIN — ATORVASTATIN CALCIUM 10 MILLIGRAM(S): 80 TABLET, FILM COATED ORAL at 21:45

## 2023-05-11 RX ADMIN — DULOXETINE HYDROCHLORIDE 60 MILLIGRAM(S): 30 CAPSULE, DELAYED RELEASE ORAL at 11:22

## 2023-05-11 RX ADMIN — Medication 3 MILLIGRAM(S): at 21:45

## 2023-05-11 RX ADMIN — Medication 6 MILLIGRAM(S): at 06:37

## 2023-05-11 RX ADMIN — Medication 81 MILLIGRAM(S): at 11:23

## 2023-05-11 RX ADMIN — CILOSTAZOL 100 MILLIGRAM(S): 100 TABLET ORAL at 17:28

## 2023-05-11 RX ADMIN — Medication 1 MILLIGRAM(S): at 11:22

## 2023-05-11 NOTE — PROGRESS NOTE ADULT - ASSESSMENT
78 y/o M with history of DM, HTN. HLD, COPD, BPH, PVD who was brought in for evaluation of fall. Found to have new onset aflutter with RVR. hospitalization complicated by hematemesis and COVID+ 5/8/2023.

## 2023-05-11 NOTE — PROGRESS NOTE ADULT - SUBJECTIVE AND OBJECTIVE BOX
Neurology Follow up note    ABRIL POMPA77yMale    HPI:  76 y/o M w/ PMHx of DM2, PVD, HTN, HLD, COPD, BPH presents from home s/p fall. Daughter at bedside supplements history. Patient has some forgetfulness but no documented history of dementia. Patient was found down by aide, has a life alert that he did not press. Unknown if LOC or head trauma. Estimated that patient was down from 6pm to 6am. Patient reports he spent the night on the floor, last thing he remembers is "feeling crummy on my couch" and then being woken up by his aide this morning. He also has a laceration of RLE from the fall which was repaired in the ED. Of note, patient does not have hx of AF but was found to be in AF w/ RVR at time of admission. Pt reports he drinks 6-7 beers ([employer]) each day. He has a history of falls and goes to PT weekly for gait/stability training. He has an aide who comes to the home 2x a week for 5 hours to supervise showers and prevent falls, help with medication management, and drives him to doctors appointments and physical therapy. At home pt uses a rollator and cane.               Interval History -no new events    Patient is seen, chart was reviewed and case was discussed with the treatment team.  Pt is not in any distress.   Lying on bed comfortably.       Vital Signs Last 24 Hrs  T(C): 36.7 (11 May 2023 11:30), Max: 36.8 (11 May 2023 06:10)  T(F): 98.1 (11 May 2023 11:30), Max: 98.3 (11 May 2023 06:10)  HR: 106 (11 May 2023 11:30) (97 - 107)  BP: 102/63 (11 May 2023 11:30) (102/63 - 151/88)  BP(mean): --  RR: 18 (11 May 2023 11:30) (18 - 18)  SpO2: 92% (11 May 2023 11:30) (90% - 93%)    Parameters below as of 11 May 2023 11:30  Patient On (Oxygen Delivery Method): room air                REVIEW OF SYSTEMS:    Constitutional: No , weight loss or fatigue  Eyes: No eye pain, visual disturbances, or discharge  ENT:  No difficulty hearing, tinnitus, vertigo  Neck: No pain or stiffness  Respiratory: No wheezing, chills or hemoptysis  Cardiovascular: No chest pain, palpitations, shortness of breath, dizziness or leg swelling  Gastrointestinal: No abdominal or epigastric pain. No nausea, vomiting or hematemesis;  Genitourinary: No frequency, hematuria or incontinence  Neurological: No headaches, , loss of strength, numbness or tremors  Psychiatric: No depression, anxiety, mood swings or difficulty sleeping  Musculoskeletal: No joint pain or swelling; No muscle, back or extremity pain  Skin: No itching, burning, rashes or lesions   Lymph Nodes: No enlarged glands  Endocrine: No heat or cold intolerance; No hair loss,   Allergy and Immunologic: No hives or eczema    On Neurological Examination:    Mental Status - Pt is alert, awake, oriented X3. H. Follows commands well and able to answer questions appropriately.Mood and affect  normal    Speech -  Normal.     Cranial Nerves - Pupils 3 mm equal and reactive to light, extraocular eye movements intact. Pt has no visual field deficit.  Pt has no  facial asymmetry. Facial sensation is intact.Tongue - is in midline.    Muscle tone - is normal     Motor Exam - 5/5 of UE  LE ;3-4/5 No drift.     Sensory Exam -  Pt withdraws all extremities equally on stimulation. No asymmetry seen. No complaints of tingling, numbness.    coordination:    Finger to nose: normal    Heel to shin: normal    Deep tendon Reflexes - 2 plus all over.     .    Neck Supple -  Yes.    MEDICATIONS  (STANDING):  aspirin  chewable 81 milliGRAM(s) Oral daily  atorvastatin 10 milliGRAM(s) Oral at bedtime  cilostazol 100 milliGRAM(s) Oral two times a day  dexAMETHasone     Tablet 6 milliGRAM(s) Oral daily  diltiazem    milliGRAM(s) Oral daily  DULoxetine 60 milliGRAM(s) Oral daily  fluticasone furoate/vilanterol 200-25 MICROgram(s) Inhaler 1 Puff(s) Inhalation daily  folic acid 1 milliGRAM(s) Oral daily  multivitamin 1 Tablet(s) Oral daily  ondansetron Injectable 4 milliGRAM(s) IV Push once  pantoprazole  Injectable 40 milliGRAM(s) IV Push every 12 hours  polyethylene glycol 3350 17 Gram(s) Oral two times a day  potassium chloride    Tablet ER 40 milliEquivalent(s) Oral every 4 hours  remdesivir  IVPB 100 milliGRAM(s) IV Intermittent every 24 hours  remdesivir  IVPB   IV Intermittent   sodium chloride 0.9%. 1000 milliLiter(s) (100 mL/Hr) IV Continuous <Continuous>  tamsulosin 0.4 milliGRAM(s) Oral at bedtime    MEDICATIONS  (PRN):  acetaminophen     Tablet .. 650 milliGRAM(s) Oral every 6 hours PRN Temp greater or equal to 38C (100.4F), Mild Pain (1 - 3)  albuterol/ipratropium for Nebulization 3 milliLiter(s) Nebulizer every 6 hours PRN Shortness of Breath  aluminum hydroxide/magnesium hydroxide/simethicone Suspension 30 milliLiter(s) Oral every 4 hours PRN Dyspepsia  diltiazem Injectable 10 milliGRAM(s) IV Push every 4 hours PRN HR>130  ibuprofen  Tablet. 400 milliGRAM(s) Oral every 8 hours PRN Temp greater or equal to 38C (100.4F)  LORazepam   Injectable 1 milliGRAM(s) IV Push every 1 hour PRN CIWA-Ar score 8 or greater  melatonin 3 milliGRAM(s) Oral at bedtime PRN Insomnia  ondansetron Injectable 4 milliGRAM(s) IV Push every 8 hours PRN Nausea and/or Vomiting      Allergies    No Known Allergies    Intolerances                          10.8   14.90 )-----------( 202      ( 11 May 2023 07:10 )             31.8           Hemoglobin A1C:     Vitamin B12     RADIOLOGY    ASSESSMENT AND PLAN:     SEEN FOR UNSTEADY GAIT   PN  TREMOR ?PD  sars-covid 2    on remdesivir  trial of sinemet after remdesivir  BRAIN MR NO ACUTE CVA  Physical therapy evaluation.  OOB to chair/ambulation with assistance only.  Pain is accessed and addressed.  Would continue to follow.

## 2023-05-11 NOTE — PROGRESS NOTE ADULT - ASSESSMENT
76 y/o M w/ PMHx of DM2, PVD, HTN, HLD, COPD, BPH presents from home s/p fall. Daughter reports that the next door neighbor has COVID and has been visiting her dad all the time despite this. Patient has some forgetfulness but no documented history of dementia and children report very clear at baseline. Patient was found down by aide, has a life alert that he did not press. Patient does not have hx of AF but was found to be in AF w/ RVR at time of admission. Pt reports he drinks 6-7 beers ([employer]) each day. Report of cough, fever and now positive COVID test. Pt vaccinated and boosted.    RECOMMENDATIONS  1-COVID acute COVID and within first 10 days but hypoxic so recommend    -Remdesivir started 5/8 so x 5 days with last day 5/12-fine to give early to expedite discharge  -Dexamethasone 6mg daily started 5/8 and with improvement 5/12 as last day and fine to give early to expedite discharge   VTE prophylaxis -primary to assess risks  pulmonary support  5/9-stable on NC-4L pt reporting improvement   sats >90% on RA  looking at dc to facility for rehab as early as 5/12 w isolation    Thank you for consulting us and involving us in the management of this most interesting and challenging case.  We will follow along in the care of this patient. Please call us at [phone] or text me directly on my cell# at [phone] with any concerns.

## 2023-05-11 NOTE — PROGRESS NOTE ADULT - ASSESSMENT
The patient is a 77 year old male with a history of HTN, HL, DM, PAD, BPH, COPD, alcohol abuse who presents with a fall.    Plan:  - ECG and telemetry with underlying atrial flutter  - Echo technically difficult with grossly normal LV systolic function  - Continue aspirin 81 mg daily  - There was reportedly hematemesis with vomiting. Will discontinue anticoagulation. Given bleeding and prior concerns with alcohol use, gait instability, falls, the patient is a poor candidate for long term anticoagulation and risks outweigh benefits.  - Continue cilostazol 100 mg bid  - Continue atorvastatin 10 mg daily  - Increase diltiazem CD to 240 mg daily  - BP improved. If remains elevated will resume losartan.  - Monitor hemoglobin and transfuse as needed  - COVID positive  - ID follow-up

## 2023-05-11 NOTE — PROGRESS NOTE ADULT - PROBLEM SELECTOR PLAN 12
Hold lovenox in setting of hematemesis  - continue ASA 81       # Dispo planning  - COVID positive  - PT recommends [facility]

## 2023-05-11 NOTE — PROGRESS NOTE ADULT - PROBLEM SELECTOR PLAN 3
- Pt had multiple episodes of hematemesis on 5/7  - GI following, believes due to esophagitis  - continue pantoprazole 40 mg IV BID  - Hgb stable  - Transfuse as needed

## 2023-05-11 NOTE — PROGRESS NOTE ADULT - PROBLEM SELECTOR PLAN 2
- Does not use O2 at home  - Remdesivir started 5/8 so x 5 days with last day 5/12  - Dexamethasone 6mg daily started 5/8 x 6 days so last day 5/13   - ID following.   - isolation.  - desaturated on RA, placed back on 2 L NC, with O2 sat improved to 92%  - leukocytosis, likely due to decadron

## 2023-05-11 NOTE — PROGRESS NOTE ADULT - SUBJECTIVE AND OBJECTIVE BOX
Chief Complaint: Fall    Interval Events: No events overnight.    Review of Systems:  General: No fevers, chills, weight gain  Skin: No rashes, color changes  Cardiovascular: No chest pain, orthopnea  Respiratory: No shortness of breath, cough  Gastrointestinal: No nausea, abdominal pain  Genitourinary: No incontinence, pain with urination  Musculoskeletal: No pain, swelling, decreased range of motion  Neurological: No headache, weakness  Psychiatric: No depression, anxiety  Endocrine: No weight gain, increased thirst  All other systems are comprehensively negative.    Physical Exam:  Vital Signs Last 24 Hrs  T(C): 36.8 (11 May 2023 06:10), Max: 36.8 (11 May 2023 06:10)  T(F): 98.3 (11 May 2023 06:10), Max: 98.3 (11 May 2023 06:10)  HR: 107 (11 May 2023 06:10) (97 - 107)  BP: 151/88 (11 May 2023 06:10) (98/59 - 151/88)  BP(mean): --  RR: 18 (11 May 2023 06:10) (18 - 18)  SpO2: 90% (11 May 2023 06:10) (90% - 93%)  Parameters below as of 11 May 2023 06:10  Patient On (Oxygen Delivery Method): room air  General: NAD  HEENT: MMM  Neck: No JVD, no carotid bruit  Lungs: CTAB  CV: Irregular, nl S1/S2, no M/R/G  Abdomen: S/NT/ND, +BS  Extremities: No LE edema, no cyanosis  Neuro: AAOx3, non-focal  Skin: No rash    Labs:    05-11    140  |  108  |  22  ----------------------------<  119<H>  3.8   |  30  |  0.77    Ca    8.4<L>      11 May 2023 07:10    TPro  5.9<L>  /  Alb  2.9<L>  /  TBili  0.4  /  DBili  x   /  AST  19  /  ALT  27  /  AlkPhos  57  05-11                        10.8   14.90 )-----------( 202      ( 11 May 2023 07:10 )             31.8     ECG/Telemetry: Atrial flutter

## 2023-05-11 NOTE — PROGRESS NOTE ADULT - SUBJECTIVE AND OBJECTIVE BOX
Patient is a 77y old  Male who presents with a chief complaint of AF w/ RVR (11 May 2023 10:33)      TELE: Aflutter, rate 112, trending in 120s, with trigeminy, O2 sat 92%    INTERVAL HPI/OVERNIGHT EVENTS: No acute overnight events. Pt was placed back onto 2 L NC due to hypoxia. Pt seen and examined at the bedside this AM. He has no complaints. Denies SOB, CP, palpitations, HA, dizziness, abd pain.    MEDICATIONS  (STANDING):  aspirin  chewable 81 milliGRAM(s) Oral daily  atorvastatin 10 milliGRAM(s) Oral at bedtime  cilostazol 100 milliGRAM(s) Oral two times a day  dexAMETHasone     Tablet 6 milliGRAM(s) Oral daily  DULoxetine 60 milliGRAM(s) Oral daily  fluticasone furoate/vilanterol 200-25 MICROgram(s) Inhaler 1 Puff(s) Inhalation daily  folic acid 1 milliGRAM(s) Oral daily  multivitamin 1 Tablet(s) Oral daily  ondansetron Injectable 4 milliGRAM(s) IV Push once  pantoprazole  Injectable 40 milliGRAM(s) IV Push every 12 hours  polyethylene glycol 3350 17 Gram(s) Oral two times a day  remdesivir  IVPB   IV Intermittent   remdesivir  IVPB 100 milliGRAM(s) IV Intermittent every 24 hours  sodium chloride 0.9%. 1000 milliLiter(s) (100 mL/Hr) IV Continuous <Continuous>  tamsulosin 0.4 milliGRAM(s) Oral at bedtime    MEDICATIONS  (PRN):  acetaminophen     Tablet .. 650 milliGRAM(s) Oral every 6 hours PRN Temp greater or equal to 38C (100.4F), Mild Pain (1 - 3)  albuterol/ipratropium for Nebulization 3 milliLiter(s) Nebulizer every 6 hours PRN Shortness of Breath  aluminum hydroxide/magnesium hydroxide/simethicone Suspension 30 milliLiter(s) Oral every 4 hours PRN Dyspepsia  diltiazem Injectable 10 milliGRAM(s) IV Push every 4 hours PRN HR>130  ibuprofen  Tablet. 400 milliGRAM(s) Oral every 8 hours PRN Temp greater or equal to 38C (100.4F)  LORazepam   Injectable 1 milliGRAM(s) IV Push every 1 hour PRN CIWA-Ar score 8 or greater  melatonin 3 milliGRAM(s) Oral at bedtime PRN Insomnia  ondansetron Injectable 4 milliGRAM(s) IV Push every 8 hours PRN Nausea and/or Vomiting      Allergies    No Known Allergies    Intolerances        REVIEW OF SYSTEMS:  CONSTITUTIONAL: No fever   HEENT:  No headache  RESPIRATORY: No cough or shortness of breath  CARDIOVASCULAR: No chest pain, palpitations  GASTROINTESTINAL: No abd pain, nausea, vomiting  NEUROLOGICAL: no dizziness    Vital Signs Last 24 Hrs  T(C): 36.7 (11 May 2023 11:30), Max: 36.8 (11 May 2023 06:10)  T(F): 98.1 (11 May 2023 11:30), Max: 98.3 (11 May 2023 06:10)  HR: 106 (11 May 2023 11:30) (97 - 107)  BP: 102/63 (11 May 2023 11:30) (102/63 - 151/88)  BP(mean): --  RR: 18 (11 May 2023 11:30) (18 - 18)  SpO2: 92% (11 May 2023 11:30) (90% - 93%)    Parameters below as of 11 May 2023 11:30  Patient On (Oxygen Delivery Method): room air        PHYSICAL EXAM:  GENERAL: NAD, well-developed  HEENT:  anicteric, moist mucous membranes  CHEST/LUNG:  coarse breath sounds bilat  HEART: tachycardia, no m/r/g  ABDOMEN:  BS+, soft, nontender, nondistended  EXTREMITIES: no edema,  or calf tenderness  NERVOUS SYSTEM: answers questions and follows commands appropriately  PSYCH: normal affect      LABS:                        10.8   14.90 )-----------( 202      ( 11 May 2023 07:10 )             31.8     CBC Full  -  ( 11 May 2023 07:10 )  WBC Count : 14.90 K/uL  Hemoglobin : 10.8 g/dL  Hematocrit : 31.8 %  Platelet Count - Automated : 202 K/uL  Mean Cell Volume : 89.1 fl  Mean Cell Hemoglobin : 30.3 pg  Mean Cell Hemoglobin Concentration : 34.0 gm/dL  Auto Neutrophil # : 13.08 K/uL  Auto Lymphocyte # : 0.75 K/uL  Auto Monocyte # : 0.87 K/uL  Auto Eosinophil # : 0.00 K/uL  Auto Basophil # : 0.01 K/uL  Auto Neutrophil % : 87.8 %  Auto Lymphocyte % : 5.0 %  Auto Monocyte % : 5.8 %  Auto Eosinophil % : 0.0 %  Auto Basophil % : 0.1 %    11 May 2023 07:10    140    |  108    |  22     ----------------------------<  119    3.8     |  30     |  0.77     Ca    8.4        11 May 2023 07:10    TPro  5.9    /  Alb  2.9    /  TBili  0.4    /  DBili  x      /  AST  19     /  ALT  27     /  AlkPhos  57     11 May 2023 07:10        CAPILLARY BLOOD GLUCOSE            Culture - Blood (collected 05-08-23 @ 13:50)  Source: .Blood Blood  Preliminary Report (05-09-23 @ 19:02):    No growth to date.    Culture - Blood (collected 05-08-23 @ 13:45)  Source: .Blood Blood  Preliminary Report (05-09-23 @ 19:02):    No growth to date.    Culture - Urine (collected 05-08-23 @ 13:26)  Source: Clean Catch Clean Catch (Midstream)  Final Report (05-09-23 @ 15:23):    >=3 organisms. Probable collection contamination.    Culture - Urine (collected 05-05-23 @ 12:19)  Source: Clean Catch Clean Catch (Midstream)  Final Report (05-06-23 @ 16:08):    <10,000 CFU/mL Normal Urogenital Naheed    Culture - Blood (collected 05-05-23 @ 08:15)  Source: .Blood Blood-Peripheral  Final Report (05-10-23 @ 12:01):    No Growth Final    Culture - Blood (collected 05-05-23 @ 08:00)  Source: .Blood Blood-Peripheral  Final Report (05-10-23 @ 12:01):    No Growth Final        RADIOLOGY & ADDITIONAL TESTS:  Personally reviewed.     Consultant(s) Notes Reviewed:  [x] YES  [ ] NO

## 2023-05-11 NOTE — SOCIAL WORK PROGRESS NOTE - NSSWPROGRESSNOTE_GEN_ALL_CORE
Pt recommended for subacute rehab and choices offered list provided. [facility]. Yeny will be faxed to facilities today. Family aware that each facility will have different isolation protocols. Sw will remain available.

## 2023-05-11 NOTE — PROGRESS NOTE ADULT - PROBLEM SELECTOR PLAN 6
- cont symbicort and nebs prn  - on 4 L NC at bedside this AM. wean as tolerated.   - family brought in breo

## 2023-05-11 NOTE — PROGRESS NOTE ADULT - SUBJECTIVE AND OBJECTIVE BOX
[facility] DIVISION of INFECTIOUS DISEASE  Dutch Laguna MD PhD, Kavitha Chapman MD, Casi Breaux MD, Mu Frias MD, Doug Wall MD  and providing coverage with Kb Watts MD  Providing Infectious Disease Consultations at [facility], [facility], [facility], [facility], [facility]'s    Office# [phone] to schedule follow up appointments  Answering Service for urgent calls or New Consults [phone]  Cell# to text for urgent issues Dutch Laguna [phone]     infectious diseases progress note:    ABRIL POMPA is a 77y y. o. Male patient    Overnight and events of the last 24hrs reviewed    Allergies    No Known Allergies    Intolerances        ANTIBIOTICS/RELEVANT:  antimicrobials  remdesivir  IVPB 100 milliGRAM(s) IV Intermittent every 24 hours  remdesivir  IVPB   IV Intermittent     immunologic:    OTHER:  acetaminophen     Tablet .. 650 milliGRAM(s) Oral every 6 hours PRN  albuterol/ipratropium for Nebulization 3 milliLiter(s) Nebulizer every 6 hours PRN  aluminum hydroxide/magnesium hydroxide/simethicone Suspension 30 milliLiter(s) Oral every 4 hours PRN  aspirin  chewable 81 milliGRAM(s) Oral daily  atorvastatin 10 milliGRAM(s) Oral at bedtime  cilostazol 100 milliGRAM(s) Oral two times a day  dexAMETHasone     Tablet 6 milliGRAM(s) Oral daily  diltiazem Injectable 10 milliGRAM(s) IV Push every 4 hours PRN  DULoxetine 60 milliGRAM(s) Oral daily  fluticasone furoate/vilanterol 200-25 MICROgram(s) Inhaler 1 Puff(s) Inhalation daily  folic acid 1 milliGRAM(s) Oral daily  ibuprofen  Tablet. 400 milliGRAM(s) Oral every 8 hours PRN  LORazepam   Injectable 1 milliGRAM(s) IV Push every 1 hour PRN  melatonin 3 milliGRAM(s) Oral at bedtime PRN  multivitamin 1 Tablet(s) Oral daily  ondansetron Injectable 4 milliGRAM(s) IV Push once  ondansetron Injectable 4 milliGRAM(s) IV Push every 8 hours PRN  pantoprazole  Injectable 40 milliGRAM(s) IV Push every 12 hours  polyethylene glycol 3350 17 Gram(s) Oral two times a day  sodium chloride 0.9%. 1000 milliLiter(s) IV Continuous <Continuous>  tamsulosin 0.4 milliGRAM(s) Oral at bedtime      Objective:  Vital Signs Last 24 Hrs  T(C): 36.7 (11 May 2023 11:30), Max: 36.8 (11 May 2023 06:10)  T(F): 98.1 (11 May 2023 11:30), Max: 98.3 (11 May 2023 06:10)  HR: 106 (11 May 2023 11:30) (97 - 107)  BP: 102/63 (11 May 2023 11:30) (102/63 - 151/88)  BP(mean): --  RR: 18 (11 May 2023 11:30) (18 - 18)  SpO2: 92% (11 May 2023 11:30) (90% - 93%)    Parameters below as of 11 May 2023 11:30  Patient On (Oxygen Delivery Method): room air        T(C): 36.7 (05-11-23 @ 11:30), Max: 36.9 (05-09-23 @ 19:45)  T(C): 36.7 (05-11-23 @ 11:30), Max: 38.3 (05-08-23 @ 15:55)  T(C): 36.7 (05-11-23 @ 11:30), Max: 39.4 (05-08-23 @ 12:56)    PHYSICAL EXAM:  HEENT: NC atraumatic  Neck: supple  Respiratory: no accessory muscle use, breathing comfortably  Cardiovascular: distant  Gastrointestinal: normal appearing, nondistended  Extremities: no clubbing, no cyanosis,        LABS:                          10.8   14.90 )-----------( 202      ( 11 May 2023 07:10 )             31.8       WBC  14.90 05-11 @ 07:10  11.66 05-10 @ 06:40  9.43 05-09 @ 06:50  6.25 05-08 @ 06:55  5.97 05-07 @ 17:51  6.74 05-07 @ 11:35  6.97 05-07 @ 07:32  7.17 05-06 @ 08:40  11.03 05-05 @ 08:00      05-11    140  |  108  |  22  ----------------------------<  119<H>  3.8   |  30  |  0.77    Ca    8.4<L>      11 May 2023 07:10    TPro  5.9<L>  /  Alb  2.9<L>  /  TBili  0.4  /  DBili  x   /  AST  19  /  ALT  27  /  AlkPhos  57  05-11      Creatinine, Serum: 0.77 mg/dL (05-11-23 @ 07:10)  Creatinine, Serum: 0.81 mg/dL (05-10-23 @ 06:40)  Creatinine, Serum: 0.88 mg/dL (05-09-23 @ 06:50)  Creatinine, Serum: 0.90 mg/dL (05-08-23 @ 06:55)  Creatinine, Serum: 0.91 mg/dL (05-07-23 @ 07:32)  Creatinine, Serum: 0.95 mg/dL (05-06-23 @ 08:40)  Creatinine, Serum: 1.10 mg/dL (05-05-23 @ 17:49)  Creatinine, Serum: 1.10 mg/dL (05-05-23 @ 09:20)                INFLAMMATORY MARKERS      MICROBIOLOGY:              RADIOLOGY & ADDITIONAL STUDIES:

## 2023-05-11 NOTE — PROGRESS NOTE ADULT - SUBJECTIVE AND OBJECTIVE BOX
[facility]  Sammy Munguia PA-C  [address] [place] [address]  [phone]      INTERVAL HPI/OVERNIGHT EVENTS:  Pt s/e  Nausea/vomiting resolved  Hgb stable  Reports no new GI complaints    MEDICATIONS  (STANDING):  aspirin  chewable 81 milliGRAM(s) Oral daily  atorvastatin 10 milliGRAM(s) Oral at bedtime  cilostazol 100 milliGRAM(s) Oral two times a day  dexAMETHasone     Tablet 6 milliGRAM(s) Oral daily  DULoxetine 60 milliGRAM(s) Oral daily  fluticasone furoate/vilanterol 200-25 MICROgram(s) Inhaler 1 Puff(s) Inhalation daily  folic acid 1 milliGRAM(s) Oral daily  multivitamin 1 Tablet(s) Oral daily  ondansetron Injectable 4 milliGRAM(s) IV Push once  pantoprazole  Injectable 40 milliGRAM(s) IV Push every 12 hours  polyethylene glycol 3350 17 Gram(s) Oral two times a day  remdesivir  IVPB   IV Intermittent   remdesivir  IVPB 100 milliGRAM(s) IV Intermittent every 24 hours  sodium chloride 0.9%. 1000 milliLiter(s) (100 mL/Hr) IV Continuous <Continuous>  tamsulosin 0.4 milliGRAM(s) Oral at bedtime    MEDICATIONS  (PRN):  acetaminophen     Tablet .. 650 milliGRAM(s) Oral every 6 hours PRN Temp greater or equal to 38C (100.4F), Mild Pain (1 - 3)  albuterol/ipratropium for Nebulization 3 milliLiter(s) Nebulizer every 6 hours PRN Shortness of Breath  aluminum hydroxide/magnesium hydroxide/simethicone Suspension 30 milliLiter(s) Oral every 4 hours PRN Dyspepsia  diltiazem Injectable 10 milliGRAM(s) IV Push every 4 hours PRN HR>130  ibuprofen  Tablet. 400 milliGRAM(s) Oral every 8 hours PRN Temp greater or equal to 38C (100.4F)  LORazepam   Injectable 1 milliGRAM(s) IV Push every 1 hour PRN CIWA-Ar score 8 or greater  melatonin 3 milliGRAM(s) Oral at bedtime PRN Insomnia  ondansetron Injectable 4 milliGRAM(s) IV Push every 8 hours PRN Nausea and/or Vomiting      Allergies    No Known Allergies    PHYSICAL EXAM:   Vital Signs:  Vital Signs Last 24 Hrs  T(C): 36.8 (11 May 2023 06:10), Max: 36.8 (11 May 2023 06:10)  T(F): 98.3 (11 May 2023 06:10), Max: 98.3 (11 May 2023 06:10)  HR: 107 (11 May 2023 06:10) (97 - 107)  BP: 151/88 (11 May 2023 06:10) (98/59 - 151/88)  BP(mean): --  RR: 18 (11 May 2023 06:10) (18 - 18)  SpO2: 90% (11 May 2023 06:10) (90% - 93%)    Parameters below as of 11 May 2023 06:10  Patient On (Oxygen Delivery Method): room air      Daily     Daily Weight in k (11 May 2023 06:10)    GENERAL:  Appears stated age  HEENT:  NC/AT  CHEST:  Full & symmetric excursion  HEART:  Regular rhythm  ABDOMEN:  Soft, non-tender, non-distended  EXTEREMITIES:  no cyanosis  SKIN:  No rash  NEURO:  Alert      LABS:                        10.8   14.90 )-----------( 202      ( 11 May 2023 07:10 )             31.8     05-11    140  |  108  |  22  ----------------------------<  119<H>  3.8   |  30  |  0.77    Ca    8.4<L>      11 May 2023 07:10    TPro  5.9<L>  /  Alb  2.9<L>  /  TBili  0.4  /  DBili  x   /  AST  19  /  ALT  27  /  AlkPhos  57  05

## 2023-05-11 NOTE — PROGRESS NOTE ADULT - PROBLEM SELECTOR PLAN 1
- new onset AFib on admission  -Received in ED: Cardizem 10 mg IV x2, Cardizem PO 30 mg  - was continued on Diltiazem  mg PO daily  - AFib is rate controlled  - HOLD AC due to frequent falls and alcohol abuse, risks outweigh benefits of anticoagulation  - Discussed risks and benefits of AC with pt and son, aware   - continue Aspirin 81 mg PO daily  - TTE: LVEF 65%, Normal left ventricular systolic function. Increased left ventricular wall thickness.  - TSH wnl  - Cardiology following  - 5/11: will increase Diltiazem CD to 240 mg daily

## 2023-05-12 DIAGNOSIS — K59.00 CONSTIPATION, UNSPECIFIED: ICD-10-CM

## 2023-05-12 LAB
ALBUMIN SERPL ELPH-MCNC: 2.6 G/DL — LOW (ref 3.3–5)
ALP SERPL-CCNC: 57 U/L — SIGNIFICANT CHANGE UP (ref 40–120)
ALT FLD-CCNC: 28 U/L — SIGNIFICANT CHANGE UP (ref 12–78)
ANION GAP SERPL CALC-SCNC: 4 MMOL/L — LOW (ref 5–17)
AST SERPL-CCNC: 16 U/L — SIGNIFICANT CHANGE UP (ref 15–37)
BASOPHILS # BLD AUTO: 0.02 K/UL — SIGNIFICANT CHANGE UP (ref 0–0.2)
BASOPHILS NFR BLD AUTO: 0.1 % — SIGNIFICANT CHANGE UP (ref 0–2)
BILIRUB SERPL-MCNC: 0.6 MG/DL — SIGNIFICANT CHANGE UP (ref 0.2–1.2)
BUN SERPL-MCNC: 23 MG/DL — SIGNIFICANT CHANGE UP (ref 7–23)
CALCIUM SERPL-MCNC: 8.2 MG/DL — LOW (ref 8.5–10.1)
CHLORIDE SERPL-SCNC: 106 MMOL/L — SIGNIFICANT CHANGE UP (ref 96–108)
CO2 SERPL-SCNC: 29 MMOL/L — SIGNIFICANT CHANGE UP (ref 22–31)
CREAT SERPL-MCNC: 0.81 MG/DL — SIGNIFICANT CHANGE UP (ref 0.5–1.3)
EGFR: 91 ML/MIN/1.73M2 — SIGNIFICANT CHANGE UP
EOSINOPHIL # BLD AUTO: 0.01 K/UL — SIGNIFICANT CHANGE UP (ref 0–0.5)
EOSINOPHIL NFR BLD AUTO: 0.1 % — SIGNIFICANT CHANGE UP (ref 0–6)
GLUCOSE SERPL-MCNC: 152 MG/DL — HIGH (ref 70–99)
HCT VFR BLD CALC: 30.3 % — LOW (ref 39–50)
HGB BLD-MCNC: 10.2 G/DL — LOW (ref 13–17)
IMM GRANULOCYTES NFR BLD AUTO: 2 % — HIGH (ref 0–0.9)
LYMPHOCYTES # BLD AUTO: 0.79 K/UL — LOW (ref 1–3.3)
LYMPHOCYTES # BLD AUTO: 5 % — LOW (ref 13–44)
MCHC RBC-ENTMCNC: 30.1 PG — SIGNIFICANT CHANGE UP (ref 27–34)
MCHC RBC-ENTMCNC: 33.7 GM/DL — SIGNIFICANT CHANGE UP (ref 32–36)
MCV RBC AUTO: 89.4 FL — SIGNIFICANT CHANGE UP (ref 80–100)
MONOCYTES # BLD AUTO: 0.92 K/UL — HIGH (ref 0–0.9)
MONOCYTES NFR BLD AUTO: 5.9 % — SIGNIFICANT CHANGE UP (ref 2–14)
NEUTROPHILS # BLD AUTO: 13.6 K/UL — HIGH (ref 1.8–7.4)
NEUTROPHILS NFR BLD AUTO: 86.9 % — HIGH (ref 43–77)
NRBC # BLD: 0 /100 WBCS — SIGNIFICANT CHANGE UP (ref 0–0)
PLATELET # BLD AUTO: 217 K/UL — SIGNIFICANT CHANGE UP (ref 150–400)
POTASSIUM SERPL-MCNC: 4 MMOL/L — SIGNIFICANT CHANGE UP (ref 3.5–5.3)
POTASSIUM SERPL-SCNC: 4 MMOL/L — SIGNIFICANT CHANGE UP (ref 3.5–5.3)
PROT SERPL-MCNC: 5.7 G/DL — LOW (ref 6–8.3)
RBC # BLD: 3.39 M/UL — LOW (ref 4.2–5.8)
RBC # FLD: 12.9 % — SIGNIFICANT CHANGE UP (ref 10.3–14.5)
SODIUM SERPL-SCNC: 139 MMOL/L — SIGNIFICANT CHANGE UP (ref 135–145)
WBC # BLD: 15.65 K/UL — HIGH (ref 3.8–10.5)
WBC # FLD AUTO: 15.65 K/UL — HIGH (ref 3.8–10.5)

## 2023-05-12 PROCEDURE — 99233 SBSQ HOSP IP/OBS HIGH 50: CPT | Mod: GC

## 2023-05-12 RX ORDER — FUROSEMIDE 40 MG
40 TABLET ORAL DAILY
Refills: 0 | Status: DISCONTINUED | OUTPATIENT
Start: 2023-05-12 | End: 2023-05-16

## 2023-05-12 RX ADMIN — Medication 1 TABLET(S): at 11:42

## 2023-05-12 RX ADMIN — CILOSTAZOL 100 MILLIGRAM(S): 100 TABLET ORAL at 18:06

## 2023-05-12 RX ADMIN — TAMSULOSIN HYDROCHLORIDE 0.4 MILLIGRAM(S): 0.4 CAPSULE ORAL at 21:56

## 2023-05-12 RX ADMIN — Medication 1 MILLIGRAM(S): at 11:41

## 2023-05-12 RX ADMIN — Medication 81 MILLIGRAM(S): at 11:41

## 2023-05-12 RX ADMIN — PANTOPRAZOLE SODIUM 40 MILLIGRAM(S): 20 TABLET, DELAYED RELEASE ORAL at 05:42

## 2023-05-12 RX ADMIN — POLYETHYLENE GLYCOL 3350 17 GRAM(S): 17 POWDER, FOR SOLUTION ORAL at 18:06

## 2023-05-12 RX ADMIN — Medication 10 MILLIGRAM(S): at 10:40

## 2023-05-12 RX ADMIN — PANTOPRAZOLE SODIUM 40 MILLIGRAM(S): 20 TABLET, DELAYED RELEASE ORAL at 18:06

## 2023-05-12 RX ADMIN — REMDESIVIR 200 MILLIGRAM(S): 5 INJECTION INTRAVENOUS at 15:00

## 2023-05-12 RX ADMIN — POLYETHYLENE GLYCOL 3350 17 GRAM(S): 17 POWDER, FOR SOLUTION ORAL at 05:43

## 2023-05-12 RX ADMIN — ATORVASTATIN CALCIUM 10 MILLIGRAM(S): 80 TABLET, FILM COATED ORAL at 21:56

## 2023-05-12 RX ADMIN — DULOXETINE HYDROCHLORIDE 60 MILLIGRAM(S): 30 CAPSULE, DELAYED RELEASE ORAL at 11:41

## 2023-05-12 RX ADMIN — Medication 40 MILLIGRAM(S): at 11:42

## 2023-05-12 RX ADMIN — Medication 240 MILLIGRAM(S): at 05:42

## 2023-05-12 RX ADMIN — FLUTICASONE FUROATE AND VILANTEROL TRIFENATATE 1 PUFF(S): 100; 25 POWDER RESPIRATORY (INHALATION) at 06:29

## 2023-05-12 RX ADMIN — Medication 6 MILLIGRAM(S): at 05:42

## 2023-05-12 RX ADMIN — CILOSTAZOL 100 MILLIGRAM(S): 100 TABLET ORAL at 05:42

## 2023-05-12 NOTE — SOCIAL WORK PROGRESS NOTE - NSSWPROGRESSNOTE_GEN_ALL_CORE
Yeny was updated today and faxed to [facility] and unable to accommodate Covid + pt. [facility] states they would consider pt after day 10 and pt completed Remdesivere 5 days today. Yousif has spoken with Randall Mcmillan in pts room and he has offered to inform pts family. Dtr Krystal was contacted and she has requested Sw contact addl facilities and provide a list of Covid +  facilities who will accept pt day 5. [facility], [facility], [facility] and [facility] will all consider referrals for covid day 5 pts. Yousif has presented information to Dtr Krystal today and met with pt. She doesn't want to provide any addl facility choices today and states she needs to research facilities. She has also contacted Patient Experience today with dissatisfaction that her Dad is Covid +  She stated that apparently she has called [facility] and states they would accept a pt on day 5.  Zaida thorpe [facility] states by phone and ACM pt will be considered after day 10. [facility] has no beds and hasn't discussed Covid protocol with COVID + pts.  Yousif will continue to follow for safe dc planning and Dr Mclean was updated today.

## 2023-05-12 NOTE — PROGRESS NOTE ADULT - SUBJECTIVE AND OBJECTIVE BOX
Neurology Follow up note    ABRIL POMPA77yMale    HPI:  78 y/o M w/ PMHx of DM2, PVD, HTN, HLD, COPD, BPH presents from home s/p fall. Daughter at bedside supplements history. Patient has some forgetfulness but no documented history of dementia. Patient was found down by aide, has a life alert that he did not press. Unknown if LOC or head trauma. Estimated that patient was down from 6pm to 6am. Patient reports he spent the night on the floor, last thing he remembers is "feeling crummy on my couch" and then being woken up by his aide this morning. He also has a laceration of RLE from the fall which was repaired in the ED. Of note, patient does not have hx of AF but was found to be in AF w/ RVR at time of admission. Pt reports he drinks 6-7 beers ([employer]) each day. He has a history of falls and goes to PT weekly for gait/stability training. He has an aide who comes to the home 2x a week for 5 hours to supervise showers and prevent falls, help with medication management, and drives him to doctors appointments and physical therapy. At home pt uses a rollator and cane.               Interval History -no new events    Patient is seen, chart was reviewed and case was discussed with the treatment team.  Pt is not in any distress.   Lying on bed comfortably.       Vital Signs Last 24 Hrs  T(C): 36.5 (12 May 2023 12:00), Max: 36.7 (12 May 2023 04:30)  T(F): 97.7 (12 May 2023 12:00), Max: 98 (12 May 2023 04:30)  HR: 113 (12 May 2023 12:00) (103 - 113)  BP: 112/67 (12 May 2023 12:00) (112/67 - 127/78)  BP(mean): --  RR: 19 (12 May 2023 12:00) (18 - 19)  SpO2: 91% (12 May 2023 12:00) (91% - 92%)    Parameters below as of 12 May 2023 12:00  Patient On (Oxygen Delivery Method): room air                    REVIEW OF SYSTEMS:    Constitutional: No , weight loss or fatigue  Eyes: No eye pain, visual disturbances, or discharge  ENT:  No difficulty hearing, tinnitus, vertigo  Neck: No pain or stiffness  Respiratory: No wheezing, chills or hemoptysis  Cardiovascular: No chest pain, palpitations, shortness of breath, dizziness or leg swelling  Gastrointestinal: No abdominal or epigastric pain. No nausea, vomiting or hematemesis;  Genitourinary: No frequency, hematuria or incontinence  Neurological: No headaches, , loss of strength, numbness or tremors  Psychiatric: No depression, anxiety, mood swings or difficulty sleeping  Musculoskeletal: No joint pain or swelling; No muscle, back or extremity pain  Skin: No itching, burning, rashes or lesions   Lymph Nodes: No enlarged glands  Endocrine: No heat or cold intolerance; No hair loss,   Allergy and Immunologic: No hives or eczema    On Neurological Examination:    Mental Status - Pt is alert, awake, oriented X3. H. Follows commands well and able to answer questions appropriately.Mood and affect  normal    Speech -  Normal.     Cranial Nerves - Pupils 3 mm equal and reactive to light, extraocular eye movements intact. Pt has no visual field deficit.  Pt has no  facial asymmetry. Facial sensation is intact.Tongue - is in midline.    Muscle tone - is normal     Motor Exam - 5/5 of UE  LE ;3-4/5 No drift.     Sensory Exam -  Pt withdraws all extremities equally on stimulation. No asymmetry seen. No complaints of tingling, numbness.    coordination:    Finger to nose: normal    Heel to shin: normal    Deep tendon Reflexes - 2 plus all over.     .    Neck Supple -  Yes.    MEDICATIONS  (STANDING):  aspirin  chewable 81 milliGRAM(s) Oral daily  atorvastatin 10 milliGRAM(s) Oral at bedtime  cilostazol 100 milliGRAM(s) Oral two times a day  dexAMETHasone     Tablet 6 milliGRAM(s) Oral daily  diltiazem    milliGRAM(s) Oral daily  DULoxetine 60 milliGRAM(s) Oral daily  fluticasone furoate/vilanterol 200-25 MICROgram(s) Inhaler 1 Puff(s) Inhalation daily  folic acid 1 milliGRAM(s) Oral daily  furosemide    Tablet 40 milliGRAM(s) Oral daily  multivitamin 1 Tablet(s) Oral daily  ondansetron Injectable 4 milliGRAM(s) IV Push once  pantoprazole  Injectable 40 milliGRAM(s) IV Push every 12 hours  polyethylene glycol 3350 17 Gram(s) Oral two times a day  sodium chloride 0.9%. 1000 milliLiter(s) (100 mL/Hr) IV Continuous <Continuous>  tamsulosin 0.4 milliGRAM(s) Oral at bedtime    MEDICATIONS  (PRN):  acetaminophen     Tablet .. 650 milliGRAM(s) Oral every 6 hours PRN Temp greater or equal to 38C (100.4F), Mild Pain (1 - 3)  albuterol/ipratropium for Nebulization 3 milliLiter(s) Nebulizer every 6 hours PRN Shortness of Breath  aluminum hydroxide/magnesium hydroxide/simethicone Suspension 30 milliLiter(s) Oral every 4 hours PRN Dyspepsia  diltiazem Injectable 10 milliGRAM(s) IV Push every 4 hours PRN HR>130  ibuprofen  Tablet. 400 milliGRAM(s) Oral every 8 hours PRN Temp greater or equal to 38C (100.4F)  LORazepam   Injectable 1 milliGRAM(s) IV Push every 1 hour PRN CIWA-Ar score 8 or greater  melatonin 3 milliGRAM(s) Oral at bedtime PRN Insomnia  ondansetron Injectable 4 milliGRAM(s) IV Push every 8 hours PRN Nausea and/or Vomiting      No Known Allergies    Intolerances                             10.2   15.65 )-----------( 217      ( 12 May 2023 06:02 )             30.3     Hemoglobin A1C:     Vitamin B12     RADIOLOGY    ASSESSMENT AND PLAN:     SEEN FOR UNSTEADY GAIT   PN  TREMOR ?PD  sars-covid 2    on remdesivir  trial of sinemet after remdesivir  BRAIN MR NO ACUTE CVA  Physical therapy evaluation.  OOB to chair/ambulation with assistance only.  Pain is accessed and addressed.  Would continue to follow.

## 2023-05-12 NOTE — DIETITIAN INITIAL EVALUATION ADULT - PERTINENT MEDS FT
MEDICATIONS  (STANDING):  aspirin  chewable 81 milliGRAM(s) Oral daily  atorvastatin 10 milliGRAM(s) Oral at bedtime  cilostazol 100 milliGRAM(s) Oral two times a day  dexAMETHasone     Tablet 6 milliGRAM(s) Oral daily  diltiazem    milliGRAM(s) Oral daily  DULoxetine 60 milliGRAM(s) Oral daily  fluticasone furoate/vilanterol 200-25 MICROgram(s) Inhaler 1 Puff(s) Inhalation daily  folic acid 1 milliGRAM(s) Oral daily  furosemide    Tablet 40 milliGRAM(s) Oral daily  multivitamin 1 Tablet(s) Oral daily  ondansetron Injectable 4 milliGRAM(s) IV Push once  pantoprazole  Injectable 40 milliGRAM(s) IV Push every 12 hours  polyethylene glycol 3350 17 Gram(s) Oral two times a day  remdesivir  IVPB   IV Intermittent   remdesivir  IVPB 100 milliGRAM(s) IV Intermittent every 24 hours  sodium chloride 0.9%. 1000 milliLiter(s) (100 mL/Hr) IV Continuous <Continuous>  tamsulosin 0.4 milliGRAM(s) Oral at bedtime    MEDICATIONS  (PRN):  acetaminophen     Tablet .. 650 milliGRAM(s) Oral every 6 hours PRN Temp greater or equal to 38C (100.4F), Mild Pain (1 - 3)  albuterol/ipratropium for Nebulization 3 milliLiter(s) Nebulizer every 6 hours PRN Shortness of Breath  aluminum hydroxide/magnesium hydroxide/simethicone Suspension 30 milliLiter(s) Oral every 4 hours PRN Dyspepsia  diltiazem Injectable 10 milliGRAM(s) IV Push every 4 hours PRN HR>130  ibuprofen  Tablet. 400 milliGRAM(s) Oral every 8 hours PRN Temp greater or equal to 38C (100.4F)  LORazepam   Injectable 1 milliGRAM(s) IV Push every 1 hour PRN CIWA-Ar score 8 or greater  melatonin 3 milliGRAM(s) Oral at bedtime PRN Insomnia  ondansetron Injectable 4 milliGRAM(s) IV Push every 8 hours PRN Nausea and/or Vomiting

## 2023-05-12 NOTE — PROGRESS NOTE ADULT - PROBLEM SELECTOR PLAN 2
- Does not use O2 at home  - Remdesivir started 5/8 so x 5 days with last day 5/12  - Dexamethasone 6mg daily started 5/8 x 6 days so last day 5/13   - ID following.   - isolation.  - BCx NGTD  - Pt on RA today  - leukocytosis, likely due to decadron - Does not use O2 at home  - Remdesivir started 5/8 so x 5 days, last day 5/12  - Dexamethasone 6mg daily started 5/8, last day 5/12  - ID following.   - isolation.  - BCx NGTD  - Pt on RA today  - leukocytosis, likely due to decadron

## 2023-05-12 NOTE — PATIENT CHOICE NOTE. - NSPTCHOICESTATE_GEN_ALL_CORE

## 2023-05-12 NOTE — DIETITIAN INITIAL EVALUATION ADULT - PROBLEM SELECTOR PLAN 4
Daughter denies that patient has ever been diagnosed with dementia. Pt AOx4 on exam, daughter reports he can sometimes be forgetful but no formal diagnosis of dementia has been made. Possibly 2/2 chronic alcohol abuse

## 2023-05-12 NOTE — PROGRESS NOTE ADULT - PROBLEM SELECTOR PLAN 9
- Chronic problem  - held home losartan and lasix due to hypotension  - Pt BP stable  - monitor hemodynamics  - 5/12: start home dose Lasix 40 mg PO daily

## 2023-05-12 NOTE — DIETITIAN INITIAL EVALUATION ADULT - PROBLEM SELECTOR PLAN 3
Pt endorsing drinking 6-7 beers per day. Counseled on how this is likely contributing to his gait instability.  - [facility] protocol ordered  - f/u B12, thiamine, folate levels  counselled on cessation  - monitor for signs/symptoms of withdrawal

## 2023-05-12 NOTE — DIETITIAN INITIAL EVALUATION ADULT - ETIOLOGY
related to presumed excessive energy intake and physical inactivity related to increased needs in the setting of viral illness

## 2023-05-12 NOTE — DIETITIAN INITIAL EVALUATION ADULT - ORAL INTAKE PTA/DIET HISTORY
Pt reports fair appetite/intake PTA. Typically consumes 3 meals/day. Gets food delivered. Has aide that comes 2x/week for 5 hours. Follows a regular diet at home.

## 2023-05-12 NOTE — PROGRESS NOTE ADULT - SUBJECTIVE AND OBJECTIVE BOX
Chief Complaint: Fall    Interval Events: No events overnight.    Review of Systems:  General: No fevers, chills, weight gain  Skin: No rashes, color changes  Cardiovascular: No chest pain, orthopnea  Respiratory: No shortness of breath, cough  Gastrointestinal: No nausea, abdominal pain  Genitourinary: No incontinence, pain with urination  Musculoskeletal: No pain, swelling, decreased range of motion  Neurological: No headache, weakness  Psychiatric: No depression, anxiety  Endocrine: No weight gain, increased thirst  All other systems are comprehensively negative.    Physical Exam:  Vital Signs Last 24 Hrs  T(C): 36.7 (12 May 2023 04:30), Max: 36.7 (11 May 2023 11:30)  T(F): 98 (12 May 2023 04:30), Max: 98.1 (11 May 2023 11:30)  HR: 103 (12 May 2023 04:30) (103 - 106)  BP: 127/78 (12 May 2023 04:30) (102/63 - 127/78)  BP(mean): --  RR: 18 (12 May 2023 04:30) (18 - 18)  SpO2: 92% (12 May 2023 04:30) (92% - 92%)  Parameters below as of 11 May 2023 11:30  Patient On (Oxygen Delivery Method): room air  General: NAD  HEENT: MMM  Neck: No JVD, no carotid bruit  Lungs: CTAB  CV: Irregular, nl S1/S2, no M/R/G  Abdomen: S/NT/ND, +BS  Extremities: No LE edema, no cyanosis  Neuro: AAOx3, non-focal  Skin: No rash    Labs:    05-12    139  |  106  |  23  ----------------------------<  152<H>  4.0   |  29  |  0.81    Ca    8.2<L>      12 May 2023 06:02    TPro  5.7<L>  /  Alb  2.6<L>  /  TBili  0.6  /  DBili  x   /  AST  16  /  ALT  28  /  AlkPhos  57  05-12                        10.2   15.65 )-----------( 217      ( 12 May 2023 06:02 )             30.3       ECG/Telemetry: Atrial flutter

## 2023-05-12 NOTE — PROGRESS NOTE ADULT - SUBJECTIVE AND OBJECTIVE BOX
Patient is a 77y old  Male who presents with a chief complaint of AF w/ RVR (11 May 2023 18:59)      TELE: Afib rate 123, rate was in 100s overnight and has increased this AM.    INTERVAL HPI/OVERNIGHT EVENTS: No acute overnight events. Pt seen and examined at the bedside this AM. Pt complains of constipation. Denies CP, SOB, palpitations, abd pain, fever. He is on RA.    MEDICATIONS  (STANDING):  aspirin  chewable 81 milliGRAM(s) Oral daily  atorvastatin 10 milliGRAM(s) Oral at bedtime  cilostazol 100 milliGRAM(s) Oral two times a day  dexAMETHasone     Tablet 6 milliGRAM(s) Oral daily  diltiazem    milliGRAM(s) Oral daily  DULoxetine 60 milliGRAM(s) Oral daily  fluticasone furoate/vilanterol 200-25 MICROgram(s) Inhaler 1 Puff(s) Inhalation daily  folic acid 1 milliGRAM(s) Oral daily  furosemide    Tablet 40 milliGRAM(s) Oral daily  multivitamin 1 Tablet(s) Oral daily  ondansetron Injectable 4 milliGRAM(s) IV Push once  pantoprazole  Injectable 40 milliGRAM(s) IV Push every 12 hours  polyethylene glycol 3350 17 Gram(s) Oral two times a day  remdesivir  IVPB   IV Intermittent   remdesivir  IVPB 100 milliGRAM(s) IV Intermittent every 24 hours  sodium chloride 0.9%. 1000 milliLiter(s) (100 mL/Hr) IV Continuous <Continuous>  tamsulosin 0.4 milliGRAM(s) Oral at bedtime    MEDICATIONS  (PRN):  acetaminophen     Tablet .. 650 milliGRAM(s) Oral every 6 hours PRN Temp greater or equal to 38C (100.4F), Mild Pain (1 - 3)  albuterol/ipratropium for Nebulization 3 milliLiter(s) Nebulizer every 6 hours PRN Shortness of Breath  aluminum hydroxide/magnesium hydroxide/simethicone Suspension 30 milliLiter(s) Oral every 4 hours PRN Dyspepsia  diltiazem Injectable 10 milliGRAM(s) IV Push every 4 hours PRN HR>130  ibuprofen  Tablet. 400 milliGRAM(s) Oral every 8 hours PRN Temp greater or equal to 38C (100.4F)  LORazepam   Injectable 1 milliGRAM(s) IV Push every 1 hour PRN CIWA-Ar score 8 or greater  melatonin 3 milliGRAM(s) Oral at bedtime PRN Insomnia  ondansetron Injectable 4 milliGRAM(s) IV Push every 8 hours PRN Nausea and/or Vomiting      Allergies    No Known Allergies    Intolerances        REVIEW OF SYSTEMS  CONSTITUTIONAL: No fever   HEENT:  No headache  RESPIRATORY: No cough or shortness of breath  CARDIOVASCULAR: No chest pain, palpitations  GASTROINTESTINAL: + constipation. No abd pain, nausea, vomiting  GENITOURINARY: No dysuria, frequency  NEUROLOGICAL: no dizziness  MUSCULOSKELETAL: no myalgias     Vital Signs Last 24 Hrs  T(C): 36.7 (12 May 2023 04:30), Max: 36.7 (12 May 2023 04:30)  T(F): 98 (12 May 2023 04:30), Max: 98 (12 May 2023 04:30)  HR: 103 (12 May 2023 04:30) (103 - 103)  BP: 127/78 (12 May 2023 04:30) (127/78 - 127/78)  BP(mean): --  RR: 18 (12 May 2023 04:30) (18 - 18)  SpO2: 92% (12 May 2023 04:30) (92% - 92%)        PHYSICAL EXAM:  GENERAL: awake, alert, NAD  HEENT:  anicteric, moist mucous membranes  CHEST/LUNG:  coarse breath sounds bilat  HEART: tachycardia, no m/r/g  ABDOMEN:  BS+, soft, nontender, +distended  EXTREMITIES: 1+ bilat LE pitting edema, No calf tenderness  NERVOUS SYSTEM: answers questions and follows commands appropriately  PSYCH: normal affect    LABS:                        10.2   15.65 )-----------( 217      ( 12 May 2023 06:02 )             30.3     CBC Full  -  ( 12 May 2023 06:02 )  WBC Count : 15.65 K/uL  Hemoglobin : 10.2 g/dL  Hematocrit : 30.3 %  Platelet Count - Automated : 217 K/uL  Mean Cell Volume : 89.4 fl  Mean Cell Hemoglobin : 30.1 pg  Mean Cell Hemoglobin Concentration : 33.7 gm/dL  Auto Neutrophil # : 13.60 K/uL  Auto Lymphocyte # : 0.79 K/uL  Auto Monocyte # : 0.92 K/uL  Auto Eosinophil # : 0.01 K/uL  Auto Basophil # : 0.02 K/uL  Auto Neutrophil % : 86.9 %  Auto Lymphocyte % : 5.0 %  Auto Monocyte % : 5.9 %  Auto Eosinophil % : 0.1 %  Auto Basophil % : 0.1 %    12 May 2023 06:02    139    |  106    |  23     ----------------------------<  152    4.0     |  29     |  0.81     Ca    8.2        12 May 2023 06:02    TPro  5.7    /  Alb  2.6    /  TBili  0.6    /  DBili  x      /  AST  16     /  ALT  28     /  AlkPhos  57     12 May 2023 06:02        CAPILLARY BLOOD GLUCOSE            Culture - Blood (collected 05-08-23 @ 13:50)  Source: .Blood Blood  Preliminary Report (05-09-23 @ 19:02):    No growth to date.    Culture - Blood (collected 05-08-23 @ 13:45)  Source: .Blood Blood  Preliminary Report (05-09-23 @ 19:02):    No growth to date.    Culture - Urine (collected 05-08-23 @ 13:26)  Source: Clean Catch Clean Catch (Midstream)  Final Report (05-09-23 @ 15:23):    >=3 organisms. Probable collection contamination.    Culture - Urine (collected 05-05-23 @ 12:19)  Source: Clean Catch Clean Catch (Midstream)  Final Report (05-06-23 @ 16:08):    <10,000 CFU/mL Normal Urogenital Naheed        RADIOLOGY & ADDITIONAL TESTS:  Personally reviewed.     Consultant(s) Notes Reviewed:  [x] YES  [ ] NO

## 2023-05-12 NOTE — PROGRESS NOTE ADULT - ASSESSMENT
afib  gi bleed    hgb noted  diet as tolerated  no signs of portal htn  suspect esophagitis  proton pump inhibitor bid  reg diet as tolerated  anti-emetics prn  d/w family at bedside    I reviewed the overnight course of events on the unit, re-confirming the patient history. I discussed the care with the patient and their family  Differential diagnosis and plan of care discussed with patient after the evaluation  40 minutes spent on total encounter of which more than fifty percent of the encounter was spent counseling and/or coordinating care by the attending physician.  Advanced care planning was discussed with patient and family.  Advanced care planning forms were reviewed and discussed.  Risks, benefits and alternatives of gastroenterologic procedures were discussed in detail and all questions were answered.

## 2023-05-12 NOTE — PROGRESS NOTE ADULT - SUBJECTIVE AND OBJECTIVE BOX
[facility]  Sammy Munguia PA-C  [address] [place] [address]  [phone]      INTERVAL HPI/OVERNIGHT EVENTS:  Pt s/e  Reports +small BM  No further nausea/vomiting    MEDICATIONS  (STANDING):  aspirin  chewable 81 milliGRAM(s) Oral daily  atorvastatin 10 milliGRAM(s) Oral at bedtime  cilostazol 100 milliGRAM(s) Oral two times a day  dexAMETHasone     Tablet 6 milliGRAM(s) Oral daily  diltiazem    milliGRAM(s) Oral daily  DULoxetine 60 milliGRAM(s) Oral daily  fluticasone furoate/vilanterol 200-25 MICROgram(s) Inhaler 1 Puff(s) Inhalation daily  folic acid 1 milliGRAM(s) Oral daily  furosemide    Tablet 40 milliGRAM(s) Oral daily  multivitamin 1 Tablet(s) Oral daily  ondansetron Injectable 4 milliGRAM(s) IV Push once  pantoprazole  Injectable 40 milliGRAM(s) IV Push every 12 hours  polyethylene glycol 3350 17 Gram(s) Oral two times a day  remdesivir  IVPB 100 milliGRAM(s) IV Intermittent every 24 hours  remdesivir  IVPB   IV Intermittent   sodium chloride 0.9%. 1000 milliLiter(s) (100 mL/Hr) IV Continuous <Continuous>  tamsulosin 0.4 milliGRAM(s) Oral at bedtime    MEDICATIONS  (PRN):  acetaminophen     Tablet .. 650 milliGRAM(s) Oral every 6 hours PRN Temp greater or equal to 38C (100.4F), Mild Pain (1 - 3)  albuterol/ipratropium for Nebulization 3 milliLiter(s) Nebulizer every 6 hours PRN Shortness of Breath  aluminum hydroxide/magnesium hydroxide/simethicone Suspension 30 milliLiter(s) Oral every 4 hours PRN Dyspepsia  diltiazem Injectable 10 milliGRAM(s) IV Push every 4 hours PRN HR>130  ibuprofen  Tablet. 400 milliGRAM(s) Oral every 8 hours PRN Temp greater or equal to 38C (100.4F)  LORazepam   Injectable 1 milliGRAM(s) IV Push every 1 hour PRN CIWA-Ar score 8 or greater  melatonin 3 milliGRAM(s) Oral at bedtime PRN Insomnia  ondansetron Injectable 4 milliGRAM(s) IV Push every 8 hours PRN Nausea and/or Vomiting      Allergies    No Known Allergies      PHYSICAL EXAM:   Vital Signs:  Vital Signs Last 24 Hrs  T(C): 36.7 (12 May 2023 04:30), Max: 36.7 (12 May 2023 04:30)  T(F): 98 (12 May 2023 04:30), Max: 98 (12 May 2023 04:30)  HR: 103 (12 May 2023 04:30) (103 - 103)  BP: 127/78 (12 May 2023 04:30) (127/78 - 127/78)  BP(mean): --  RR: 18 (12 May 2023 04:30) (18 - 18)  SpO2: 92% (12 May 2023 04:30) (92% - 92%)      GENERAL:  Appears stated age  HEENT:  NC/AT  CHEST:  Full & symmetric excursion  HEART:  Regular rhythm  ABDOMEN:  Soft, non-tender, non-distended  EXTEREMITIES:  no cyanosis  SKIN:  No rash  NEURO:  Alert      LABS:                        10.2   15.65 )-----------( 217      ( 12 May 2023 06:02 )             30.3     05-12    139  |  106  |  23  ----------------------------<  152<H>  4.0   |  29  |  0.81    Ca    8.2<L>      12 May 2023 06:02    TPro  5.7<L>  /  Alb  2.6<L>  /  TBili  0.6  /  DBili  x   /  AST  16  /  ALT  28  /  AlkPhos  57  05-12

## 2023-05-12 NOTE — PROGRESS NOTE ADULT - ASSESSMENT
78 y/o M w/ PMHx of DM2, PVD, HTN, HLD, COPD, BPH presents from home s/p fall. Daughter reports that the next door neighbor has COVID and has been visiting her dad all the time despite this. Patient has some forgetfulness but no documented history of dementia and children report very clear at baseline. Patient was found down by aide, has a life alert that he did not press. Patient does not have hx of AF but was found to be in AF w/ RVR at time of admission. Pt reports he drinks 6-7 beers ([employer]) each day. Report of cough, fever and now positive COVID test. Pt vaccinated and boosted.    RECOMMENDATIONS  1-COVID acute COVID and within first 10 days but hypoxic when consulted so recommend    -Remdesivir started 5/8 so x 5 days with last day 5/12-TODAY  -Dexamethasone 6mg daily started 5/8 and with improvement 5/12 as last day -TODAY  VTE prophylaxis -primary to assess risks  pulmonary support  5/9-stable on NC-4L pt reporting improvement   sats >90% on RA    From an ID standpoint no further requirement for inpatient status for the management of ID issues. Fine with discharge from ID standpoint when other medical issues no longer require inpatient care and social issues allow for a safe discharge plan. To schedule an outpatient ID follow up appointment please call our office at [phone]    Thank you for consulting us and involving us in the management of this most interesting and challenging case.  Please call us at [phone] or text me directly on my cell#[phone] with any concerns or further questions.

## 2023-05-12 NOTE — PROGRESS NOTE ADULT - ASSESSMENT
The patient is a 77 year old male with a history of HTN, HL, DM, PAD, BPH, COPD, alcohol abuse who presents with a fall.    Plan:  - ECG and telemetry with underlying atrial flutter  - Echo technically difficult with grossly normal LV systolic function  - Continue aspirin 81 mg daily  - There was reportedly hematemesis with vomiting. Will discontinue anticoagulation. Given bleeding and prior concerns with alcohol use, gait instability, falls, the patient is a poor candidate for long term anticoagulation and risks outweigh benefits.  - Continue cilostazol 100 mg bid  - Continue atorvastatin 10 mg daily  - Continue diltiazem  mg daily  - HR in the  range, acceptable  - Monitor hemoglobin and transfuse as needed  - COVID positive  - ID follow-up

## 2023-05-12 NOTE — PROGRESS NOTE ADULT - PROBLEM SELECTOR PLAN 1
- new onset AFib on admission  -Received in ED: Cardizem 10 mg IV x2, Cardizem PO 30 mg  - was continued on Diltiazem  mg PO daily  - AFib is rate controlled  - HOLD AC due to frequent falls and alcohol abuse, risks outweigh benefits of anticoagulation  - Discussed risks and benefits of AC with pt and son, aware   - continue Aspirin 81 mg PO daily  - TTE: LVEF 65%, Normal left ventricular systolic function. Increased left ventricular wall thickness.  - TSH wnl  - Cardiology following  - Continue Diltiazem CD to 240 mg daily  - If HR continues to rise, consider adding metoprolol tartrate 2.5 mg PO BID for better rate control

## 2023-05-12 NOTE — PROGRESS NOTE ADULT - PROBLEM SELECTOR PLAN 6
Pt presented with a fall, found on floor after approx 12 hours  - MRI brain: No acute infarction or hemorrhage  - Hx of falls due to gait instability and alcohol use

## 2023-05-12 NOTE — PROGRESS NOTE ADULT - SUBJECTIVE AND OBJECTIVE BOX
[facility] of [facility]IOUS DISEASE  Dtuch Laguna MD PhD, Kavitha Chapman MD, Casi Breaux MD, Mu Frias MD, Doug Wall MD  and providing coverage with Kb Watts MD  Providing Infectious Disease Consultations at [facility], [facility], [place], [facility], [facility]'s    Office# [phone] to schedule follow up appointments  Answering Service for urgent calls or New Consults [phone]  Cell# to text for urgent issues Dutch Laguna [phone]     infectious diseases progress note:    ABRIL POMPA is a 77y y. o. Male patient    Overnight and events of the last 24hrs reviewed    Allergies    No Known Allergies    Intolerances        ANTIBIOTICS/RELEVANT:  antimicrobials  remdesivir  IVPB 100 milliGRAM(s) IV Intermittent every 24 hours  remdesivir  IVPB   IV Intermittent     immunologic:    OTHER:  acetaminophen     Tablet .. 650 milliGRAM(s) Oral every 6 hours PRN  albuterol/ipratropium for Nebulization 3 milliLiter(s) Nebulizer every 6 hours PRN  aluminum hydroxide/magnesium hydroxide/simethicone Suspension 30 milliLiter(s) Oral every 4 hours PRN  aspirin  chewable 81 milliGRAM(s) Oral daily  atorvastatin 10 milliGRAM(s) Oral at bedtime  cilostazol 100 milliGRAM(s) Oral two times a day  dexAMETHasone     Tablet 6 milliGRAM(s) Oral daily  diltiazem    milliGRAM(s) Oral daily  diltiazem Injectable 10 milliGRAM(s) IV Push every 4 hours PRN  DULoxetine 60 milliGRAM(s) Oral daily  fluticasone furoate/vilanterol 200-25 MICROgram(s) Inhaler 1 Puff(s) Inhalation daily  folic acid 1 milliGRAM(s) Oral daily  furosemide    Tablet 40 milliGRAM(s) Oral daily  ibuprofen  Tablet. 400 milliGRAM(s) Oral every 8 hours PRN  LORazepam   Injectable 1 milliGRAM(s) IV Push every 1 hour PRN  melatonin 3 milliGRAM(s) Oral at bedtime PRN  multivitamin 1 Tablet(s) Oral daily  ondansetron Injectable 4 milliGRAM(s) IV Push every 8 hours PRN  ondansetron Injectable 4 milliGRAM(s) IV Push once  pantoprazole  Injectable 40 milliGRAM(s) IV Push every 12 hours  polyethylene glycol 3350 17 Gram(s) Oral two times a day  sodium chloride 0.9%. 1000 milliLiter(s) IV Continuous <Continuous>  tamsulosin 0.4 milliGRAM(s) Oral at bedtime      Objective:  Vital Signs Last 24 Hrs  T(C): 36.7 (12 May 2023 04:30), Max: 36.7 (12 May 2023 04:30)  T(F): 98 (12 May 2023 04:30), Max: 98 (12 May 2023 04:30)  HR: 103 (12 May 2023 04:30) (103 - 103)  BP: 127/78 (12 May 2023 04:30) (127/78 - 127/78)  BP(mean): --  RR: 18 (12 May 2023 04:30) (18 - 18)  SpO2: 92% (12 May 2023 04:30) (92% - 92%)        T(C): 36.7 (05-12-23 @ 04:30), Max: 36.8 (05-11-23 @ 06:10)  T(C): 36.7 (05-12-23 @ 04:30), Max: 36.9 (05-09-23 @ 19:45)  T(C): 36.7 (05-12-23 @ 04:30), Max: 39.4 (05-08-23 @ 12:56)    PHYSICAL EXAM:  HEENT: NC atraumatic  Neck: supple  Respiratory: no accessory muscle use, breathing comfortably  Cardiovascular: distant  Gastrointestinal: normal appearing, nondistended  Extremities: no clubbing, no cyanosis,        LABS:                          10.2   15.65 )-----------( 217      ( 12 May 2023 06:02 )             30.3       WBC  15.65 05-12 @ 06:02  14.90 05-11 @ 07:10  11.66 05-10 @ 06:40  9.43 05-09 @ 06:50  6.25 05-08 @ 06:55  5.97 05-07 @ 17:51  6.74 05-07 @ 11:35  6.97 05-07 @ 07:32  7.17 05-06 @ 08:40      05-12    139  |  106  |  23  ----------------------------<  152<H>  4.0   |  29  |  0.81    Ca    8.2<L>      12 May 2023 06:02    TPro  5.7<L>  /  Alb  2.6<L>  /  TBili  0.6  /  DBili  x   /  AST  16  /  ALT  28  /  AlkPhos  57  05-12      Creatinine, Serum: 0.81 mg/dL (05-12-23 @ 06:02)  Creatinine, Serum: 0.77 mg/dL (05-11-23 @ 07:10)  Creatinine, Serum: 0.81 mg/dL (05-10-23 @ 06:40)  Creatinine, Serum: 0.88 mg/dL (05-09-23 @ 06:50)  Creatinine, Serum: 0.90 mg/dL (05-08-23 @ 06:55)  Creatinine, Serum: 0.91 mg/dL (05-07-23 @ 07:32)  Creatinine, Serum: 0.95 mg/dL (05-06-23 @ 08:40)  Creatinine, Serum: 1.10 mg/dL (05-05-23 @ 17:49)                INFLAMMATORY MARKERS      MICROBIOLOGY:              RADIOLOGY & ADDITIONAL STUDIES:

## 2023-05-12 NOTE — PROGRESS NOTE ADULT - PROBLEM SELECTOR PLAN 3
Pt with constipation, states hasn't had BM yet during this hospitalization  - has been receiving miralax 17 g BID standing  - Will give dulcolax suppository now  - continue to monitor

## 2023-05-12 NOTE — DIETITIAN INITIAL EVALUATION ADULT - PERTINENT LABORATORY DATA
05-12    139  |  106  |  23  ----------------------------<  152<H>  4.0   |  29  |  0.81    Ca    8.2<L>      12 May 2023 06:02    TPro  5.7<L>  /  Alb  2.6<L>  /  TBili  0.6  /  DBili  x   /  AST  16  /  ALT  28  /  AlkPhos  57  05-12  A1C with Estimated Average Glucose Result: 6.4 % (05-06-23 @ 08:40)

## 2023-05-12 NOTE — DIETITIAN INITIAL EVALUATION ADULT - PROBLEM SELECTOR PLAN 2
Pt with hx of falls, known gait instability going to PT weekly, pt endorses drinking 6-7 beers daily  - fall precautions  - b12, thiamine, folate levels ordered  -[facility] protocol    - PT consult

## 2023-05-12 NOTE — DIETITIAN INITIAL EVALUATION ADULT - ADD RECOMMEND
1) Continue current diet as ordered; honor food preferences as able to optimize po intake/tolerance  2) Continue MVI daily  3) Monitor po intake, diet tolerance, weight trends, labs, GI function, skin integrity

## 2023-05-12 NOTE — DIETITIAN INITIAL EVALUATION ADULT - OTHER INFO
Pt is a "78 y/o M with history of DM, HTN, HLD, COPD, BPH, PVD who was brought in for evaluation of fall. Found to have new onset aflutter with RVR. hospitalization complicated by hematemesis and COVID+ 5/8/2023."    Visited pt at bedside this am. Pt's nephew present at this time. Pt reports dislike to some food provided. Overall fair appetite/intake. PO intakes % per nursing documentation. Pt tolerating diet well. Denies N/V. Reports constipation, no BM x 1 week; bowel regimen rx. Discussed importance of adequate intake of fluids and fiber. No food allergies. Denies chewing/swallowing difficulties. CBW on admission 220#. Pt reports stable weight. 1+ BL leg/knee/ankle edema noted. Skin: stage I pressure ulcer to sacrum and BL heels. Pt currently on DASH/TLC diet. Receiving IVFs; NaCl@100ml/hr. Discussed current diet order. Food preferences obtained to optimize po intake/tolerance. Pt with no nutritional questions/concerns at this time.

## 2023-05-13 LAB
ALBUMIN SERPL ELPH-MCNC: 2.7 G/DL — LOW (ref 3.3–5)
ALP SERPL-CCNC: 62 U/L — SIGNIFICANT CHANGE UP (ref 40–120)
ALT FLD-CCNC: 29 U/L — SIGNIFICANT CHANGE UP (ref 12–78)
ANION GAP SERPL CALC-SCNC: 5 MMOL/L — SIGNIFICANT CHANGE UP (ref 5–17)
AST SERPL-CCNC: 13 U/L — LOW (ref 15–37)
BASOPHILS # BLD AUTO: 0 K/UL — SIGNIFICANT CHANGE UP (ref 0–0.2)
BASOPHILS NFR BLD AUTO: 0 % — SIGNIFICANT CHANGE UP (ref 0–2)
BILIRUB SERPL-MCNC: 0.5 MG/DL — SIGNIFICANT CHANGE UP (ref 0.2–1.2)
BUN SERPL-MCNC: 30 MG/DL — HIGH (ref 7–23)
CALCIUM SERPL-MCNC: 8.3 MG/DL — LOW (ref 8.5–10.1)
CHLORIDE SERPL-SCNC: 106 MMOL/L — SIGNIFICANT CHANGE UP (ref 96–108)
CO2 SERPL-SCNC: 28 MMOL/L — SIGNIFICANT CHANGE UP (ref 22–31)
CREAT SERPL-MCNC: 0.84 MG/DL — SIGNIFICANT CHANGE UP (ref 0.5–1.3)
CULTURE RESULTS: SIGNIFICANT CHANGE UP
CULTURE RESULTS: SIGNIFICANT CHANGE UP
EGFR: 90 ML/MIN/1.73M2 — SIGNIFICANT CHANGE UP
EOSINOPHIL # BLD AUTO: 0 K/UL — SIGNIFICANT CHANGE UP (ref 0–0.5)
EOSINOPHIL NFR BLD AUTO: 0 % — SIGNIFICANT CHANGE UP (ref 0–6)
GLUCOSE SERPL-MCNC: 164 MG/DL — HIGH (ref 70–99)
HCT VFR BLD CALC: 31.7 % — LOW (ref 39–50)
HGB BLD-MCNC: 10.7 G/DL — LOW (ref 13–17)
LYMPHOCYTES # BLD AUTO: 0.78 K/UL — LOW (ref 1–3.3)
LYMPHOCYTES # BLD AUTO: 6 % — LOW (ref 13–44)
MCHC RBC-ENTMCNC: 29.9 PG — SIGNIFICANT CHANGE UP (ref 27–34)
MCHC RBC-ENTMCNC: 33.8 GM/DL — SIGNIFICANT CHANGE UP (ref 32–36)
MCV RBC AUTO: 88.5 FL — SIGNIFICANT CHANGE UP (ref 80–100)
MONOCYTES # BLD AUTO: 0.91 K/UL — HIGH (ref 0–0.9)
MONOCYTES NFR BLD AUTO: 7 % — SIGNIFICANT CHANGE UP (ref 2–14)
NEUTROPHILS # BLD AUTO: 11.28 K/UL — HIGH (ref 1.8–7.4)
NEUTROPHILS NFR BLD AUTO: 87 % — HIGH (ref 43–77)
NRBC # BLD: SIGNIFICANT CHANGE UP /100 WBCS (ref 0–0)
PLATELET # BLD AUTO: 251 K/UL — SIGNIFICANT CHANGE UP (ref 150–400)
POTASSIUM SERPL-MCNC: 4.1 MMOL/L — SIGNIFICANT CHANGE UP (ref 3.5–5.3)
POTASSIUM SERPL-SCNC: 4.1 MMOL/L — SIGNIFICANT CHANGE UP (ref 3.5–5.3)
PROT SERPL-MCNC: 5.9 G/DL — LOW (ref 6–8.3)
RBC # BLD: 3.58 M/UL — LOW (ref 4.2–5.8)
RBC # FLD: 13 % — SIGNIFICANT CHANGE UP (ref 10.3–14.5)
SODIUM SERPL-SCNC: 139 MMOL/L — SIGNIFICANT CHANGE UP (ref 135–145)
SPECIMEN SOURCE: SIGNIFICANT CHANGE UP
SPECIMEN SOURCE: SIGNIFICANT CHANGE UP
WBC # BLD: 12.96 K/UL — HIGH (ref 3.8–10.5)
WBC # FLD AUTO: 12.96 K/UL — HIGH (ref 3.8–10.5)

## 2023-05-13 PROCEDURE — 99232 SBSQ HOSP IP/OBS MODERATE 35: CPT | Mod: GC

## 2023-05-13 RX ORDER — METOPROLOL TARTRATE 50 MG
12.5 TABLET ORAL EVERY 12 HOURS
Refills: 0 | Status: DISCONTINUED | OUTPATIENT
Start: 2023-05-14 | End: 2023-05-16

## 2023-05-13 RX ORDER — CARBIDOPA AND LEVODOPA 25; 100 MG/1; MG/1
1 TABLET ORAL THREE TIMES A DAY
Refills: 0 | Status: DISCONTINUED | OUTPATIENT
Start: 2023-05-14 | End: 2023-05-16

## 2023-05-13 RX ADMIN — Medication 1 TABLET(S): at 11:43

## 2023-05-13 RX ADMIN — POLYETHYLENE GLYCOL 3350 17 GRAM(S): 17 POWDER, FOR SOLUTION ORAL at 17:25

## 2023-05-13 RX ADMIN — Medication 240 MILLIGRAM(S): at 06:00

## 2023-05-13 RX ADMIN — CILOSTAZOL 100 MILLIGRAM(S): 100 TABLET ORAL at 06:01

## 2023-05-13 RX ADMIN — CILOSTAZOL 100 MILLIGRAM(S): 100 TABLET ORAL at 17:25

## 2023-05-13 RX ADMIN — FLUTICASONE FUROATE AND VILANTEROL TRIFENATATE 1 PUFF(S): 100; 25 POWDER RESPIRATORY (INHALATION) at 06:00

## 2023-05-13 RX ADMIN — Medication 40 MILLIGRAM(S): at 06:00

## 2023-05-13 RX ADMIN — POLYETHYLENE GLYCOL 3350 17 GRAM(S): 17 POWDER, FOR SOLUTION ORAL at 06:00

## 2023-05-13 RX ADMIN — DULOXETINE HYDROCHLORIDE 60 MILLIGRAM(S): 30 CAPSULE, DELAYED RELEASE ORAL at 11:43

## 2023-05-13 RX ADMIN — Medication 1 MILLIGRAM(S): at 11:42

## 2023-05-13 RX ADMIN — Medication 6 MILLIGRAM(S): at 06:00

## 2023-05-13 RX ADMIN — TAMSULOSIN HYDROCHLORIDE 0.4 MILLIGRAM(S): 0.4 CAPSULE ORAL at 21:13

## 2023-05-13 RX ADMIN — PANTOPRAZOLE SODIUM 40 MILLIGRAM(S): 20 TABLET, DELAYED RELEASE ORAL at 05:59

## 2023-05-13 RX ADMIN — PANTOPRAZOLE SODIUM 40 MILLIGRAM(S): 20 TABLET, DELAYED RELEASE ORAL at 17:26

## 2023-05-13 RX ADMIN — ATORVASTATIN CALCIUM 10 MILLIGRAM(S): 80 TABLET, FILM COATED ORAL at 21:13

## 2023-05-13 RX ADMIN — Medication 81 MILLIGRAM(S): at 11:43

## 2023-05-13 NOTE — PROGRESS NOTE ADULT - ASSESSMENT
78 y/o M with history of DM, HTN. HLD, COPD, BPH, PVD who was brought in for evaluation of fall. Found to have new onset aflutter with RVR. hospitalization complicated by hematemesis and COVID+ 5/8/2023. 76 y/o M with history of DM, HTN. HLD, COPD, BPH, PVD who was brought in for evaluation of fall. Found to have new onset aflutter with RVR. hospitalization complicated by hematemesis and COVID+ 5/8/2023. Dispo planning for [facility].

## 2023-05-13 NOTE — PROGRESS NOTE ADULT - SUBJECTIVE AND OBJECTIVE BOX
Patient is a 77y Male with a known history of :  Atrial fibrillation [I48.91]    HTN (hypertension) [I10]    HLD (hyperlipidemia) [E78.5]    Need for prophylactic measure [Z29.9]    Dementia [F03.90]    COPD, moderate [J44.9]    BPH (benign prostatic hyperplasia) [N40.0]    Fall [W19.XXXA]    Alcohol abuse [F10.10]    Goals of care, counseling/discussion [Z71.89]    COVID-19 [U07.1]    Hematemesis [K92.0]    Imaging abnormality [R93.89]    Constipation [K59.00]      HPI:  78 y/o M w/ PMHx of DM2, PVD, HTN, HLD, COPD, BPH presents from home s/p fall. Daughter at bedside supplements history. Patient has some forgetfulness but no documented history of dementia. Patient was found down by aide, has a life alert that he did not press. Unknown if LOC or head trauma. Estimated that patient was down from 6pm to 6am. Patient reports he spent the night on the floor, last thing he remembers is "feeling crummy on my couch" and then being woken up by his aide this morning. He also has a laceration of RLE from the fall which was repaired in the ED. Of note, patient does not have hx of AF but was found to be in AF w/ RVR at time of admission. Pt reports he drinks 6-7 beers ([employer]) each day. He has a history of falls and goes to PT weekly for gait/stability training. He has an aide who comes to the home 2x a week for 5 hours to supervise showers and prevent falls, help with medication management, and drives him to doctors appointments and physical therapy. At home pt uses a rollator and cane.       In the ED   Vitals: 133/51, , T 97, RR 12   Labs: WBC 11, , Lactate 2.4,   EKG: atrial flutter with RVR, rates 130's  Imaging  CXR: slight left base atelectasis     In the ED given:  Meds Given: 2.3L NS, 2g Ofirmev, 10mg IV Cardizem x2, 30mg PO Cardizem x1    (05 May 2023 14:47)      REVIEW OF SYSTEMS:    CONSTITUTIONAL: No fever, weight loss, or fatigue  EYES: No eye pain, visual disturbances, or discharge  ENMT:  No difficulty hearing, tinnitus, vertigo; No sinus or throat pain  NECK: No pain or stiffness  BREASTS: No pain, masses, or nipple discharge  RESPIRATORY: No cough, wheezing, chills or hemoptysis; No shortness of breath  CARDIOVASCULAR: No chest pain, palpitations, dizziness, or leg swelling  GASTROINTESTINAL: No abdominal or epigastric pain. No nausea, vomiting, or hematemesis; No diarrhea or constipation. No melena or hematochezia.  GENITOURINARY: No dysuria, frequency, hematuria, or incontinence  NEUROLOGICAL: No headaches, memory loss, loss of strength, numbness, or tremors  SKIN: No itching, burning, rashes, or lesions   LYMPH NODES: No enlarged glands  ENDOCRINE: No heat or cold intolerance; No hair loss  MUSCULOSKELETAL: No joint pain or swelling; No muscle, back, or extremity pain  PSYCHIATRIC: No depression, anxiety, mood swings, or difficulty sleeping  HEME/LYMPH: No easy bruising, or bleeding gums  ALLERGY AND IMMUNOLOGIC: No hives or eczema    MEDICATIONS  (STANDING):  aspirin  chewable 81 milliGRAM(s) Oral daily  atorvastatin 10 milliGRAM(s) Oral at bedtime  cilostazol 100 milliGRAM(s) Oral two times a day  dexAMETHasone     Tablet 6 milliGRAM(s) Oral daily  diltiazem    milliGRAM(s) Oral daily  DULoxetine 60 milliGRAM(s) Oral daily  fluticasone furoate/vilanterol 200-25 MICROgram(s) Inhaler 1 Puff(s) Inhalation daily  folic acid 1 milliGRAM(s) Oral daily  furosemide    Tablet 40 milliGRAM(s) Oral daily  multivitamin 1 Tablet(s) Oral daily  ondansetron Injectable 4 milliGRAM(s) IV Push once  pantoprazole  Injectable 40 milliGRAM(s) IV Push every 12 hours  polyethylene glycol 3350 17 Gram(s) Oral two times a day  sodium chloride 0.9%. 1000 milliLiter(s) (100 mL/Hr) IV Continuous <Continuous>  tamsulosin 0.4 milliGRAM(s) Oral at bedtime    MEDICATIONS  (PRN):  acetaminophen     Tablet .. 650 milliGRAM(s) Oral every 6 hours PRN Temp greater or equal to 38C (100.4F), Mild Pain (1 - 3)  albuterol/ipratropium for Nebulization 3 milliLiter(s) Nebulizer every 6 hours PRN Shortness of Breath  aluminum hydroxide/magnesium hydroxide/simethicone Suspension 30 milliLiter(s) Oral every 4 hours PRN Dyspepsia  diltiazem Injectable 10 milliGRAM(s) IV Push every 4 hours PRN HR>130  ibuprofen  Tablet. 400 milliGRAM(s) Oral every 8 hours PRN Temp greater or equal to 38C (100.4F)  melatonin 3 milliGRAM(s) Oral at bedtime PRN Insomnia  ondansetron Injectable 4 milliGRAM(s) IV Push every 8 hours PRN Nausea and/or Vomiting      ALLERGIES: No Known Allergies      FAMILY HISTORY:  No pertinent family history in first degree relatives        Social history:  Alochol:   Smoking:   Drug Use:   Marital Status:     PHYSICAL EXAMINATION:  -----------------------------  T(C): 36.7 (05-13-23 @ 05:23), Max: 36.7 (05-13-23 @ 05:23)  HR: 105 (05-13-23 @ 05:23) (105 - 113)  BP: 116/70 (05-13-23 @ 05:23) (112/67 - 117/69)  RR: 18 (05-13-23 @ 05:23) (18 - 19)  SpO2: 92% (05-13-23 @ 05:23) (91% - 93%)  Wt(kg): --    05-12 @ 07:01  -  05-13 @ 07:00  --------------------------------------------------------  IN:  Total IN: 0 mL    OUT:    Incontinent per Condom Catheter (mL): 1300 mL  Total OUT: 1300 mL    Total NET: -1300 mL       LABS:   --------  05-13    139  |  106  |  30<H>  ----------------------------<  164<H>  4.1   |  28  |  0.84    Ca    8.3<L>      13 May 2023 07:06    TPro  5.9<L>  /  Alb  2.7<L>  /  TBili  0.5  /  DBili  x   /  AST  13<L>  /  ALT  29  /  AlkPhos  62  05-13                         10.7   12.96 )-----------( 251      ( 13 May 2023 07:06 )             31.7                   Radiology:    < from: TTE Echo Complete w/o Contrast w/ Doppler (05.09.23 @ 14:24) >    ACC: [id number] EXAM:  ECHO TTE WO CON COMP W DOPP   ORDERED BY: ENOCH NAVARRO     PROCEDURE DATE:  05/09/2023          INTERPRETATION:  Ordering Physician: ENOCH NAVARRO [id number]    Indication: Atrial flutter    Technician: NOAH    Study Quality: Technically difficult  A complete echocardiographic study was performed utilizing standard   protocol including spectral and color Doppler in all echocardiographic   windows.    Height: 180 cm  Weight: 100 kg  BSA: 2.19 m2  Blood Pressure: 113/63 mmHg    MEASUREMENTS  IVS: 1.2 cm  PWT: 1.0 cm  LA: 3.3 cm  AO: 4.0 cm  LVIDd: 4.8 cm  LVIDs: 3.5 cm    LVEF: 65%  RVSP: 33 mmHg  RA Pressure: 3 mmHg    FINDINGS  General: The patient was in atrial flutter during the study period.  Left Ventricle: The left ventricle is normal in size. Increased left   ventricular wall thickness. The left ventricular systolic function is not   well visualized but appears grossly normal with an estimated EF of 65%.  Right Ventricle: The right ventricle is grossly normal in size and   function.  Left Atrium: The left atrium is normal in size.  Right Atrium: The right atrium is grossly normal in size.  Mitral Valve: Mitral annular calcification. Mild mitral regurgitation.  Aortic Valve: Aortic valve sclerosis. Mild aorticregurgitation.  Tricuspid Valve: The tricuspid valve is grossly normal. Mild tricuspid   regurgitation. Estimated pulmonary artery systolic pressure is 33 mmHg.  Pulmonic Valve: The pulmonic valve is not well visualized.  Diastolic Function: Indeterminate.  Pericardium/Pleura: No pericardial effusion visualized.  Aorta: The aortic root is borderline dilated.    IMPRESSION:  1. Normal left ventricular systolic function.  2. Increased left ventricular wall thickness.    --- End of Report ---            ENOCH NAVARRO MD; Attending Cardiologist  This document has been electronically signed. May 10 2023 10:09AM    < end of copied text >

## 2023-05-13 NOTE — SOCIAL WORK PROGRESS NOTE - NSSWPROGRESSNOTE_GEN_ALL_CORE
[facility] contacted and said they have no available beds. [facility] contacted & no one in admissions & nursing seemed uncertain if they can accept COVID positive patient. [facility] accepted and has private room. Daughter Krystal informed and is not prepared for patient to leave today. She asked if we could try [facility] again tomorrow and if not accepted she will agree to [facility]. Admissions at [facility] confirmed they can accept Sunday and we can communicate with them via ACM. Social work to follow up Sunday. MD informed.

## 2023-05-13 NOTE — PROGRESS NOTE ADULT - PROBLEM SELECTOR PLAN 2
- Does not use O2 at home  - Remdesivir started 5/8 so x 5 days, last day 5/12  - Dexamethasone 6mg daily started 5/8, last day 5/12  - ID following.   - isolation.  - BCx NGTD  - Pt on RA today  - leukocytosis, likely due to decadron - Does not use O2 at home  - s/p Remdesivir x5 days, last day 5/12  - s/p Dexamethasone 6mg x 5 days, last day 5/12  - ID following.   - isolation.  - BCx NGTD  - Pt tolerating RA  - leukocytosis, likely due to decadron, improving

## 2023-05-13 NOTE — PROGRESS NOTE ADULT - PROBLEM SELECTOR PLAN 3
Pt with constipation, states hasn't had BM yet during this hospitalization  - has been receiving miralax 17 g BID standing  - Will give dulcolax suppository now  - continue to monitor Pt with constipation during this hospitalization  - has been receiving miralax 17 g BID standing  - s/p dulcolax suppository on 5/13, with resulting BM  - continue to monitor

## 2023-05-13 NOTE — PROGRESS NOTE ADULT - PROBLEM SELECTOR PLAN 1
- new onset AFib on admission  -Received in ED: Cardizem 10 mg IV x2, Cardizem PO 30 mg  - was continued on Diltiazem  mg PO daily  - AFib is rate controlled  - HOLD AC due to frequent falls and alcohol abuse, risks outweigh benefits of anticoagulation  - Discussed risks and benefits of AC with pt and son, aware   - continue Aspirin 81 mg PO daily  - TTE: LVEF 65%, Normal left ventricular systolic function. Increased left ventricular wall thickness.  - TSH wnl  - Cardiology following  - Continue Diltiazem CD to 240 mg daily  - If HR continues to rise, consider adding metoprolol tartrate 2.5 mg PO BID for better rate control - new onset AFib on admission  -Received in ED: Cardizem 10 mg IV x2, Cardizem PO 30 mg  - was continued on Diltiazem  mg PO daily  - AFib is rate controlled  - HOLD AC due to frequent falls and alcohol abuse, risks outweigh benefits of anticoagulation  - Discussed risks and benefits of AC with pt and son, aware   - continue Aspirin 81 mg PO daily  - TTE: LVEF 65%, Normal left ventricular systolic function. Increased left ventricular wall thickness.  - TSH wnl  - Cardiology following  - Continue Diltiazem CD to 240 mg daily

## 2023-05-13 NOTE — PROGRESS NOTE ADULT - PROBLEM SELECTOR PLAN 12
3.8cm AAA, infrarenal   - seen by vascular surgery. continue cilostazol.   - ASA initially held given hematemesis, will restart today  - routine monitoring outpatient   - patient reports he is aware of the aneurysm - Pt on Aspirin, no AC due to hx of epistaxis      # Dispo planning  - KIM once find placement for COVID

## 2023-05-13 NOTE — PROGRESS NOTE ADULT - ASSESSMENT
The patient is a 77 year old male with a history of HTN, HL, DM, PAD, BPH, COPD, alcohol abuse who presents with a fall.    5/13/23  Seen at [facility] telemetry  Monitor-compatible with A-Flutter with variable block    Plan:  - ECG and telemetry with underlying atrial flutter  - Echo technically difficult with grossly normal LV systolic function-see above  - Continue aspirin 81 mg daily  - There was reportedly hematemesis with vomiting. Will discontinue anticoagulation. Given bleeding and prior concerns with alcohol use, gait instability, falls, the patient is a poor candidate for long term anticoagulation and risks outweigh benefits.  - Continue cilostazol 100 mg bid  - Continue atorvastatin 10 mg daily  - Continue diltiazem  mg daily  - HR in the  range, acceptable  - Monitor hemoglobin and transfuse as needed  - COVID positive  - ID follow-up

## 2023-05-13 NOTE — PROGRESS NOTE ADULT - PROBLEM SELECTOR PLAN 11
continue home tamsulosin 0.4 mg daily 3.8cm AAA, infrarenal   - seen by vascular surgery. continue cilostazol.   - ASA initially held given hematemesis, will restart today  - routine monitoring outpatient   - patient reports he is aware of the aneurysm

## 2023-05-13 NOTE — PROGRESS NOTE ADULT - PROBLEM SELECTOR PLAN 6
Pt presented with a fall, found on floor after approx 12 hours  - MRI brain: No acute infarction or hemorrhage  - Hx of falls due to gait instability and alcohol use Pt presented with a fall, found on floor after approx 12 hours  - MRI brain: No acute infarction or hemorrhage  - Hx of falls due to gait instability and alcohol use  - plan for discharge to [facility]

## 2023-05-13 NOTE — SOCIAL WORK PROGRESS NOTE - NSSWPROGRESSNOTE_GEN_ALL_CORE
Patient is ready for d/c. Met with daughter Krystal at nurse's station today. She no longer wants [facility]. [facility] is still first choice. She agreed to referrals to [facility] as back up. Referrals sent. Social work to follow.

## 2023-05-13 NOTE — PROGRESS NOTE ADULT - SUBJECTIVE AND OBJECTIVE BOX
Patient is a 77y old  Male who presents with a chief complaint of AF w/ RVR (13 May 2023 11:11)      TELE:  Aflutter with variable conduction, rate 106, between 100-120s (nonsustained in 120s). Occasional PVCs, with occasional 3 beat VTach    INTERVAL HPI/OVERNIGHT EVENTS: No acute overnight events. Pt seen and examined at the bedside this AM.     MEDICATIONS  (STANDING):  aspirin  chewable 81 milliGRAM(s) Oral daily  atorvastatin 10 milliGRAM(s) Oral at bedtime  cilostazol 100 milliGRAM(s) Oral two times a day  dexAMETHasone     Tablet 6 milliGRAM(s) Oral daily  diltiazem    milliGRAM(s) Oral daily  DULoxetine 60 milliGRAM(s) Oral daily  fluticasone furoate/vilanterol 200-25 MICROgram(s) Inhaler 1 Puff(s) Inhalation daily  folic acid 1 milliGRAM(s) Oral daily  furosemide    Tablet 40 milliGRAM(s) Oral daily  multivitamin 1 Tablet(s) Oral daily  ondansetron Injectable 4 milliGRAM(s) IV Push once  pantoprazole  Injectable 40 milliGRAM(s) IV Push every 12 hours  polyethylene glycol 3350 17 Gram(s) Oral two times a day  sodium chloride 0.9%. 1000 milliLiter(s) (100 mL/Hr) IV Continuous <Continuous>  tamsulosin 0.4 milliGRAM(s) Oral at bedtime    MEDICATIONS  (PRN):  acetaminophen     Tablet .. 650 milliGRAM(s) Oral every 6 hours PRN Temp greater or equal to 38C (100.4F), Mild Pain (1 - 3)  albuterol/ipratropium for Nebulization 3 milliLiter(s) Nebulizer every 6 hours PRN Shortness of Breath  aluminum hydroxide/magnesium hydroxide/simethicone Suspension 30 milliLiter(s) Oral every 4 hours PRN Dyspepsia  diltiazem Injectable 10 milliGRAM(s) IV Push every 4 hours PRN HR>130  ibuprofen  Tablet. 400 milliGRAM(s) Oral every 8 hours PRN Temp greater or equal to 38C (100.4F)  melatonin 3 milliGRAM(s) Oral at bedtime PRN Insomnia  ondansetron Injectable 4 milliGRAM(s) IV Push every 8 hours PRN Nausea and/or Vomiting      Allergies    No Known Allergies    Intolerances        REVIEW OF SYSTEMS:  CONSTITUTIONAL: No fever or chills  HEENT:  No headache, no sore throat  RESPIRATORY: No cough, wheezing, or shortness of breath  CARDIOVASCULAR: No chest pain, palpitations  GASTROINTESTINAL: No abd pain, nausea, vomiting, or diarrhea  GENITOURINARY: No dysuria, frequency, or hematuria  NEUROLOGICAL: no focal weakness or dizziness  MUSCULOSKELETAL: no myalgias     Vital Signs Last 24 Hrs  T(C): 36.7 (13 May 2023 05:23), Max: 36.7 (13 May 2023 05:23)  T(F): 98.1 (13 May 2023 05:23), Max: 98.1 (13 May 2023 05:23)  HR: 105 (13 May 2023 05:23) (105 - 113)  BP: 116/70 (13 May 2023 05:23) (112/67 - 117/69)  BP(mean): --  RR: 18 (13 May 2023 05:23) (18 - 19)  SpO2: 92% (13 May 2023 05:23) (91% - 93%)    Parameters below as of 13 May 2023 05:23  Patient On (Oxygen Delivery Method): room air        PHYSICAL EXAM:  CONSTITUTIONAL: awake, alert, no acute distress  HEENT: Atraumatic normocephalic. Moist mucous membranes.  No conjunctival injection.  RESP: No respiratory distress; CTA b/l, no WRR  CV: RRR, +S1S2, no MRG  GI: Bowel sounds present. Soft, nontender, nondistended, no rebound or guarding  MSK: Moving all four extremities spontaneously. No peripheral edema. No calf tenderness.  SKIN: Warm and dry. No rashes noted.  NEURO: AOx3, answering questions and following commands appropriately  PSYCH: Mood and affect appropriate, recent memory intact     LABS:                        10.7   12.96 )-----------( 251      ( 13 May 2023 07:06 )             31.7     CBC Full  -  ( 13 May 2023 07:06 )  WBC Count : 12.96 K/uL  Hemoglobin : 10.7 g/dL  Hematocrit : 31.7 %  Platelet Count - Automated : 251 K/uL  Mean Cell Volume : 88.5 fl  Mean Cell Hemoglobin : 29.9 pg  Mean Cell Hemoglobin Concentration : 33.8 gm/dL  Auto Neutrophil # : 11.28 K/uL  Auto Lymphocyte # : 0.78 K/uL  Auto Monocyte # : 0.91 K/uL  Auto Eosinophil # : 0.00 K/uL  Auto Basophil # : 0.00 K/uL  Auto Neutrophil % : 87.0 %  Auto Lymphocyte % : 6.0 %  Auto Monocyte % : 7.0 %  Auto Eosinophil % : 0.0 %  Auto Basophil % : 0.0 %    13 May 2023 07:06    139    |  106    |  30     ----------------------------<  164    4.1     |  28     |  0.84     Ca    8.3        13 May 2023 07:06    TPro  5.9    /  Alb  2.7    /  TBili  0.5    /  DBili  x      /  AST  13     /  ALT  29     /  AlkPhos  62     13 May 2023 07:06        CAPILLARY BLOOD GLUCOSE            Culture - Blood (collected 05-08-23 @ 13:50)  Source: .Blood Blood  Preliminary Report (05-09-23 @ 19:02):    No growth to date.    Culture - Blood (collected 05-08-23 @ 13:45)  Source: .Blood Blood  Preliminary Report (05-09-23 @ 19:02):    No growth to date.    Culture - Urine (collected 05-08-23 @ 13:26)  Source: Clean Catch Clean Catch (Midstream)  Final Report (05-09-23 @ 15:23):    >=3 organisms. Probable collection contamination.        RADIOLOGY & ADDITIONAL TESTS:  Personally reviewed.     Consultant(s) Notes Reviewed:  [x] YES  [ ] NO Patient is a 77y old  Male who presents with a chief complaint of AF w/ RVR (13 May 2023 11:11)      TELE:  Aflutter with variable conduction, rate 106, between 100-120s (nonsustained in 120s). Occasional PVCs, with occasional 3 beat VTach    INTERVAL HPI/OVERNIGHT EVENTS: No acute overnight events. Pt seen and examined at the bedside this AM. He has no complaints at this time. States he feels okay, just generalized weakness. No SOB on RA, denies HA, dizziness, CP, abd pain.    MEDICATIONS  (STANDING):  aspirin  chewable 81 milliGRAM(s) Oral daily  atorvastatin 10 milliGRAM(s) Oral at bedtime  cilostazol 100 milliGRAM(s) Oral two times a day  dexAMETHasone     Tablet 6 milliGRAM(s) Oral daily  diltiazem    milliGRAM(s) Oral daily  DULoxetine 60 milliGRAM(s) Oral daily  fluticasone furoate/vilanterol 200-25 MICROgram(s) Inhaler 1 Puff(s) Inhalation daily  folic acid 1 milliGRAM(s) Oral daily  furosemide    Tablet 40 milliGRAM(s) Oral daily  multivitamin 1 Tablet(s) Oral daily  ondansetron Injectable 4 milliGRAM(s) IV Push once  pantoprazole  Injectable 40 milliGRAM(s) IV Push every 12 hours  polyethylene glycol 3350 17 Gram(s) Oral two times a day  sodium chloride 0.9%. 1000 milliLiter(s) (100 mL/Hr) IV Continuous <Continuous>  tamsulosin 0.4 milliGRAM(s) Oral at bedtime    MEDICATIONS  (PRN):  acetaminophen     Tablet .. 650 milliGRAM(s) Oral every 6 hours PRN Temp greater or equal to 38C (100.4F), Mild Pain (1 - 3)  albuterol/ipratropium for Nebulization 3 milliLiter(s) Nebulizer every 6 hours PRN Shortness of Breath  aluminum hydroxide/magnesium hydroxide/simethicone Suspension 30 milliLiter(s) Oral every 4 hours PRN Dyspepsia  diltiazem Injectable 10 milliGRAM(s) IV Push every 4 hours PRN HR>130  ibuprofen  Tablet. 400 milliGRAM(s) Oral every 8 hours PRN Temp greater or equal to 38C (100.4F)  melatonin 3 milliGRAM(s) Oral at bedtime PRN Insomnia  ondansetron Injectable 4 milliGRAM(s) IV Push every 8 hours PRN Nausea and/or Vomiting      Allergies    No Known Allergies    Intolerances        REVIEW OF SYSTEMS:  CONSTITUTIONAL: No fever  HEENT:  No headache  RESPIRATORY: No shortness of breath  CARDIOVASCULAR: No chest pain  GASTROINTESTINAL: No abd pain, nausea, vomiting  NEUROLOGICAL: no  dizziness  MSK: + diffuse weakness    Vital Signs Last 24 Hrs  T(C): 36.7 (13 May 2023 05:23), Max: 36.7 (13 May 2023 05:23)  T(F): 98.1 (13 May 2023 05:23), Max: 98.1 (13 May 2023 05:23)  HR: 105 (13 May 2023 05:23) (105 - 113)  BP: 116/70 (13 May 2023 05:23) (112/67 - 117/69)  BP(mean): --  RR: 18 (13 May 2023 05:23) (18 - 19)  SpO2: 92% (13 May 2023 05:23) (91% - 93%)    Parameters below as of 13 May 2023 05:23  Patient On (Oxygen Delivery Method): room air        PHYSICAL EXAM:  GENERAL: awake, alert, NAD  HEENT:  anicteric, moist mucous membranes  CHEST/LUNG:  coarse breath sounds bilat  HEART: tachycardia, no m/r/g  ABDOMEN:  BS+, soft, nontender, +distended  EXTREMITIES: 1+ bilat LE pitting edema, No calf tenderness  NERVOUS SYSTEM: answers questions and follows commands appropriately  PSYCH: normal affect    LABS:                        10.7   12.96 )-----------( 251      ( 13 May 2023 07:06 )             31.7     CBC Full  -  ( 13 May 2023 07:06 )  WBC Count : 12.96 K/uL  Hemoglobin : 10.7 g/dL  Hematocrit : 31.7 %  Platelet Count - Automated : 251 K/uL  Mean Cell Volume : 88.5 fl  Mean Cell Hemoglobin : 29.9 pg  Mean Cell Hemoglobin Concentration : 33.8 gm/dL  Auto Neutrophil # : 11.28 K/uL  Auto Lymphocyte # : 0.78 K/uL  Auto Monocyte # : 0.91 K/uL  Auto Eosinophil # : 0.00 K/uL  Auto Basophil # : 0.00 K/uL  Auto Neutrophil % : 87.0 %  Auto Lymphocyte % : 6.0 %  Auto Monocyte % : 7.0 %  Auto Eosinophil % : 0.0 %  Auto Basophil % : 0.0 %    13 May 2023 07:06    139    |  106    |  30     ----------------------------<  164    4.1     |  28     |  0.84     Ca    8.3        13 May 2023 07:06    TPro  5.9    /  Alb  2.7    /  TBili  0.5    /  DBili  x      /  AST  13     /  ALT  29     /  AlkPhos  62     13 May 2023 07:06        CAPILLARY BLOOD GLUCOSE            Culture - Blood (collected 05-08-23 @ 13:50)  Source: .Blood Blood  Preliminary Report (05-09-23 @ 19:02):    No growth to date.    Culture - Blood (collected 05-08-23 @ 13:45)  Source: .Blood Blood  Preliminary Report (05-09-23 @ 19:02):    No growth to date.    Culture - Urine (collected 05-08-23 @ 13:26)  Source: Clean Catch Clean Catch (Midstream)  Final Report (05-09-23 @ 15:23):    >=3 organisms. Probable collection contamination.        RADIOLOGY & ADDITIONAL TESTS:  Personally reviewed.     Consultant(s) Notes Reviewed:  [x] YES  [ ] NO

## 2023-05-13 NOTE — PROGRESS NOTE ADULT - SUBJECTIVE AND OBJECTIVE BOX
INTERVAL HPI/OVERNIGHT EVENTS:  No new overnight event.  No N/V/D.  Tolerating diet.  some nausea    Allergies    No Known Allergies    Intolerances    General:  No wt loss, fevers, chills, night sweats, fatigue,   Eyes:  Good vision, no reported pain  ENT:  No sore throat, pain, runny nose, dysphagia  CV:  No pain, palpitations, hypo/hypertension  Resp:  No dyspnea, cough, tachypnea, wheezing  GI:  No pain, No nausea, No vomiting, No diarrhea, No constipation, No weight loss, No fever, No pruritis, No rectal bleeding, No tarry stools, No dysphagia,  :  No pain, bleeding, incontinence, nocturia  Muscle:  No pain, weakness  Neuro:  No weakness, tingling, memory problems  Psych:  No fatigue, insomnia, mood problems, depression  Endocrine:  No polyuria, polydipsia, cold/heat intolerance  Heme:  No petechiae, ecchymosis, easy bruisability  Skin:  No rash, tattoos, scars, edema      PHYSICAL EXAM:   Vital Signs:  Vital Signs Last 24 Hrs  T(C): 36.7 (13 May 2023 05:23), Max: 36.7 (13 May 2023 05:23)  T(F): 98.1 (13 May 2023 05:23), Max: 98.1 (13 May 2023 05:23)  HR: 105 (13 May 2023 05:23) (105 - 113)  BP: 116/70 (13 May 2023 05:23) (112/67 - 117/69)  BP(mean): --  RR: 18 (13 May 2023 05:23) (18 - 19)  SpO2: 92% (13 May 2023 05:23) (91% - 93%)    Parameters below as of 13 May 2023 05:23  Patient On (Oxygen Delivery Method): room air      Daily     Daily I&O's Summary    12 May 2023 07:01  -  13 May 2023 07:00  --------------------------------------------------------  IN: 0 mL / OUT: 1300 mL / NET: -1300 mL        GENERAL:  Appears stated age, well-groomed, well-nourished, no distress  HEENT:  NC/AT,  conjunctivae clear and pink, no thyromegaly, nodules, adenopathy, no JVD, sclera -anicteric  CHEST:  Full & symmetric excursion, no increased effort, breath sounds clear  HEART:  Regular rhythm, S1, S2, no murmur/rub/S3/S4, no abdominal bruit, no edema  ABDOMEN:  Soft, non-tender, non-distended, normoactive bowel sounds,  no masses ,no hepato-splenomegaly, no signs of chronic liver disease  EXTEREMITIES:  no cyanosis,clubbing or edema  SKIN:  No rash/erythema/ecchymoses/petechiae/wounds/abscess/warm/dry  NEURO:  Alert, oriented, no asterixis, no tremor, no encephalopathy      LABS:                        10.7   12.96 )-----------( 251      ( 13 May 2023 07:06 )             31.7     05-13    139  |  106  |  30<H>  ----------------------------<  164<H>  4.1   |  28  |  0.84    Ca    8.3<L>      13 May 2023 07:06    TPro  5.9<L>  /  Alb  2.7<L>  /  TBili  0.5  /  DBili  x   /  AST  13<L>  /  ALT  29  /  AlkPhos  62  05-13        amylase   lipase  RADIOLOGY & ADDITIONAL TESTS:

## 2023-05-13 NOTE — PROGRESS NOTE ADULT - PROBLEM SELECTOR PLAN 9
- Chronic problem  - held home losartan and lasix due to hypotension  - Pt BP stable  - monitor hemodynamics  - 5/12: start home dose Lasix 40 mg PO daily - Chronic problem  - held home losartan and lasix due to hypotension  - Pt BP stable  - monitor hemodynamics  - 5/12: start home dose Lasix 40 mg PO daily      ## HLD  - continue home atorvastatin

## 2023-05-14 LAB
ALBUMIN SERPL ELPH-MCNC: 2.7 G/DL — LOW (ref 3.3–5)
ALP SERPL-CCNC: 58 U/L — SIGNIFICANT CHANGE UP (ref 40–120)
ALT FLD-CCNC: 24 U/L — SIGNIFICANT CHANGE UP (ref 12–78)
ANION GAP SERPL CALC-SCNC: 4 MMOL/L — LOW (ref 5–17)
AST SERPL-CCNC: 9 U/L — LOW (ref 15–37)
BASOPHILS # BLD AUTO: 0.02 K/UL — SIGNIFICANT CHANGE UP (ref 0–0.2)
BASOPHILS NFR BLD AUTO: 0.2 % — SIGNIFICANT CHANGE UP (ref 0–2)
BILIRUB SERPL-MCNC: 0.6 MG/DL — SIGNIFICANT CHANGE UP (ref 0.2–1.2)
BUN SERPL-MCNC: 34 MG/DL — HIGH (ref 7–23)
CALCIUM SERPL-MCNC: 8.4 MG/DL — LOW (ref 8.5–10.1)
CHLORIDE SERPL-SCNC: 105 MMOL/L — SIGNIFICANT CHANGE UP (ref 96–108)
CO2 SERPL-SCNC: 29 MMOL/L — SIGNIFICANT CHANGE UP (ref 22–31)
CREAT SERPL-MCNC: 0.94 MG/DL — SIGNIFICANT CHANGE UP (ref 0.5–1.3)
EGFR: 83 ML/MIN/1.73M2 — SIGNIFICANT CHANGE UP
EOSINOPHIL # BLD AUTO: 0 K/UL — SIGNIFICANT CHANGE UP (ref 0–0.5)
EOSINOPHIL NFR BLD AUTO: 0 % — SIGNIFICANT CHANGE UP (ref 0–6)
GLUCOSE SERPL-MCNC: 154 MG/DL — HIGH (ref 70–99)
HCT VFR BLD CALC: 32.7 % — LOW (ref 39–50)
HGB BLD-MCNC: 11 G/DL — LOW (ref 13–17)
IMM GRANULOCYTES NFR BLD AUTO: 3.8 % — HIGH (ref 0–0.9)
LYMPHOCYTES # BLD AUTO: 0.91 K/UL — LOW (ref 1–3.3)
LYMPHOCYTES # BLD AUTO: 7.3 % — LOW (ref 13–44)
MCHC RBC-ENTMCNC: 30 PG — SIGNIFICANT CHANGE UP (ref 27–34)
MCHC RBC-ENTMCNC: 33.6 GM/DL — SIGNIFICANT CHANGE UP (ref 32–36)
MCV RBC AUTO: 89.1 FL — SIGNIFICANT CHANGE UP (ref 80–100)
MONOCYTES # BLD AUTO: 0.99 K/UL — HIGH (ref 0–0.9)
MONOCYTES NFR BLD AUTO: 7.9 % — SIGNIFICANT CHANGE UP (ref 2–14)
NEUTROPHILS # BLD AUTO: 10.11 K/UL — HIGH (ref 1.8–7.4)
NEUTROPHILS NFR BLD AUTO: 80.8 % — HIGH (ref 43–77)
NRBC # BLD: 0 /100 WBCS — SIGNIFICANT CHANGE UP (ref 0–0)
PLATELET # BLD AUTO: 281 K/UL — SIGNIFICANT CHANGE UP (ref 150–400)
POTASSIUM SERPL-MCNC: 3.9 MMOL/L — SIGNIFICANT CHANGE UP (ref 3.5–5.3)
POTASSIUM SERPL-SCNC: 3.9 MMOL/L — SIGNIFICANT CHANGE UP (ref 3.5–5.3)
PROT SERPL-MCNC: 5.8 G/DL — LOW (ref 6–8.3)
RBC # BLD: 3.67 M/UL — LOW (ref 4.2–5.8)
RBC # FLD: 12.7 % — SIGNIFICANT CHANGE UP (ref 10.3–14.5)
SARS-COV-2 RNA SPEC QL NAA+PROBE: DETECTED
SODIUM SERPL-SCNC: 138 MMOL/L — SIGNIFICANT CHANGE UP (ref 135–145)
WBC # BLD: 12.5 K/UL — HIGH (ref 3.8–10.5)
WBC # FLD AUTO: 12.5 K/UL — HIGH (ref 3.8–10.5)

## 2023-05-14 PROCEDURE — 99232 SBSQ HOSP IP/OBS MODERATE 35: CPT | Mod: GC

## 2023-05-14 RX ADMIN — CILOSTAZOL 100 MILLIGRAM(S): 100 TABLET ORAL at 06:05

## 2023-05-14 RX ADMIN — Medication 1 MILLIGRAM(S): at 14:35

## 2023-05-14 RX ADMIN — PANTOPRAZOLE SODIUM 40 MILLIGRAM(S): 20 TABLET, DELAYED RELEASE ORAL at 06:05

## 2023-05-14 RX ADMIN — FLUTICASONE FUROATE AND VILANTEROL TRIFENATATE 1 PUFF(S): 100; 25 POWDER RESPIRATORY (INHALATION) at 06:58

## 2023-05-14 RX ADMIN — CILOSTAZOL 100 MILLIGRAM(S): 100 TABLET ORAL at 17:40

## 2023-05-14 RX ADMIN — Medication 81 MILLIGRAM(S): at 14:35

## 2023-05-14 RX ADMIN — ATORVASTATIN CALCIUM 10 MILLIGRAM(S): 80 TABLET, FILM COATED ORAL at 21:28

## 2023-05-14 RX ADMIN — Medication 240 MILLIGRAM(S): at 06:04

## 2023-05-14 RX ADMIN — Medication 6 MILLIGRAM(S): at 06:05

## 2023-05-14 RX ADMIN — Medication 12.5 MILLIGRAM(S): at 06:04

## 2023-05-14 RX ADMIN — CARBIDOPA AND LEVODOPA 1 TABLET(S): 25; 100 TABLET ORAL at 06:04

## 2023-05-14 RX ADMIN — Medication 40 MILLIGRAM(S): at 06:04

## 2023-05-14 RX ADMIN — Medication 1 TABLET(S): at 14:34

## 2023-05-14 RX ADMIN — POLYETHYLENE GLYCOL 3350 17 GRAM(S): 17 POWDER, FOR SOLUTION ORAL at 06:05

## 2023-05-14 RX ADMIN — TAMSULOSIN HYDROCHLORIDE 0.4 MILLIGRAM(S): 0.4 CAPSULE ORAL at 21:29

## 2023-05-14 RX ADMIN — POLYETHYLENE GLYCOL 3350 17 GRAM(S): 17 POWDER, FOR SOLUTION ORAL at 17:52

## 2023-05-14 RX ADMIN — CARBIDOPA AND LEVODOPA 1 TABLET(S): 25; 100 TABLET ORAL at 21:28

## 2023-05-14 RX ADMIN — PANTOPRAZOLE SODIUM 40 MILLIGRAM(S): 20 TABLET, DELAYED RELEASE ORAL at 17:40

## 2023-05-14 RX ADMIN — DULOXETINE HYDROCHLORIDE 60 MILLIGRAM(S): 30 CAPSULE, DELAYED RELEASE ORAL at 14:34

## 2023-05-14 RX ADMIN — CARBIDOPA AND LEVODOPA 1 TABLET(S): 25; 100 TABLET ORAL at 14:35

## 2023-05-14 RX ADMIN — Medication 12.5 MILLIGRAM(S): at 17:40

## 2023-05-14 NOTE — PROGRESS NOTE ADULT - SUBJECTIVE AND OBJECTIVE BOX
Patient is a 77y old  Male who presents with a chief complaint of AF w/ RVR (14 May 2023 10:07)      INTERVAL HPI/OVERNIGHT EVENTS: Pt was seen and examined at bedside. No acute overnight events. Pt states that he feels fine and would like to be discharged.   Pt denies headache, CP, SOB, palpitations, abdominal pain, n/v.  No other complaints at this time.     MEDICATIONS  (STANDING):  aspirin  chewable 81 milliGRAM(s) Oral daily  atorvastatin 10 milliGRAM(s) Oral at bedtime  carbidopa/levodopa  25/100 1 Tablet(s) Oral three times a day  cilostazol 100 milliGRAM(s) Oral two times a day  diltiazem    milliGRAM(s) Oral daily  DULoxetine 60 milliGRAM(s) Oral daily  fluticasone furoate/vilanterol 200-25 MICROgram(s) Inhaler 1 Puff(s) Inhalation daily  folic acid 1 milliGRAM(s) Oral daily  furosemide    Tablet 40 milliGRAM(s) Oral daily  metoprolol tartrate 12.5 milliGRAM(s) Oral every 12 hours  multivitamin 1 Tablet(s) Oral daily  ondansetron Injectable 4 milliGRAM(s) IV Push once  pantoprazole  Injectable 40 milliGRAM(s) IV Push every 12 hours  polyethylene glycol 3350 17 Gram(s) Oral two times a day  sodium chloride 0.9%. 1000 milliLiter(s) (100 mL/Hr) IV Continuous <Continuous>  tamsulosin 0.4 milliGRAM(s) Oral at bedtime    MEDICATIONS  (PRN):  acetaminophen     Tablet .. 650 milliGRAM(s) Oral every 6 hours PRN Temp greater or equal to 38C (100.4F), Mild Pain (1 - 3)  albuterol/ipratropium for Nebulization 3 milliLiter(s) Nebulizer every 6 hours PRN Shortness of Breath  aluminum hydroxide/magnesium hydroxide/simethicone Suspension 30 milliLiter(s) Oral every 4 hours PRN Dyspepsia  diltiazem Injectable 10 milliGRAM(s) IV Push every 4 hours PRN HR>130  ibuprofen  Tablet. 400 milliGRAM(s) Oral every 8 hours PRN Temp greater or equal to 38C (100.4F)  melatonin 3 milliGRAM(s) Oral at bedtime PRN Insomnia  ondansetron Injectable 4 milliGRAM(s) IV Push every 8 hours PRN Nausea and/or Vomiting      Allergies    No Known Allergies    Intolerances        REVIEW OF SYSTEMS:  CONSTITUTIONAL: No fever or chills  HEENT:  No headache, no sore throat  RESPIRATORY: No cough, wheezing, or shortness of breath  CARDIOVASCULAR: No chest pain, palpitations  GASTROINTESTINAL: No abd pain, nausea, vomiting, or diarrhea  GENITOURINARY: No dysuria, frequency, or hematuria  NEUROLOGICAL: no focal weakness or dizziness  MUSCULOSKELETAL: no myalgias     Vital Signs Last 24 Hrs  T(C): 36.7 (14 May 2023 10:56), Max: 36.9 (14 May 2023 05:16)  T(F): 98 (14 May 2023 10:56), Max: 98.4 (14 May 2023 05:16)  HR: 88 (14 May 2023 10:56) (88 - 104)  BP: 126/72 (14 May 2023 10:56) (122/70 - 126/72)  BP(mean): --  RR: 17 (14 May 2023 10:56) (17 - 18)  SpO2: 97% (14 May 2023 10:56) (90% - 98%)    Parameters below as of 14 May 2023 10:56  Patient On (Oxygen Delivery Method): room air        PHYSICAL EXAM:  GENERAL: NAD, pleasant male   HEENT:  anicteric, moist mucous membranes  CHEST/LUNG:  CTA b/l, no rales, wheezes, or rhonchi  HEART:  irregular rhythm, S1, S2  ABDOMEN:  BS+, soft, nontender, nondistended  EXTREMITIES: non pitting edema, no cyanosis, or calf tenderness  NERVOUS SYSTEM: answers questions and follows commands appropriately    LABS:                        11.0   12.50 )-----------( 281      ( 14 May 2023 07:05 )             32.7     CBC Full  -  ( 14 May 2023 07:05 )  WBC Count : 12.50 K/uL  Hemoglobin : 11.0 g/dL  Hematocrit : 32.7 %  Platelet Count - Automated : 281 K/uL  Mean Cell Volume : 89.1 fl  Mean Cell Hemoglobin : 30.0 pg  Mean Cell Hemoglobin Concentration : 33.6 gm/dL  Auto Neutrophil # : 10.11 K/uL  Auto Lymphocyte # : 0.91 K/uL  Auto Monocyte # : 0.99 K/uL  Auto Eosinophil # : 0.00 K/uL  Auto Basophil # : 0.02 K/uL  Auto Neutrophil % : 80.8 %  Auto Lymphocyte % : 7.3 %  Auto Monocyte % : 7.9 %  Auto Eosinophil % : 0.0 %  Auto Basophil % : 0.2 %    14 May 2023 07:05    138    |  105    |  34     ----------------------------<  154    3.9     |  29     |  0.94     Ca    8.4        14 May 2023 07:05    TPro  5.8    /  Alb  2.7    /  TBili  0.6    /  DBili  x      /  AST  9      /  ALT  24     /  AlkPhos  58     14 May 2023 07:05        CAPILLARY BLOOD GLUCOSE            Culture - Blood (collected 05-08-23 @ 13:50)  Source: .Blood Blood  Final Report (05-13-23 @ 19:00):    No Growth Final    Culture - Blood (collected 05-08-23 @ 13:45)  Source: .Blood Blood  Final Report (05-13-23 @ 19:00):    No Growth Final    Culture - Urine (collected 05-08-23 @ 13:26)  Source: Clean Catch Clean Catch (Midstream)  Final Report (05-09-23 @ 15:23):    >=3 organisms. Probable collection contamination.        RADIOLOGY & ADDITIONAL TESTS:     Personally reviewed.     Consultant(s) Notes Reviewed:  [x] YES  [ ] NO

## 2023-05-14 NOTE — PROGRESS NOTE ADULT - ASSESSMENT
The patient is a 77 year old male with a history of HTN, HL, DM, PAD, BPH, COPD, alcohol abuse who presents with a fall.    5/14/23  Seen at [facility] telemetry  Monitor-compatible with A-Flutter with variable block    Plan:  - ECG and telemetry with underlying atrial flutter  - Echo technically difficult with grossly normal LV systolic function-see above  - Continue aspirin 81 mg daily  - There was reportedly hematemesis with vomiting. Will discontinue anticoagulation. Given bleeding and prior concerns with alcohol use, gait instability, falls, the patient is a poor candidate for long term anticoagulation and risks outweigh benefits.  - Continue cilostazol 100 mg bid  - Continue atorvastatin 10 mg daily  - Continue diltiazem  mg daily  - HR in the  range, acceptable  - Monitor hemoglobin and transfuse as needed  - COVID positive-completed remdesivir  - ID follow-up

## 2023-05-14 NOTE — PROGRESS NOTE ADULT - PROBLEM SELECTOR PLAN 1
- new onset AFib on admission  - C/w Diltiazem  mg PO daily  - Started on Lopressor 12.5 mg q12h  - HOLD AC due to frequent falls and alcohol abuse, risks outweigh benefits of anticoagulation - Discussed risks and benefits of AC with pt and son, aware   - Continue Aspirin 81 mg PO daily  - TTE: LVEF 65%, Normal left ventricular systolic function. Increased left ventricular wall thickness.  - TSH wnl  - Cardiology Dr. Vargas following

## 2023-05-14 NOTE — PROGRESS NOTE ADULT - PROBLEM SELECTOR PLAN 3
Pt with constipation during this hospitalization  - Has been receiving miralax 17 g BID standing  - s/p dulcolax suppository on 5/13, with resulting BM  - Continue to monitor

## 2023-05-14 NOTE — PROGRESS NOTE ADULT - PROBLEM SELECTOR PLAN 11
3.8cm AAA, infrarenal   - seen by vascular surgery. continue cilostazol.   - ASA initially held given hematemesis, now restarted  - monitor for s/s bleeding    - routine monitoring outpatient   - patient reports he is aware of the aneurysm

## 2023-05-14 NOTE — PROGRESS NOTE ADULT - PROBLEM SELECTOR PLAN 2
- Does not use O2 at home  - s/p Remdesivir x5 days, last day 5/12  - s/p Dexamethasone 6mg x 5 days, last day 5/12  - BCx NGTD  - Pt tolerating RA  - leukocytosis, likely due to decadron, improving  - Isolation   - ID Dr. Laguna following

## 2023-05-14 NOTE — SOCIAL WORK PROGRESS NOTE - NSSWPROGRESSNOTE_GEN_ALL_CORE
Raheem spoke w dtr at length, jonny [phone] in re to dc of pt . raheem informed dtr of availability of [facility] at this time. She wanted [facility] but [facility] has responded today w no available beds. dtr to consider other facilities given, if no availability at [facility] or [facility] as of tomorrow she will agree w excel. Dtr expresssed her concern about sending pt. Raheem again explained dc process. Anticipate dc to excel tomorrow if no bed at [facility] or [facility].

## 2023-05-14 NOTE — PROGRESS NOTE ADULT - PROBLEM SELECTOR PLAN 6
Pt presented with a fall, found on floor after approx 12 hours  - MRI brain: No acute infarction or hemorrhage  - Hx of falls due to gait instability and alcohol use  - plan for discharge to [facility]

## 2023-05-14 NOTE — PROGRESS NOTE ADULT - SUBJECTIVE AND OBJECTIVE BOX
Patient is a 77y Male with a known history of :  Atrial fibrillation [I48.91]    HTN (hypertension) [I10]    HLD (hyperlipidemia) [E78.5]    Need for prophylactic measure [Z29.9]    Dementia [F03.90]    COPD, moderate [J44.9]    BPH (benign prostatic hyperplasia) [N40.0]    Fall [W19.XXXA]    Alcohol abuse [F10.10]    Goals of care, counseling/discussion [Z71.89]    COVID-19 [U07.1]    Hematemesis [K92.0]    Imaging abnormality [R93.89]    Constipation [K59.00]      HPI:  78 y/o M w/ PMHx of DM2, PVD, HTN, HLD, COPD, BPH presents from home s/p fall. Daughter at bedside supplements history. Patient has some forgetfulness but no documented history of dementia. Patient was found down by aide, has a life alert that he did not press. Unknown if LOC or head trauma. Estimated that patient was down from 6pm to 6am. Patient reports he spent the night on the floor, last thing he remembers is "feeling crummy on my couch" and then being woken up by his aide this morning. He also has a laceration of RLE from the fall which was repaired in the ED. Of note, patient does not have hx of AF but was found to be in AF w/ RVR at time of admission. Pt reports he drinks 6-7 beers ([employer]) each day. He has a history of falls and goes to PT weekly for gait/stability training. He has an aide who comes to the home 2x a week for 5 hours to supervise showers and prevent falls, help with medication management, and drives him to doctors appointments and physical therapy. At home pt uses a rollator and cane.       In the ED   Vitals: 133/51, , T 97, RR 12   Labs: WBC 11, , Lactate 2.4,   EKG: atrial flutter with RVR, rates 130's  Imaging  CXR: slight left base atelectasis     In the ED given:  Meds Given: 2.3L NS, 2g Ofirmev, 10mg IV Cardizem x2, 30mg PO Cardizem x1    (05 May 2023 14:47)      REVIEW OF SYSTEMS:    CONSTITUTIONAL: No fever, weight loss, or fatigue  EYES: No eye pain, visual disturbances, or discharge  ENMT:  No difficulty hearing, tinnitus, vertigo; No sinus or throat pain  NECK: No pain or stiffness  BREASTS: No pain, masses, or nipple discharge  RESPIRATORY: No cough, wheezing, chills or hemoptysis; No shortness of breath  CARDIOVASCULAR: No chest pain, palpitations, dizziness, or leg swelling  GASTROINTESTINAL: No abdominal or epigastric pain. No nausea, vomiting, or hematemesis; No diarrhea or constipation. No melena or hematochezia.  GENITOURINARY: No dysuria, frequency, hematuria, or incontinence  NEUROLOGICAL: No headaches, memory loss, loss of strength, numbness, or tremors  SKIN: No itching, burning, rashes, or lesions   LYMPH NODES: No enlarged glands  ENDOCRINE: No heat or cold intolerance; No hair loss  MUSCULOSKELETAL: No joint pain or swelling; No muscle, back, or extremity pain  PSYCHIATRIC: No depression, anxiety, mood swings, or difficulty sleeping  HEME/LYMPH: No easy bruising, or bleeding gums  ALLERGY AND IMMUNOLOGIC: No hives or eczema    MEDICATIONS  (STANDING):  aspirin  chewable 81 milliGRAM(s) Oral daily  atorvastatin 10 milliGRAM(s) Oral at bedtime  carbidopa/levodopa  25/100 1 Tablet(s) Oral three times a day  cilostazol 100 milliGRAM(s) Oral two times a day  diltiazem    milliGRAM(s) Oral daily  DULoxetine 60 milliGRAM(s) Oral daily  fluticasone furoate/vilanterol 200-25 MICROgram(s) Inhaler 1 Puff(s) Inhalation daily  folic acid 1 milliGRAM(s) Oral daily  furosemide    Tablet 40 milliGRAM(s) Oral daily  metoprolol tartrate 12.5 milliGRAM(s) Oral every 12 hours  multivitamin 1 Tablet(s) Oral daily  ondansetron Injectable 4 milliGRAM(s) IV Push once  pantoprazole  Injectable 40 milliGRAM(s) IV Push every 12 hours  polyethylene glycol 3350 17 Gram(s) Oral two times a day  sodium chloride 0.9%. 1000 milliLiter(s) (100 mL/Hr) IV Continuous <Continuous>  tamsulosin 0.4 milliGRAM(s) Oral at bedtime    MEDICATIONS  (PRN):  acetaminophen     Tablet .. 650 milliGRAM(s) Oral every 6 hours PRN Temp greater or equal to 38C (100.4F), Mild Pain (1 - 3)  albuterol/ipratropium for Nebulization 3 milliLiter(s) Nebulizer every 6 hours PRN Shortness of Breath  aluminum hydroxide/magnesium hydroxide/simethicone Suspension 30 milliLiter(s) Oral every 4 hours PRN Dyspepsia  diltiazem Injectable 10 milliGRAM(s) IV Push every 4 hours PRN HR>130  ibuprofen  Tablet. 400 milliGRAM(s) Oral every 8 hours PRN Temp greater or equal to 38C (100.4F)  melatonin 3 milliGRAM(s) Oral at bedtime PRN Insomnia  ondansetron Injectable 4 milliGRAM(s) IV Push every 8 hours PRN Nausea and/or Vomiting      ALLERGIES: No Known Allergies      FAMILY HISTORY:  No pertinent family history in first degree relatives        Social history:  Alochol:   Smoking:   Drug Use:   Marital Status:     PHYSICAL EXAMINATION:  -----------------------------  T(C): 36.9 (05-14-23 @ 05:16), Max: 37 (05-13-23 @ 11:37)  HR: 90 (05-14-23 @ 05:16) (90 - 104)  BP: 123/77 (05-14-23 @ 05:16) (118/74 - 123/77)  RR: 18 (05-14-23 @ 05:16) (17 - 18)  SpO2: 98% (05-14-23 @ 05:16) (90% - 98%)  Wt(kg): --    05-13 @ 07:01  -  05-14 @ 07:00  --------------------------------------------------------  IN:  Total IN: 0 mL    OUT:    Voided (mL): 600 mL  Total OUT: 600 mL    Total NET: -600 mL        LABS:   --------  05-13    139  |  106  |  30<H>  ----------------------------<  164<H>  4.1   |  28  |  0.84    Ca    8.3<L>      13 May 2023 07:06    TPro  5.9<L>  /  Alb  2.7<L>  /  TBili  0.5  /  DBili  x   /  AST  13<L>  /  ALT  29  /  AlkPhos  62  05-13                         10.7   12.96 )-----------( 251      ( 13 May 2023 07:06 )             31.7                   Radiology:

## 2023-05-14 NOTE — PROGRESS NOTE ADULT - ASSESSMENT
78 y/o M with history of DM, HTN. HLD, COPD, BPH, PVD who was brought in for evaluation of fall. Found to have new onset aflutter with RVR. hospitalization complicated by hematemesis and COVID+ 5/8/2023. Dispo planning for [facility].

## 2023-05-14 NOTE — PROGRESS NOTE ADULT - SUBJECTIVE AND OBJECTIVE BOX
INTERVAL HPI/OVERNIGHT EVENTS:  No new overnight event.  No N/V/D.  Tolerating diet.    Allergies    No Known Allergies    Intolerances    General:  No wt loss, fevers, chills, night sweats, fatigue,   Eyes:  Good vision, no reported pain  ENT:  No sore throat, pain, runny nose, dysphagia  CV:  No pain, palpitations, hypo/hypertension  Resp:  No dyspnea, cough, tachypnea, wheezing  GI:  No pain, No nausea, No vomiting, No diarrhea, No constipation, No weight loss, No fever, No pruritis, No rectal bleeding, No tarry stools, No dysphagia,  :  No pain, bleeding, incontinence, nocturia  Muscle:  No pain, weakness  Neuro:  No weakness, tingling, memory problems  Psych:  No fatigue, insomnia, mood problems, depression  Endocrine:  No polyuria, polydipsia, cold/heat intolerance  Heme:  No petechiae, ecchymosis, easy bruisability  Skin:  No rash, tattoos, scars, edema      PHYSICAL EXAM:   Vital Signs:  Vital Signs Last 24 Hrs  T(C): 36.9 (14 May 2023 05:16), Max: 37 (13 May 2023 11:37)  T(F): 98.4 (14 May 2023 05:16), Max: 98.6 (13 May 2023 11:37)  HR: 90 (14 May 2023 05:16) (90 - 104)  BP: 123/77 (14 May 2023 05:16) (118/74 - 123/77)  BP(mean): --  RR: 18 (14 May 2023 05:16) (17 - 18)  SpO2: 98% (14 May 2023 05:16) (90% - 98%)    Parameters below as of 13 May 2023 19:34  Patient On (Oxygen Delivery Method): room air      Daily     Daily Weight in k.9 (14 May 2023 05:16)I&O's Summary    13 May 2023 07:01  -  14 May 2023 07:00  --------------------------------------------------------  IN: 0 mL / OUT: 600 mL / NET: -600 mL        GENERAL:  Appears stated age, well-groomed, well-nourished, no distress  HEENT:  NC/AT,  conjunctivae clear and pink, no thyromegaly, nodules, adenopathy, no JVD, sclera -anicteric  CHEST:  Full & symmetric excursion, no increased effort, breath sounds clear  HEART:  Regular rhythm, S1, S2, no murmur/rub/S3/S4, no abdominal bruit, no edema  ABDOMEN:  Soft, non-tender, non-distended, normoactive bowel sounds,  no masses ,no hepato-splenomegaly, no signs of chronic liver disease  EXTEREMITIES:  no cyanosis,clubbing or edema  SKIN:  No rash/erythema/ecchymoses/petechiae/wounds/abscess/warm/dry  NEURO:  Alert, oriented, no asterixis, no tremor, no encephalopathy      LABS:                        11.0   12.50 )-----------( 281      ( 14 May 2023 07:05 )             32.7     05-14    138  |  105  |  34<H>  ----------------------------<  154<H>  3.9   |  29  |  0.94    Ca    8.4<L>      14 May 2023 07:05    TPro  5.8<L>  /  Alb  2.7<L>  /  TBili  0.6  /  DBili  x   /  AST  9<L>  /  ALT  24  /  AlkPhos  58  05-14        amylase   lipase  RADIOLOGY & ADDITIONAL TESTS:

## 2023-05-14 NOTE — PROGRESS NOTE ADULT - PROBLEM SELECTOR PLAN 12
- Pt on Aspirin, no AC due to hx of epistaxis      # Dispo planning  - [facility] once find placement for COVID isolation  - DC tomorrow possible

## 2023-05-15 LAB
ALBUMIN SERPL ELPH-MCNC: 2.6 G/DL — LOW (ref 3.3–5)
ALP SERPL-CCNC: 65 U/L — SIGNIFICANT CHANGE UP (ref 40–120)
ALT FLD-CCNC: 13 U/L — SIGNIFICANT CHANGE UP (ref 12–78)
ANION GAP SERPL CALC-SCNC: 5 MMOL/L — SIGNIFICANT CHANGE UP (ref 5–17)
AST SERPL-CCNC: 10 U/L — LOW (ref 15–37)
BASOPHILS # BLD AUTO: 0.03 K/UL — SIGNIFICANT CHANGE UP (ref 0–0.2)
BASOPHILS NFR BLD AUTO: 0.3 % — SIGNIFICANT CHANGE UP (ref 0–2)
BILIRUB SERPL-MCNC: 0.7 MG/DL — SIGNIFICANT CHANGE UP (ref 0.2–1.2)
BUN SERPL-MCNC: 30 MG/DL — HIGH (ref 7–23)
CALCIUM SERPL-MCNC: 8.1 MG/DL — LOW (ref 8.5–10.1)
CHLORIDE SERPL-SCNC: 104 MMOL/L — SIGNIFICANT CHANGE UP (ref 96–108)
CO2 SERPL-SCNC: 28 MMOL/L — SIGNIFICANT CHANGE UP (ref 22–31)
CREAT SERPL-MCNC: 0.88 MG/DL — SIGNIFICANT CHANGE UP (ref 0.5–1.3)
EGFR: 89 ML/MIN/1.73M2 — SIGNIFICANT CHANGE UP
EOSINOPHIL # BLD AUTO: 0 K/UL — SIGNIFICANT CHANGE UP (ref 0–0.5)
EOSINOPHIL NFR BLD AUTO: 0 % — SIGNIFICANT CHANGE UP (ref 0–6)
GLUCOSE SERPL-MCNC: 175 MG/DL — HIGH (ref 70–99)
HCT VFR BLD CALC: 32.5 % — LOW (ref 39–50)
HGB BLD-MCNC: 10.9 G/DL — LOW (ref 13–17)
IMM GRANULOCYTES NFR BLD AUTO: 6.2 % — HIGH (ref 0–0.9)
LYMPHOCYTES # BLD AUTO: 0.86 K/UL — LOW (ref 1–3.3)
LYMPHOCYTES # BLD AUTO: 8.6 % — LOW (ref 13–44)
MAGNESIUM SERPL-MCNC: 2.3 MG/DL — SIGNIFICANT CHANGE UP (ref 1.6–2.6)
MCHC RBC-ENTMCNC: 29.8 PG — SIGNIFICANT CHANGE UP (ref 27–34)
MCHC RBC-ENTMCNC: 33.5 GM/DL — SIGNIFICANT CHANGE UP (ref 32–36)
MCV RBC AUTO: 88.8 FL — SIGNIFICANT CHANGE UP (ref 80–100)
MONOCYTES # BLD AUTO: 0.81 K/UL — SIGNIFICANT CHANGE UP (ref 0–0.9)
MONOCYTES NFR BLD AUTO: 8.1 % — SIGNIFICANT CHANGE UP (ref 2–14)
NEUTROPHILS # BLD AUTO: 7.64 K/UL — HIGH (ref 1.8–7.4)
NEUTROPHILS NFR BLD AUTO: 76.8 % — SIGNIFICANT CHANGE UP (ref 43–77)
NRBC # BLD: 0 /100 WBCS — SIGNIFICANT CHANGE UP (ref 0–0)
PHOSPHATE SERPL-MCNC: 3.3 MG/DL — SIGNIFICANT CHANGE UP (ref 2.5–4.5)
PLATELET # BLD AUTO: 316 K/UL — SIGNIFICANT CHANGE UP (ref 150–400)
POTASSIUM SERPL-MCNC: 4 MMOL/L — SIGNIFICANT CHANGE UP (ref 3.5–5.3)
POTASSIUM SERPL-SCNC: 4 MMOL/L — SIGNIFICANT CHANGE UP (ref 3.5–5.3)
PROT SERPL-MCNC: 5.9 G/DL — LOW (ref 6–8.3)
RBC # BLD: 3.66 M/UL — LOW (ref 4.2–5.8)
RBC # FLD: 12.6 % — SIGNIFICANT CHANGE UP (ref 10.3–14.5)
SODIUM SERPL-SCNC: 137 MMOL/L — SIGNIFICANT CHANGE UP (ref 135–145)
WBC # BLD: 9.96 K/UL — SIGNIFICANT CHANGE UP (ref 3.8–10.5)
WBC # FLD AUTO: 9.96 K/UL — SIGNIFICANT CHANGE UP (ref 3.8–10.5)

## 2023-05-15 PROCEDURE — 99232 SBSQ HOSP IP/OBS MODERATE 35: CPT | Mod: GC

## 2023-05-15 RX ORDER — METOPROLOL TARTRATE 50 MG
0.5 TABLET ORAL
Qty: 30 | Refills: 0
Start: 2023-05-15 | End: 2023-06-13

## 2023-05-15 RX ORDER — DILTIAZEM HCL 120 MG
1 CAPSULE, EXT RELEASE 24 HR ORAL
Qty: 0 | Refills: 0 | DISCHARGE
Start: 2023-05-15

## 2023-05-15 RX ORDER — POLYETHYLENE GLYCOL 3350 17 G/17G
17 POWDER, FOR SOLUTION ORAL
Qty: 0 | Refills: 0 | DISCHARGE
Start: 2023-05-15

## 2023-05-15 RX ORDER — METOLAZONE 5 MG/1
1 TABLET ORAL
Refills: 0 | DISCHARGE

## 2023-05-15 RX ORDER — FOLIC ACID 0.8 MG
1 TABLET ORAL
Qty: 0 | Refills: 0 | DISCHARGE
Start: 2023-05-15

## 2023-05-15 RX ORDER — CARBIDOPA AND LEVODOPA 25; 100 MG/1; MG/1
1 TABLET ORAL
Qty: 0 | Refills: 0 | DISCHARGE
Start: 2023-05-15

## 2023-05-15 RX ORDER — CLOPIDOGREL BISULFATE 75 MG/1
1 TABLET, FILM COATED ORAL
Refills: 0 | DISCHARGE

## 2023-05-15 RX ORDER — LOSARTAN POTASSIUM 100 MG/1
1 TABLET, FILM COATED ORAL
Refills: 0 | DISCHARGE

## 2023-05-15 RX ADMIN — PANTOPRAZOLE SODIUM 40 MILLIGRAM(S): 20 TABLET, DELAYED RELEASE ORAL at 17:47

## 2023-05-15 RX ADMIN — Medication 40 MILLIGRAM(S): at 06:01

## 2023-05-15 RX ADMIN — DULOXETINE HYDROCHLORIDE 60 MILLIGRAM(S): 30 CAPSULE, DELAYED RELEASE ORAL at 13:14

## 2023-05-15 RX ADMIN — CARBIDOPA AND LEVODOPA 1 TABLET(S): 25; 100 TABLET ORAL at 20:32

## 2023-05-15 RX ADMIN — POLYETHYLENE GLYCOL 3350 17 GRAM(S): 17 POWDER, FOR SOLUTION ORAL at 06:01

## 2023-05-15 RX ADMIN — Medication 12.5 MILLIGRAM(S): at 06:01

## 2023-05-15 RX ADMIN — Medication 81 MILLIGRAM(S): at 13:14

## 2023-05-15 RX ADMIN — CILOSTAZOL 100 MILLIGRAM(S): 100 TABLET ORAL at 06:01

## 2023-05-15 RX ADMIN — POLYETHYLENE GLYCOL 3350 17 GRAM(S): 17 POWDER, FOR SOLUTION ORAL at 17:48

## 2023-05-15 RX ADMIN — TAMSULOSIN HYDROCHLORIDE 0.4 MILLIGRAM(S): 0.4 CAPSULE ORAL at 20:34

## 2023-05-15 RX ADMIN — FLUTICASONE FUROATE AND VILANTEROL TRIFENATATE 1 PUFF(S): 100; 25 POWDER RESPIRATORY (INHALATION) at 07:43

## 2023-05-15 RX ADMIN — Medication 240 MILLIGRAM(S): at 06:01

## 2023-05-15 RX ADMIN — Medication 1 TABLET(S): at 13:14

## 2023-05-15 RX ADMIN — CARBIDOPA AND LEVODOPA 1 TABLET(S): 25; 100 TABLET ORAL at 13:15

## 2023-05-15 RX ADMIN — PANTOPRAZOLE SODIUM 40 MILLIGRAM(S): 20 TABLET, DELAYED RELEASE ORAL at 06:02

## 2023-05-15 RX ADMIN — CILOSTAZOL 100 MILLIGRAM(S): 100 TABLET ORAL at 17:48

## 2023-05-15 RX ADMIN — CARBIDOPA AND LEVODOPA 1 TABLET(S): 25; 100 TABLET ORAL at 06:01

## 2023-05-15 RX ADMIN — Medication 1 MILLIGRAM(S): at 13:15

## 2023-05-15 RX ADMIN — ATORVASTATIN CALCIUM 10 MILLIGRAM(S): 80 TABLET, FILM COATED ORAL at 20:33

## 2023-05-15 NOTE — PROGRESS NOTE ADULT - PROBLEM SELECTOR PLAN 1
- new onset AFib on admission  - Continue Diltiazem  mg PO daily  - Continue Lopressor 12.5 mg q12h  - HOLD AC due to frequent falls and alcohol abuse, risks outweigh benefits of anticoagulation - Discussed risks and benefits of AC with pt and son, aware   - Continue Aspirin 81 mg PO daily  - TTE: LVEF 65%, Normal left ventricular systolic function. Increased left ventricular wall thickness.  - TSH wnl  - Cardiology Dr. Vargas following

## 2023-05-15 NOTE — PROGRESS NOTE ADULT - PROBLEM SELECTOR PLAN 6
Pt presented with a fall, found on floor after approx 12 hours  - MRI brain: No acute infarction or hemorrhage  - Hx of falls due to gait instability and alcohol use  - plan for discharge to [facility]

## 2023-05-15 NOTE — PROGRESS NOTE ADULT - ASSESSMENT
afib  gi bleed    hgb noted  diet as tolerated  no signs of portal htn  suspect esophagitis  proton pump inhibitor bid  reg diet as tolerated  anti-emetics prn  clinically improved no GI objection to begin d/c plan  d/w family at bedside    I reviewed the overnight course of events on the unit, re-confirming the patient history. I discussed the care with the patient and their family  Differential diagnosis and plan of care discussed with patient after the evaluation  40 minutes spent on total encounter of which more than fifty percent of the encounter was spent counseling and/or coordinating care by the attending physician.  Advanced care planning was discussed with patient and family.  Advanced care planning forms were reviewed and discussed.  Risks, benefits and alternatives of gastroenterologic procedures were discussed in detail and all questions were answered.

## 2023-05-15 NOTE — PROGRESS NOTE ADULT - SUBJECTIVE AND OBJECTIVE BOX
Patient is a 77y old  Male who presents with a chief complaint of AF w/ RVR (15 May 2023 13:28)      TELE: AFib rate 78, range 70-80s. No events overnight.     INTERVAL HPI/OVERNIGHT EVENTS: No acute overnight events. Pt seen and examined at the bedside this AM. He states he feels good and has no complaints at that time. He denies CP, palpitations, SOB, abd pain.    MEDICATIONS  (STANDING):  aspirin  chewable 81 milliGRAM(s) Oral daily  atorvastatin 10 milliGRAM(s) Oral at bedtime  carbidopa/levodopa  25/100 1 Tablet(s) Oral three times a day  cilostazol 100 milliGRAM(s) Oral two times a day  diltiazem    milliGRAM(s) Oral daily  DULoxetine 60 milliGRAM(s) Oral daily  fluticasone furoate/vilanterol 200-25 MICROgram(s) Inhaler 1 Puff(s) Inhalation daily  folic acid 1 milliGRAM(s) Oral daily  furosemide    Tablet 40 milliGRAM(s) Oral daily  metoprolol tartrate 12.5 milliGRAM(s) Oral every 12 hours  multivitamin 1 Tablet(s) Oral daily  ondansetron Injectable 4 milliGRAM(s) IV Push once  pantoprazole  Injectable 40 milliGRAM(s) IV Push every 12 hours  polyethylene glycol 3350 17 Gram(s) Oral two times a day  sodium chloride 0.9%. 1000 milliLiter(s) (100 mL/Hr) IV Continuous <Continuous>  tamsulosin 0.4 milliGRAM(s) Oral at bedtime    MEDICATIONS  (PRN):  acetaminophen     Tablet .. 650 milliGRAM(s) Oral every 6 hours PRN Temp greater or equal to 38C (100.4F), Mild Pain (1 - 3)  albuterol/ipratropium for Nebulization 3 milliLiter(s) Nebulizer every 6 hours PRN Shortness of Breath  aluminum hydroxide/magnesium hydroxide/simethicone Suspension 30 milliLiter(s) Oral every 4 hours PRN Dyspepsia  diltiazem Injectable 10 milliGRAM(s) IV Push every 4 hours PRN HR>130  ibuprofen  Tablet. 400 milliGRAM(s) Oral every 8 hours PRN Temp greater or equal to 38C (100.4F)  melatonin 3 milliGRAM(s) Oral at bedtime PRN Insomnia  ondansetron Injectable 4 milliGRAM(s) IV Push every 8 hours PRN Nausea and/or Vomiting      Allergies    No Known Allergies    Intolerances        REVIEW OF SYSTEMS:  CONSTITUTIONAL: No fever   HEENT:  No headache  RESPIRATORY: No cough, shortness of breath  CARDIOVASCULAR: No chest pain, palpitations  GASTROINTESTINAL: No abd pain, nausea,  NEUROLOGICAL: no dizziness  MUSCULOSKELETAL: no myalgias     Vital Signs Last 24 Hrs  T(C): 36.6 (15 May 2023 11:37), Max: 36.7 (14 May 2023 20:24)  T(F): 97.8 (15 May 2023 11:37), Max: 98.1 (15 May 2023 06:04)  HR: 82 (15 May 2023 11:37) (78 - 83)  BP: 109/71 (15 May 2023 11:37) (109/71 - 125/55)  BP(mean): --  RR: 17 (15 May 2023 11:37) (17 - 18)  SpO2: 94% (15 May 2023 11:37) (91% - 95%)    Parameters below as of 15 May 2023 11:37  Patient On (Oxygen Delivery Method): room air        PHYSICAL EXAM:  GENERAL: NAD, pleasant male   HEENT:  anicteric, moist mucous membranes  CHEST/LUNG:  CTA b/l, no rales, wheezes, or rhonchi  HEART:  irregular rhythm, S1, S2  ABDOMEN:  BS+, soft, nontender, nondistended  EXTREMITIES: non pitting edema, no cyanosis, or calf tenderness  NERVOUS SYSTEM: answers questions and follows commands appropriately    LABS:                        10.9   9.96  )-----------( 316      ( 15 May 2023 06:15 )             32.5     CBC Full  -  ( 15 May 2023 06:15 )  WBC Count : 9.96 K/uL  Hemoglobin : 10.9 g/dL  Hematocrit : 32.5 %  Platelet Count - Automated : 316 K/uL  Mean Cell Volume : 88.8 fl  Mean Cell Hemoglobin : 29.8 pg  Mean Cell Hemoglobin Concentration : 33.5 gm/dL  Auto Neutrophil # : 7.64 K/uL  Auto Lymphocyte # : 0.86 K/uL  Auto Monocyte # : 0.81 K/uL  Auto Eosinophil # : 0.00 K/uL  Auto Basophil # : 0.03 K/uL  Auto Neutrophil % : 76.8 %  Auto Lymphocyte % : 8.6 %  Auto Monocyte % : 8.1 %  Auto Eosinophil % : 0.0 %  Auto Basophil % : 0.3 %    15 May 2023 06:15    137    |  104    |  30     ----------------------------<  175    4.0     |  28     |  0.88     Ca    8.1        15 May 2023 06:15  Phos  3.3       15 May 2023 06:15  Mg     2.3       15 May 2023 06:15    TPro  5.9    /  Alb  2.6    /  TBili  0.7    /  DBili  x      /  AST  10     /  ALT  13     /  AlkPhos  65     15 May 2023 06:15        CAPILLARY BLOOD GLUCOSE              RADIOLOGY & ADDITIONAL TESTS:  Personally reviewed.     Consultant(s) Notes Reviewed:  [x] YES  [ ] NO

## 2023-05-15 NOTE — SOCIAL WORK PROGRESS NOTE - NSSWPROGRESSNOTE_GEN_ALL_CORE
Yousif has been in contact with [facility] who has no beds available for today. facility asked if family would consider a bed at [facility]. Yousif has presented option to family who requested a referral to [facility] facilioty initially was willing to consider pt with isolation for covid + however called back and states administration denied pt secondary to etoh dependence. Yousif has spoken with RN and CWA is ) requested a el be updated to reflect CWA 0 and requested facility reassess pt. Was informed by admissions RN today who states facility unable to accept pt. Yousif has updated Dtr Krystal that Pt has been denied by [facility]. Yousif educated her regarding el reflection clinicals in pts chart and h and p. She has requested to speak with pt experience rep Montserrat who was informed. Yousif has also updated her that [facility] will accept on 5/18, [facility] on 5/18 amd [facility] has accepted and has a bed available today. family not interested in these facilities and visited [facility] but states no tour was offered. Yousif will remain available.

## 2023-05-15 NOTE — PROGRESS NOTE ADULT - ASSESSMENT
78 y/o M w/ PMHx of DM2, PVD, HTN, HLD, COPD, BPH presents from home s/p fall. Daughter reports that the next door neighbor has COVID and has been visiting her dad all the time despite this. Patient has some forgetfulness but no documented history of dementia and children report very clear at baseline. Patient was found down by aide, has a life alert that he did not press. Patient does not have hx of AF but was found to be in AF w/ RVR at time of admission. Pt reports he drinks 6-7 beers ([employer]) each day. Report of cough, fever and now positive COVID test. Pt vaccinated and boosted.    RECOMMENDATIONS  1-COVID acute COVID and within first 10 days but hypoxic when consulted so recommend    -Remdesivir started 5/8 so x 5 days with last day 5/12  -Dexamethasone 6mg daily started 5/8 and with improvement 5/12 as last day   VTE prophylaxis -primary to assess risks  pulmonary support  5/9-stable on NC-4L pt reporting improvement   sats >90% on RA    5/15-cognitively clear, pleasant, reported back to recent baseline, at this point follow for any sequelae but stable for dc to discharge to facility for Parkinson's    From an ID standpoint no further requirement for inpatient status for the management of ID issues. Fine with discharge from ID standpoint when other medical issues no longer require inpatient care and social issues allow for a safe discharge plan. To schedule an outpatient ID follow up appointment please call our office at [phone]    Thank you for consulting us and involving us in the management of this most interesting and challenging case.  We will follow along in the care of this patient. Please call us at [phone] or text me directly on my cell# at [phone] with any concerns.

## 2023-05-15 NOTE — PROGRESS NOTE ADULT - SUBJECTIVE AND OBJECTIVE BOX
[facility] DIV[facility] of INFECTIOUS DISEASE  Dutch Laguna MD PhD, Kavitha Chapman MD, Casi Breaux MD, Mu Frias MD, Doug Wall MD  and providing coverage with Kb Watts MD  Providing Infectious Disease Consultations at [facility], [facility], [place], [place], [facility], [facility]'s    Office# [phone] to schedule follow up appointments  Answering Service for urgent calls or New Consults [phone]  Cell# to text for urgent issues Dutch Laguna [phone]     infectious diseases progress note:    ABRIL POMPA is a 77y y. o. Male patient    Overnight and events of the last 24hrs reviewed    Allergies    No Known Allergies    Intolerances        ANTIBIOTICS/RELEVANT:  antimicrobials    immunologic:    OTHER:  acetaminophen     Tablet .. 650 milliGRAM(s) Oral every 6 hours PRN  albuterol/ipratropium for Nebulization 3 milliLiter(s) Nebulizer every 6 hours PRN  aluminum hydroxide/magnesium hydroxide/simethicone Suspension 30 milliLiter(s) Oral every 4 hours PRN  aspirin  chewable 81 milliGRAM(s) Oral daily  atorvastatin 10 milliGRAM(s) Oral at bedtime  carbidopa/levodopa  25/100 1 Tablet(s) Oral three times a day  cilostazol 100 milliGRAM(s) Oral two times a day  diltiazem    milliGRAM(s) Oral daily  diltiazem Injectable 10 milliGRAM(s) IV Push every 4 hours PRN  DULoxetine 60 milliGRAM(s) Oral daily  fluticasone furoate/vilanterol 200-25 MICROgram(s) Inhaler 1 Puff(s) Inhalation daily  folic acid 1 milliGRAM(s) Oral daily  furosemide    Tablet 40 milliGRAM(s) Oral daily  ibuprofen  Tablet. 400 milliGRAM(s) Oral every 8 hours PRN  melatonin 3 milliGRAM(s) Oral at bedtime PRN  metoprolol tartrate 12.5 milliGRAM(s) Oral every 12 hours  multivitamin 1 Tablet(s) Oral daily  ondansetron Injectable 4 milliGRAM(s) IV Push once  ondansetron Injectable 4 milliGRAM(s) IV Push every 8 hours PRN  pantoprazole  Injectable 40 milliGRAM(s) IV Push every 12 hours  polyethylene glycol 3350 17 Gram(s) Oral two times a day  sodium chloride 0.9%. 1000 milliLiter(s) IV Continuous <Continuous>  tamsulosin 0.4 milliGRAM(s) Oral at bedtime      Objective:  Vital Signs Last 24 Hrs  T(C): 36.6 (15 May 2023 11:37), Max: 36.7 (14 May 2023 20:24)  T(F): 97.8 (15 May 2023 11:37), Max: 98.1 (15 May 2023 06:04)  HR: 82 (15 May 2023 11:37) (78 - 83)  BP: 109/71 (15 May 2023 11:37) (109/71 - 125/55)  BP(mean): --  RR: 17 (15 May 2023 11:37) (17 - 18)  SpO2: 94% (15 May 2023 11:37) (91% - 95%)    Parameters below as of 15 May 2023 11:37  Patient On (Oxygen Delivery Method): room air        T(C): 36.6 (05-15-23 @ 11:37), Max: 36.9 (05-14-23 @ 05:16)  T(C): 36.6 (05-15-23 @ 11:37), Max: 37 (05-13-23 @ 11:37)  T(C): 36.6 (05-15-23 @ 11:37), Max: 37 (05-13-23 @ 11:37)    PHYSICAL EXAM:  HEENT: NC atraumatic  Neck: supple  Respiratory: no accessory muscle use, breathing comfortably  Cardiovascular: distant  Gastrointestinal: normal appearing, nondistended  Extremities: no clubbing, no cyanosis,        LABS:                          10.9   9.96  )-----------( 316      ( 15 May 2023 06:15 )             32.5       WBC  9.96 05-15 @ 06:15  12.50 05-14 @ 07:05  12.96 05-13 @ 07:06  15.65 05-12 @ 06:02  14.90 05-11 @ 07:10  11.66 05-10 @ 06:40  9.43 05-09 @ 06:50      05-15    137  |  104  |  30<H>  ----------------------------<  175<H>  4.0   |  28  |  0.88    Ca    8.1<L>      15 May 2023 06:15  Phos  3.3     05-15  Mg     2.3     05-15    TPro  5.9<L>  /  Alb  2.6<L>  /  TBili  0.7  /  DBili  x   /  AST  10<L>  /  ALT  13  /  AlkPhos  65  05-15      Creatinine, Serum: 0.88 mg/dL (05-15-23 @ 06:15)  Creatinine, Serum: 0.94 mg/dL (05-14-23 @ 07:05)  Creatinine, Serum: 0.84 mg/dL (05-13-23 @ 07:06)  Creatinine, Serum: 0.81 mg/dL (05-12-23 @ 06:02)  Creatinine, Serum: 0.77 mg/dL (05-11-23 @ 07:10)  Creatinine, Serum: 0.81 mg/dL (05-10-23 @ 06:40)  Creatinine, Serum: 0.88 mg/dL (05-09-23 @ 06:50)                INFLAMMATORY MARKERS      MICROBIOLOGY:              RADIOLOGY & ADDITIONAL STUDIES:

## 2023-05-15 NOTE — PROGRESS NOTE ADULT - PROBLEM SELECTOR PLAN 12
- Pt on Aspirin, no AC due to hx of epistaxis      # Dispo planning  - [facility] once find placement for COVID isolation  - DC today or tomorrow possible

## 2023-05-15 NOTE — PROGRESS NOTE ADULT - ATTENDING COMMENTS
76 y/o M with history of DM, HTN. HLD, COPD, BPH, PVD who was brought in for evaluation of fall. Found to have new onset aflutter with RVR. hospitalization complicated by hematemesis and COVID+ 5/8/2023.    Patient seen and examined at bedside. States he is feeling well, denies any chest pain, SOB, abd pain. Completed course of remdesivir. Hgb stable, tolerating ASA.  HR better controlled after addition of low dose metoprolol BID. As discussed with Neurology, concern for Parkinson's, started on sinemet- tremors improving. Awaiting [facility] placement.
76 y/o M with history of DM, HTN. HLD, COPD, BPH, PVD who was brought in for evaluation of fall. Found to have new onset aflutter with RVR. hospitalization complicated by hematemesis and COVID+ 5/8/2023.    Patient seen and examined at bedside. States he is feeling well, now off supplemental O2. Denies any further nausea/vomiting, is still complaining of constipation, received suppository and had BM after. Continue remdesivir (day 5/5) and dexamethasone for COVID. Hgb stable, tolerating ASA. D/C planning, awaiting placement
78 y/o M with history of DM, HTN. HLD, COPD, BPH, PVD who was brought in for evaluation of fall. Found to have new onset aflutter with RVR. hospitalization complicated by hematemesis and COVID+ 5/8/2023.    Patient seen and examined at bedside. States he is feeling well, states his breathing has improved. Denies any further nausea/vomiting, is complaining of constipation. Continue remdesivir and dexamethasone for COVID. Hgb stable, will restart ASA and monitor Hgb.
76 y/o M with history of DM, HTN. HLD, COPD, BPH, PVD who was brought in for evaluation of fall. Found to have new onset aflutter with RVR. hospitalization complicated by hematemesis and COVID+ 5/8/2023.    Patient seen and examined at bedside. States he is feeling well, states his breathing has improved, weaned off O2. Denies any further nausea/vomiting, is still complaining of constipation, continue miralax. Continue remdesivir and dexamethasone for COVID. Hgb stable, tolerating ASA.
78 y/o M with history of DM, HTN. HLD, COPD, BPH, PVD who was brought in for evaluation of fall. Found to have new onset aflutter with RVR. hospitalization complicated by hematemesis and COVID+ 5/8/2023.    Patient seen and examined at bedside. States he is feeling well, now off supplemental O2. Completed course of remdesivir. Hgb stable, tolerating ASA. D/C planning, awaiting placement. HR better controlled after addition of low dose metoprolol BID. As discussed with Neurology, concern for Parkinson's, started sinemet in AM.
78 y/o M with history of DM, HTN. HLD, COPD, BPH, PVD who was brought in for evaluation of fall. Found to have new onset aflutter with RVR. hospitalization complicated by hematemesis and COVID+ 5/8/2023.    Patient seen and examined at bedside. States he is feeling well, now off supplemental O2. Completed course of remdesivir. Hgb stable, tolerating ASA. D/C planning, awaiting placement. HR 90s-110s, will add low dose metoprolol BID. As discussed with Neurology, concern for Parkinson's, will start sinemet in AM. Plan of care discussed with daughter at bedside.

## 2023-05-15 NOTE — PROGRESS NOTE ADULT - ASSESSMENT
76 y/o M with history of DM, HTN. HLD, COPD, BPH, PVD who was brought in for evaluation of fall. Found to have new onset aflutter with RVR. hospitalization complicated by hematemesis and COVID+ 5/8/2023. Dispo planning for [facility].

## 2023-05-15 NOTE — PROGRESS NOTE ADULT - ASSESSMENT
The patient is a 77 year old male with a history of HTN, HL, DM, PAD, BPH, COPD, alcohol abuse who presents with a fall.    Plan:  - ECG and telemetry with underlying atrial flutter  - Echo technically difficult with grossly normal LV systolic function  - Continue aspirin 81 mg daily  - There was reportedly hematemesis with vomiting. Will discontinue anticoagulation. Given bleeding and prior concerns with alcohol use, gait instability, falls, the patient is a poor candidate for long term anticoagulation and risks outweigh benefits.  - Continue cilostazol 100 mg bid  - Continue atorvastatin 10 mg daily  - Continue diltiazem  mg daily  - HR in the  range, acceptable  - Monitor hemoglobin and transfuse as needed  - COVID positive  - ID follow-up  - Discharge planning

## 2023-05-15 NOTE — PROGRESS NOTE ADULT - SUBJECTIVE AND OBJECTIVE BOX
Chief Complaint: Fall    Interval Events: No events overnight.    Review of Systems:  General: No fevers, chills, weight gain  Skin: No rashes, color changes  Cardiovascular: No chest pain, orthopnea  Respiratory: No shortness of breath, cough  Gastrointestinal: No nausea, abdominal pain  Genitourinary: No incontinence, pain with urination  Musculoskeletal: No pain, swelling, decreased range of motion  Neurological: No headache, weakness  Psychiatric: No depression, anxiety  Endocrine: No weight gain, increased thirst  All other systems are comprehensively negative.    Physical Exam:  Vital Signs Last 24 Hrs  T(C): 36.7 (15 May 2023 06:04), Max: 36.7 (14 May 2023 10:56)  T(F): 98.1 (15 May 2023 06:04), Max: 98.1 (15 May 2023 06:04)  HR: 83 (15 May 2023 06:04) (78 - 88)  BP: 124/84 (15 May 2023 06:04) (124/84 - 126/72)  BP(mean): --  RR: 18 (15 May 2023 06:04) (17 - 18)  SpO2: 95% (15 May 2023 06:04) (94% - 97%)  Parameters below as of 15 May 2023 06:04  Patient On (Oxygen Delivery Method): room air  General: NAD  HEENT: MMM  Neck: No JVD, no carotid bruit  Lungs: CTAB  CV: Irregular, nl S1/S2, no M/R/G  Abdomen: S/NT/ND, +BS  Extremities: No LE edema, no cyanosis  Neuro: AAOx3, non-focal  Skin: No rash    Labs:    05-15    137  |  104  |  30<H>  ----------------------------<  175<H>  4.0   |  28  |  0.88    Ca    8.1<L>      15 May 2023 06:15  Phos  3.3     05-15  Mg     2.3     05-15    TPro  5.9<L>  /  Alb  2.6<L>  /  TBili  0.7  /  DBili  x   /  AST  10<L>  /  ALT  13  /  AlkPhos  65  05-15                        10.9   9.96  )-----------( 316      ( 15 May 2023 06:15 )             32.5       ECG/Telemetry: Atrial flutter

## 2023-05-15 NOTE — PROGRESS NOTE ADULT - SUBJECTIVE AND OBJECTIVE BOX
Neurology Follow up note    ABRIL POMPA77yMale    HPI:  76 y/o M w/ PMHx of DM2, PVD, HTN, HLD, COPD, BPH presents from home s/p fall. Daughter at bedside supplements history. Patient has some forgetfulness but no documented history of dementia. Patient was found down by aide, has a life alert that he did not press. Unknown if LOC or head trauma. Estimated that patient was down from 6pm to 6am. Patient reports he spent the night on the floor, last thing he remembers is "feeling crummy on my couch" and then being woken up by his aide this morning. He also has a laceration of RLE from the fall which was repaired in the ED. Of note, patient does not have hx of AF but was found to be in AF w/ RVR at time of admission. Pt reports he drinks 6-7 beers ([employer]) each day. He has a history of falls and goes to PT weekly for gait/stability training. He has an aide who comes to the home 2x a week for 5 hours to supervise showers and prevent falls, help with medication management, and drives him to doctors appointments and physical therapy. At home pt uses a rollator and cane.               Interval History -no worsening  of  tremor    Patient is seen, chart was reviewed and case was discussed with the treatment team.  Pt is not in any distress.   Lying on bed comfortably.                           REVIEW OF SYSTEMS:    Constitutional: No , weight loss or fatigue  Eyes: No eye pain, visual disturbances, or discharge  ENT:  No difficulty hearing, tinnitus, vertigo  Neck: No pain or stiffness  Respiratory: No wheezing, chills or hemoptysis  Cardiovascular: No chest pain, palpitations, shortness of breath, dizziness or leg swelling  Gastrointestinal: No abdominal or epigastric pain. No nausea, vomiting or hematemesis;  Genitourinary: No frequency, hematuria or incontinence  Neurological: No headaches, , loss of strength, numbness or tremors  Psychiatric: No depression, anxiety, mood swings or difficulty sleeping  Musculoskeletal: No joint pain or swelling; No muscle, back or extremity pain  Skin: No itching, burning, rashes or lesions   Lymph Nodes: No enlarged glands  Endocrine: No heat or cold intolerance; No hair loss,   Allergy and Immunologic: No hives or eczema    On Neurological Examination:    Mental Status - Pt is alert, awake, oriented X3. H. Follows commands well and able to answer questions appropriately.Mood and affect  normal    Speech -  Normal.     Cranial Nerves - Pupils 3 mm equal and reactive to light, extraocular eye movements intact. Pt has no visual field deficit.  Pt has no  facial asymmetry. Facial sensation is intact.Tongue - is in midline.    Muscle tone - is normal     Motor Exam - 5/5 of UE  LE ;3-4/5 No drift.     Sensory Exam -  Pt withdraws all extremities equally on stimulation. No asymmetry seen. No complaints of tingling, numbness.    coordination:    Finger to nose: normal    Heel to shin: normal    Deep tendon Reflexes - 2 plus all over.     .    Neck Supple -  Yes.    MEDICATIONS  (STANDING):  aspirin  chewable 81 milliGRAM(s) Oral daily  atorvastatin 10 milliGRAM(s) Oral at bedtime  cilostazol 100 milliGRAM(s) Oral two times a day  dexAMETHasone     Tablet 6 milliGRAM(s) Oral daily  diltiazem    milliGRAM(s) Oral daily  DULoxetine 60 milliGRAM(s) Oral daily  fluticasone furoate/vilanterol 200-25 MICROgram(s) Inhaler 1 Puff(s) Inhalation daily  folic acid 1 milliGRAM(s) Oral daily  furosemide    Tablet 40 milliGRAM(s) Oral daily  multivitamin 1 Tablet(s) Oral daily  ondansetron Injectable 4 milliGRAM(s) IV Push once  pantoprazole  Injectable 40 milliGRAM(s) IV Push every 12 hours  polyethylene glycol 3350 17 Gram(s) Oral two times a day  sodium chloride 0.9%. 1000 milliLiter(s) (100 mL/Hr) IV Continuous <Continuous>  tamsulosin 0.4 milliGRAM(s) Oral at bedtime    MEDICATIONS  (PRN):  acetaminophen     Tablet .. 650 milliGRAM(s) Oral every 6 hours PRN Temp greater or equal to 38C (100.4F), Mild Pain (1 - 3)  albuterol/ipratropium for Nebulization 3 milliLiter(s) Nebulizer every 6 hours PRN Shortness of Breath  aluminum hydroxide/magnesium hydroxide/simethicone Suspension 30 milliLiter(s) Oral every 4 hours PRN Dyspepsia  diltiazem Injectable 10 milliGRAM(s) IV Push every 4 hours PRN HR>130  ibuprofen  Tablet. 400 milliGRAM(s) Oral every 8 hours PRN Temp greater or equal to 38C (100.4F)  LORazepam   Injectable 1 milliGRAM(s) IV Push every 1 hour PRN CIWA-Ar score 8 or greater  melatonin 3 milliGRAM(s) Oral at bedtime PRN Insomnia  ondansetron Injectable 4 milliGRAM(s) IV Push every 8 hours PRN Nausea and/or Vomiting      No Known Allergies    Intolerances                  05-15    137  |  104  |  30<H>  ----------------------------<  175<H>  4.0   |  28  |  0.88    Ca    8.1<L>      15 May 2023 06:15  Phos  3.3     05-15  Mg     2.3     05-15    TPro  5.9<L>  /  Alb  2.6<L>  /  TBili  0.7  /  DBili  x   /  AST  10<L>  /  ALT  13  /  AlkPhos  65  05-15      Vitamin B12     RADIOLOGY    ASSESSMENT AND PLAN:     SEEN FOR UNSTEADY GAIT   PN  TREMOR ?PD  sars-covid 2    sinemet  mg bid  BRAIN MR NO ACUTE CVA  Physical therapy evaluation.  OOB to chair/ambulation with assistance only.  Pain is accessed and addressed.  Would continue to follow.

## 2023-05-15 NOTE — PROGRESS NOTE ADULT - SUBJECTIVE AND OBJECTIVE BOX
[facility]  Sammy Munguia PA-C  [address] [place] [address]  [phone]      INTERVAL HPI/OVERNIGHT EVENTS:  Pt s/e with daughter at bedside  Reports no further nausea/vomiting  No new GI complaints    MEDICATIONS  (STANDING):  aspirin  chewable 81 milliGRAM(s) Oral daily  atorvastatin 10 milliGRAM(s) Oral at bedtime  carbidopa/levodopa  25/100 1 Tablet(s) Oral three times a day  cilostazol 100 milliGRAM(s) Oral two times a day  diltiazem    milliGRAM(s) Oral daily  DULoxetine 60 milliGRAM(s) Oral daily  fluticasone furoate/vilanterol 200-25 MICROgram(s) Inhaler 1 Puff(s) Inhalation daily  folic acid 1 milliGRAM(s) Oral daily  furosemide    Tablet 40 milliGRAM(s) Oral daily  metoprolol tartrate 12.5 milliGRAM(s) Oral every 12 hours  multivitamin 1 Tablet(s) Oral daily  ondansetron Injectable 4 milliGRAM(s) IV Push once  pantoprazole  Injectable 40 milliGRAM(s) IV Push every 12 hours  polyethylene glycol 3350 17 Gram(s) Oral two times a day  sodium chloride 0.9%. 1000 milliLiter(s) (100 mL/Hr) IV Continuous <Continuous>  tamsulosin 0.4 milliGRAM(s) Oral at bedtime    MEDICATIONS  (PRN):  acetaminophen     Tablet .. 650 milliGRAM(s) Oral every 6 hours PRN Temp greater or equal to 38C (100.4F), Mild Pain (1 - 3)  albuterol/ipratropium for Nebulization 3 milliLiter(s) Nebulizer every 6 hours PRN Shortness of Breath  aluminum hydroxide/magnesium hydroxide/simethicone Suspension 30 milliLiter(s) Oral every 4 hours PRN Dyspepsia  diltiazem Injectable 10 milliGRAM(s) IV Push every 4 hours PRN HR>130  ibuprofen  Tablet. 400 milliGRAM(s) Oral every 8 hours PRN Temp greater or equal to 38C (100.4F)  melatonin 3 milliGRAM(s) Oral at bedtime PRN Insomnia  ondansetron Injectable 4 milliGRAM(s) IV Push every 8 hours PRN Nausea and/or Vomiting      Allergies    No Known Allergies      PHYSICAL EXAM:   Vital Signs:  Vital Signs Last 24 Hrs  T(C): 36.6 (15 May 2023 11:37), Max: 36.7 (14 May 2023 20:24)  T(F): 97.8 (15 May 2023 11:37), Max: 98.1 (15 May 2023 06:04)  HR: 82 (15 May 2023 11:37) (78 - 83)  BP: 109/71 (15 May 2023 11:37) (109/71 - 125/55)  BP(mean): --  RR: 17 (15 May 2023 11:37) (17 - 18)  SpO2: 94% (15 May 2023 11:37) (91% - 95%)    Parameters below as of 15 May 2023 11:37  Patient On (Oxygen Delivery Method): room air      Daily     Daily Weight in k (15 May 2023 06:04)    GENERAL:  Appears stated age  HEENT:  NC/AT  CHEST:  Full & symmetric excursion  HEART:  Regular rhythm  ABDOMEN:  Soft, non-tender, non-distended  EXTEREMITIES:  no cyanosis  SKIN:  No rash  NEURO:  Alert      LABS:                        10.9   9.96  )-----------( 316      ( 15 May 2023 06:15 )             32.5     05-15    137  |  104  |  30<H>  ----------------------------<  175<H>  4.0   |  28  |  0.88    Ca    8.1<L>      15 May 2023 06:15  Phos  3.3     05-15  Mg     2.3     05-15    TPro  5.9<L>  /  Alb  2.6<L>  /  TBili  0.7  /  DBili  x   /  AST  10<L>  /  ALT  13  /  AlkPhos  65  05-15

## 2023-05-16 ENCOUNTER — TRANSCRIPTION ENCOUNTER (OUTPATIENT)
Age: 78
End: 2023-05-16

## 2023-05-16 VITALS
DIASTOLIC BLOOD PRESSURE: 77 MMHG | HEART RATE: 72 BPM | RESPIRATION RATE: 18 BRPM | SYSTOLIC BLOOD PRESSURE: 130 MMHG | OXYGEN SATURATION: 93 % | TEMPERATURE: 98 F

## 2023-05-16 LAB
ALBUMIN SERPL ELPH-MCNC: 2.8 G/DL — LOW (ref 3.3–5)
ALP SERPL-CCNC: 71 U/L — SIGNIFICANT CHANGE UP (ref 40–120)
ALT FLD-CCNC: 11 U/L — LOW (ref 12–78)
ANION GAP SERPL CALC-SCNC: 7 MMOL/L — SIGNIFICANT CHANGE UP (ref 5–17)
AST SERPL-CCNC: 8 U/L — LOW (ref 15–37)
BASOPHILS # BLD AUTO: 0.03 K/UL — SIGNIFICANT CHANGE UP (ref 0–0.2)
BASOPHILS NFR BLD AUTO: 0.3 % — SIGNIFICANT CHANGE UP (ref 0–2)
BILIRUB SERPL-MCNC: 0.8 MG/DL — SIGNIFICANT CHANGE UP (ref 0.2–1.2)
BUN SERPL-MCNC: 34 MG/DL — HIGH (ref 7–23)
CALCIUM SERPL-MCNC: 8.4 MG/DL — LOW (ref 8.5–10.1)
CHLORIDE SERPL-SCNC: 104 MMOL/L — SIGNIFICANT CHANGE UP (ref 96–108)
CO2 SERPL-SCNC: 29 MMOL/L — SIGNIFICANT CHANGE UP (ref 22–31)
CREAT SERPL-MCNC: 0.99 MG/DL — SIGNIFICANT CHANGE UP (ref 0.5–1.3)
EGFR: 78 ML/MIN/1.73M2 — SIGNIFICANT CHANGE UP
EOSINOPHIL # BLD AUTO: 0.03 K/UL — SIGNIFICANT CHANGE UP (ref 0–0.5)
EOSINOPHIL NFR BLD AUTO: 0.3 % — SIGNIFICANT CHANGE UP (ref 0–6)
GLUCOSE SERPL-MCNC: 140 MG/DL — HIGH (ref 70–99)
HCT VFR BLD CALC: 34 % — LOW (ref 39–50)
HGB BLD-MCNC: 11.4 G/DL — LOW (ref 13–17)
IMM GRANULOCYTES NFR BLD AUTO: 4.5 % — HIGH (ref 0–0.9)
LYMPHOCYTES # BLD AUTO: 1.33 K/UL — SIGNIFICANT CHANGE UP (ref 1–3.3)
LYMPHOCYTES # BLD AUTO: 11.7 % — LOW (ref 13–44)
MAGNESIUM SERPL-MCNC: 2.3 MG/DL — SIGNIFICANT CHANGE UP (ref 1.6–2.6)
MCHC RBC-ENTMCNC: 29.9 PG — SIGNIFICANT CHANGE UP (ref 27–34)
MCHC RBC-ENTMCNC: 33.5 GM/DL — SIGNIFICANT CHANGE UP (ref 32–36)
MCV RBC AUTO: 89.2 FL — SIGNIFICANT CHANGE UP (ref 80–100)
MONOCYTES # BLD AUTO: 0.93 K/UL — HIGH (ref 0–0.9)
MONOCYTES NFR BLD AUTO: 8.2 % — SIGNIFICANT CHANGE UP (ref 2–14)
NEUTROPHILS # BLD AUTO: 8.54 K/UL — HIGH (ref 1.8–7.4)
NEUTROPHILS NFR BLD AUTO: 75 % — SIGNIFICANT CHANGE UP (ref 43–77)
NRBC # BLD: 0 /100 WBCS — SIGNIFICANT CHANGE UP (ref 0–0)
PHOSPHATE SERPL-MCNC: 3.6 MG/DL — SIGNIFICANT CHANGE UP (ref 2.5–4.5)
PLATELET # BLD AUTO: 333 K/UL — SIGNIFICANT CHANGE UP (ref 150–400)
POTASSIUM SERPL-MCNC: 3.5 MMOL/L — SIGNIFICANT CHANGE UP (ref 3.5–5.3)
POTASSIUM SERPL-SCNC: 3.5 MMOL/L — SIGNIFICANT CHANGE UP (ref 3.5–5.3)
PROT SERPL-MCNC: 5.8 G/DL — LOW (ref 6–8.3)
RBC # BLD: 3.81 M/UL — LOW (ref 4.2–5.8)
RBC # FLD: 12.9 % — SIGNIFICANT CHANGE UP (ref 10.3–14.5)
SODIUM SERPL-SCNC: 140 MMOL/L — SIGNIFICANT CHANGE UP (ref 135–145)
WBC # BLD: 11.37 K/UL — HIGH (ref 3.8–10.5)
WBC # FLD AUTO: 11.37 K/UL — HIGH (ref 3.8–10.5)

## 2023-05-16 PROCEDURE — 97530 THERAPEUTIC ACTIVITIES: CPT

## 2023-05-16 PROCEDURE — 72125 CT NECK SPINE W/O DYE: CPT | Mod: MA

## 2023-05-16 PROCEDURE — 84145 PROCALCITONIN (PCT): CPT

## 2023-05-16 PROCEDURE — 84443 ASSAY THYROID STIM HORMONE: CPT

## 2023-05-16 PROCEDURE — 0225U NFCT DS DNA&RNA 21 SARSCOV2: CPT

## 2023-05-16 PROCEDURE — 93880 EXTRACRANIAL BILAT STUDY: CPT

## 2023-05-16 PROCEDURE — 86901 BLOOD TYPING SEROLOGIC RH(D): CPT

## 2023-05-16 PROCEDURE — 71045 X-RAY EXAM CHEST 1 VIEW: CPT

## 2023-05-16 PROCEDURE — 96376 TX/PRO/DX INJ SAME DRUG ADON: CPT

## 2023-05-16 PROCEDURE — 94640 AIRWAY INHALATION TREATMENT: CPT

## 2023-05-16 PROCEDURE — 74177 CT ABD & PELVIS W/CONTRAST: CPT | Mod: MA

## 2023-05-16 PROCEDURE — 93306 TTE W/DOPPLER COMPLETE: CPT

## 2023-05-16 PROCEDURE — 86900 BLOOD TYPING SEROLOGIC ABO: CPT

## 2023-05-16 PROCEDURE — 97116 GAIT TRAINING THERAPY: CPT

## 2023-05-16 PROCEDURE — 87086 URINE CULTURE/COLONY COUNT: CPT

## 2023-05-16 PROCEDURE — 70450 CT HEAD/BRAIN W/O DYE: CPT | Mod: MA

## 2023-05-16 PROCEDURE — 82746 ASSAY OF FOLIC ACID SERUM: CPT

## 2023-05-16 PROCEDURE — 87635 SARS-COV-2 COVID-19 AMP PRB: CPT

## 2023-05-16 PROCEDURE — 87040 BLOOD CULTURE FOR BACTERIA: CPT

## 2023-05-16 PROCEDURE — 80076 HEPATIC FUNCTION PANEL: CPT

## 2023-05-16 PROCEDURE — 80053 COMPREHEN METABOLIC PANEL: CPT

## 2023-05-16 PROCEDURE — 85730 THROMBOPLASTIN TIME PARTIAL: CPT

## 2023-05-16 PROCEDURE — 36415 COLL VENOUS BLD VENIPUNCTURE: CPT

## 2023-05-16 PROCEDURE — 80061 LIPID PANEL: CPT

## 2023-05-16 PROCEDURE — 84425 ASSAY OF VITAMIN B-1: CPT

## 2023-05-16 PROCEDURE — 85025 COMPLETE CBC W/AUTO DIFF WBC: CPT

## 2023-05-16 PROCEDURE — 96375 TX/PRO/DX INJ NEW DRUG ADDON: CPT

## 2023-05-16 PROCEDURE — 97110 THERAPEUTIC EXERCISES: CPT

## 2023-05-16 PROCEDURE — 82550 ASSAY OF CK (CPK): CPT

## 2023-05-16 PROCEDURE — 97162 PT EVAL MOD COMPLEX 30 MIN: CPT

## 2023-05-16 PROCEDURE — 81001 URINALYSIS AUTO W/SCOPE: CPT

## 2023-05-16 PROCEDURE — 84100 ASSAY OF PHOSPHORUS: CPT

## 2023-05-16 PROCEDURE — 96374 THER/PROPH/DIAG INJ IV PUSH: CPT

## 2023-05-16 PROCEDURE — 94760 N-INVAS EAR/PLS OXIMETRY 1: CPT

## 2023-05-16 PROCEDURE — 80048 BASIC METABOLIC PNL TOTAL CA: CPT

## 2023-05-16 PROCEDURE — 85610 PROTHROMBIN TIME: CPT

## 2023-05-16 PROCEDURE — 86803 HEPATITIS C AB TEST: CPT

## 2023-05-16 PROCEDURE — 99285 EMERGENCY DEPT VISIT HI MDM: CPT | Mod: 25

## 2023-05-16 PROCEDURE — 70551 MRI BRAIN STEM W/O DYE: CPT

## 2023-05-16 PROCEDURE — 84484 ASSAY OF TROPONIN QUANT: CPT

## 2023-05-16 PROCEDURE — 83605 ASSAY OF LACTIC ACID: CPT

## 2023-05-16 PROCEDURE — 83735 ASSAY OF MAGNESIUM: CPT

## 2023-05-16 PROCEDURE — 93005 ELECTROCARDIOGRAM TRACING: CPT

## 2023-05-16 PROCEDURE — 82728 ASSAY OF FERRITIN: CPT

## 2023-05-16 PROCEDURE — 82607 VITAMIN B-12: CPT

## 2023-05-16 PROCEDURE — 83036 HEMOGLOBIN GLYCOSYLATED A1C: CPT

## 2023-05-16 PROCEDURE — 80307 DRUG TEST PRSMV CHEM ANLYZR: CPT

## 2023-05-16 PROCEDURE — 85027 COMPLETE CBC AUTOMATED: CPT

## 2023-05-16 PROCEDURE — 86850 RBC ANTIBODY SCREEN: CPT

## 2023-05-16 PROCEDURE — 99239 HOSP IP/OBS DSCHRG MGMT >30: CPT

## 2023-05-16 RX ORDER — PANTOPRAZOLE SODIUM 20 MG/1
40 TABLET, DELAYED RELEASE ORAL
Qty: 0 | Refills: 0 | DISCHARGE
Start: 2023-05-16

## 2023-05-16 RX ORDER — POTASSIUM CHLORIDE 20 MEQ
40 PACKET (EA) ORAL ONCE
Refills: 0 | Status: COMPLETED | OUTPATIENT
Start: 2023-05-16 | End: 2023-05-16

## 2023-05-16 RX ADMIN — Medication 40 MILLIGRAM(S): at 06:03

## 2023-05-16 RX ADMIN — FLUTICASONE FUROATE AND VILANTEROL TRIFENATATE 1 PUFF(S): 100; 25 POWDER RESPIRATORY (INHALATION) at 06:04

## 2023-05-16 RX ADMIN — Medication 81 MILLIGRAM(S): at 12:14

## 2023-05-16 RX ADMIN — DULOXETINE HYDROCHLORIDE 60 MILLIGRAM(S): 30 CAPSULE, DELAYED RELEASE ORAL at 12:14

## 2023-05-16 RX ADMIN — POLYETHYLENE GLYCOL 3350 17 GRAM(S): 17 POWDER, FOR SOLUTION ORAL at 06:03

## 2023-05-16 RX ADMIN — Medication 40 MILLIEQUIVALENT(S): at 12:15

## 2023-05-16 RX ADMIN — CARBIDOPA AND LEVODOPA 1 TABLET(S): 25; 100 TABLET ORAL at 06:03

## 2023-05-16 RX ADMIN — Medication 1 MILLIGRAM(S): at 12:14

## 2023-05-16 RX ADMIN — Medication 12.5 MILLIGRAM(S): at 06:03

## 2023-05-16 RX ADMIN — CILOSTAZOL 100 MILLIGRAM(S): 100 TABLET ORAL at 06:04

## 2023-05-16 RX ADMIN — Medication 1 TABLET(S): at 12:15

## 2023-05-16 RX ADMIN — Medication 240 MILLIGRAM(S): at 06:03

## 2023-05-16 RX ADMIN — PANTOPRAZOLE SODIUM 40 MILLIGRAM(S): 20 TABLET, DELAYED RELEASE ORAL at 06:03

## 2023-05-16 NOTE — PROGRESS NOTE ADULT - SUBJECTIVE AND OBJECTIVE BOX
[facility]  Sammy Munguia PA-C  [address] [place] [address]  [phone]      INTERVAL HPI/OVERNIGHT EVENTS:  Pt s/e  No further nausea/vomiting    MEDICATIONS  (STANDING):  aspirin  chewable 81 milliGRAM(s) Oral daily  atorvastatin 10 milliGRAM(s) Oral at bedtime  carbidopa/levodopa  25/100 1 Tablet(s) Oral three times a day  cilostazol 100 milliGRAM(s) Oral two times a day  diltiazem    milliGRAM(s) Oral daily  DULoxetine 60 milliGRAM(s) Oral daily  fluticasone furoate/vilanterol 200-25 MICROgram(s) Inhaler 1 Puff(s) Inhalation daily  folic acid 1 milliGRAM(s) Oral daily  furosemide    Tablet 40 milliGRAM(s) Oral daily  metoprolol tartrate 12.5 milliGRAM(s) Oral every 12 hours  multivitamin 1 Tablet(s) Oral daily  ondansetron Injectable 4 milliGRAM(s) IV Push once  pantoprazole  Injectable 40 milliGRAM(s) IV Push every 12 hours  polyethylene glycol 3350 17 Gram(s) Oral two times a day  sodium chloride 0.9%. 1000 milliLiter(s) (100 mL/Hr) IV Continuous <Continuous>  tamsulosin 0.4 milliGRAM(s) Oral at bedtime    MEDICATIONS  (PRN):  acetaminophen     Tablet .. 650 milliGRAM(s) Oral every 6 hours PRN Temp greater or equal to 38C (100.4F), Mild Pain (1 - 3)  albuterol/ipratropium for Nebulization 3 milliLiter(s) Nebulizer every 6 hours PRN Shortness of Breath  aluminum hydroxide/magnesium hydroxide/simethicone Suspension 30 milliLiter(s) Oral every 4 hours PRN Dyspepsia  diltiazem Injectable 10 milliGRAM(s) IV Push every 4 hours PRN HR>130  ibuprofen  Tablet. 400 milliGRAM(s) Oral every 8 hours PRN Temp greater or equal to 38C (100.4F)  melatonin 3 milliGRAM(s) Oral at bedtime PRN Insomnia  ondansetron Injectable 4 milliGRAM(s) IV Push every 8 hours PRN Nausea and/or Vomiting      Allergies    No Known Allergies    PHYSICAL EXAM:   Vital Signs:  Vital Signs Last 24 Hrs  T(C): 36.6 (16 May 2023 06:05), Max: 36.7 (15 May 2023 20:46)  T(F): 97.9 (16 May 2023 06:05), Max: 98 (15 May 2023 20:46)  HR: 103 (16 May 2023 06:05) (62 - 103)  BP: 121/82 (16 May 2023 06:05) (105/65 - 121/82)  BP(mean): --  RR: 17 (15 May 2023 20:46) (17 - 17)  SpO2: 93% (16 May 2023 06:05) (90% - 95%)    Parameters below as of 16 May 2023 06:05  Patient On (Oxygen Delivery Method): room air      Daily     Daily Weight in k.7 (16 May 2023 06:05)    GENERAL:  Appears stated age  HEENT:  NC/AT  CHEST:  Full & symmetric excursion  HEART:  Regular rhythm  ABDOMEN:  Soft, non-tender, non-distended  EXTEREMITIES:  no cyanosis  SKIN:  No rash  NEURO:  Alert      LABS:                        11.4   11.37 )-----------( 333      ( 16 May 2023 06:35 )             34.0     16    140  |  104  |  34<H>  ----------------------------<  140<H>  3.5   |  29  |  0.99    Ca    8.4<L>      16 May 2023 06:35  Phos  3.6     05-16  Mg     2.3         TPro  5.8<L>  /  Alb  2.8<L>  /  TBili  0.8  /  DBili  x   /  AST  8<L>  /  ALT  11<L>  /  AlkPhos  71  16

## 2023-05-16 NOTE — PROGRESS NOTE ADULT - ASSESSMENT
afib  gi bleed    hgb noted  diet as tolerated  no signs of portal htn  suspect esophagitis  proton pump inhibitor bid  reg diet as tolerated  anti-emetics prn  clinically improved no GI objection to begin d/c plan    I reviewed the overnight course of events on the unit, re-confirming the patient history. I discussed the care with the patient and their family  Differential diagnosis and plan of care discussed with patient after the evaluation  40 minutes spent on total encounter of which more than fifty percent of the encounter was spent counseling and/or coordinating care by the attending physician.  Advanced care planning was discussed with patient and family.  Advanced care planning forms were reviewed and discussed.  Risks, benefits and alternatives of gastroenterologic procedures were discussed in detail and all questions were answered.

## 2023-05-16 NOTE — PROGRESS NOTE ADULT - SUBJECTIVE AND OBJECTIVE BOX
Chief Complaint: Fall    Interval Events: No events overnight.    Review of Systems:  General: No fevers, chills, weight gain  Skin: No rashes, color changes  Cardiovascular: No chest pain, orthopnea  Respiratory: No shortness of breath, cough  Gastrointestinal: No nausea, abdominal pain  Genitourinary: No incontinence, pain with urination  Musculoskeletal: No pain, swelling, decreased range of motion  Neurological: No headache, weakness  Psychiatric: No depression, anxiety  Endocrine: No weight gain, increased thirst  All other systems are comprehensively negative.    Physical Exam:  Vital Signs Last 24 Hrs  T(C): 36.6 (16 May 2023 06:05), Max: 36.7 (15 May 2023 20:46)  T(F): 97.9 (16 May 2023 06:05), Max: 98 (15 May 2023 20:46)  HR: 103 (16 May 2023 06:05) (62 - 103)  BP: 121/82 (16 May 2023 06:05) (105/65 - 121/82)  BP(mean): --  RR: 17 (15 May 2023 20:46) (17 - 17)  SpO2: 93% (16 May 2023 06:05) (90% - 95%)  Parameters below as of 16 May 2023 06:05  Patient On (Oxygen Delivery Method): room air  General: NAD  HEENT: MMM  Neck: No JVD, no carotid bruit  Lungs: CTAB  CV: Irregular, nl S1/S2, no M/R/G  Abdomen: S/NT/ND, +BS  Extremities: No LE edema, no cyanosis  Neuro: AAOx3, non-focal  Skin: No rash    Labs:    05-15    137  |  104  |  30<H>  ----------------------------<  175<H>  4.0   |  28  |  0.88    Ca    8.1<L>      15 May 2023 06:15  Phos  3.3     05-15  Mg     2.3     05-15    TPro  5.9<L>  /  Alb  2.6<L>  /  TBili  0.7  /  DBili  x   /  AST  10<L>  /  ALT  13  /  AlkPhos  65  05-15                        11.4   11.37 )-----------( 333      ( 16 May 2023 06:35 )             34.0       ECG/Telemetry: Atrial flutter

## 2023-05-16 NOTE — PROGRESS NOTE ADULT - PROBLEM SELECTOR PLAN 10
- CIWA   - ativan 1mg prn. did not appear to require any additional dosing. appears stable.  - Last CIWA 0 over past 24 hours.

## 2023-05-16 NOTE — CHART NOTE - NSCHARTNOTEFT_GEN_A_CORE
Patient was admitted for Afib with RVR, hospital course complicated by COVID, now s/p remdesivir and dexamethasone, on room air, saturating well. As per H+P, patient drinks few beers daily, was started on CIWA protocol, last CIWA 0 past 24 hours, out of withdrawal window.

## 2023-05-16 NOTE — PROGRESS NOTE ADULT - PROBLEM SELECTOR PLAN 8
- Chronic  - HELD home losartan and lasix due to hypotension  - Monitor hemodynamics  - 5/12: restart home dose Lasix 40 mg PO daily as BP improved, continue to hold Losartan  - Continue metoprolol and cardizem       # HLD  - continue home atorvastatin
- CIWA   - ativan 1mg prn. did not appear to require any additional dosing. appears stable.
- Chronic  - HELD home losartan and lasix due to hypotension  - Monitor hemodynamics  - 5/12: restart home dose Lasix 40 mg PO daily as BP improved, continue to hold Losartan  - Continue metoprolol and cardizem       # HLD  - continue home atorvastatin
- CIWA   - ativan 1mg prn. did not appear to require any additional dosing. appears stable.
- Chronic  - HELD home losartan and lasix due to hypotension  - Monitor hemodynamics  - Continue Lasix 40 mg PO daily (home dose)  - continue to hold home Losartan  - Continue metoprolol and cardizem       # HLD  - continue home atorvastatin
- Chronic problem  - held home losartan and lasix due to hypotension  - Pt BP soft but stable  - monitor hemodynamics
- Chronic problem  - held home losartan and lasix due to hypotension  - Pt BP soft but stable  - monitor hemodynamics

## 2023-05-16 NOTE — PROGRESS NOTE ADULT - ASSESSMENT
76 y/o M w/ PMHx of DM2, PVD, HTN, HLD, COPD, BPH presents from home s/p fall. Daughter reports that the next door neighbor has COVID and has been visiting her dad all the time despite this. Patient has some forgetfulness but no documented history of dementia and children report very clear at baseline. Patient was found down by aide, has a life alert that he did not press. Patient does not have hx of AF but was found to be in AF w/ RVR at time of admission. Pt reports he drinks 6-7 beers ([employer]) each day. Report of cough, fever and now positive COVID test. Pt vaccinated and boosted.    RECOMMENDATIONS  1-COVID acute COVID and within first 10 days but hypoxic when consulted so recommend    -Remdesivir started 5/8 so x 5 days with last day 5/12  -Dexamethasone 6mg daily started 5/8 and with improvement 5/12 as last day   VTE prophylaxis -primary to assess risks  pulmonary support  5/9-stable on NC-4L pt reporting improvement   sats >90% on RA    5/15-cognitively clear, pleasant, reported back to recent baseline, at this point follow for any sequelae but stable for dc to discharge to facility for Parkinson's    From an ID standpoint no further requirement for inpatient status for the management of ID issues. Fine with discharge from ID standpoint when other medical issues no longer require inpatient care and social issues allow for a safe discharge plan. To schedule an outpatient ID follow up appointment please call our office at [phone]    Thank you for consulting us and involving us in the management of this most interesting and challenging case.  We will follow along in the care of this patient. Please call us at [phone] or text me directly on my cell# at [phone] with any concerns.

## 2023-05-16 NOTE — PROGRESS NOTE ADULT - SUBJECTIVE AND OBJECTIVE BOX
Neurology Follow up note    ABRIL [id number]    HPI:  76 y/o M w/ PMHx of DM2, PVD, HTN, HLD, COPD, BPH presents from home s/p fall. Daughter at bedside supplements history. Patient has some forgetfulness but no documented history of dementia. Patient was found down by aide, has a life alert that he did not press. Unknown if LOC or head trauma. Estimated that patient was down from 6pm to 6am. Patient reports he spent the night on the floor, last thing he remembers is "feeling crummy on my couch" and then being woken up by his aide this morning. He also has a laceration of RLE from the fall which was repaired in the ED. Of note, patient does not have hx of AF but was found to be in AF w/ RVR at time of admission. Pt reports he drinks 6-7 beers ([employer]) each day. He has a history of falls and goes to PT weekly for gait/stability training. He has an aide who comes to the home 2x a week for 5 hours to supervise showers and prevent falls, help with medication management, and drives him to doctors appointments and physical therapy. At home pt uses a rollator and cane.               Interval History -improving   tremor    Patient is seen, chart was reviewed and case was discussed with the treatment team.  Pt is not in any distress.   Lying on bed comfortably.                           REVIEW OF SYSTEMS:    Constitutional: No , weight loss or fatigue  Eyes: No eye pain, visual disturbances, or discharge  ENT:  No difficulty hearing, tinnitus, vertigo  Neck: No pain or stiffness  Respiratory: No wheezing, chills or hemoptysis  Cardiovascular: No chest pain, palpitations, shortness of breath, dizziness or leg swelling  Gastrointestinal: No abdominal or epigastric pain. No nausea, vomiting or hematemesis;  Genitourinary: No frequency, hematuria or incontinence  Neurological: No headaches, , loss of strength, numbness or tremors  Psychiatric: No depression, anxiety, mood swings or difficulty sleeping  Musculoskeletal: No joint pain or swelling; No muscle, back or extremity pain  Skin: No itching, burning, rashes or lesions   Lymph Nodes: No enlarged glands  Endocrine: No heat or cold intolerance; No hair loss,   Allergy and Immunologic: No hives or eczema    On Neurological Examination:    Mental Status - Pt is alert, awake, oriented X3. . Follows commands well and able to answer questions appropriately.Mood and affect  normal    Speech -  Normal.     Cranial Nerves - Pupils 3 mm equal and reactive to light, extraocular eye movements intact. Pt has no visual field deficit.  Pt has no  facial asymmetry. Facial sensation is intact.Tongue - is in midline.    Muscle tone - is normal     Motor Exam - 5/5 of UE  LE ;3-4/5 No drift.     Sensory Exam -  Pt withdraws all extremities equally on stimulation. No asymmetry seen. No complaints of tingling, numbness.    coordination:    Finger to nose: normal    Heel to shin: normal    Deep tendon Reflexes - 2 plus all over.     .    Neck Supple -  Yes.      MEDICATIONS  (STANDING):  aspirin  chewable 81 milliGRAM(s) Oral daily  atorvastatin 10 milliGRAM(s) Oral at bedtime  carbidopa/levodopa  25/100 1 Tablet(s) Oral three times a day  cilostazol 100 milliGRAM(s) Oral two times a day  diltiazem    milliGRAM(s) Oral daily  DULoxetine 60 milliGRAM(s) Oral daily  fluticasone furoate/vilanterol 200-25 MICROgram(s) Inhaler 1 Puff(s) Inhalation daily  folic acid 1 milliGRAM(s) Oral daily  furosemide    Tablet 40 milliGRAM(s) Oral daily  metoprolol tartrate 12.5 milliGRAM(s) Oral every 12 hours  multivitamin 1 Tablet(s) Oral daily  ondansetron Injectable 4 milliGRAM(s) IV Push once  pantoprazole  Injectable 40 milliGRAM(s) IV Push every 12 hours  polyethylene glycol 3350 17 Gram(s) Oral two times a day  sodium chloride 0.9%. 1000 milliLiter(s) (100 mL/Hr) IV Continuous <Continuous>  tamsulosin 0.4 milliGRAM(s) Oral at bedtime    MEDICATIONS  (PRN):  acetaminophen     Tablet .. 650 milliGRAM(s) Oral every 6 hours PRN Temp greater or equal to 38C (100.4F), Mild Pain (1 - 3)  albuterol/ipratropium for Nebulization 3 milliLiter(s) Nebulizer every 6 hours PRN Shortness of Breath  aluminum hydroxide/magnesium hydroxide/simethicone Suspension 30 milliLiter(s) Oral every 4 hours PRN Dyspepsia  diltiazem Injectable 10 milliGRAM(s) IV Push every 4 hours PRN HR>130  ibuprofen  Tablet. 400 milliGRAM(s) Oral every 8 hours PRN Temp greater or equal to 38C (100.4F)  melatonin 3 milliGRAM(s) Oral at bedtime PRN Insomnia  ondansetron Injectable 4 milliGRAM(s) IV Push every 8 hours PRN Nausea and/or Vomiting            No Known Allergies    Intolerances                  0                      11.4   11.37 )-----------( 333      ( 16 May 2023 06:35 )             34.0     Vitamin B12     RADIOLOGY    ASSESSMENT AND PLAN:     SEEN FOR UNSTEADY GAIT   PN  TREMOR ?PD  sars-covid 2    sinemet  mg tid  BRAIN MR NO ACUTE CVA  Physical therapy evaluation.  OOB to chair/ambulation with assistance only.  Pain is accessed and addressed.  Would continue to follow.

## 2023-05-16 NOTE — SOCIAL WORK PROGRESS NOTE - NSSWPROGRESSNOTE_GEN_ALL_CORE
Yousif has been in contact with [facility] rehab today and requested per family that they reassess pt. Dr Valdivia to call over to facility on pt's behalf.  Yousif has received approval from [facility] that they have a covid + and a bed is available for today. Yousif has spoken with family today who was made aware that pt has been medically accepted to [facility] and they are accepting bed available. Yousif has arranged for ambulance for 4:00 pm today.   facility aware of time pt will arrive. All paperwork completed and copy of el, screen and medical necessity for the transport to be sent with pt. Sw will continue  to remain available support provided.

## 2023-05-16 NOTE — PROGRESS NOTE ADULT - SUBJECTIVE AND OBJECTIVE BOX
[facility]IOUS DISE[facility]  Dutch Laguna MD PhD, Kavitha Chapman MD, Casi Breaux MD, Mu Frias MD, Doug Wall MD  and providing coverage with Kb Watts MD  Providing Infectious Disease Consultations at [facility], [facility] [place], [place], [facility], [facility]'s    Office# [phone] to schedule follow up appointments  Answering Service for urgent calls or New Consults [phone]  Cell# to text for urgent issues Dutch Laguna [phone]     infectious diseases progress note:    ABRIL POMPA is a 77y y. o. Male patient    Overnight and events of the last 24hrs reviewed    Allergies    No Known Allergies    Intolerances        ANTIBIOTICS/RELEVANT:  antimicrobials    immunologic:    OTHER:  acetaminophen     Tablet .. 650 milliGRAM(s) Oral every 6 hours PRN  albuterol/ipratropium for Nebulization 3 milliLiter(s) Nebulizer every 6 hours PRN  aluminum hydroxide/magnesium hydroxide/simethicone Suspension 30 milliLiter(s) Oral every 4 hours PRN  aspirin  chewable 81 milliGRAM(s) Oral daily  atorvastatin 10 milliGRAM(s) Oral at bedtime  carbidopa/levodopa  25/100 1 Tablet(s) Oral three times a day  cilostazol 100 milliGRAM(s) Oral two times a day  diltiazem    milliGRAM(s) Oral daily  diltiazem Injectable 10 milliGRAM(s) IV Push every 4 hours PRN  DULoxetine 60 milliGRAM(s) Oral daily  fluticasone furoate/vilanterol 200-25 MICROgram(s) Inhaler 1 Puff(s) Inhalation daily  folic acid 1 milliGRAM(s) Oral daily  furosemide    Tablet 40 milliGRAM(s) Oral daily  ibuprofen  Tablet. 400 milliGRAM(s) Oral every 8 hours PRN  melatonin 3 milliGRAM(s) Oral at bedtime PRN  metoprolol tartrate 12.5 milliGRAM(s) Oral every 12 hours  multivitamin 1 Tablet(s) Oral daily  ondansetron Injectable 4 milliGRAM(s) IV Push once  ondansetron Injectable 4 milliGRAM(s) IV Push every 8 hours PRN  pantoprazole  Injectable 40 milliGRAM(s) IV Push every 12 hours  polyethylene glycol 3350 17 Gram(s) Oral two times a day  potassium chloride    Tablet ER 40 milliEquivalent(s) Oral once  sodium chloride 0.9%. 1000 milliLiter(s) IV Continuous <Continuous>  tamsulosin 0.4 milliGRAM(s) Oral at bedtime      Objective:  Vital Signs Last 24 Hrs  T(C): 36.6 (16 May 2023 06:05), Max: 36.7 (15 May 2023 20:46)  T(F): 97.9 (16 May 2023 06:05), Max: 98 (15 May 2023 20:46)  HR: 103 (16 May 2023 06:05) (62 - 103)  BP: 121/82 (16 May 2023 06:05) (105/65 - 121/82)  BP(mean): --  RR: 17 (15 May 2023 20:46) (17 - 17)  SpO2: 93% (16 May 2023 06:05) (90% - 95%)    Parameters below as of 16 May 2023 06:05  Patient On (Oxygen Delivery Method): room air        T(C): 36.6 (05-16-23 @ 06:05), Max: 36.7 (05-14-23 @ 10:56)  T(C): 36.6 (05-16-23 @ 06:05), Max: 37 (05-13-23 @ 11:37)  T(C): 36.6 (05-16-23 @ 06:05), Max: 37 (05-13-23 @ 11:37)    PHYSICAL EXAM:  HEENT: NC atraumatic  Neck: supple  Respiratory: no accessory muscle use, breathing comfortably  Cardiovascular: distant  Gastrointestinal: normal appearing, nondistended  Extremities: no clubbing, no cyanosis,        LABS:                          11.4   11.37 )-----------( 333      ( 16 May 2023 06:35 )             34.0       WBC  11.37 05-16 @ 06:35  9.96 05-15 @ 06:15  12.50 05-14 @ 07:05  12.96 05-13 @ 07:06  15.65 05-12 @ 06:02  14.90 05-11 @ 07:10  11.66 05-10 @ 06:40      05-16    140  |  104  |  34<H>  ----------------------------<  140<H>  3.5   |  29  |  0.99    Ca    8.4<L>      16 May 2023 06:35  Phos  3.6     05-16  Mg     2.3     05-16    TPro  5.8<L>  /  Alb  2.8<L>  /  TBili  0.8  /  DBili  x   /  AST  8<L>  /  ALT  11<L>  /  AlkPhos  71  05-16      Creatinine, Serum: 0.99 mg/dL (05-16-23 @ 06:35)  Creatinine, Serum: 0.88 mg/dL (05-15-23 @ 06:15)  Creatinine, Serum: 0.94 mg/dL (05-14-23 @ 07:05)  Creatinine, Serum: 0.84 mg/dL (05-13-23 @ 07:06)  Creatinine, Serum: 0.81 mg/dL (05-12-23 @ 06:02)  Creatinine, Serum: 0.77 mg/dL (05-11-23 @ 07:10)  Creatinine, Serum: 0.81 mg/dL (05-10-23 @ 06:40)                INFLAMMATORY MARKERS      MICROBIOLOGY:              RADIOLOGY & ADDITIONAL STUDIES:

## 2023-05-16 NOTE — PROGRESS NOTE ADULT - REASON FOR ADMISSION
AF w/ RVR

## 2023-05-16 NOTE — PROGRESS NOTE ADULT - PROBLEM SELECTOR PROBLEM 7
COPD, moderate
Alcohol abuse
COPD, moderate
Alcohol abuse
COPD, moderate

## 2023-05-16 NOTE — PROGRESS NOTE ADULT - PROBLEM SELECTOR PLAN 5
- Pt with no known history of dementia  - continue home duloxetine  - MRI brain: no acute infarct or hemorrhage, severe ventricular greater than sulcal prominence, may reflect central greater than cortical atrophy, also consistent with communicating hydrocephalus. With chronic microvascular ischemic changes.  - Neurology (Ramesh) following, possible frontotemporal dementia, possible component of Parkinson Disease  - Neuro recs -- trial of sinemet starting this AM   - Neurology outpatient FU on dc  - PT - KIM
- Pt with no known history of dementia  - continue home duloxetine  - MRI brain: no acute infarct or hemorrhage, severe ventricular greater than sulcal prominence, may reflect central greater than cortical atrophy, also consistent with communicating hydrocephalus. With chronic microvascular ischemic changes.  - Neurology (Ramesh) following, possible frontotemporal dementia, possible component of Parkinson Disease  - Neuro recs started trial of sinemet   - Neurology outpatient FU on dc  - PT - KIM
- Pt with no known history of dementia  - MRI brain: no acute infarct or hemorrhage, severe ventricular greater than sulcal prominence, may reflect central greater than cortical atrophy, also consistent with communicating hydrocephalus. With chronic microvascular ischemic changes.  - Neurology (Ramesh) following, possible frontotemporal dementia, possible component of Parkinson Disease  - Neuro recs start trial of sinemet after remdesivir completion  - Neurology outpatient FU  - PT evaluating pt  - continue home duloxetine
Pt presented with a fall, found on floor after approx 12 hours  - MRI brain: No acute infarction or hemorrhage  - Hx of falls due to gait instability and alcohol use
- Pt with no known history of dementia  - continue home duloxetine  - MRI brain: no acute infarct or hemorrhage, severe ventricular greater than sulcal prominence, may reflect central greater than cortical atrophy, also consistent with communicating hydrocephalus. With chronic microvascular ischemic changes.  - Neurology (Ramesh) following, possible frontotemporal dementia, possible component of Parkinson Disease  - Neuro recs started trial of sinemet   - Neurology outpatient FU on dc  - PT - KIM
Pt presented with a fall, found on floor after approx 12 hours  - MRI brain: No acute infarction or hemorrhage  - Hx of falls due to gait instability and alcohol use
- Pt with no known history of dementia  - MRI brain: no acute infarct or hemorrhage, severe ventricular greater than sulcal prominence, may reflect central greater than cortical atrophy, also consistent with communicating hydrocephalus. With chronic microvascular ischemic changes.  - Neurology (Ramesh) following, possible frontotemporal dementia, possible component of Parkinson Disease  - Neuro recs start trial of sinemet after remdesivir completion  - Neurology outpatient FU  - PT evaluating pt  - continue home duloxetine

## 2023-05-16 NOTE — PROGRESS NOTE ADULT - PROBLEM SELECTOR PROBLEM 9
HLD (hyperlipidemia)
BPH (benign prostatic hyperplasia)
HTN (hypertension)
HLD (hyperlipidemia)
HTN (hypertension)

## 2023-05-16 NOTE — PROGRESS NOTE ADULT - SUBJECTIVE AND OBJECTIVE BOX
Patient is a 77y old  Male who presents with a chief complaint of AF w/ RVR (16 May 2023 10:42)      TELE: Afib rate 93, range 90s-112, no events    INTERVAL HPI/OVERNIGHT EVENTS: No acute overnight events. Pt seen and examined at the bedside this AM. He has no complaints today. Denies CP, palpitations, SOB, abd pain.    MEDICATIONS  (STANDING):  aspirin  chewable 81 milliGRAM(s) Oral daily  atorvastatin 10 milliGRAM(s) Oral at bedtime  carbidopa/levodopa  25/100 1 Tablet(s) Oral three times a day  cilostazol 100 milliGRAM(s) Oral two times a day  diltiazem    milliGRAM(s) Oral daily  DULoxetine 60 milliGRAM(s) Oral daily  fluticasone furoate/vilanterol 200-25 MICROgram(s) Inhaler 1 Puff(s) Inhalation daily  folic acid 1 milliGRAM(s) Oral daily  furosemide    Tablet 40 milliGRAM(s) Oral daily  metoprolol tartrate 12.5 milliGRAM(s) Oral every 12 hours  multivitamin 1 Tablet(s) Oral daily  ondansetron Injectable 4 milliGRAM(s) IV Push once  pantoprazole  Injectable 40 milliGRAM(s) IV Push every 12 hours  polyethylene glycol 3350 17 Gram(s) Oral two times a day  potassium chloride    Tablet ER 40 milliEquivalent(s) Oral once  sodium chloride 0.9%. 1000 milliLiter(s) (100 mL/Hr) IV Continuous <Continuous>  tamsulosin 0.4 milliGRAM(s) Oral at bedtime    MEDICATIONS  (PRN):  acetaminophen     Tablet .. 650 milliGRAM(s) Oral every 6 hours PRN Temp greater or equal to 38C (100.4F), Mild Pain (1 - 3)  albuterol/ipratropium for Nebulization 3 milliLiter(s) Nebulizer every 6 hours PRN Shortness of Breath  aluminum hydroxide/magnesium hydroxide/simethicone Suspension 30 milliLiter(s) Oral every 4 hours PRN Dyspepsia  diltiazem Injectable 10 milliGRAM(s) IV Push every 4 hours PRN HR>130  ibuprofen  Tablet. 400 milliGRAM(s) Oral every 8 hours PRN Temp greater or equal to 38C (100.4F)  melatonin 3 milliGRAM(s) Oral at bedtime PRN Insomnia  ondansetron Injectable 4 milliGRAM(s) IV Push every 8 hours PRN Nausea and/or Vomiting      Allergies    No Known Allergies    Intolerances        REVIEW OF SYSTEMS:  CONSTITUTIONAL: No fever. + general weakness.  HEENT:  No headache  RESPIRATORY: No shortness of breath  CARDIOVASCULAR: No chest pain, palpitations  GASTROINTESTINAL: No abd pain, nausea, vomiting  NEUROLOGICAL: no dizziness  MUSCULOSKELETAL: no myalgias     Vital Signs Last 24 Hrs  T(C): 36.6 (16 May 2023 06:05), Max: 36.7 (15 May 2023 20:46)  T(F): 97.9 (16 May 2023 06:05), Max: 98 (15 May 2023 20:46)  HR: 103 (16 May 2023 06:05) (62 - 103)  BP: 121/82 (16 May 2023 06:05) (105/65 - 121/82)  BP(mean): --  RR: 17 (15 May 2023 20:46) (17 - 17)  SpO2: 93% (16 May 2023 06:05) (90% - 95%)    Parameters below as of 16 May 2023 06:05  Patient On (Oxygen Delivery Method): room air        PHYSICAL EXAM:  GENERAL: NAD, pleasant male   HEENT:  anicteric, moist mucous membranes  CHEST/LUNG:  CTA b/l, no rales, wheezes, or rhonchi  HEART:  irregular rhythm, S1, S2  ABDOMEN:  BS+, soft, nontender, nondistended  EXTREMITIES: non pitting edema, no cyanosis, or calf tenderness  NERVOUS SYSTEM: answers questions and follows commands appropriately    LABS:                        11.4   11.37 )-----------( 333      ( 16 May 2023 06:35 )             34.0     CBC Full  -  ( 16 May 2023 06:35 )  WBC Count : 11.37 K/uL  Hemoglobin : 11.4 g/dL  Hematocrit : 34.0 %  Platelet Count - Automated : 333 K/uL  Mean Cell Volume : 89.2 fl  Mean Cell Hemoglobin : 29.9 pg  Mean Cell Hemoglobin Concentration : 33.5 gm/dL  Auto Neutrophil # : 8.54 K/uL  Auto Lymphocyte # : 1.33 K/uL  Auto Monocyte # : 0.93 K/uL  Auto Eosinophil # : 0.03 K/uL  Auto Basophil # : 0.03 K/uL  Auto Neutrophil % : 75.0 %  Auto Lymphocyte % : 11.7 %  Auto Monocyte % : 8.2 %  Auto Eosinophil % : 0.3 %  Auto Basophil % : 0.3 %    16 May 2023 06:35    140    |  104    |  34     ----------------------------<  140    3.5     |  29     |  0.99     Ca    8.4        16 May 2023 06:35  Phos  3.6       16 May 2023 06:35  Mg     2.3       16 May 2023 06:35    TPro  5.8    /  Alb  2.8    /  TBili  0.8    /  DBili  x      /  AST  8      /  ALT  11     /  AlkPhos  71     16 May 2023 06:35        CAPILLARY BLOOD GLUCOSE              RADIOLOGY & ADDITIONAL TESTS:  Personally reviewed.     Consultant(s) Notes Reviewed:  [x] YES  [ ] NO

## 2023-05-16 NOTE — PROGRESS NOTE ADULT - PROBLEM SELECTOR PROBLEM 8
Alcohol abuse
HTN (hypertension)
Alcohol abuse

## 2023-05-16 NOTE — PROGRESS NOTE ADULT - PROBLEM SELECTOR PLAN 4
- Pt with no known history of dementia  - MRI brain: no acute infarct or hemorrhage, severe ventricular greater than sulcal prominence, may reflect central greater than cortical atrophy, also consistent with communicating hydrocephalus. With chronic microvascular ischemic changes.  - Neurology (Ramesh) following, possible frontotemporal demenia, possible component of Parkinson Disease  - Neuro recs start trial of sinemet after remdesivir completion  - Neurology outpatient FU  - PT evaluating pt  - continue home duloxetine
- Pt had multiple episodes of hematemesis on 5/7  - GI following, believes due to esophagitis  - Continue pantoprazole 40 mg IV BID  - Hgb stable  - Transfuse as needed
- Pt with no known history of dementia  - MRI brain: no acute infarct or hemorrhage, severe ventricular greater than sulcal prominence, may reflect central greater than cortical atrophy, also consistent with communicating hydrocephalus. With chronic microvascular ischemic changes.  - Neurology (Ramesh) following, possible frontotemporal dementia, possible component of Parkinson Disease  - Neuro recs start trial of sinemet after remdesivir completion  - Neurology outpatient FU  - PT evaluating pt  - continue home duloxetine
- Pt had multiple episodes of hematemesis on 5/7  - GI following, believes due to esophagitis  - Continue pantoprazole 40 mg IV BID  - Hgb stable  - Transfuse as needed

## 2023-05-16 NOTE — PROGRESS NOTE ADULT - PROVIDER SPECIALTY LIST ADULT
Cardiology
Cardiology
Gastroenterology
Hospitalist
Hospitalist
Infectious Disease
Neurology
Neurology
Cardiology
Gastroenterology
Hospitalist
Infectious Disease
Infectious Disease
Neurology
Neurology
Cardiology
Cardiology
Gastroenterology
Hospitalist
Infectious Disease
Infectious Disease
Neurology
Neurology
Gastroenterology
Gastroenterology
Infectious Disease
Vascular Surgery
Hospitalist

## 2023-05-16 NOTE — PROGRESS NOTE ADULT - PROBLEM SELECTOR PLAN 6
Pt presented with a fall, found on floor after approx 12 hours  - MRI brain: No acute infarction or hemorrhage  - Hx of falls due to gait instability and alcohol use  - plan for discharge to [facility]

## 2023-05-16 NOTE — DISCHARGE NOTE NURSING/CASE MANAGEMENT/SOCIAL WORK - NSDCPEFALRISK_GEN_ALL_CORE
For information on Fall & Injury Prevention, visit: [email or web address] OR  [email or web address] OR  https://www.cdc.gov/steadi/patient.html

## 2023-05-16 NOTE — DISCHARGE NOTE NURSING/CASE MANAGEMENT/SOCIAL WORK - PATIENT PORTAL LINK FT
You can access the FollowMyHealth Patient Portal offered by [facility] by registering at the following website: [email or web address]. By joining [employer]’s FollowMyHealth portal, you will also be able to view your health information using other applications (apps) compatible with our system.
Private car

## 2023-05-16 NOTE — PROGRESS NOTE ADULT - PROBLEM SELECTOR PLAN 7
- cont symbicort and nebs prn  - family brought in breo  - On RA
- cont symbicort and nebs prn  - on 4 L NC at bedside this AM. wean as tolerated.   - family brought in breo
- cont symbicort and nebs prn  - on 4 L NC at bedside this AM. wean as tolerated.   - family brought in breo
- CIWA   - ativan 1mg prn. did not appear to require any additional dosing. appears stable.
- cont symbicort and nebs prn  - family brought in breo  - On RA
- CIWA   - ativan 1mg prn. did not appear to require any additional dosing. appears stable.
- cont symbicort and nebs prn  - family brought in breo  - On RA

## 2024-04-10 ENCOUNTER — INPATIENT (INPATIENT)
Facility: HOSPITAL | Age: 79
LOS: 6 days | Discharge: HOME | DRG: 179 | End: 2024-04-17
Attending: INTERNAL MEDICINE | Admitting: INTERNAL MEDICINE
Payer: MEDICARE

## 2024-04-10 VITALS
HEART RATE: 98 BPM | SYSTOLIC BLOOD PRESSURE: 115 MMHG | OXYGEN SATURATION: 96 % | TEMPERATURE: 98 F | RESPIRATION RATE: 16 BRPM | DIASTOLIC BLOOD PRESSURE: 74 MMHG

## 2024-04-10 DIAGNOSIS — I10 ESSENTIAL (PRIMARY) HYPERTENSION: ICD-10-CM

## 2024-04-10 DIAGNOSIS — J44.9 CHRONIC OBSTRUCTIVE PULMONARY DISEASE, UNSPECIFIED: ICD-10-CM

## 2024-04-10 DIAGNOSIS — J18.9 PNEUMONIA, UNSPECIFIED ORGANISM: ICD-10-CM

## 2024-04-10 DIAGNOSIS — J69.0 PNEUMONITIS DUE TO INHALATION OF FOOD AND VOMIT: ICD-10-CM

## 2024-04-10 DIAGNOSIS — I25.10 ATHEROSCLEROTIC HEART DISEASE OF NATIVE CORONARY ARTERY WITHOUT ANGINA PECTORIS: ICD-10-CM

## 2024-04-10 DIAGNOSIS — E11.9 TYPE 2 DIABETES MELLITUS WITHOUT COMPLICATIONS: ICD-10-CM

## 2024-04-10 DIAGNOSIS — K92.2 GASTROINTESTINAL HEMORRHAGE, UNSPECIFIED: ICD-10-CM

## 2024-04-10 DIAGNOSIS — Z29.9 ENCOUNTER FOR PROPHYLACTIC MEASURES, UNSPECIFIED: ICD-10-CM

## 2024-04-10 DIAGNOSIS — D62 ACUTE POSTHEMORRHAGIC ANEMIA: ICD-10-CM

## 2024-04-10 DIAGNOSIS — E78.5 HYPERLIPIDEMIA, UNSPECIFIED: ICD-10-CM

## 2024-04-10 DIAGNOSIS — Z98.890 OTHER SPECIFIED POSTPROCEDURAL STATES: Chronic | ICD-10-CM

## 2024-04-10 LAB
ALBUMIN SERPL ELPH-MCNC: 3.1 G/DL — LOW (ref 3.3–5)
ALP SERPL-CCNC: 84 U/L — SIGNIFICANT CHANGE UP (ref 40–120)
ALT FLD-CCNC: 19 U/L — SIGNIFICANT CHANGE UP (ref 12–78)
ANION GAP SERPL CALC-SCNC: 11 MMOL/L — SIGNIFICANT CHANGE UP (ref 5–17)
APTT BLD: 45.9 SEC — HIGH (ref 24.5–35.6)
AST SERPL-CCNC: 17 U/L — SIGNIFICANT CHANGE UP (ref 15–37)
BASOPHILS # BLD AUTO: 0.04 K/UL — SIGNIFICANT CHANGE UP (ref 0–0.2)
BASOPHILS NFR BLD AUTO: 0.4 % — SIGNIFICANT CHANGE UP (ref 0–2)
BILIRUB SERPL-MCNC: 0.5 MG/DL — SIGNIFICANT CHANGE UP (ref 0.2–1.2)
BUN SERPL-MCNC: 27 MG/DL — HIGH (ref 7–23)
CALCIUM SERPL-MCNC: 8.6 MG/DL — SIGNIFICANT CHANGE UP (ref 8.5–10.1)
CHLORIDE SERPL-SCNC: 102 MMOL/L — SIGNIFICANT CHANGE UP (ref 96–108)
CO2 SERPL-SCNC: 26 MMOL/L — SIGNIFICANT CHANGE UP (ref 22–31)
CREAT SERPL-MCNC: 1.3 MG/DL — SIGNIFICANT CHANGE UP (ref 0.5–1.3)
EGFR: 56 ML/MIN/1.73M2 — LOW
EOSINOPHIL # BLD AUTO: 0.06 K/UL — SIGNIFICANT CHANGE UP (ref 0–0.5)
EOSINOPHIL NFR BLD AUTO: 0.6 % — SIGNIFICANT CHANGE UP (ref 0–6)
FLUAV AG NPH QL: SIGNIFICANT CHANGE UP
FLUBV AG NPH QL: SIGNIFICANT CHANGE UP
GLUCOSE SERPL-MCNC: 105 MG/DL — HIGH (ref 70–99)
HCT VFR BLD CALC: 24.2 % — LOW (ref 39–50)
HCT VFR BLD CALC: 27.9 % — LOW (ref 39–50)
HGB BLD-MCNC: 7.9 G/DL — LOW (ref 13–17)
HGB BLD-MCNC: 9.1 G/DL — LOW (ref 13–17)
IMM GRANULOCYTES NFR BLD AUTO: 0.5 % — SIGNIFICANT CHANGE UP (ref 0–0.9)
INR BLD: 1.98 RATIO — HIGH (ref 0.85–1.18)
LACTATE SERPL-SCNC: 1.4 MMOL/L — SIGNIFICANT CHANGE UP (ref 0.7–2)
LYMPHOCYTES # BLD AUTO: 1.04 K/UL — SIGNIFICANT CHANGE UP (ref 1–3.3)
LYMPHOCYTES # BLD AUTO: 10.6 % — LOW (ref 13–44)
MCHC RBC-ENTMCNC: 29.9 PG — SIGNIFICANT CHANGE UP (ref 27–34)
MCHC RBC-ENTMCNC: 32.6 GM/DL — SIGNIFICANT CHANGE UP (ref 32–36)
MCV RBC AUTO: 91.8 FL — SIGNIFICANT CHANGE UP (ref 80–100)
MONOCYTES # BLD AUTO: 0.79 K/UL — SIGNIFICANT CHANGE UP (ref 0–0.9)
MONOCYTES NFR BLD AUTO: 8 % — SIGNIFICANT CHANGE UP (ref 2–14)
NEUTROPHILS # BLD AUTO: 7.85 K/UL — HIGH (ref 1.8–7.4)
NEUTROPHILS NFR BLD AUTO: 79.9 % — HIGH (ref 43–77)
NRBC # BLD: 0 /100 WBCS — SIGNIFICANT CHANGE UP (ref 0–0)
OB PNL STL: POSITIVE
PLATELET # BLD AUTO: 437 K/UL — HIGH (ref 150–400)
POTASSIUM SERPL-MCNC: 4.5 MMOL/L — SIGNIFICANT CHANGE UP (ref 3.5–5.3)
POTASSIUM SERPL-SCNC: 4.5 MMOL/L — SIGNIFICANT CHANGE UP (ref 3.5–5.3)
PROT SERPL-MCNC: 6.7 G/DL — SIGNIFICANT CHANGE UP (ref 6–8.3)
PROTHROM AB SERPL-ACNC: 22.7 SEC — HIGH (ref 9.5–13)
RBC # BLD: 3.04 M/UL — LOW (ref 4.2–5.8)
RBC # FLD: 15.3 % — HIGH (ref 10.3–14.5)
RSV RNA NPH QL NAA+NON-PROBE: SIGNIFICANT CHANGE UP
SARS-COV-2 RNA SPEC QL NAA+PROBE: SIGNIFICANT CHANGE UP
SODIUM SERPL-SCNC: 139 MMOL/L — SIGNIFICANT CHANGE UP (ref 135–145)
WBC # BLD: 9.83 K/UL — SIGNIFICANT CHANGE UP (ref 3.8–10.5)
WBC # FLD AUTO: 9.83 K/UL — SIGNIFICANT CHANGE UP (ref 3.8–10.5)

## 2024-04-10 PROCEDURE — 71250 CT THORAX DX C-: CPT | Mod: 26

## 2024-04-10 PROCEDURE — 99221 1ST HOSP IP/OBS SF/LOW 40: CPT

## 2024-04-10 PROCEDURE — 99285 EMERGENCY DEPT VISIT HI MDM: CPT | Mod: FS

## 2024-04-10 PROCEDURE — 93010 ELECTROCARDIOGRAM REPORT: CPT

## 2024-04-10 PROCEDURE — 71045 X-RAY EXAM CHEST 1 VIEW: CPT | Mod: 26

## 2024-04-10 RX ORDER — FUROSEMIDE 40 MG
1 TABLET ORAL
Refills: 0 | DISCHARGE

## 2024-04-10 RX ORDER — CILOSTAZOL 100 MG/1
1 TABLET ORAL
Refills: 0 | DISCHARGE

## 2024-04-10 RX ORDER — SENNA PLUS 8.6 MG/1
1 TABLET ORAL
Refills: 0 | DISCHARGE

## 2024-04-10 RX ORDER — DEXTROSE 50 % IN WATER 50 %
25 SYRINGE (ML) INTRAVENOUS ONCE
Refills: 0 | Status: DISCONTINUED | OUTPATIENT
Start: 2024-04-10 | End: 2024-04-11

## 2024-04-10 RX ORDER — SODIUM CHLORIDE 9 MG/ML
1000 INJECTION, SOLUTION INTRAVENOUS
Refills: 0 | Status: DISCONTINUED | OUTPATIENT
Start: 2024-04-10 | End: 2024-04-11

## 2024-04-10 RX ORDER — LANOLIN ALCOHOL/MO/W.PET/CERES
3 CREAM (GRAM) TOPICAL AT BEDTIME
Refills: 0 | Status: DISCONTINUED | OUTPATIENT
Start: 2024-04-10 | End: 2024-04-17

## 2024-04-10 RX ORDER — AMIODARONE HYDROCHLORIDE 400 MG/1
200 TABLET ORAL DAILY
Refills: 0 | Status: DISCONTINUED | OUTPATIENT
Start: 2024-04-10 | End: 2024-04-17

## 2024-04-10 RX ORDER — GABAPENTIN 400 MG/1
1 CAPSULE ORAL
Refills: 0 | DISCHARGE

## 2024-04-10 RX ORDER — ASPIRIN/CALCIUM CARB/MAGNESIUM 324 MG
1 TABLET ORAL
Refills: 0 | DISCHARGE

## 2024-04-10 RX ORDER — SENNA PLUS 8.6 MG/1
2 TABLET ORAL AT BEDTIME
Refills: 0 | Status: DISCONTINUED | OUTPATIENT
Start: 2024-04-10 | End: 2024-04-17

## 2024-04-10 RX ORDER — IPRATROPIUM/ALBUTEROL SULFATE 18-103MCG
3 AEROSOL WITH ADAPTER (GRAM) INHALATION EVERY 6 HOURS
Refills: 0 | Status: DISCONTINUED | OUTPATIENT
Start: 2024-04-10 | End: 2024-04-17

## 2024-04-10 RX ORDER — SODIUM CHLORIDE 9 MG/ML
1000 INJECTION INTRAMUSCULAR; INTRAVENOUS; SUBCUTANEOUS
Refills: 0 | Status: DISCONTINUED | OUTPATIENT
Start: 2024-04-10 | End: 2024-04-17

## 2024-04-10 RX ORDER — DILTIAZEM HCL 120 MG
180 CAPSULE, EXT RELEASE 24 HR ORAL DAILY
Refills: 0 | Status: DISCONTINUED | OUTPATIENT
Start: 2024-04-10 | End: 2024-04-17

## 2024-04-10 RX ORDER — DULOXETINE HYDROCHLORIDE 30 MG/1
1 CAPSULE, DELAYED RELEASE ORAL
Refills: 0 | DISCHARGE

## 2024-04-10 RX ORDER — SPIRONOLACTONE 25 MG/1
1 TABLET, FILM COATED ORAL
Refills: 0 | DISCHARGE

## 2024-04-10 RX ORDER — POTASSIUM CHLORIDE 20 MEQ
2 PACKET (EA) ORAL
Refills: 0 | DISCHARGE

## 2024-04-10 RX ORDER — CILOSTAZOL 100 MG/1
100 TABLET ORAL
Refills: 0 | Status: DISCONTINUED | OUTPATIENT
Start: 2024-04-10 | End: 2024-04-10

## 2024-04-10 RX ORDER — CEFTRIAXONE 500 MG/1
1000 INJECTION, POWDER, FOR SOLUTION INTRAMUSCULAR; INTRAVENOUS EVERY 24 HOURS
Refills: 0 | Status: DISCONTINUED | OUTPATIENT
Start: 2024-04-11 | End: 2024-04-11

## 2024-04-10 RX ORDER — ATORVASTATIN CALCIUM 80 MG/1
1 TABLET, FILM COATED ORAL
Refills: 0 | DISCHARGE

## 2024-04-10 RX ORDER — DEXTROSE 10 % IN WATER 10 %
125 INTRAVENOUS SOLUTION INTRAVENOUS ONCE
Refills: 0 | Status: DISCONTINUED | OUTPATIENT
Start: 2024-04-10 | End: 2024-04-11

## 2024-04-10 RX ORDER — LACTOBACILLUS ACIDOPHILUS 100MM CELL
1 CAPSULE ORAL EVERY 12 HOURS
Refills: 0 | Status: DISCONTINUED | OUTPATIENT
Start: 2024-04-10 | End: 2024-04-17

## 2024-04-10 RX ORDER — PANTOPRAZOLE SODIUM 20 MG/1
40 TABLET, DELAYED RELEASE ORAL EVERY 12 HOURS
Refills: 0 | Status: DISCONTINUED | OUTPATIENT
Start: 2024-04-10 | End: 2024-04-12

## 2024-04-10 RX ORDER — TAMSULOSIN HYDROCHLORIDE 0.4 MG/1
0.4 CAPSULE ORAL AT BEDTIME
Refills: 0 | Status: DISCONTINUED | OUTPATIENT
Start: 2024-04-10 | End: 2024-04-17

## 2024-04-10 RX ORDER — DEXTROSE 50 % IN WATER 50 %
12.5 SYRINGE (ML) INTRAVENOUS ONCE
Refills: 0 | Status: DISCONTINUED | OUTPATIENT
Start: 2024-04-10 | End: 2024-04-11

## 2024-04-10 RX ORDER — GLUCAGON INJECTION, SOLUTION 0.5 MG/.1ML
1 INJECTION, SOLUTION SUBCUTANEOUS ONCE
Refills: 0 | Status: DISCONTINUED | OUTPATIENT
Start: 2024-04-10 | End: 2024-04-11

## 2024-04-10 RX ORDER — MAGNESIUM HYDROXIDE 400 MG/1
30 TABLET, CHEWABLE ORAL
Refills: 0 | DISCHARGE

## 2024-04-10 RX ORDER — SPIRONOLACTONE 25 MG/1
50 TABLET, FILM COATED ORAL DAILY
Refills: 0 | Status: DISCONTINUED | OUTPATIENT
Start: 2024-04-10 | End: 2024-04-17

## 2024-04-10 RX ORDER — PANTOPRAZOLE SODIUM 20 MG/1
40 TABLET, DELAYED RELEASE ORAL ONCE
Refills: 0 | Status: COMPLETED | OUTPATIENT
Start: 2024-04-10 | End: 2024-04-10

## 2024-04-10 RX ORDER — METFORMIN HYDROCHLORIDE 850 MG/1
1 TABLET ORAL
Refills: 0 | DISCHARGE

## 2024-04-10 RX ORDER — TRAMADOL HYDROCHLORIDE 50 MG/1
50 TABLET ORAL EVERY 12 HOURS
Refills: 0 | Status: DISCONTINUED | OUTPATIENT
Start: 2024-04-10 | End: 2024-04-10

## 2024-04-10 RX ORDER — CEFTRIAXONE 500 MG/1
INJECTION, POWDER, FOR SOLUTION INTRAMUSCULAR; INTRAVENOUS
Refills: 0 | Status: DISCONTINUED | OUTPATIENT
Start: 2024-04-10 | End: 2024-04-11

## 2024-04-10 RX ORDER — AZITHROMYCIN 500 MG/1
500 TABLET, FILM COATED ORAL ONCE
Refills: 0 | Status: COMPLETED | OUTPATIENT
Start: 2024-04-10 | End: 2024-04-10

## 2024-04-10 RX ORDER — FLUTICASONE FUROATE AND VILANTEROL TRIFENATATE 100; 25 UG/1; UG/1
1 POWDER RESPIRATORY (INHALATION)
Refills: 0 | DISCHARGE

## 2024-04-10 RX ORDER — TIOTROPIUM BROMIDE 18 UG/1
2 CAPSULE ORAL; RESPIRATORY (INHALATION) DAILY
Refills: 0 | Status: DISCONTINUED | OUTPATIENT
Start: 2024-04-10 | End: 2024-04-17

## 2024-04-10 RX ORDER — ACETAMINOPHEN 500 MG
650 TABLET ORAL EVERY 6 HOURS
Refills: 0 | Status: DISCONTINUED | OUTPATIENT
Start: 2024-04-10 | End: 2024-04-17

## 2024-04-10 RX ORDER — DEXTROSE 50 % IN WATER 50 %
15 SYRINGE (ML) INTRAVENOUS ONCE
Refills: 0 | Status: DISCONTINUED | OUTPATIENT
Start: 2024-04-10 | End: 2024-04-11

## 2024-04-10 RX ORDER — TAMSULOSIN HYDROCHLORIDE 0.4 MG/1
1 CAPSULE ORAL
Refills: 0 | DISCHARGE

## 2024-04-10 RX ORDER — CEFTRIAXONE 500 MG/1
1000 INJECTION, POWDER, FOR SOLUTION INTRAMUSCULAR; INTRAVENOUS ONCE
Refills: 0 | Status: COMPLETED | OUTPATIENT
Start: 2024-04-10 | End: 2024-04-10

## 2024-04-10 RX ORDER — AZITHROMYCIN 500 MG/1
500 TABLET, FILM COATED ORAL EVERY 24 HOURS
Refills: 0 | Status: DISCONTINUED | OUTPATIENT
Start: 2024-04-11 | End: 2024-04-11

## 2024-04-10 RX ORDER — GABAPENTIN 400 MG/1
300 CAPSULE ORAL AT BEDTIME
Refills: 0 | Status: DISCONTINUED | OUTPATIENT
Start: 2024-04-10 | End: 2024-04-17

## 2024-04-10 RX ORDER — ATORVASTATIN CALCIUM 80 MG/1
10 TABLET, FILM COATED ORAL AT BEDTIME
Refills: 0 | Status: DISCONTINUED | OUTPATIENT
Start: 2024-04-10 | End: 2024-04-17

## 2024-04-10 RX ORDER — DILTIAZEM HCL 120 MG
1 CAPSULE, EXT RELEASE 24 HR ORAL
Refills: 0 | DISCHARGE

## 2024-04-10 RX ORDER — BUDESONIDE AND FORMOTEROL FUMARATE DIHYDRATE 160; 4.5 UG/1; UG/1
2 AEROSOL RESPIRATORY (INHALATION)
Refills: 0 | Status: DISCONTINUED | OUTPATIENT
Start: 2024-04-10 | End: 2024-04-17

## 2024-04-10 RX ORDER — INSULIN LISPRO 100/ML
VIAL (ML) SUBCUTANEOUS EVERY 6 HOURS
Refills: 0 | Status: DISCONTINUED | OUTPATIENT
Start: 2024-04-10 | End: 2024-04-11

## 2024-04-10 RX ORDER — ONDANSETRON 8 MG/1
4 TABLET, FILM COATED ORAL EVERY 8 HOURS
Refills: 0 | Status: DISCONTINUED | OUTPATIENT
Start: 2024-04-10 | End: 2024-04-17

## 2024-04-10 RX ADMIN — PANTOPRAZOLE SODIUM 40 MILLIGRAM(S): 20 TABLET, DELAYED RELEASE ORAL at 19:02

## 2024-04-10 RX ADMIN — SODIUM CHLORIDE 50 MILLILITER(S): 9 INJECTION INTRAMUSCULAR; INTRAVENOUS; SUBCUTANEOUS at 16:36

## 2024-04-10 RX ADMIN — AZITHROMYCIN 255 MILLIGRAM(S): 500 TABLET, FILM COATED ORAL at 13:20

## 2024-04-10 RX ADMIN — PANTOPRAZOLE SODIUM 40 MILLIGRAM(S): 20 TABLET, DELAYED RELEASE ORAL at 12:05

## 2024-04-10 RX ADMIN — CEFTRIAXONE 1000 MILLIGRAM(S): 500 INJECTION, POWDER, FOR SOLUTION INTRAMUSCULAR; INTRAVENOUS at 19:02

## 2024-04-10 RX ADMIN — SENNA PLUS 2 TABLET(S): 8.6 TABLET ORAL at 22:47

## 2024-04-10 RX ADMIN — TAMSULOSIN HYDROCHLORIDE 0.4 MILLIGRAM(S): 0.4 CAPSULE ORAL at 22:47

## 2024-04-10 RX ADMIN — ATORVASTATIN CALCIUM 10 MILLIGRAM(S): 80 TABLET, FILM COATED ORAL at 22:46

## 2024-04-10 RX ADMIN — GABAPENTIN 300 MILLIGRAM(S): 400 CAPSULE ORAL at 22:47

## 2024-04-10 RX ADMIN — BUDESONIDE AND FORMOTEROL FUMARATE DIHYDRATE 2 PUFF(S): 160; 4.5 AEROSOL RESPIRATORY (INHALATION) at 19:15

## 2024-04-10 NOTE — H&P ADULT - HISTORY OF PRESENT ILLNESS
Patient is a 78-year-old male with past medical of hypertension, hyperlipidemia, diabetes, COPD, A-fib on Plavix presents with dark stool.  Patient noted for the past month he has had dark stool.  Patient reports his PCP at the [facility] has been following his hemoglobin which was noted to be low.  Patient reports he had left hand pain for the past 4 days described as stiffness.  Patient has not had a colonoscopy in years.  Patient notes a 20 pound weight loss over the past few months.  Patient recently recovered from norovirus.  Patient denies fever chest pain shortness of breath abdominal pain dysuria dizziness l.Found to have FOBT positive and seen by GI team Cardiology clearance for possible colonoscopy requested .Found to have pneumonia and started on abx , seen by pulm Palliative care consult requested ,to discuss advance directives and complete MOLST

## 2024-04-10 NOTE — ED PROVIDER NOTE - OBJECTIVE STATEMENT
Patient is a 78-year-old male with past medical of hypertension, hyperlipidemia, diabetes, COPD, A-fib on Plavix presents with dark stool.  Patient noted for the past month he has had dark stool.  Patient reports his PCP at the [facility] has been following his hemoglobin which was noted to be low.  Patient reports he had left hand pain for the past 4 days described as stiffness.  Patient has not had a colonoscopy in years.  Patient notes a 20 pound weight loss over the past few months.  Patient recently recovered from norovirus.  Patient denies fever chest pain shortness of breath abdominal pain dysuria dizziness loss of

## 2024-04-10 NOTE — H&P ADULT - NSICDXPASTMEDICALHX_GEN_ALL_CORE_FT
PAST MEDICAL HISTORY:  CAD (coronary artery disease)     COPD, moderate     DM (diabetes mellitus)     HLD (hyperlipidemia)     HTN (hypertension)

## 2024-04-10 NOTE — CONSULT NOTE ADULT - SUBJECTIVE AND OBJECTIVE BOX
CHIEF COMPLAINT/ REASON FOR VISIT  .. Patient was seen to address the  issue listed under PROBLEM LIST which is located toward bottom of this note     ABRIL POMPA    ENMANUELV APER 03    Allergies    No Known Allergies    Intolerances        PAST MEDICAL & SURGICAL HISTORY:  HTN (hypertension)      HLD (hyperlipidemia)      DM (diabetes mellitus)      COPD, moderate      S/P primary angioplasty          FAMILY HISTORY:      Home Medications:  amiodarone 200 mg oral tablet: 1 tab(s) orally once a day (10 Apr 2024 14:50)  Anoro Ellipta 62.5 mcg-25 mcg/inh inhalation powder: 1 puff(s) inhaled once a day (10 Apr 2024 15:00)  atorvastatin 10 mg oral tablet: 1 tab(s) orally once a day (at bedtime) (10 Apr 2024 14:51)  cilostazol 100 mg oral tablet: 1 tab(s) orally 2 times a day (10 Apr 2024 15:00)  DilTIAZem (Eqv-Cardizem CD) 180 mg/24 hours oral capsule, extended release: 1 cap(s) orally once a day (10 Apr 2024 14:52)  Eliquis 5 mg oral tablet: 1 tab(s) orally 2 times a day (10 Apr 2024 14:59)  gabapentin 300 mg oral capsule: 1 cap(s) orally once a day (at bedtime) (10 Apr 2024 15:01)  Lasix 40 mg oral tablet: 1 tab(s) orally once a day (10 Apr 2024 15:00)  metFORMIN 500 mg oral tablet: 1 tab(s) orally 2 times a day (10 Apr 2024 15:03)  Milk of Magnesia 1200 mg/15 mL oral liquid: 30 milliliter(s) orally once a day (10 Apr 2024 15:04)  Potassium Chloride (Eqv-Klor-Con 10) 10 mEq oral tablet, extended release: 2 tab(s) orally once a day (10 Apr 2024 15:04)  senna (sennosides) 8.6 mg oral tablet: 1 tab(s) orally once a day (at bedtime) (10 Apr 2024 15:05)  spironolactone 50 mg oral tablet: 1 tab(s) orally once a day (10 Apr 2024 15:06)  tamsulosin 0.4 mg oral capsule: 1 cap(s) orally once a day (10 Apr 2024 15:08)  traMADol 50 mg oral tablet: 1 tab(s) orally every 12 hours (10 Apr 2024 15:10)      MEDICATIONS  (STANDING):  aMIOdarone    Tablet 200 milliGRAM(s) Oral daily  atorvastatin 10 milliGRAM(s) Oral at bedtime  cilostazol 100 milliGRAM(s) Oral two times a day  dextrose 10% Bolus 125 milliLiter(s) IV Bolus once  dextrose 5%. 1000 milliLiter(s) (50 mL/Hr) IV Continuous <Continuous>  dextrose 5%. 1000 milliLiter(s) (100 mL/Hr) IV Continuous <Continuous>  dextrose 50% Injectable 25 Gram(s) IV Push once  dextrose 50% Injectable 12.5 Gram(s) IV Push once  diltiazem    milliGRAM(s) Oral daily  gabapentin 300 milliGRAM(s) Oral at bedtime  glucagon  Injectable 1 milliGRAM(s) IntraMuscular once  insulin lispro (ADMELOG) corrective regimen sliding scale   SubCutaneous every 6 hours  pantoprazole  Injectable 40 milliGRAM(s) IV Push every 12 hours  senna 2 Tablet(s) Oral at bedtime  sodium chloride 0.9%. 1000 milliLiter(s) (50 mL/Hr) IV Continuous <Continuous>  spironolactone 50 milliGRAM(s) Oral daily  tamsulosin 0.4 milliGRAM(s) Oral at bedtime    MEDICATIONS  (PRN):  dextrose Oral Gel 15 Gram(s) Oral once PRN Blood Glucose LESS THAN 70 milliGRAM(s)/deciliter      Diet, NPO:   Except Medications (04-10-24 @ 14:57) [Active]          Vital Signs Last 24 Hrs  T(C): 36.8 (10 Apr 2024 17:56), Max: 37.1 (10 Apr 2024 12:16)  T(F): 98.2 (10 Apr 2024 17:56), Max: 98.7 (10 Apr 2024 12:16)  HR: 94 (10 Apr 2024 17:56) (89 - 100)  BP: 104/68 (10 Apr 2024 17:56) (104/55 - 115/74)  BP(mean): --  RR: 17 (10 Apr 2024 17:56) (16 - 18)  SpO2: 95% (10 Apr 2024 17:56) (93% - 96%)    Parameters below as of 10 Apr 2024 17:56  Patient On (Oxygen Delivery Method): room air                  LABS:                        9.1    9.83  )-----------( 437      ( 10 Apr 2024 12:09 )             27.9     04-10    139  |  102  |  27<H>  ----------------------------<  105<H>  4.5   |  26  |  1.30    Ca    8.6      10 Apr 2024 12:09    TPro  6.7  /  Alb  3.1<L>  /  TBili  0.5  /  DBili  x   /  AST  17  /  ALT  19  /  AlkPhos  84  04-10    PT/INR - ( 10 Apr 2024 12:09 )   PT: 22.7 sec;   INR: 1.98 ratio         PTT - ( 10 Apr 2024 12:09 )  PTT:45.9 sec  Urinalysis Basic - ( 10 Apr 2024 12:09 )    Color: x / Appearance: x / SG: x / pH: x  Gluc: 105 mg/dL / Ketone: x  / Bili: x / Urobili: x   Blood: x / Protein: x / Nitrite: x   Leuk Esterase: x / RBC: x / WBC x   Sq Epi: x / Non Sq Epi: x / Bacteria: x            WBC:  WBC Count: 9.83 K/uL (04-10 @ 12:09)      MICROBIOLOGY:  RECENT CULTURES:              PT/INR - ( 10 Apr 2024 12:09 )   PT: 22.7 sec;   INR: 1.98 ratio         PTT - ( 10 Apr 2024 12:09 )  PTT:45.9 sec    Sodium:  Sodium: 139 mmol/L (04-10 @ 12:09)      1.30 mg/dL 04-10 @ 12:09      Hemoglobin:  Hemoglobin: 9.1 g/dL (04-10 @ 12:09)      Platelets: Platelet Count - Automated: 437 K/uL (04-10 @ 12:09)      LIVER FUNCTIONS - ( 10 Apr 2024 12:09 )  Alb: 3.1 g/dL / Pro: 6.7 g/dL / ALK PHOS: 84 U/L / ALT: 19 U/L / AST: 17 U/L / GGT: x             Urinalysis Basic - ( 10 Apr 2024 12:09 )    Color: x / Appearance: x / SG: x / pH: x  Gluc: 105 mg/dL / Ketone: x  / Bili: x / Urobili: x   Blood: x / Protein: x / Nitrite: x   Leuk Esterase: x / RBC: x / WBC x   Sq Epi: x / Non Sq Epi: x / Bacteria: x        RADIOLOGY & ADDITIONAL STUDIES:      MICROBIOLOGY:  RECENT CULTURES:

## 2024-04-10 NOTE — H&P ADULT - ASSESSMENT
Patient is a 78-year-old male with past medical of hypertension, hyperlipidemia, diabetes, COPD, A-fib on Plavix presents with dark stool.  Patient noted for the past month he has had dark stool.  Patient reports his PCP at the [facility] has been following his hemoglobin which was noted to be low.  Patient reports he had left hand pain for the past 4 days described as stiffness.  Patient has not had a colonoscopy in years.  Patient notes a 20 pound weight loss over the past few months.  Patient recently recovered from norovirus.  Patient denies fever chest pain shortness of breath abdominal pain dysuria dizziness l.Found to have FOBT positive and seen by GI team Cardiology clearance for possible colonoscopy requested .Found to have pneumonia and started on abx , seen by pulm Palliative care consult requested ,to discuss advance directives and complete MOLST

## 2024-04-10 NOTE — ED ADULT NURSE NOTE - NSFALLHARMRISKINTERV_ED_ALL_ED

## 2024-04-10 NOTE — ED PROVIDER NOTE - PHYSICAL EXAMINATION
Neuro- A&Ox3. Speech clear, without articulation or word-finding difficulties. Eyes- PERRL bilaterally. EOMs in tact. No nystagmus. CN II-XII in tact. No facial droop. No sensory  deficits throughout extremities bilaterally. decreased  strength left hand normal strength r ue No pronator drift. Gait stable. Heel-to-shin and finger-to-nose in tact.

## 2024-04-10 NOTE — H&P ADULT - MUSCULOSKELETAL
no joint swelling/no joint erythema/no joint warmth/no calf tenderness/no chest wall tenderness details…

## 2024-04-10 NOTE — CONSULT NOTE ADULT - SUBJECTIVE AND OBJECTIVE BOX
[facility]  Sammy Munguia PA-C  [address] [place] [address]  [phone]      Chief Complaint:  Patient is a 78y old  Male who presents with a chief complaint of     HPI:Patient is a 78-year-old male with past medical of hypertension, hyperlipidemia, diabetes, COPD, A-fib on Plavix presents with dark stool.  Patient noted for the past month he has had dark stool.  Patient reports his PCP at the [facility] has been following his hemoglobin which was noted to be low.  Patient reports he had left hand pain for the past 4 days described as stiffness.  Patient has not had a colonoscopy in years.  Patient notes a 20 pound weight loss over the past few months.  Patient recently recovered from norovirus.      Allergies:  No Known Allergies      Medications:  aMIOdarone    Tablet 200 milliGRAM(s) Oral daily  atorvastatin 10 milliGRAM(s) Oral at bedtime  cilostazol 100 milliGRAM(s) Oral two times a day  dextrose 10% Bolus 125 milliLiter(s) IV Bolus once  dextrose 5%. 1000 milliLiter(s) IV Continuous <Continuous>  dextrose 5%. 1000 milliLiter(s) IV Continuous <Continuous>  dextrose 50% Injectable 25 Gram(s) IV Push once  dextrose 50% Injectable 12.5 Gram(s) IV Push once  dextrose Oral Gel 15 Gram(s) Oral once PRN  diltiazem    milliGRAM(s) Oral daily  gabapentin 300 milliGRAM(s) Oral at bedtime  glucagon  Injectable 1 milliGRAM(s) IntraMuscular once  insulin lispro (ADMELOG) corrective regimen sliding scale   SubCutaneous every 6 hours  pantoprazole  Injectable 40 milliGRAM(s) IV Push every 12 hours  senna 2 Tablet(s) Oral at bedtime  sodium chloride 0.9%. 1000 milliLiter(s) IV Continuous <Continuous>  spironolactone 50 milliGRAM(s) Oral daily  tamsulosin 0.4 milliGRAM(s) Oral at bedtime      PMHX/PSHX:  HTN (hypertension)    HLD (hyperlipidemia)    DM (diabetes mellitus)    Dementia    COPD, moderate    S/P primary angioplasty        Family history:  No pertinent family history in first degree relatives        Social History:     ROS:     General:  no fevers, chills, night sweats, fatigue,   Eyes:  Good vision, no reported pain  ENT:  No sore throat, pain, runny nose, dysphagia  CV:  No pain, palpitations, hypo/hypertension  Resp:  No dyspnea, cough, tachypnea, wheezing  GI:  No pain, No nausea, No vomiting, No diarrhea, No constipation, No weight loss, No fever, No pruritis, No rectal bleeding, No tarry stools, No dysphagia,  :  No pain, bleeding, incontinence, nocturia  Muscle:  No pain, weakness  Neuro:  No weakness, tingling, memory problems  Psych:  No fatigue, insomnia, mood problems, depression  Endocrine:  No polyuria, polydipsia, cold/heat intolerance  Heme:  No petechiae, ecchymosis, easy bruisability  Skin:  No rash, tattoos, scars, edema      PHYSICAL EXAM:   Vital Signs:  Vital Signs Last 24 Hrs  T(C): 37.1 (10 Apr 2024 12:16), Max: 37.1 (10 Apr 2024 12:16)  T(F): 98.7 (10 Apr 2024 12:16), Max: 98.7 (10 Apr 2024 12:16)  HR: 89 (10 Apr 2024 12:16) (89 - 98)  BP: 106/84 (10 Apr 2024 12:16) (106/84 - 115/74)  BP(mean): --  RR: 18 (10 Apr 2024 12:16) (16 - 18)  SpO2: 96% (10 Apr 2024 12:16) (96% - 96%)    Parameters below as of 10 Apr 2024 12:16  Patient On (Oxygen Delivery Method): room air      Daily     Daily     GENERAL:  Appears stated age,   HEENT:  NC/AT,    CHEST:  Full & symmetric excursion,   HEART:  Regular rhythm  ABDOMEN:  Soft, non-tender, non-distended,   EXTEREMITIES:  no cyanosis,clubbing or edema  SKIN:  No rash  NEURO:  Alert,    LABS:                        9.1    9.83  )-----------( 437      ( 10 Apr 2024 12:09 )             27.9     04-10    139  |  102  |  27<H>  ----------------------------<  105<H>  4.5   |  26  |  1.30    Ca    8.6      10 Apr 2024 12:09    TPro  6.7  /  Alb  3.1<L>  /  TBili  0.5  /  DBili  x   /  AST  17  /  ALT  19  /  AlkPhos  84  04-10    LIVER FUNCTIONS - ( 10 Apr 2024 12:09 )  Alb: 3.1 g/dL / Pro: 6.7 g/dL / ALK PHOS: 84 U/L / ALT: 19 U/L / AST: 17 U/L / GGT: x           PT/INR - ( 10 Apr 2024 12:09 )   PT: 22.7 sec;   INR: 1.98 ratio         PTT - ( 10 Apr 2024 12:09 )  PTT:45.9 sec  Urinalysis Basic - ( 10 Apr 2024 12:09 )    Color: x / Appearance: x / SG: x / pH: x  Gluc: 105 mg/dL / Ketone: x  / Bili: x / Urobili: x   Blood: x / Protein: x / Nitrite: x   Leuk Esterase: x / RBC: x / WBC x   Sq Epi: x / Non Sq Epi: x / Bacteria: x          Imaging:

## 2024-04-10 NOTE — CONSULT NOTE ADULT - SUBJECTIVE AND OBJECTIVE BOX
CARDIOLOGY CONSULT NOTE    Patient is a 78y Male with a known history of : admitted with tiana and pain left hand    HPI:      REVIEW OF SYSTEMS:    CONSTITUTIONAL: No fever, weight loss, or fatigue  EYES: No eye pain, visual disturbances, or discharge  ENMT:  No difficulty hearing, tinnitus, vertigo; No sinus or throat pain  NECK: No pain or stiffness  BREASTS: No pain, masses, or nipple discharge  RESPIRATORY: No cough, wheezing, chills or hemoptysis; No shortness of breath  CARDIOVASCULAR: No chest pain, palpitations, dizziness, or leg swelling  GASTROINTESTINAL: No abdominal or epigastric pain. No nausea, vomiting, or hematemesis; No diarrhea or constipation. No melena or hematochezia.  GENITOURINARY: No dysuria, frequency, hematuria, or incontinence  NEUROLOGICAL: No headaches, memory loss, loss of strength, numbness, or tremors  SKIN: No itching, burning, rashes, or lesions   LYMPH NODES: No enlarged glands  ENDOCRINE: No heat or cold intolerance; No hair loss  MUSCULOSKELETAL: No joint pain or swelling; No muscle, back, or extremity pain  PSYCHIATRIC: No depression, anxiety, mood swings, or difficulty sleeping  HEME/LYMPH: No easy bruising, or bleeding gums  ALLERGY AND IMMUNOLOGIC: No hives or eczema    MEDICATIONS  (STANDING):  aMIOdarone    Tablet 200 milliGRAM(s) Oral daily  atorvastatin 10 milliGRAM(s) Oral at bedtime  cilostazol 100 milliGRAM(s) Oral two times a day  dextrose 10% Bolus 125 milliLiter(s) IV Bolus once  dextrose 5%. 1000 milliLiter(s) (50 mL/Hr) IV Continuous <Continuous>  dextrose 5%. 1000 milliLiter(s) (100 mL/Hr) IV Continuous <Continuous>  dextrose 50% Injectable 25 Gram(s) IV Push once  dextrose 50% Injectable 12.5 Gram(s) IV Push once  diltiazem    milliGRAM(s) Oral daily  gabapentin 300 milliGRAM(s) Oral at bedtime  glucagon  Injectable 1 milliGRAM(s) IntraMuscular once  insulin lispro (ADMELOG) corrective regimen sliding scale   SubCutaneous every 6 hours  pantoprazole  Injectable 40 milliGRAM(s) IV Push every 12 hours  senna 2 Tablet(s) Oral at bedtime  sodium chloride 0.9%. 1000 milliLiter(s) (50 mL/Hr) IV Continuous <Continuous>  spironolactone 50 milliGRAM(s) Oral daily  tamsulosin 0.4 milliGRAM(s) Oral at bedtime    MEDICATIONS  (PRN):  dextrose Oral Gel 15 Gram(s) Oral once PRN Blood Glucose LESS THAN 70 milliGRAM(s)/deciliter      ALLERGIES: No Known Allergies      Social History:     FAMILY HISTORY:      PAST MEDICAL & SURGICAL HISTORY:  HTN (hypertension)      HLD (hyperlipidemia)      DM (diabetes mellitus)      COPD, moderate      S/P primary angioplasty            PHYSICAL EXAMINATION:  -----------------------------  T(C): 37.1 (04-10-24 @ 12:16), Max: 37.1 (04-10-24 @ 12:16)  HR: 89 (04-10-24 @ 12:16) (89 - 98)  BP: 106/84 (04-10-24 @ 12:16) (106/84 - 115/74)  RR: 18 (04-10-24 @ 12:16) (16 - 18)  SpO2: 96% (04-10-24 @ 12:16) (96% - 96%)  Wt(kg): --        Constitutional: well developed, normal appearance, well groomed, well nourished, no deformities and no acute distress.   Eyes: the conjunctiva exhibited no abnormalities and the eyelids demonstrated no xanthelasmas.   HEENT: normal oral mucosa, no oral pallor and no oral cyanosis.   Neck: normal jugular venous A waves present, normal jugular venous V waves present and no jugular venous esqueda A waves.   Pulmonary: no respiratory distress, normal respiratory rhythm and effort, no accessory muscle use and lungs were clear to auscultation bilaterally.   Cardiovascular: heart rate and rhythm were normal, normal S1 and S2 and no murmur, gallop, rub, heave or thrill are present.   Abdomen: soft, non-tender, no hepato-splenomegaly and no abdominal mass palpated.   Musculoskeletal: the gait could not be assessed..   Extremities: no clubbing of the fingernails, no localized cyanosis, no petechial hemorrhages and no ischemic changes.   Skin: normal skin color and pigmentation, no rash, no venous stasis, no skin lesions, no skin ulcer and no xanthoma was observed.   Psychiatric: oriented to person, place, and time, the affect was normal, the mood was normal and not feeling anxious.     LABS:   --------  04-10    139  |  102  |  27<H>  ----------------------------<  105<H>  4.5   |  26  |  1.30    Ca    8.6      10 Apr 2024 12:09    TPro  6.7  /  Alb  3.1<L>  /  TBili  0.5  /  DBili  x   /  AST  17  /  ALT  19  /  AlkPhos  84  04-10                         9.1    9.83  )-----------( 437      ( 10 Apr 2024 12:09 )             27.9     PT/INR - ( 10 Apr 2024 12:09 )   PT: 22.7 sec;   INR: 1.98 ratio         PTT - ( 10 Apr 2024 12:09 )  PTT:45.9 sec            RADIOLOGY:  -----------------        ECG:     ECHO

## 2024-04-10 NOTE — ED PROVIDER NOTE - CLINICAL SUMMARY MEDICAL DECISION MAKING FREE TEXT BOX
Saeid: presents with dark stool.  Patient noted for the past month he has had dark stool.  Patient reports his PCP at the [facility] has been following his hemoglobin which was noted to be low. pt to be admitted for further work up and tx

## 2024-04-10 NOTE — PROGRESS NOTE ADULT - SUBJECTIVE AND OBJECTIVE BOX
Chart reviewed  The patient was admitted for GI bleed.  CXR was suggestive of pneumonia in lower lobes but chest CT was negative  Continue IV Rocephin and Zithromax for now  Full consult to follow.

## 2024-04-10 NOTE — H&P ADULT - RESPIRATORY AND THORAX
Patient here for follow-up on multiple medical problems:    He has diabetes mellitus type 2 with peripheral neuropathy, blood sugar has been quite well-controlled with metformin 1000 mg twice daily, Levemir 34 units at night and Humalog 8 units before breakfast and dinner.  He has very occasional mild morning hypoglycemia around 5 AM.  His most recent hemoglobin A1c was 6.6.  His fasting blood glucose has been around 120, evening blood sugar has been around 140.  He has no diabetic retinopathy, he is status post bilateral cataract surgery.  He has no exertional chest pain no intermittent claudication.    He has primary hypertension, blood pressure has been well-controlled with benazepril 20 mg daily, electrolytes and renal function has been normal, he is compliant with low-sodium diet.  He has no headache or dizziness no exertional dyspnea.    He has mixed hyperlipidemia lipid profile has been at goal with simvastatin 40 mg, he has no statin induced myalgia, liver enzymes have been normal.    He has BPH he is maintained on terazosin 2 mg daily, and nocturia 2-3 times a night voiding well PSA has been normal    He has osteoarthritis of the right shoulder has been doing well currently no shoulder pain has full range of motion.    He has bilateral inguinal hernia remain asymptomatic and nonobstructive no pain    He has moderate major depression currently in remission with reduced dose of sertraline 25 mg daily    Current medication:    Benazepril 20 mg daily  Gabapentin 300 mg 4 times daily  Levemir 34 units at night  Humalog 8 units before breakfast and dinner  Metformin 1000 mg twice daily  Sertraline 25 mg daily  Simvastatin 40 mg nightly  Terazosin 2 mg daily    Physical exam:    Patient is well-nourished alert oriented no distress  Blood pressure 116/60 heart rate 70/min regular  No carotid bruit no palpable lymph nodes  Heart: Normal S1-S2 grade 1/6 systolic ejection murmur no gallop  Lungs: Normal breath sounds  no rales or rhonchi  No edema  Diabetic foot exam: No ulcers, dorsalis pedis posterior tibialis 1+, no calluses, sensation to monofilament is intact    Impression and plan:    Diabetes mellitus type 2 with peripheral neuropathy, blood sugars been quite well-controlled discussed ADA diet, very occasional very mild hypoglycemia around 5 AM mostly related to decreased intake for dinner continue Levemir, NovoLog and metformin will check blood chemistry hemoglobin A1c continue gabapentin for neuropathy    Primary hypertension blood pressure is well-controlled discussed low-sodium diet continue benazepril 20 mg    Mixed hyperlipidemia lipid profile at goal discussed low-fat low-cholesterol diet continue simvastatin check lipid profile    BPH with nocturia voiding well continue with terazosin    Moderate major depression in remission.  Continue sertraline 25 mg daily    Bilateral inguinal hernia remain asymptomatic and nonobstructive continue observation    Osteoarthritis right shoulder currently asymptomatic    Follow-up in 6 months   negative

## 2024-04-10 NOTE — H&P ADULT - NSHPLABSRESULTS_GEN_ALL_CORE
< from: CT Chest No Cont (04.10.24 @ 18:40) >    INTERPRETATION:  EXAMINATION: CT CHEST    CLINICAL INDICATION: Right hilar opacity.    TECHNIQUE: Noncontrast CT of the chest was obtained.    COMPARISON: 4/24/2013.    FINDINGS:    AIRWAYS AND LUNGS: Tracheobronchial secretions.  Emphysema. Left lower   lobe calcified granuloma. Scattered linear atelectasis/scarring.    MEDIASTINUM AND PLEURA: There are no enlarged mediastinal, hilar or   axillary lymph nodes. The visualized portion of the thyroid gland is   heterogeneous. There is no pleural effusion. There is no pneumothorax.    HEART AND VESSELS: The heart is normal in size.  There are   atherosclerotic calcifications of the aorta and coronary arteries.  There   is no pericardial effusion.    UPPER ABDOMEN: Images of the upper abdomen demonstrate left hepatic cyst.    BONES AND SOFT TISSUES: There are mild degenerative changes of the spine.    The soft tissues are unremarkable.    IMPRESSION:  Tracheobronchial secretions.  Emphysema. Left lower lobe calcified   granuloma. Scattered linear atelectasis/scarring.    There are no enlarged mediastinal, hilar or axillary lymph nodes.    < end of copied text >    < from: Xray Chest 1 View-PORTABLE IMMEDIATE (04.10.24 @ 12:20) >    INTERPRETATION:  AP semierect chest on April 10, 2024 11:53 AM. This is a   preadmit study. Heart magnified by technique.Granuloma over the left   base again noted.    On May 8, 2023 there were mild bibasilar infiltrates. Present film shows   improvement but there is now a right perihilar infiltrate which is new.    IMPRESSION: Improved basilar infiltrates but presently there is a right   perihilar infiltrate which is new.    < end of copied text >

## 2024-04-10 NOTE — H&P ADULT - NSICDXPASTSURGICALHX_GEN_ALL_CORE_FT
You were seen in the emergency room today for frequent urination. The urine sample did not show signs of infection.   Please follow-up with the primary care doctor after being in the emergency room
PAST SURGICAL HISTORY:  S/P primary angioplasty

## 2024-04-10 NOTE — PATIENT PROFILE ADULT - FALL HARM RISK - RISK INTERVENTIONS

## 2024-04-10 NOTE — ED ADULT NURSE NOTE - NS ED NURSE LEVEL OF CONSCIOUSNESS SPEECH
Speaking Coherently Winlevi Counseling:  I discussed with the patient the risks of topical clascoterone including but not limited to erythema, scaling, itching, and stinging. Patient voiced their understanding.

## 2024-04-10 NOTE — ED PROVIDER NOTE - RATE
Pt received in room 15. Pt A&Ox4, pmhx esophagectomy 2 weeks ago, peg tube, c/o sever abd pain and diarrhea starting today. States 7 episodes of diarrhea today. Denies chest pain, sob, ha, dizziness, n/v, fevers/chills. Pt incision across abdomen clean, dry and intact, no discharge or discoloration. Respirations even and unlabored, no accessory muscle use. Pt placed on cardiac monitor. Stretcher in lowest position, side rail up, call bell within reach. 18G placed to R forearm, labs sent. 91

## 2024-04-10 NOTE — ED ADULT NURSE NOTE - OBJECTIVE STATEMENT
received pt in room 14B, 78 yr/o male A+OX4, ambulatory at baseline with walker and 2 assist. hx of COPD, DMII, HTN, HLD, and Afib. pt was brought to ED by EMS from [facility] for C/O low H+H and black stools for 1 month. abdomen is flat, soft, nontender; denies nausea, vomiting, diarrhea, and constipation. VSS, denies chest pain and SOB, RR even and unlabored. denies feelings of palpations and feelings of weakness. PERRLA and facial symmetry noted. pt denies numbness, tingling, in peripheral extremities; endorses new onset left hand weakness. pt has active and passive ROM of all extremities, no obvious joint deformities noted. generalized ecchymosis noted to b/l upper extremities. +2 edema noted to left lower extremity. hyperpigmentation noted to B/L LE. right AC 20g placed, labs drawn and snet. meds given as ordered. pending lab results.

## 2024-04-11 ENCOUNTER — TRANSCRIPTION ENCOUNTER (OUTPATIENT)
Age: 79
End: 2024-04-11

## 2024-04-11 LAB
A1C WITH ESTIMATED AVERAGE GLUCOSE RESULT: 5.8 % — HIGH (ref 4–5.6)
ALBUMIN SERPL ELPH-MCNC: 2.6 G/DL — LOW (ref 3.3–5)
ALP SERPL-CCNC: 70 U/L — SIGNIFICANT CHANGE UP (ref 40–120)
ALT FLD-CCNC: 15 U/L — SIGNIFICANT CHANGE UP (ref 12–78)
ANION GAP SERPL CALC-SCNC: 7 MMOL/L — SIGNIFICANT CHANGE UP (ref 5–17)
AST SERPL-CCNC: 10 U/L — LOW (ref 15–37)
BASOPHILS # BLD AUTO: 0.04 K/UL — SIGNIFICANT CHANGE UP (ref 0–0.2)
BASOPHILS NFR BLD AUTO: 0.6 % — SIGNIFICANT CHANGE UP (ref 0–2)
BILIRUB SERPL-MCNC: 0.4 MG/DL — SIGNIFICANT CHANGE UP (ref 0.2–1.2)
BUN SERPL-MCNC: 20 MG/DL — SIGNIFICANT CHANGE UP (ref 7–23)
CALCIUM SERPL-MCNC: 8.6 MG/DL — SIGNIFICANT CHANGE UP (ref 8.5–10.1)
CHLORIDE SERPL-SCNC: 107 MMOL/L — SIGNIFICANT CHANGE UP (ref 96–108)
CO2 SERPL-SCNC: 27 MMOL/L — SIGNIFICANT CHANGE UP (ref 22–31)
CREAT SERPL-MCNC: 1.2 MG/DL — SIGNIFICANT CHANGE UP (ref 0.5–1.3)
EGFR: 62 ML/MIN/1.73M2 — SIGNIFICANT CHANGE UP
EOSINOPHIL # BLD AUTO: 0.17 K/UL — SIGNIFICANT CHANGE UP (ref 0–0.5)
EOSINOPHIL NFR BLD AUTO: 2.5 % — SIGNIFICANT CHANGE UP (ref 0–6)
ESTIMATED AVERAGE GLUCOSE: 120 MG/DL — HIGH (ref 68–114)
FERRITIN SERPL-MCNC: 59 NG/ML — SIGNIFICANT CHANGE UP (ref 30–400)
FOLATE SERPL-MCNC: 19.2 NG/ML — SIGNIFICANT CHANGE UP
GLUCOSE SERPL-MCNC: 119 MG/DL — HIGH (ref 70–99)
HCT VFR BLD CALC: 24.6 % — LOW (ref 39–50)
HGB BLD-MCNC: 7.9 G/DL — LOW (ref 13–17)
IMM GRANULOCYTES NFR BLD AUTO: 0.6 % — SIGNIFICANT CHANGE UP (ref 0–0.9)
INR BLD: 1.42 RATIO — HIGH (ref 0.85–1.18)
IRON SATN MFR SERPL: 21 UG/DL — LOW (ref 45–165)
IRON SATN MFR SERPL: 8 % — LOW (ref 16–55)
LYMPHOCYTES # BLD AUTO: 1.13 K/UL — SIGNIFICANT CHANGE UP (ref 1–3.3)
LYMPHOCYTES # BLD AUTO: 16.6 % — SIGNIFICANT CHANGE UP (ref 13–44)
MAGNESIUM SERPL-MCNC: 2.2 MG/DL — SIGNIFICANT CHANGE UP (ref 1.6–2.6)
MCHC RBC-ENTMCNC: 29.7 PG — SIGNIFICANT CHANGE UP (ref 27–34)
MCHC RBC-ENTMCNC: 32.1 GM/DL — SIGNIFICANT CHANGE UP (ref 32–36)
MCV RBC AUTO: 92.5 FL — SIGNIFICANT CHANGE UP (ref 80–100)
MONOCYTES # BLD AUTO: 0.59 K/UL — SIGNIFICANT CHANGE UP (ref 0–0.9)
MONOCYTES NFR BLD AUTO: 8.7 % — SIGNIFICANT CHANGE UP (ref 2–14)
NEUTROPHILS # BLD AUTO: 4.82 K/UL — SIGNIFICANT CHANGE UP (ref 1.8–7.4)
NEUTROPHILS NFR BLD AUTO: 71 % — SIGNIFICANT CHANGE UP (ref 43–77)
NRBC # BLD: 0 /100 WBCS — SIGNIFICANT CHANGE UP (ref 0–0)
NT-PROBNP SERPL-SCNC: 236 PG/ML — SIGNIFICANT CHANGE UP (ref 0–450)
PHOSPHATE SERPL-MCNC: 3.2 MG/DL — SIGNIFICANT CHANGE UP (ref 2.5–4.5)
PLATELET # BLD AUTO: 391 K/UL — SIGNIFICANT CHANGE UP (ref 150–400)
POTASSIUM SERPL-MCNC: 4 MMOL/L — SIGNIFICANT CHANGE UP (ref 3.5–5.3)
POTASSIUM SERPL-SCNC: 4 MMOL/L — SIGNIFICANT CHANGE UP (ref 3.5–5.3)
PROCALCITONIN SERPL-MCNC: 0.1 NG/ML — HIGH
PROCALCITONIN SERPL-MCNC: 0.1 NG/ML — HIGH
PROT SERPL-MCNC: 5.7 G/DL — LOW (ref 6–8.3)
PROTHROM AB SERPL-ACNC: 16.4 SEC — HIGH (ref 9.5–13)
RBC # BLD: 2.66 M/UL — LOW (ref 4.2–5.8)
RBC # BLD: 2.66 M/UL — LOW (ref 4.2–5.8)
RBC # FLD: 15.1 % — HIGH (ref 10.3–14.5)
RETICS #: 78.7 K/UL — SIGNIFICANT CHANGE UP (ref 25–125)
RETICS/RBC NFR: 3 % — HIGH (ref 0.5–2.5)
SODIUM SERPL-SCNC: 141 MMOL/L — SIGNIFICANT CHANGE UP (ref 135–145)
TIBC SERPL-MCNC: 250 UG/DL — SIGNIFICANT CHANGE UP (ref 220–430)
UIBC SERPL-MCNC: 229 UG/DL — SIGNIFICANT CHANGE UP (ref 110–370)
VIT B12 SERPL-MCNC: 486 PG/ML — SIGNIFICANT CHANGE UP (ref 232–1245)
WBC # BLD: 6.79 K/UL — SIGNIFICANT CHANGE UP (ref 3.8–10.5)
WBC # FLD AUTO: 6.79 K/UL — SIGNIFICANT CHANGE UP (ref 3.8–10.5)

## 2024-04-11 PROCEDURE — 99231 SBSQ HOSP IP/OBS SF/LOW 25: CPT

## 2024-04-11 RX ORDER — DEXTROSE 50 % IN WATER 50 %
25 SYRINGE (ML) INTRAVENOUS ONCE
Refills: 0 | Status: DISCONTINUED | OUTPATIENT
Start: 2024-04-11 | End: 2024-04-17

## 2024-04-11 RX ORDER — SODIUM CHLORIDE 9 MG/ML
1000 INJECTION, SOLUTION INTRAVENOUS
Refills: 0 | Status: DISCONTINUED | OUTPATIENT
Start: 2024-04-11 | End: 2024-04-17

## 2024-04-11 RX ORDER — DEXTROSE 50 % IN WATER 50 %
15 SYRINGE (ML) INTRAVENOUS ONCE
Refills: 0 | Status: DISCONTINUED | OUTPATIENT
Start: 2024-04-11 | End: 2024-04-17

## 2024-04-11 RX ORDER — DEXTROSE 10 % IN WATER 10 %
125 INTRAVENOUS SOLUTION INTRAVENOUS ONCE
Refills: 0 | Status: DISCONTINUED | OUTPATIENT
Start: 2024-04-11 | End: 2024-04-17

## 2024-04-11 RX ORDER — GLUCAGON INJECTION, SOLUTION 0.5 MG/.1ML
1 INJECTION, SOLUTION SUBCUTANEOUS ONCE
Refills: 0 | Status: DISCONTINUED | OUTPATIENT
Start: 2024-04-11 | End: 2024-04-17

## 2024-04-11 RX ORDER — IRON SUCROSE 20 MG/ML
100 INJECTION, SOLUTION INTRAVENOUS EVERY 24 HOURS
Refills: 0 | Status: COMPLETED | OUTPATIENT
Start: 2024-04-11 | End: 2024-04-13

## 2024-04-11 RX ORDER — INSULIN LISPRO 100/ML
VIAL (ML) SUBCUTANEOUS
Refills: 0 | Status: DISCONTINUED | OUTPATIENT
Start: 2024-04-11 | End: 2024-04-17

## 2024-04-11 RX ORDER — INSULIN LISPRO 100/ML
VIAL (ML) SUBCUTANEOUS AT BEDTIME
Refills: 0 | Status: DISCONTINUED | OUTPATIENT
Start: 2024-04-11 | End: 2024-04-17

## 2024-04-11 RX ORDER — DEXTROSE 50 % IN WATER 50 %
12.5 SYRINGE (ML) INTRAVENOUS ONCE
Refills: 0 | Status: DISCONTINUED | OUTPATIENT
Start: 2024-04-11 | End: 2024-04-17

## 2024-04-11 RX ADMIN — BUDESONIDE AND FORMOTEROL FUMARATE DIHYDRATE 2 PUFF(S): 160; 4.5 AEROSOL RESPIRATORY (INHALATION) at 08:30

## 2024-04-11 RX ADMIN — TIOTROPIUM BROMIDE 2 PUFF(S): 18 CAPSULE ORAL; RESPIRATORY (INHALATION) at 08:30

## 2024-04-11 RX ADMIN — IRON SUCROSE 100 MILLIGRAM(S): 20 INJECTION, SOLUTION INTRAVENOUS at 17:28

## 2024-04-11 RX ADMIN — Medication 1 TABLET(S): at 06:09

## 2024-04-11 RX ADMIN — SENNA PLUS 2 TABLET(S): 8.6 TABLET ORAL at 21:48

## 2024-04-11 RX ADMIN — GABAPENTIN 300 MILLIGRAM(S): 400 CAPSULE ORAL at 21:48

## 2024-04-11 RX ADMIN — CEFTRIAXONE 100 MILLIGRAM(S): 500 INJECTION, POWDER, FOR SOLUTION INTRAMUSCULAR; INTRAVENOUS at 18:22

## 2024-04-11 RX ADMIN — TAMSULOSIN HYDROCHLORIDE 0.4 MILLIGRAM(S): 0.4 CAPSULE ORAL at 21:48

## 2024-04-11 RX ADMIN — PANTOPRAZOLE SODIUM 40 MILLIGRAM(S): 20 TABLET, DELAYED RELEASE ORAL at 06:10

## 2024-04-11 RX ADMIN — AZITHROMYCIN 255 MILLIGRAM(S): 500 TABLET, FILM COATED ORAL at 14:39

## 2024-04-11 RX ADMIN — PANTOPRAZOLE SODIUM 40 MILLIGRAM(S): 20 TABLET, DELAYED RELEASE ORAL at 17:30

## 2024-04-11 RX ADMIN — AMIODARONE HYDROCHLORIDE 200 MILLIGRAM(S): 400 TABLET ORAL at 06:10

## 2024-04-11 RX ADMIN — ATORVASTATIN CALCIUM 10 MILLIGRAM(S): 80 TABLET, FILM COATED ORAL at 21:48

## 2024-04-11 RX ADMIN — SPIRONOLACTONE 50 MILLIGRAM(S): 25 TABLET, FILM COATED ORAL at 06:10

## 2024-04-11 NOTE — PROGRESS NOTE ADULT - SUBJECTIVE AND OBJECTIVE BOX
[facility]  Sammy Munguia PA-C  [address] [place] [address]  [phone]      INTERVAL HPI/OVERNIGHT EVENTS:  Pt s/e  No reported overt GI bleeding  Hgb noted    MEDICATIONS  (STANDING):  aMIOdarone    Tablet 200 milliGRAM(s) Oral daily  atorvastatin 10 milliGRAM(s) Oral at bedtime  azithromycin  IVPB 500 milliGRAM(s) IV Intermittent every 24 hours  budesonide 160 MICROgram(s)/formoterol 4.5 MICROgram(s) Inhaler 2 Puff(s) Inhalation two times a day  cefTRIAXone   IVPB      cefTRIAXone   IVPB 1000 milliGRAM(s) IV Intermittent every 24 hours  dextrose 10% Bolus 125 milliLiter(s) IV Bolus once  dextrose 5%. 1000 milliLiter(s) (50 mL/Hr) IV Continuous <Continuous>  dextrose 5%. 1000 milliLiter(s) (100 mL/Hr) IV Continuous <Continuous>  dextrose 50% Injectable 25 Gram(s) IV Push once  dextrose 50% Injectable 12.5 Gram(s) IV Push once  diltiazem    milliGRAM(s) Oral daily  gabapentin 300 milliGRAM(s) Oral at bedtime  glucagon  Injectable 1 milliGRAM(s) IntraMuscular once  lactobacillus acidophilus 1 Tablet(s) Oral every 12 hours  pantoprazole  Injectable 40 milliGRAM(s) IV Push every 12 hours  senna 2 Tablet(s) Oral at bedtime  sodium chloride 0.9%. 1000 milliLiter(s) (50 mL/Hr) IV Continuous <Continuous>  spironolactone 50 milliGRAM(s) Oral daily  tamsulosin 0.4 milliGRAM(s) Oral at bedtime  tiotropium 2.5 MICROgram(s) Inhaler 2 Puff(s) Inhalation daily    MEDICATIONS  (PRN):  acetaminophen     Tablet .. 650 milliGRAM(s) Oral every 6 hours PRN Temp greater or equal to 38C (100.4F), Mild Pain (1 - 3)  albuterol/ipratropium for Nebulization 3 milliLiter(s) Nebulizer every 6 hours PRN Shortness of Breath and/or Wheezing  aluminum hydroxide/magnesium hydroxide/simethicone Suspension 30 milliLiter(s) Oral every 4 hours PRN Dyspepsia  dextrose Oral Gel 15 Gram(s) Oral once PRN Blood Glucose LESS THAN 70 milliGRAM(s)/deciliter  guaiFENesin Oral Liquid (Sugar-Free) 200 milliGRAM(s) Oral every 6 hours PRN Cough  melatonin 3 milliGRAM(s) Oral at bedtime PRN Insomnia  ondansetron Injectable 4 milliGRAM(s) IV Push every 8 hours PRN Nausea and/or Vomiting  traMADol 50 milliGRAM(s) Oral every 12 hours PRN Moderate Pain (4 - 6)      Allergies  No Known Allergies      PHYSICAL EXAM:   Vital Signs:  Vital Signs Last 24 Hrs  T(C): 36.5 (2024 04:57), Max: 37.1 (10 Apr 2024 12:16)  T(F): 97.7 (2024 04:57), Max: 98.7 (10 Apr 2024 12:16)  HR: 63 (2024 04:57) (63 - 100)  BP: 100/59 (2024 04:57) (96/59 - 111/63)  BP(mean): --  RR: 18 (2024 04:57) (16 - 18)  SpO2: 95% (2024 04:57) (92% - 96%)    Parameters below as of 2024 04:57  Patient On (Oxygen Delivery Method): room air      Daily     Daily Weight in k.4 (2024 04:57)    GENERAL:  Appears stated age  HEENT:  NC/AT  CHEST:  Full & symmetric excursion  HEART:  Regular rhythm  ABDOMEN:  Soft, non-tender, non-distended  EXTEREMITIES:  no cyanosis  SKIN:  No rash  NEURO:  Alert      LABS:                        7.9    6.79  )-----------( 391      ( 2024 05:55 )             24.6     04-11    141  |  107  |  20  ----------------------------<  119<H>  4.0   |  27  |  1.20    Ca    8.6      2024 05:55  Phos  3.2     04-11  Mg     2.2     04-11    TPro  5.7<L>  /  Alb  2.6<L>  /  TBili  0.4  /  DBili  x   /  AST  10<L>  /  ALT  15  /  AlkPhos  70  04-11    PT/INR - ( 2024 05:55 )   PT: 16.4 sec;   INR: 1.42 ratio         PTT - ( 10 Apr 2024 12:09 )  PTT:45.9 sec  Urinalysis Basic - ( 2024 05:55 )    Color: x / Appearance: x / SG: x / pH: x  Gluc: 119 mg/dL / Ketone: x  / Bili: x / Urobili: x   Blood: x / Protein: x / Nitrite: x   Leuk Esterase: x / RBC: x / WBC x   Sq Epi: x / Non Sq Epi: x / Bacteria: x

## 2024-04-11 NOTE — DIETITIAN INITIAL EVALUATION ADULT - PERTINENT MEDS FT
MEDICATIONS  (STANDING):  aMIOdarone    Tablet 200 milliGRAM(s) Oral daily  atorvastatin 10 milliGRAM(s) Oral at bedtime  azithromycin  IVPB 500 milliGRAM(s) IV Intermittent every 24 hours  budesonide 160 MICROgram(s)/formoterol 4.5 MICROgram(s) Inhaler 2 Puff(s) Inhalation two times a day  cefTRIAXone   IVPB      cefTRIAXone   IVPB 1000 milliGRAM(s) IV Intermittent every 24 hours  dextrose 10% Bolus 125 milliLiter(s) IV Bolus once  dextrose 5%. 1000 milliLiter(s) (50 mL/Hr) IV Continuous <Continuous>  dextrose 5%. 1000 milliLiter(s) (100 mL/Hr) IV Continuous <Continuous>  dextrose 50% Injectable 25 Gram(s) IV Push once  dextrose 50% Injectable 12.5 Gram(s) IV Push once  diltiazem    milliGRAM(s) Oral daily  gabapentin 300 milliGRAM(s) Oral at bedtime  glucagon  Injectable 1 milliGRAM(s) IntraMuscular once  insulin lispro (ADMELOG) corrective regimen sliding scale   SubCutaneous every 6 hours  lactobacillus acidophilus 1 Tablet(s) Oral every 12 hours  pantoprazole  Injectable 40 milliGRAM(s) IV Push every 12 hours  senna 2 Tablet(s) Oral at bedtime  sodium chloride 0.9%. 1000 milliLiter(s) (50 mL/Hr) IV Continuous <Continuous>  spironolactone 50 milliGRAM(s) Oral daily  tamsulosin 0.4 milliGRAM(s) Oral at bedtime  tiotropium 2.5 MICROgram(s) Inhaler 2 Puff(s) Inhalation daily    MEDICATIONS  (PRN):  acetaminophen     Tablet .. 650 milliGRAM(s) Oral every 6 hours PRN Temp greater or equal to 38C (100.4F), Mild Pain (1 - 3)  albuterol/ipratropium for Nebulization 3 milliLiter(s) Nebulizer every 6 hours PRN Shortness of Breath and/or Wheezing  aluminum hydroxide/magnesium hydroxide/simethicone Suspension 30 milliLiter(s) Oral every 4 hours PRN Dyspepsia  dextrose Oral Gel 15 Gram(s) Oral once PRN Blood Glucose LESS THAN 70 milliGRAM(s)/deciliter  guaiFENesin Oral Liquid (Sugar-Free) 200 milliGRAM(s) Oral every 6 hours PRN Cough  melatonin 3 milliGRAM(s) Oral at bedtime PRN Insomnia  ondansetron Injectable 4 milliGRAM(s) IV Push every 8 hours PRN Nausea and/or Vomiting  traMADol 50 milliGRAM(s) Oral every 12 hours PRN Moderate Pain (4 - 6)

## 2024-04-11 NOTE — PROGRESS NOTE ADULT - ASSESSMENT
Patient is a 78-year-old male with past medical of hypertension, hyperlipidemia, diabetes, COPD, A-fib on Plavix presents with dark stool.  Patient noted for the past month he has had dark stool.  Patient reports his PCP at the [facility] has been following his hemoglobin which was noted to be low.  Patient reports he had left hand pain for the past 4 days described as stiffness.  Patient has not had a colonoscopy in years.  Patient notes a 20 pound weight loss over the past few months.  Patient recently recovered from norovirus.  Patient denies fever chest pain shortness of breath abdominal pain dysuria dizziness l.Found to have FOBT positive and seen by GI team Cardiology clearance for possible colonoscopy requested .Found to have pneumonia and started on abx , seen by pulm Palliative care consult requested ,to discuss advance directives and complete MOLST

## 2024-04-11 NOTE — CHART NOTE - NSCHARTNOTEFT_GEN_A_CORE
Called by RN for patient refusing npo at this time and had a sandwich against medical advice.   Patient has active npo order since 1500 today per primary team for CT abd/pelvis for evaluating weight loss.   Further care per primary team.   (Christine Cao PGY2)

## 2024-04-11 NOTE — DIETITIAN INITIAL EVALUATION ADULT - ADD RECOMMEND
When medically feasible clear liquid diet and advance as tolerated to Consistent carbohydrate diet with hs snack. Further recommendations on diet pending endoscopy.  Recommend glucerna 8oz po BID (chocolate) when feasible. Recommend MVI with minerals/fe daily (centrum liquid).

## 2024-04-11 NOTE — CONSULT NOTE ADULT - SUBJECTIVE AND OBJECTIVE BOX
Patient is a 78y old  Male who presents with a chief complaint of dark stools (11 Apr 2024 12:06)      HPI:  Patient is a 78-year-old male with past medical of hypertension, hyperlipidemia, diabetes, COPD, A-fib on Plavix presents with dark stool.  Patient noted for the past month he has had dark stool.  Patient reports his PCP at the [facility] has been following his hemoglobin which was noted to be low.  Patient reports he had left hand pain for the past 4 days described as stiffness.  Patient has not had a colonoscopy in years.  Patient notes a 20 pound weight loss over the past few months.  Patient recently recovered from norovirus.  Patient denies fever chest pain shortness of breath abdominal pain dysuria dizziness l.Found to have FOBT positive and seen by GI team Cardiology clearance for possible colonoscopy requested .Found to have pneumonia and started on abx , seen by pulm Palliative care consult requested ,to discuss advance directives and complete MOLST  (10 Apr 2024 15:27)       ROS:  Negative except for:    PAST MEDICAL & SURGICAL HISTORY:  HTN (hypertension)      HLD (hyperlipidemia)      DM (diabetes mellitus)      COPD, moderate      CAD (coronary artery disease)      S/P primary angioplasty          SOCIAL HISTORY:    FAMILY HISTORY:      MEDICATIONS  (STANDING):  aMIOdarone    Tablet 200 milliGRAM(s) Oral daily  atorvastatin 10 milliGRAM(s) Oral at bedtime  azithromycin  IVPB 500 milliGRAM(s) IV Intermittent every 24 hours  budesonide 160 MICROgram(s)/formoterol 4.5 MICROgram(s) Inhaler 2 Puff(s) Inhalation two times a day  cefTRIAXone   IVPB      cefTRIAXone   IVPB 1000 milliGRAM(s) IV Intermittent every 24 hours  dextrose 10% Bolus 125 milliLiter(s) IV Bolus once  dextrose 5%. 1000 milliLiter(s) (50 mL/Hr) IV Continuous <Continuous>  dextrose 5%. 1000 milliLiter(s) (100 mL/Hr) IV Continuous <Continuous>  dextrose 50% Injectable 25 Gram(s) IV Push once  dextrose 50% Injectable 12.5 Gram(s) IV Push once  diltiazem    milliGRAM(s) Oral daily  gabapentin 300 milliGRAM(s) Oral at bedtime  glucagon  Injectable 1 milliGRAM(s) IntraMuscular once  lactobacillus acidophilus 1 Tablet(s) Oral every 12 hours  pantoprazole  Injectable 40 milliGRAM(s) IV Push every 12 hours  senna 2 Tablet(s) Oral at bedtime  sodium chloride 0.9%. 1000 milliLiter(s) (50 mL/Hr) IV Continuous <Continuous>  spironolactone 50 milliGRAM(s) Oral daily  tamsulosin 0.4 milliGRAM(s) Oral at bedtime  tiotropium 2.5 MICROgram(s) Inhaler 2 Puff(s) Inhalation daily    MEDICATIONS  (PRN):  acetaminophen     Tablet .. 650 milliGRAM(s) Oral every 6 hours PRN Temp greater or equal to 38C (100.4F), Mild Pain (1 - 3)  albuterol/ipratropium for Nebulization 3 milliLiter(s) Nebulizer every 6 hours PRN Shortness of Breath and/or Wheezing  aluminum hydroxide/magnesium hydroxide/simethicone Suspension 30 milliLiter(s) Oral every 4 hours PRN Dyspepsia  dextrose Oral Gel 15 Gram(s) Oral once PRN Blood Glucose LESS THAN 70 milliGRAM(s)/deciliter  guaiFENesin Oral Liquid (Sugar-Free) 200 milliGRAM(s) Oral every 6 hours PRN Cough  melatonin 3 milliGRAM(s) Oral at bedtime PRN Insomnia  ondansetron Injectable 4 milliGRAM(s) IV Push every 8 hours PRN Nausea and/or Vomiting  traMADol 50 milliGRAM(s) Oral every 12 hours PRN Moderate Pain (4 - 6)      Allergies    No Known Allergies    Intolerances        Vital Signs Last 24 Hrs  T(C): 36.7 (11 Apr 2024 12:35), Max: 37.1 (10 Apr 2024 18:21)  T(F): 98 (11 Apr 2024 12:35), Max: 98.7 (10 Apr 2024 18:21)  HR: 83 (11 Apr 2024 12:35) (63 - 100)  BP: 121/64 (11 Apr 2024 12:35) (96/59 - 121/64)  BP(mean): --  RR: 18 (11 Apr 2024 12:35) (16 - 18)  SpO2: 91% (11 Apr 2024 12:35) (91% - 95%)    Parameters below as of 11 Apr 2024 12:35  Patient On (Oxygen Delivery Method): room air        PHYSICAL EXAM  General: adult in NAD  HEENT: clear oropharynx, anicteric sclera, pink conjunctivae  Neck: supple  CV: normal S1S2 with no murmur rubs or gallops  Lungs: clear to auscultation, no wheezes, no rhales  Abdomen: soft non-tender non-distended, no hepato/splenomegaly  Ext: no clubbing cyanosis or edema  Skin: no rashes and no petichiae  Neuro: alert and oriented X3 no focal deficits      LABS:    CBC Full  -  ( 11 Apr 2024 05:55 )  WBC Count : 6.79 K/uL  RBC Count : 2.66 M/uL  Hemoglobin : 7.9 g/dL  Hematocrit : 24.6 %  Platelet Count - Automated : 391 K/uL  Mean Cell Volume : 92.5 fl  Mean Cell Hemoglobin : 29.7 pg  Mean Cell Hemoglobin Concentration : 32.1 gm/dL  Auto Neutrophil # : 4.82 K/uL  Auto Lymphocyte # : 1.13 K/uL  Auto Monocyte # : 0.59 K/uL  Auto Eosinophil # : 0.17 K/uL  Auto Basophil # : 0.04 K/uL  Auto Neutrophil % : 71.0 %  Auto Lymphocyte % : 16.6 %  Auto Monocyte % : 8.7 %  Auto Eosinophil % : 2.5 %  Auto Basophil % : 0.6 %    04-11    141  |  107  |  20  ----------------------------<  119<H>  4.0   |  27  |  1.20    Ca    8.6      11 Apr 2024 05:55  Phos  3.2     04-11  Mg     2.2     04-11    TPro  5.7<L>  /  Alb  2.6<L>  /  TBili  0.4  /  DBili  x   /  AST  10<L>  /  ALT  15  /  AlkPhos  70  04-11    PT/INR - ( 11 Apr 2024 05:55 )   PT: 16.4 sec;   INR: 1.42 ratio         PTT - ( 10 Apr 2024 12:09 )  PTT:45.9 sec  Iron - Total Binding Capacity.: 250 ug/dL (04-11 @ 05:55)  Ferritin: 59 ng/mL (04-11 @ 05:55)          BLOOD SMEAR INTERPRETATION:    RADIOLOGY & ADDITIONAL STUDIES:  < from: CT Chest No Cont (04.10.24 @ 18:40) >  ACC: [id number] EXAM:  CT CHEST   ORDERED BY: MICAH BOGGS     PROCEDURE DATE:  04/10/2024          INTERPRETATION:  EXAMINATION: CT CHEST    CLINICAL INDICATION: Right hilar opacity.    TECHNIQUE: Noncontrast CT of the chest was obtained.    COMPARISON: 4/24/2013.    FINDINGS:    AIRWAYS AND LUNGS: Tracheobronchial secretions.  Emphysema. Left lower   lobe calcified granuloma. Scattered linear atelectasis/scarring.    MEDIASTINUM AND PLEURA: There are no enlarged mediastinal, hilar or   axillary lymph nodes. The visualized portion of the thyroid gland is   heterogeneous. There is no pleural effusion. There is no pneumothorax.    HEART AND VESSELS: The heart is normal in size.  There are   atherosclerotic calcifications of the aorta and coronary arteries.  There   is no pericardial effusion.    UPPER ABDOMEN: Images of the upper abdomen demonstrate left hepatic cyst.    BONES AND SOFT TISSUES: There are mild degenerative changes of the spine.    The soft tissues are unremarkable.    IMPRESSION:  Tracheobronchial secretions.  Emphysema. Left lower lobe calcified   granuloma. Scattered linear atelectasis/scarring.    There are no enlarged mediastinal, hilar or axillary lymph nodes.    --- End of Report ---            DAV MARQUEZ MD; Attending Radiologist  This document has been electronically signed. Apr 10 2024  7:57PM    < end of copied text >

## 2024-04-11 NOTE — DIETITIAN INITIAL EVALUATION ADULT - PERTINENT LABORATORY DATA
04-11    141  |  107  |  20  ----------------------------<  119<H>  4.0   |  27  |  1.20    Ca    8.6      11 Apr 2024 05:55  Phos  3.2     04-11  Mg     2.2     04-11    TPro  5.7<L>  /  Alb  2.6<L>  /  TBili  0.4  /  DBili  x   /  AST  10<L>  /  ALT  15  /  AlkPhos  70  04-11  POCT Blood Glucose.: 132 mg/dL (04-11-24 @ 06:22)  A1C with Estimated Average Glucose Result: 5.8 % (04-11-24 @ 05:55)  A1C with Estimated Average Glucose Result: 6.4 % (05-06-23 @ 08:40)

## 2024-04-11 NOTE — PROGRESS NOTE ADULT - SUBJECTIVE AND OBJECTIVE BOX
Patient is a 78y Male with a known history of :  GIB (gastrointestinal bleeding) [K92.2]    Anemia due to acute blood loss [D62]    HTN (hypertension) [I10]    HLD (hyperlipidemia) [E78.5]    DM (diabetes mellitus) [E11.9]    COPD, moderate [J44.9]    CAD (coronary artery disease) [I25.10]    Prophylactic measure [Z29.9]    Pneumonia [J18.9]      HPI:  Patient is a 78-year-old male with past medical of hypertension, hyperlipidemia, diabetes, COPD, A-fib on Plavix presents with dark stool.  Patient noted for the past month he has had dark stool.  Patient reports his PCP at the [facility] has been following his hemoglobin which was noted to be low.  Patient reports he had left hand pain for the past 4 days described as stiffness.  Patient has not had a colonoscopy in years.  Patient notes a 20 pound weight loss over the past few months.  Patient recently recovered from norovirus.  Patient denies fever chest pain shortness of breath abdominal pain dysuria dizziness l.Found to have FOBT positive and seen by GI team Cardiology clearance for possible colonoscopy requested .Found to have pneumonia and started on abx , seen by pulm Palliative care consult requested ,to discuss advance directives and complete MOLST  (10 Apr 2024 15:27)      REVIEW OF SYSTEMS:    CONSTITUTIONAL: No fever, weight loss, or fatigue  EYES: No eye pain, visual disturbances, or discharge  ENMT:  No difficulty hearing, tinnitus, vertigo; No sinus or throat pain  NECK: No pain or stiffness  BREASTS: No pain, masses, or nipple discharge  RESPIRATORY: No cough, wheezing, chills or hemoptysis; No shortness of breath  CARDIOVASCULAR: No chest pain, palpitations, dizziness, or leg swelling  GASTROINTESTINAL: No abdominal or epigastric pain. No nausea, vomiting, or hematemesis; No diarrhea or constipation. No melena or hematochezia.  GENITOURINARY: No dysuria, frequency, hematuria, or incontinence  NEUROLOGICAL: No headaches, memory loss, loss of strength, numbness, or tremors  SKIN: No itching, burning, rashes, or lesions   LYMPH NODES: No enlarged glands  ENDOCRINE: No heat or cold intolerance; No hair loss  MUSCULOSKELETAL: No joint pain or swelling; No muscle, back, or extremity pain  PSYCHIATRIC: No depression, anxiety, mood swings, or difficulty sleeping  HEME/LYMPH: No easy bruising, or bleeding gums  ALLERGY AND IMMUNOLOGIC: No hives or eczema    MEDICATIONS  (STANDING):  aMIOdarone    Tablet 200 milliGRAM(s) Oral daily  atorvastatin 10 milliGRAM(s) Oral at bedtime  azithromycin  IVPB 500 milliGRAM(s) IV Intermittent every 24 hours  budesonide 160 MICROgram(s)/formoterol 4.5 MICROgram(s) Inhaler 2 Puff(s) Inhalation two times a day  cefTRIAXone   IVPB      cefTRIAXone   IVPB 1000 milliGRAM(s) IV Intermittent every 24 hours  dextrose 10% Bolus 125 milliLiter(s) IV Bolus once  dextrose 5%. 1000 milliLiter(s) (50 mL/Hr) IV Continuous <Continuous>  dextrose 5%. 1000 milliLiter(s) (100 mL/Hr) IV Continuous <Continuous>  dextrose 50% Injectable 25 Gram(s) IV Push once  dextrose 50% Injectable 12.5 Gram(s) IV Push once  diltiazem    milliGRAM(s) Oral daily  gabapentin 300 milliGRAM(s) Oral at bedtime  glucagon  Injectable 1 milliGRAM(s) IntraMuscular once  insulin lispro (ADMELOG) corrective regimen sliding scale   SubCutaneous every 6 hours  lactobacillus acidophilus 1 Tablet(s) Oral every 12 hours  pantoprazole  Injectable 40 milliGRAM(s) IV Push every 12 hours  senna 2 Tablet(s) Oral at bedtime  sodium chloride 0.9%. 1000 milliLiter(s) (50 mL/Hr) IV Continuous <Continuous>  spironolactone 50 milliGRAM(s) Oral daily  tamsulosin 0.4 milliGRAM(s) Oral at bedtime  tiotropium 2.5 MICROgram(s) Inhaler 2 Puff(s) Inhalation daily    MEDICATIONS  (PRN):  acetaminophen     Tablet .. 650 milliGRAM(s) Oral every 6 hours PRN Temp greater or equal to 38C (100.4F), Mild Pain (1 - 3)  albuterol/ipratropium for Nebulization 3 milliLiter(s) Nebulizer every 6 hours PRN Shortness of Breath and/or Wheezing  aluminum hydroxide/magnesium hydroxide/simethicone Suspension 30 milliLiter(s) Oral every 4 hours PRN Dyspepsia  dextrose Oral Gel 15 Gram(s) Oral once PRN Blood Glucose LESS THAN 70 milliGRAM(s)/deciliter  guaiFENesin Oral Liquid (Sugar-Free) 200 milliGRAM(s) Oral every 6 hours PRN Cough  melatonin 3 milliGRAM(s) Oral at bedtime PRN Insomnia  ondansetron Injectable 4 milliGRAM(s) IV Push every 8 hours PRN Nausea and/or Vomiting  traMADol 50 milliGRAM(s) Oral every 12 hours PRN Moderate Pain (4 - 6)      ALLERGIES: No Known Allergies      FAMILY HISTORY:      PHYSICAL EXAMINATION:  -----------------------------  T(C): 36.5 (04-11-24 @ 04:57), Max: 37.1 (04-10-24 @ 12:16)  HR: 63 (04-11-24 @ 04:57) (63 - 100)  BP: 100/59 (04-11-24 @ 04:57) (96/59 - 115/74)  RR: 18 (04-11-24 @ 04:57) (16 - 18)  SpO2: 95% (04-11-24 @ 04:57) (92% - 96%)  Wt(kg): --    04-10 @ 07:01  -  04-11 @ 07:00  --------------------------------------------------------  IN:    sodium chloride 0.9%: 550 mL  Total IN: 550 mL    OUT:  Total OUT: 0 mL    Total NET: 550 mL            VITALS  T(C): 36.5 (04-11-24 @ 04:57), Max: 37.1 (04-10-24 @ 12:16)  HR: 63 (04-11-24 @ 04:57) (63 - 100)  BP: 100/59 (04-11-24 @ 04:57) (96/59 - 115/74)  RR: 18 (04-11-24 @ 04:57) (16 - 18)  SpO2: 95% (04-11-24 @ 04:57) (92% - 96%)    Constitutional: well developed, normal appearance, well groomed, well nourished, no deformities and no acute distress.   Eyes: the conjunctiva exhibited no abnormalities and the eyelids demonstrated no xanthelasmas.   HEENT: normal oral mucosa, no oral pallor and no oral cyanosis.   Neck: normal jugular venous A waves present, normal jugular venous V waves present and no jugular venous esqueda A waves.   Pulmonary: no respiratory distress, normal respiratory rhythm and effort, no accessory muscle use and lungs were clear to auscultation bilaterally.   Cardiovascular: heart rate and rhythm were normal, normal S1 and S2 and no murmur, gallop, rub, heave or thrill are present.   Abdomen: soft, non-tender, no hepato-splenomegaly and no abdominal mass palpated.   Musculoskeletal: the gait could not be assessed..   Extremities: no clubbing of the fingernails, no localized cyanosis, no petechial hemorrhages and no ischemic changes.   Skin: normal skin color and pigmentation, no rash, no venous stasis, no skin lesions, no skin ulcer and no xanthoma was observed.   Psychiatric: oriented to person, place, and time, the affect was normal, the mood was normal and not feeling anxious.     LABS:   --------  04-11    141  |  107  |  20  ----------------------------<  119<H>  4.0   |  27  |  1.20    Ca    8.6      11 Apr 2024 05:55  Phos  3.2     04-11  Mg     2.2     04-11    TPro  5.7<L>  /  Alb  2.6<L>  /  TBili  0.4  /  DBili  x   /  AST  10<L>  /  ALT  15  /  AlkPhos  70  04-11                         7.9    6.79  )-----------( 391      ( 11 Apr 2024 05:55 )             24.6     PT/INR - ( 11 Apr 2024 05:55 )   PT: 16.4 sec;   INR: 1.42 ratio         PTT - ( 10 Apr 2024 12:09 )  PTT:45.9 sec            RADIOLOGY:  -----------------    ECG:     ECHO:

## 2024-04-11 NOTE — DIETITIAN INITIAL EVALUATION ADULT - TIME FRAME
Video visit is ok but cannot do telephone audio-only visit because they are controlled medications.    Looks like MyChart still has not been setup.    3 weeks-1 mo

## 2024-04-11 NOTE — CONSULT NOTE ADULT - ASSESSMENT
Initial evaluation/Pulmonary Critical Care consultation requested by DR BONE  on 4/10/2024 from Dr Conner Lambert    Patient examined chart reviewed    HOSPITAL ADMISSION   PATIENT CAME  FROM (if information available)      GENERAL DATA .   GOC.    ..    ICU STAY.    .. no   COVID.   .. SCV2 4/10/2024 (-)   BEST PRACTICE ISSUES.    HOB ELEVATN.    .. Yes  DVT PPLX.   .. 4/10/2024 GI BLEED SO PHARMAC PPLX WAS WITHELD   DIET.     ..  4/10/2024 NPO     ALLGY.   ..   NKA    WT.   ..    BMI.   ..     PROCEDURES.   ..   IV fl.   .. 4/10/2024 NS 50   BOLUS.   ..  STRESS ULCER PPLX.   .    ..   4/10/2024 PROTONIX 40   INFECTION PPLX.    ABGS.   .  VS/ PO/IO/ VENT/ DRIPS.   4/10/2024 afeb 100 100/55   4/10/2024 ra 96%     . CC .   . 4/10/2024 Patient is from [facility] for low HB. C/O Blood in the stool.  . SUMMARY.     . 4/10/2024 78-year-old male with past medical of hypertension, hyperlipidemia, diabetes, COPD, A-fib on Plavix presents with dark stool.  Patient noted for the past month he has had dark stool.  Patient reports his PCP at the [facility] has been following his hemoglobin which was noted to be low.  Patient reports he had left hand pain for the past 4 days described as stiffness.  Patient has not had a colonoscopy in years.  Patient notes a 20 pound weight loss over the past few months.  Patient recently recovered from norovirus.  Patient denies fever chest pain shortness of breath abdominal pain dysuria dizziness  . BASELINE STATUS.   . [facility]   . HOME MEDS.   · pantoprazole 40 mg oral delayed release tablet: 40 milligram(s) orally 2 times a day  · metoprolol tartrate 25 mg oral tablet: 0.5 tab(s) orally every 12 hours  · folic acid 1 mg oral tablet: 1 tab(s) orally once a day  · Multiple Vitamins oral tablet: 1 tab(s) orally once a day  · polyethylene glycol 3350 oral powder for reconstitution: 17 gram(s) orally 2 times a day  · carbidopa-levodopa 25 mg-100 mg oral tablet: 1 tab(s) orally 3 times a day  · dilTIAZem 240 mg/24 hours oral capsule, extended release: 1 cap(s) orally once a day  · Lasix 40 mg oral tablet: 1 tab(s) orally once a day  · aspirin 81 mg oral tablet: 1 orally once a day  · atorvastatin 10 mg oral tablet: 1 orally once a day  · tamsulosin 0.4 mg oral capsule: 1 cap(s) orally once a day  · cilostazol 100 mg oral tablet: 1 tab(s) orally 2 times a day  · DULoxetine 60 mg oral delayed release capsule: 1 orally once a day  · Breo Ellipta 200 mcg-25 mcg/inh inhalation powder: 1 puff(s) inhaled once a day  · Anoro Ellipta 62.5 mcg-25 mcg/inh inhalation powder: 1 puff(s) inhaled once a day  . PMH.    . PROBLEMS AT PRESENTATION.    . ER MGMT.     . PATIENT DATA .    . ACTIVE ISSUES PLAN .  INFECTION.  . PNEUMONIA   .... w 4/10/2024 w 9.8   .... CXR 4/10/2024 cw 5/8/2023   .... improvd basal infiltr  .... new right perihilar infiltra   .... Flu ab 4/10/2024 (-)   .... rsv 4/10/2024 (-)   .. A/R  .... Low susp for pneum  .... 4/10/2024 check ct ch  and procalc    .... 4/10/2024 Rocephin azithro     . COPD   .... 4/10/2024 symbicort   .... 4/10/2024 spiriva     . LACTIC ACID STSTUS .  .... la 4/10/2024 la 1.4  .... monitor     . CAD.  .... 4/10/2024 ATORVASTAT 10     . PVD.  .... 4/10/2024 CILOSTAZOL 100.2     . A fib.  .... 4/10/2024 AMIODARPONE 200   .... 4/10/2024 CARDIZEM    .... 4/10/2024 apixaba held   .... cardio on case     . CHF   .... 4/10/2024 SPIRONOLACTON 50   .... 4/10/2024 check echo     . ANEMIA.  .... Hb 4/10/2024 Hb 9.1   .... INR 4/10/2024 INR 1.98   .... On protonix   .... monitor     . GI BLEED   .... 4/10/2024 Protonix 40   .... 4/10/2024 GI felt it is likely diverticular bleed and to get ct angio if bleed persists   .... 4/10/2024 apixaba held   ..... monitor Hb     . CONSTIPATION.  .... 4/10/2024 SENNA     RENAL.  .... Na 4/10/2024 na 139  .... K 4/10/2024 K 4.5   .... CO2 4/10/2024 co2 26   .... Cr 4/10/2024 Cr 1.3     . BPH.  .... 4/10/2024 TAMSULOSIN .4     . PAIN.  .... GABAPENTIN 300       . OVERALL.  . 4/10/2024 78-year-old male with past medical of hypertension, hyperlipidemia, diabetes, COPD, A-fib on Plavix presents with dark stool. Recnt 20 lb wt loss Pulm consulted at admission HO copd New right perihilar infiltr on 4/10 cxr cw 5/2023 cxr     . PNEUM   . COPD   . A fib  . RO CHF  . GI BLEED On 4/10/2024 was SOB (+)   . ANEMIA.   . WEIGHT LOSS     TIME SPENT.  . Over 55 minutes aggregate care time spent on encounter; activities included   direct patient care, counseling and/or coordinating care reviewing notes, lab data/ imaging , discussion with multidisciplinary team/ patient  /family and explaining in detail risks, benefits, alternatives  of the recommendations     PATIENT.  . PARTRIDGE ABRIL 78 m 4/10/2024 1945 DR GILL NIETO  
diverticulosis  anemia  gi bleed    hold a/c  monitor cbc  suspect diverticular bleed  transfuse as needed  if bleeding persist may need CT angio  will follow 
Anemia with equivocal fe studies  admitted with GIB and +FOBT  has not received PRBC since admission    Recommendations:  1.  follow CBC  2.  continue GI evaluation   3.  venofer 100mg IV daily x 3  4.  Consider CT abd/pelvis for evaluation of weight loss  5.  further heme recommendations pending above    discussed with Dr. Mansfield
tiana   upper gib gi evaluation  ashd - atrial fib -hold eliquis for gib  pvd - s/p angioplasty both legs  dm2  hypertension  dyslipidemia  pna- antibiotics as per pulmonary  copd  no angina - no chf -cardiac status stable low risk for upper gi endoscopy  discussed with daughter at bed side 4/10

## 2024-04-11 NOTE — PROGRESS NOTE ADULT - SUBJECTIVE AND OBJECTIVE BOX
CHIEF COMPLAINT/ REASON FOR VISIT  .. Patient was seen to address the  issue listed under PROBLEM LIST which is located toward bottom of this note     ABRIL POMPA    ENMANUELV 1EAS 106 D1    Allergies    No Known Allergies    Intolerances        PAST MEDICAL & SURGICAL HISTORY:  HTN (hypertension)      HLD (hyperlipidemia)      DM (diabetes mellitus)      COPD, moderate      CAD (coronary artery disease)      S/P primary angioplasty          FAMILY HISTORY:      Home Medications:  amiodarone 200 mg oral tablet: 1 tab(s) orally once a day (10 Apr 2024 14:50)  Anoro Ellipta 62.5 mcg-25 mcg/inh inhalation powder: 1 puff(s) inhaled once a day (10 Apr 2024 15:00)  atorvastatin 10 mg oral tablet: 1 tab(s) orally once a day (at bedtime) (10 Apr 2024 14:51)  cilostazol 100 mg oral tablet: 1 tab(s) orally 2 times a day (10 Apr 2024 15:00)  DilTIAZem (Eqv-Cardizem CD) 180 mg/24 hours oral capsule, extended release: 1 cap(s) orally once a day (10 Apr 2024 14:52)  Eliquis 5 mg oral tablet: 1 tab(s) orally 2 times a day (10 Apr 2024 14:59)  gabapentin 300 mg oral capsule: 1 cap(s) orally once a day (at bedtime) (10 Apr 2024 15:01)  Lasix 40 mg oral tablet: 1 tab(s) orally once a day (10 Apr 2024 15:00)  metFORMIN 500 mg oral tablet: 1 tab(s) orally 2 times a day (10 Apr 2024 15:03)  Milk of Magnesia 1200 mg/15 mL oral liquid: 30 milliliter(s) orally once a day (10 Apr 2024 15:04)  Potassium Chloride (Eqv-Klor-Con 10) 10 mEq oral tablet, extended release: 2 tab(s) orally once a day (10 Apr 2024 15:04)  senna (sennosides) 8.6 mg oral tablet: 1 tab(s) orally once a day (at bedtime) (10 Apr 2024 15:05)  spironolactone 50 mg oral tablet: 1 tab(s) orally once a day (10 Apr 2024 15:06)  tamsulosin 0.4 mg oral capsule: 1 cap(s) orally once a day (10 Apr 2024 15:08)  traMADol 50 mg oral tablet: 1 tab(s) orally every 12 hours (10 Apr 2024 15:10)      MEDICATIONS  (STANDING):  aMIOdarone    Tablet 200 milliGRAM(s) Oral daily  atorvastatin 10 milliGRAM(s) Oral at bedtime  azithromycin  IVPB 500 milliGRAM(s) IV Intermittent every 24 hours  budesonide 160 MICROgram(s)/formoterol 4.5 MICROgram(s) Inhaler 2 Puff(s) Inhalation two times a day  cefTRIAXone   IVPB      cefTRIAXone   IVPB 1000 milliGRAM(s) IV Intermittent every 24 hours  dextrose 10% Bolus 125 milliLiter(s) IV Bolus once  dextrose 5%. 1000 milliLiter(s) (50 mL/Hr) IV Continuous <Continuous>  dextrose 5%. 1000 milliLiter(s) (100 mL/Hr) IV Continuous <Continuous>  dextrose 50% Injectable 25 Gram(s) IV Push once  dextrose 50% Injectable 12.5 Gram(s) IV Push once  diltiazem    milliGRAM(s) Oral daily  gabapentin 300 milliGRAM(s) Oral at bedtime  glucagon  Injectable 1 milliGRAM(s) IntraMuscular once  insulin lispro (ADMELOG) corrective regimen sliding scale   SubCutaneous every 6 hours  lactobacillus acidophilus 1 Tablet(s) Oral every 12 hours  pantoprazole  Injectable 40 milliGRAM(s) IV Push every 12 hours  senna 2 Tablet(s) Oral at bedtime  sodium chloride 0.9%. 1000 milliLiter(s) (50 mL/Hr) IV Continuous <Continuous>  spironolactone 50 milliGRAM(s) Oral daily  tamsulosin 0.4 milliGRAM(s) Oral at bedtime  tiotropium 2.5 MICROgram(s) Inhaler 2 Puff(s) Inhalation daily    MEDICATIONS  (PRN):  acetaminophen     Tablet .. 650 milliGRAM(s) Oral every 6 hours PRN Temp greater or equal to 38C (100.4F), Mild Pain (1 - 3)  albuterol/ipratropium for Nebulization 3 milliLiter(s) Nebulizer every 6 hours PRN Shortness of Breath and/or Wheezing  aluminum hydroxide/magnesium hydroxide/simethicone Suspension 30 milliLiter(s) Oral every 4 hours PRN Dyspepsia  dextrose Oral Gel 15 Gram(s) Oral once PRN Blood Glucose LESS THAN 70 milliGRAM(s)/deciliter  guaiFENesin Oral Liquid (Sugar-Free) 200 milliGRAM(s) Oral every 6 hours PRN Cough  melatonin 3 milliGRAM(s) Oral at bedtime PRN Insomnia  ondansetron Injectable 4 milliGRAM(s) IV Push every 8 hours PRN Nausea and/or Vomiting  traMADol 50 milliGRAM(s) Oral every 12 hours PRN Moderate Pain (4 - 6)      Diet, NPO:   Except Medications (04-10-24 @ 14:57) [Active]          Vital Signs Last 24 Hrs  T(C): 36.5 (11 Apr 2024 04:57), Max: 37.1 (10 Apr 2024 12:16)  T(F): 97.7 (11 Apr 2024 04:57), Max: 98.7 (10 Apr 2024 12:16)  HR: 63 (11 Apr 2024 04:57) (63 - 100)  BP: 100/59 (11 Apr 2024 04:57) (96/59 - 115/74)  BP(mean): --  RR: 18 (11 Apr 2024 04:57) (16 - 18)  SpO2: 95% (11 Apr 2024 04:57) (92% - 96%)    Parameters below as of 11 Apr 2024 04:57  Patient On (Oxygen Delivery Method): room air          04-10-24 @ 07:01  -  04-11-24 @ 07:00  --------------------------------------------------------  IN: 550 mL / OUT: 0 mL / NET: 550 mL              LABS:                        7.9    6.79  )-----------( 391      ( 11 Apr 2024 05:55 )             24.6     04-11    141  |  107  |  20  ----------------------------<  119<H>  4.0   |  27  |  1.20    Ca    8.6      11 Apr 2024 05:55  Phos  3.2     04-11  Mg     2.2     04-11    TPro  5.7<L>  /  Alb  2.6<L>  /  TBili  0.4  /  DBili  x   /  AST  10<L>  /  ALT  15  /  AlkPhos  70  04-11    PT/INR - ( 11 Apr 2024 05:55 )   PT: 16.4 sec;   INR: 1.42 ratio         PTT - ( 10 Apr 2024 12:09 )  PTT:45.9 sec  Urinalysis Basic - ( 11 Apr 2024 05:55 )    Color: x / Appearance: x / SG: x / pH: x  Gluc: 119 mg/dL / Ketone: x  / Bili: x / Urobili: x   Blood: x / Protein: x / Nitrite: x   Leuk Esterase: x / RBC: x / WBC x   Sq Epi: x / Non Sq Epi: x / Bacteria: x            WBC:  WBC Count: 6.79 K/uL (04-11 @ 05:55)  WBC Count: 9.83 K/uL (04-10 @ 12:09)      MICROBIOLOGY:  RECENT CULTURES:              PT/INR - ( 11 Apr 2024 05:55 )   PT: 16.4 sec;   INR: 1.42 ratio         PTT - ( 10 Apr 2024 12:09 )  PTT:45.9 sec    Sodium:  Sodium: 141 mmol/L (04-11 @ 05:55)  Sodium: 139 mmol/L (04-10 @ 12:09)      1.20 mg/dL 04-11 @ 05:55  1.30 mg/dL 04-10 @ 12:09      Hemoglobin:  Hemoglobin: 7.9 g/dL (04-11 @ 05:55)  Hemoglobin: 7.9 g/dL (04-10 @ 21:18)  Hemoglobin: 9.1 g/dL (04-10 @ 12:09)      Platelets: Platelet Count - Automated: 391 K/uL (04-11 @ 05:55)  Platelet Count - Automated: 437 K/uL (04-10 @ 12:09)      LIVER FUNCTIONS - ( 11 Apr 2024 05:55 )  Alb: 2.6 g/dL / Pro: 5.7 g/dL / ALK PHOS: 70 U/L / ALT: 15 U/L / AST: 10 U/L / GGT: x             Urinalysis Basic - ( 11 Apr 2024 05:55 )    Color: x / Appearance: x / SG: x / pH: x  Gluc: 119 mg/dL / Ketone: x  / Bili: x / Urobili: x   Blood: x / Protein: x / Nitrite: x   Leuk Esterase: x / RBC: x / WBC x   Sq Epi: x / Non Sq Epi: x / Bacteria: x        RADIOLOGY & ADDITIONAL STUDIES:      MICROBIOLOGY:  RECENT CULTURES:

## 2024-04-11 NOTE — PROGRESS NOTE ADULT - ASSESSMENT
diverticulosis  anemia  gi bleed    hold a/c  monitor cbc  suspect diverticular bleed  transfuse as needed  if bleeding persist may need CT angio  will follow     I reviewed the overnight course of events on the unit, re-confirming the patient history. I discussed the care with the patient  Differential diagnosis and plan of care discussed with patient after the evaluation  35 minutes spent on total encounter of which more than fifty percent of the encounter was spent counseling and/or coordinating care by the attending physician.

## 2024-04-11 NOTE — CARE COORDINATION ASSESSMENT. - NSPASTMEDSURGHISTORY_GEN_ALL_CORE_FT
PAST MEDICAL & SURGICAL HISTORY:  COPD, moderate      DM (diabetes mellitus)      HLD (hyperlipidemia)      HTN (hypertension)      S/P primary angioplasty      CAD (coronary artery disease)

## 2024-04-11 NOTE — CARE COORDINATION ASSESSMENT. - NSCAREPROVIDERS_GEN_ALL_CORE_FT
CARE PROVIDERS:  Accepting Physician: Love Mansfield  Access Services: Indira Delatorre  Administration: Edouard Arias  Administration: Monserrat Dodge  Administration: Susan Lobo  Administration: Rell Harley  Admitting: Love Mansfield  Attending: Love Mansfield  Cardiology Technician: Indira Sarabia  Case Management: Asia Hess  Case Management: Nicci Roberts  Consultant: Kellie Munguia  Consultant: Weil, Patricia  Consultant: Conner Lambert  Consultant: Wojciech Collins  Consultant: Feli Diamond  Consultant: Bro Pierre  ED ACP: Arlen Colindres  ED Attending: Erica Breaux ED Nurse: Samantha Pascual  ED Nurse 2: Nae Carlson  Nurse: Honey Doll  Ordered: Honey Doll  Ordered: Doctor, Unknown  Ordered: ADM, User  Override: Sherrill Ellison  Override: Honey Doll  PCA/Nursing Assistant: Betty Toussaint  Physical Therapy: Yumi Ballard  Physical Therapy: Kathia Reid  Primary Team: Zoe Cantu  Registered Dietitian: Kayla Gomez  Registered Dietitian: Kavita Engel  : Arin Fatima  Student: Ilene Graham  Team: [facility], Team  Team: [facility], Team

## 2024-04-11 NOTE — DIETITIAN INITIAL EVALUATION ADULT - FACTORS AFF FOOD INTAKE
dx GI bleed suspect diverticular; recently recovering from norovirus, weakness/persistent lack of appetite/NPO

## 2024-04-11 NOTE — CARE COORDINATION ASSESSMENT. - NSDCPLANSERVICES_GEN_ALL_CORE
The [facility] will needs the [facility] attestation form Medications sent to [facility] phone number [phone]. They uses [facility] for home care./Anticipated Needs Unclear at Present

## 2024-04-11 NOTE — PHYSICAL THERAPY INITIAL EVALUATION ADULT - ADDITIONAL COMMENTS
Pt reports residing at the [place]. Pt reports receiving some help with ADLs and utilized a RW for ambulation. Pt and daughter reports pt was able to independently ambulate short distances with RW.

## 2024-04-11 NOTE — CONSULT NOTE ADULT - SUBJECTIVE AND OBJECTIVE BOX
Patient is a 78y old  Male who presents with a chief complaint of dark stools (11 Apr 2024 13:39)      HPI:  Patient is a 78-year-old male with past medical of hypertension, hyperlipidemia, diabetes, COPD, A-fib on Plavix presents with dark stool.  Patient noted for the past month he has had dark stool.  Patient reports his PCP at the [facility] has been following his hemoglobin which was noted to be low.  Patient reports he had left hand pain for the past 4 days described as stiffness.  Patient has not had a colonoscopy in years.  Patient notes a 20 pound weight loss over the past few months.  Patient recently recovered from norovirus.  Patient denies fever chest pain shortness of breath abdominal pain dysuria dizziness l.Found to have FOBT positive and seen by GI team Cardiology clearance for possible colonoscopy requested .Found to have pneumonia and started on abx , seen by pulm Palliative care consult requested ,to discuss advance directives and complete MOLST  (10 Apr 2024 15:27)      Asked to see patient for ID Consult    PAST MEDICAL & SURGICAL HISTORY:  HTN (hypertension)      HLD (hyperlipidemia)      DM (diabetes mellitus)      COPD, moderate      CAD (coronary artery disease)      S/P primary angioplasty          Allergies    No Known Allergies    Intolerances        REVIEW OF SYSTEMS:  All systems below were reviewed and are negative [  ]  HEENT:  ID:  Pulmonary:  Cardiac:  GI:  Renal:  Musculoskeletal:  All other systems above were reviewed and are negative   [  ]    HOME MEDICATIONS:    MEDICATIONS  (STANDING):  aMIOdarone    Tablet 200 milliGRAM(s) Oral daily  atorvastatin 10 milliGRAM(s) Oral at bedtime  azithromycin  IVPB 500 milliGRAM(s) IV Intermittent every 24 hours  budesonide 160 MICROgram(s)/formoterol 4.5 MICROgram(s) Inhaler 2 Puff(s) Inhalation two times a day  cefTRIAXone   IVPB      cefTRIAXone   IVPB 1000 milliGRAM(s) IV Intermittent every 24 hours  dextrose 10% Bolus 125 milliLiter(s) IV Bolus once  dextrose 5%. 1000 milliLiter(s) (50 mL/Hr) IV Continuous <Continuous>  dextrose 5%. 1000 milliLiter(s) (100 mL/Hr) IV Continuous <Continuous>  dextrose 50% Injectable 25 Gram(s) IV Push once  dextrose 50% Injectable 12.5 Gram(s) IV Push once  diltiazem    milliGRAM(s) Oral daily  gabapentin 300 milliGRAM(s) Oral at bedtime  glucagon  Injectable 1 milliGRAM(s) IntraMuscular once  insulin lispro (ADMELOG) corrective regimen sliding scale   SubCutaneous three times a day before meals  insulin lispro (ADMELOG) corrective regimen sliding scale   SubCutaneous at bedtime  iron sucrose Injectable 100 milliGRAM(s) IV Push every 24 hours  lactobacillus acidophilus 1 Tablet(s) Oral every 12 hours  pantoprazole  Injectable 40 milliGRAM(s) IV Push every 12 hours  senna 2 Tablet(s) Oral at bedtime  sodium chloride 0.9%. 1000 milliLiter(s) (50 mL/Hr) IV Continuous <Continuous>  spironolactone 50 milliGRAM(s) Oral daily  tamsulosin 0.4 milliGRAM(s) Oral at bedtime  tiotropium 2.5 MICROgram(s) Inhaler 2 Puff(s) Inhalation daily    MEDICATIONS  (PRN):  acetaminophen     Tablet .. 650 milliGRAM(s) Oral every 6 hours PRN Temp greater or equal to 38C (100.4F), Mild Pain (1 - 3)  albuterol/ipratropium for Nebulization 3 milliLiter(s) Nebulizer every 6 hours PRN Shortness of Breath and/or Wheezing  aluminum hydroxide/magnesium hydroxide/simethicone Suspension 30 milliLiter(s) Oral every 4 hours PRN Dyspepsia  dextrose Oral Gel 15 Gram(s) Oral once PRN Blood Glucose LESS THAN 70 milliGRAM(s)/deciliter  guaiFENesin Oral Liquid (Sugar-Free) 200 milliGRAM(s) Oral every 6 hours PRN Cough  melatonin 3 milliGRAM(s) Oral at bedtime PRN Insomnia  ondansetron Injectable 4 milliGRAM(s) IV Push every 8 hours PRN Nausea and/or Vomiting  traMADol 50 milliGRAM(s) Oral every 12 hours PRN Moderate Pain (4 - 6)      Vital Signs Last 24 Hrs  T(C): 36.7 (11 Apr 2024 12:35), Max: 36.7 (10 Apr 2024 21:24)  T(F): 98 (11 Apr 2024 12:35), Max: 98 (10 Apr 2024 21:24)  HR: 83 (11 Apr 2024 12:35) (63 - 83)  BP: 121/64 (11 Apr 2024 12:35) (96/59 - 121/64)  BP(mean): --  RR: 18 (11 Apr 2024 12:35) (17 - 18)  SpO2: 91% (11 Apr 2024 12:35) (91% - 95%)    Parameters below as of 11 Apr 2024 12:35  Patient On (Oxygen Delivery Method): room air        PHYSICAL EXAM:  HEENT:  Neck:  Lungs:  Heart:  Abdomen:  Genital/ Rectal:  Extremities:  Neurologic:  Vascular:    I&O's Summary    10 Apr 2024 07:01  -  11 Apr 2024 07:00  --------------------------------------------------------  IN: 600 mL / OUT: 0 mL / NET: 600 mL    11 Apr 2024 07:01  -  11 Apr 2024 19:43  --------------------------------------------------------  IN: 600 mL / OUT: 100 mL / NET: 500 mL        LABORATORY:                          7.9    6.79  )-----------( 391      ( 11 Apr 2024 05:55 )             24.6       ESR:                   04-11 @ 05:55  --    C-Reactive Protein:     04-11 @ 05:55  --    Procalcitonin:           04-11 @ 05:55   0.10      04-11    141  |  107  |  20  ----------------------------<  119<H>  4.0   |  27  |  1.20    Ca    8.6      11 Apr 2024 05:55  Phos  3.2     04-11  Mg     2.2     04-11    TPro  5.7<L>  /  Alb  2.6<L>  /  TBili  0.4  /  DBili  x   /  AST  10<L>  /  ALT  15  /  AlkPhos  70  04-11          LABORATORY:    CBC Full  -  ( 11 Apr 2024 05:55 )  WBC Count : 6.79 K/uL  RBC Count : 2.66 M/uL  Hemoglobin : 7.9 g/dL  Hematocrit : 24.6 %  Platelet Count - Automated : 391 K/uL  Mean Cell Volume : 92.5 fl  Mean Cell Hemoglobin : 29.7 pg  Mean Cell Hemoglobin Concentration : 32.1 gm/dL  Auto Neutrophil # : 4.82 K/uL  Auto Lymphocyte # : 1.13 K/uL  Auto Monocyte # : 0.59 K/uL  Auto Eosinophil # : 0.17 K/uL  Auto Basophil # : 0.04 K/uL  Auto Neutrophil % : 71.0 %  Auto Lymphocyte % : 16.6 %  Auto Monocyte % : 8.7 %  Auto Eosinophil % : 2.5 %  Auto Basophil % : 0.6 %        ESR:                   04-11 @ 05:55  --    C-Reactive Protein:     04-11 @ 05:55  --    Procalcitonin:           04-11 @ 05:55   0.10      04-11    141  |  107  |  20  ----------------------------<  119<H>  4.0   |  27  |  1.20    Ca    8.6      11 Apr 2024 05:55  Phos  3.2     04-11  Mg     2.2     04-11    TPro  5.7<L>  /  Alb  2.6<L>  /  TBili  0.4  /  DBili  x   /  AST  10<L>  /  ALT  15  /  AlkPhos  70  04-11      Assessment and Plan:    1. Lower GI bleed.  2. Atelectasis of lower lobes.    . Low suspicion for pneumonia. Discontinue all antibiotics.  . GI evaluation.  . Supportive care.    Thank you.                                                Patient is a 78y old  Male who presents with a chief complaint of dark stools (11 Apr 2024 13:39)      Patient is a 78-year-old male with past medical of hypertension, hyperlipidemia, diabetes, COPD, A-fib on Plavix presents with dark stool.  Patient noted for the past month he has had dark stool.  Patient reports his PCP at the [facility] has been following his hemoglobin which was noted to be low. He was admitted and has been seen by GI. CXR was suggestive of possible infiltrates of the lower lobes and he was placed on IV Rocephin and Zithromax. His chest CT was negative for infiltrates. he denied cough or SOB. He has been afebrile.           PAST MEDICAL & SURGICAL HISTORY:  HTN (hypertension)      HLD (hyperlipidemia)      DM (diabetes mellitus)      COPD, moderate      CAD (coronary artery disease)      S/P primary angioplasty          Allergies    No Known Allergies    Intolerances        REVIEW OF SYSTEMS:  No cough or SOB  No vomiting.    HOME MEDICATIONS:    MEDICATIONS  (STANDING):  aMIOdarone    Tablet 200 milliGRAM(s) Oral daily  atorvastatin 10 milliGRAM(s) Oral at bedtime  azithromycin  IVPB 500 milliGRAM(s) IV Intermittent every 24 hours  budesonide 160 MICROgram(s)/formoterol 4.5 MICROgram(s) Inhaler 2 Puff(s) Inhalation two times a day  cefTRIAXone   IVPB      cefTRIAXone   IVPB 1000 milliGRAM(s) IV Intermittent every 24 hours  dextrose 10% Bolus 125 milliLiter(s) IV Bolus once  dextrose 5%. 1000 milliLiter(s) (50 mL/Hr) IV Continuous <Continuous>  dextrose 5%. 1000 milliLiter(s) (100 mL/Hr) IV Continuous <Continuous>  dextrose 50% Injectable 25 Gram(s) IV Push once  dextrose 50% Injectable 12.5 Gram(s) IV Push once  diltiazem    milliGRAM(s) Oral daily  gabapentin 300 milliGRAM(s) Oral at bedtime  glucagon  Injectable 1 milliGRAM(s) IntraMuscular once  insulin lispro (ADMELOG) corrective regimen sliding scale   SubCutaneous three times a day before meals  insulin lispro (ADMELOG) corrective regimen sliding scale   SubCutaneous at bedtime  iron sucrose Injectable 100 milliGRAM(s) IV Push every 24 hours  lactobacillus acidophilus 1 Tablet(s) Oral every 12 hours  pantoprazole  Injectable 40 milliGRAM(s) IV Push every 12 hours  senna 2 Tablet(s) Oral at bedtime  sodium chloride 0.9%. 1000 milliLiter(s) (50 mL/Hr) IV Continuous <Continuous>  spironolactone 50 milliGRAM(s) Oral daily  tamsulosin 0.4 milliGRAM(s) Oral at bedtime  tiotropium 2.5 MICROgram(s) Inhaler 2 Puff(s) Inhalation daily    MEDICATIONS  (PRN):  acetaminophen     Tablet .. 650 milliGRAM(s) Oral every 6 hours PRN Temp greater or equal to 38C (100.4F), Mild Pain (1 - 3)  albuterol/ipratropium for Nebulization 3 milliLiter(s) Nebulizer every 6 hours PRN Shortness of Breath and/or Wheezing  aluminum hydroxide/magnesium hydroxide/simethicone Suspension 30 milliLiter(s) Oral every 4 hours PRN Dyspepsia  dextrose Oral Gel 15 Gram(s) Oral once PRN Blood Glucose LESS THAN 70 milliGRAM(s)/deciliter  guaiFENesin Oral Liquid (Sugar-Free) 200 milliGRAM(s) Oral every 6 hours PRN Cough  melatonin 3 milliGRAM(s) Oral at bedtime PRN Insomnia  ondansetron Injectable 4 milliGRAM(s) IV Push every 8 hours PRN Nausea and/or Vomiting  traMADol 50 milliGRAM(s) Oral every 12 hours PRN Moderate Pain (4 - 6)      Vital Signs Last 24 Hrs  T(C): 36.7 (11 Apr 2024 12:35), Max: 36.7 (10 Apr 2024 21:24)  T(F): 98 (11 Apr 2024 12:35), Max: 98 (10 Apr 2024 21:24)  HR: 83 (11 Apr 2024 12:35) (63 - 83)  BP: 121/64 (11 Apr 2024 12:35) (96/59 - 121/64)  BP(mean): --  RR: 18 (11 Apr 2024 12:35) (17 - 18)  SpO2: 91% (11 Apr 2024 12:35) (91% - 95%)    Parameters below as of 11 Apr 2024 12:35  Patient On (Oxygen Delivery Method): room air        PHYSICAL EXAM:  HEENT: NC/AT, PERRLA.   Neck: Soft, no masses.   Lungs: Coarse BS bilaterally, no wheezing.   Heart: RRR, no murmurs.   Abdomen: Soft, no tenderness. No masses.   Genital/ Rectal: No seo catheter.   Extremities: No ulcers.   Neurologic: Awake. No focal weakness.     I&O's Summary    10 Apr 2024 07:01  -  11 Apr 2024 07:00  --------------------------------------------------------  IN: 600 mL / OUT: 0 mL / NET: 600 mL    11 Apr 2024 07:01  -  11 Apr 2024 19:43  --------------------------------------------------------  IN: 600 mL / OUT: 100 mL / NET: 500 mL        LABORATORY:                          7.9    6.79  )-----------( 391      ( 11 Apr 2024 05:55 )             24.6       ESR:                   04-11 @ 05:55  --    C-Reactive Protein:     04-11 @ 05:55  --    Procalcitonin:           04-11 @ 05:55   0.10      04-11    141  |  107  |  20  ----------------------------<  119<H>  4.0   |  27  |  1.20    Ca    8.6      11 Apr 2024 05:55  Phos  3.2     04-11  Mg     2.2     04-11    TPro  5.7<L>  /  Alb  2.6<L>  /  TBili  0.4  /  DBili  x   /  AST  10<L>  /  ALT  15  /  AlkPhos  70  04-11          LABORATORY:    CBC Full  -  ( 11 Apr 2024 05:55 )  WBC Count : 6.79 K/uL  RBC Count : 2.66 M/uL  Hemoglobin : 7.9 g/dL  Hematocrit : 24.6 %  Platelet Count - Automated : 391 K/uL  Mean Cell Volume : 92.5 fl  Mean Cell Hemoglobin : 29.7 pg  Mean Cell Hemoglobin Concentration : 32.1 gm/dL  Auto Neutrophil # : 4.82 K/uL  Auto Lymphocyte # : 1.13 K/uL  Auto Monocyte # : 0.59 K/uL  Auto Eosinophil # : 0.17 K/uL  Auto Basophil # : 0.04 K/uL  Auto Neutrophil % : 71.0 %  Auto Lymphocyte % : 16.6 %  Auto Monocyte % : 8.7 %  Auto Eosinophil % : 2.5 %  Auto Basophil % : 0.6 %        ESR:                   04-11 @ 05:55  --    C-Reactive Protein:     04-11 @ 05:55  --    Procalcitonin:           04-11 @ 05:55   0.10      04-11    141  |  107  |  20  ----------------------------<  119<H>  4.0   |  27  |  1.20    Ca    8.6      11 Apr 2024 05:55  Phos  3.2     04-11  Mg     2.2     04-11    TPro  5.7<L>  /  Alb  2.6<L>  /  TBili  0.4  /  DBili  x   /  AST  10<L>  /  ALT  15  /  AlkPhos  70  04-11      Assessment and Plan:    1. Lower GI bleed.  2. Atelectasis of lower lobes.    . Low suspicion for pneumonia. Discontinue IV Rocephin and Zithromax. Monitor off antibiotics.  . GI evaluation.  . Supportive care.    Thank you.

## 2024-04-11 NOTE — DIETITIAN INITIAL EVALUATION ADULT - SIGNS/SYMPTOMS
as evidenced by GI bleed suspect diverticular, dark tarry stool, recent norovirus illness as evidenced by consuming <50% est energy needs > 5 days, ~15lb(7.5%) weight loss x 3 weeks

## 2024-04-11 NOTE — PROGRESS NOTE ADULT - SUBJECTIVE AND OBJECTIVE BOX
PROGRESS NOTE  Patient is a 78y old  Male who presents with a chief complaint of Pneumonitis due to inhalation of food or vomitus     (11 Apr 2024 10:44)    Chart and available morning labs /imaging are reviewed electronically , urgent issues addressed . More information  is being added upon completion of rounds , when more information is collected and management discussed with consultants , medical staff and social service/case management on the floor   OVERNIGHT      HPI:  Patient is a 78-year-old male with past medical of hypertension, hyperlipidemia, diabetes, COPD, A-fib on Plavix presents with dark stool.  Patient noted for the past month he has had dark stool.  Patient reports his PCP at the [facility] has been following his hemoglobin which was noted to be low.  Patient reports he had left hand pain for the past 4 days described as stiffness.  Patient has not had a colonoscopy in years.  Patient notes a 20 pound weight loss over the past few months.  Patient recently recovered from norovirus.  Patient denies fever chest pain shortness of breath abdominal pain dysuria dizziness l.Found to have FOBT positive and seen by GI team Cardiology clearance for possible colonoscopy requested .Found to have pneumonia and started on abx , seen by pulm Palliative care consult requested ,to discuss advance directives and complete MOLST  (10 Apr 2024 15:27)    PAST MEDICAL & SURGICAL HISTORY:  HTN (hypertension)      HLD (hyperlipidemia)      DM (diabetes mellitus)      COPD, moderate      CAD (coronary artery disease)      S/P primary angioplasty          MEDICATIONS  (STANDING):  aMIOdarone    Tablet 200 milliGRAM(s) Oral daily  atorvastatin 10 milliGRAM(s) Oral at bedtime  azithromycin  IVPB 500 milliGRAM(s) IV Intermittent every 24 hours  budesonide 160 MICROgram(s)/formoterol 4.5 MICROgram(s) Inhaler 2 Puff(s) Inhalation two times a day  cefTRIAXone   IVPB      cefTRIAXone   IVPB 1000 milliGRAM(s) IV Intermittent every 24 hours  dextrose 10% Bolus 125 milliLiter(s) IV Bolus once  dextrose 5%. 1000 milliLiter(s) (50 mL/Hr) IV Continuous <Continuous>  dextrose 5%. 1000 milliLiter(s) (100 mL/Hr) IV Continuous <Continuous>  dextrose 50% Injectable 25 Gram(s) IV Push once  dextrose 50% Injectable 12.5 Gram(s) IV Push once  diltiazem    milliGRAM(s) Oral daily  gabapentin 300 milliGRAM(s) Oral at bedtime  glucagon  Injectable 1 milliGRAM(s) IntraMuscular once  insulin lispro (ADMELOG) corrective regimen sliding scale   SubCutaneous every 6 hours  lactobacillus acidophilus 1 Tablet(s) Oral every 12 hours  pantoprazole  Injectable 40 milliGRAM(s) IV Push every 12 hours  senna 2 Tablet(s) Oral at bedtime  sodium chloride 0.9%. 1000 milliLiter(s) (50 mL/Hr) IV Continuous <Continuous>  spironolactone 50 milliGRAM(s) Oral daily  tamsulosin 0.4 milliGRAM(s) Oral at bedtime  tiotropium 2.5 MICROgram(s) Inhaler 2 Puff(s) Inhalation daily    MEDICATIONS  (PRN):  acetaminophen     Tablet .. 650 milliGRAM(s) Oral every 6 hours PRN Temp greater or equal to 38C (100.4F), Mild Pain (1 - 3)  albuterol/ipratropium for Nebulization 3 milliLiter(s) Nebulizer every 6 hours PRN Shortness of Breath and/or Wheezing  aluminum hydroxide/magnesium hydroxide/simethicone Suspension 30 milliLiter(s) Oral every 4 hours PRN Dyspepsia  dextrose Oral Gel 15 Gram(s) Oral once PRN Blood Glucose LESS THAN 70 milliGRAM(s)/deciliter  guaiFENesin Oral Liquid (Sugar-Free) 200 milliGRAM(s) Oral every 6 hours PRN Cough  melatonin 3 milliGRAM(s) Oral at bedtime PRN Insomnia  ondansetron Injectable 4 milliGRAM(s) IV Push every 8 hours PRN Nausea and/or Vomiting  traMADol 50 milliGRAM(s) Oral every 12 hours PRN Moderate Pain (4 - 6)      OBJECTIVE    T(C): 36.5 (04-11-24 @ 04:57), Max: 37.1 (04-10-24 @ 12:16)  HR: 63 (04-11-24 @ 04:57) (63 - 100)  BP: 100/59 (04-11-24 @ 04:57) (96/59 - 111/63)  RR: 18 (04-11-24 @ 04:57) (16 - 18)  SpO2: 95% (04-11-24 @ 04:57) (92% - 96%)  Wt(kg): --  I&O's Summary    10 Apr 2024 07:01  -  11 Apr 2024 07:00  --------------------------------------------------------  IN: 550 mL / OUT: 0 mL / NET: 550 mL          REVIEW OF SYSTEMS:  CONSTITUTIONAL: No fever, weight loss, or fatigue  EYES: No eye pain, visual disturbances, or discharge  ENMT:   No sinus or throat pain  NECK: No pain or stiffness  RESPIRATORY: No cough, wheezing, chills or hemoptysis; No shortness of breath  CARDIOVASCULAR: No chest pain, palpitations, dizziness, or leg swelling  GASTROINTESTINAL: No abdominal pain. No nausea, vomiting; No diarrhea or constipation. No melena or hematochezia.  GENITOURINARY: No dysuria, frequency, hematuria, or incontinence  NEUROLOGICAL: No headaches, memory loss, loss of strength, numbness, or tremors  SKIN: No itching, burning, rashes, or lesions   MUSCULOSKELETAL: No joint pain or swelling; No muscle, back, or extremity pain    PHYSICAL EXAM:  Appearance: NAD. VS past 24 hrs -as above   HEENT:   Moist oral mucosa. Conjunctiva clear b/l.   Neck : supple  Respiratory: Lungs CTAB.  Gastrointestinal:  Soft, nontender. No rebound. No rigidity. BS present	  Cardiovascular: RRR ,S1S2 present  Neurologic: Non-focal. Moving all extremities.  Extremities: No edema. No erythema. No calf tenderness.  Skin: No rashes, No ecchymoses, No cyanosis.	  wounds ,skin lesions-See skin assesment flow sheet   LABS:                        7.9    6.79  )-----------( 391      ( 11 Apr 2024 05:55 )             24.6     04-11    141  |  107  |  20  ----------------------------<  119<H>  4.0   |  27  |  1.20    Ca    8.6      11 Apr 2024 05:55  Phos  3.2     04-11  Mg     2.2     04-11    TPro  5.7<L>  /  Alb  2.6<L>  /  TBili  0.4  /  DBili  x   /  AST  10<L>  /  ALT  15  /  AlkPhos  70  04-11    CAPILLARY BLOOD GLUCOSE      POCT Blood Glucose.: 132 mg/dL (11 Apr 2024 06:22)  POCT Blood Glucose.: 110 mg/dL (11 Apr 2024 00:08)  POCT Blood Glucose.: 123 mg/dL (10 Apr 2024 19:00)    PT/INR - ( 11 Apr 2024 05:55 )   PT: 16.4 sec;   INR: 1.42 ratio         PTT - ( 10 Apr 2024 12:09 )  PTT:45.9 sec  Urinalysis Basic - ( 11 Apr 2024 05:55 )    Color: x / Appearance: x / SG: x / pH: x  Gluc: 119 mg/dL / Ketone: x  / Bili: x / Urobili: x   Blood: x / Protein: x / Nitrite: x   Leuk Esterase: x / RBC: x / WBC x   Sq Epi: x / Non Sq Epi: x / Bacteria: x        RADIOLOGY & ADDITIONAL TESTS:   reviewed elctronically  ASSESSMENT/PLAN: 	     PROGRESS NOTE  Patient is a 78y old  Male who presents with a chief complaint of Pneumonitis due to inhalation of food or vomitus     (11 Apr 2024 10:44)    Chart and available morning labs /imaging are reviewed electronically , urgent issues addressed . More information  is being added upon completion of rounds , when more information is collected and management discussed with consultants , medical staff and social service/case management on the floor   OVERNIGHT  No new issues reported by medical staff . All above noted Patient is resting in a bed comfortably .No complains  .No distress noted     HPI:  Patient is a 78-year-old male with past medical of hypertension, hyperlipidemia, diabetes, COPD, A-fib on Plavix presents with dark stool.  Patient noted for the past month he has had dark stool.  Patient reports his PCP at the [facility] has been following his hemoglobin which was noted to be low.  Patient reports he had left hand pain for the past 4 days described as stiffness.  Patient has not had a colonoscopy in years.  Patient notes a 20 pound weight loss over the past few months.  Patient recently recovered from norovirus.  Patient denies fever chest pain shortness of breath abdominal pain dysuria dizziness l.Found to have FOBT positive and seen by GI team Cardiology clearance for possible colonoscopy requested .Found to have pneumonia and started on abx , seen by pulm Palliative care consult requested ,to discuss advance directives and complete MOLST  (10 Apr 2024 15:27)    PAST MEDICAL & SURGICAL HISTORY:  HTN (hypertension)      HLD (hyperlipidemia)      DM (diabetes mellitus)      COPD, moderate      CAD (coronary artery disease)      S/P primary angioplasty          MEDICATIONS  (STANDING):  aMIOdarone    Tablet 200 milliGRAM(s) Oral daily  atorvastatin 10 milliGRAM(s) Oral at bedtime  azithromycin  IVPB 500 milliGRAM(s) IV Intermittent every 24 hours  budesonide 160 MICROgram(s)/formoterol 4.5 MICROgram(s) Inhaler 2 Puff(s) Inhalation two times a day  cefTRIAXone   IVPB      cefTRIAXone   IVPB 1000 milliGRAM(s) IV Intermittent every 24 hours  dextrose 10% Bolus 125 milliLiter(s) IV Bolus once  dextrose 5%. 1000 milliLiter(s) (50 mL/Hr) IV Continuous <Continuous>  dextrose 5%. 1000 milliLiter(s) (100 mL/Hr) IV Continuous <Continuous>  dextrose 50% Injectable 25 Gram(s) IV Push once  dextrose 50% Injectable 12.5 Gram(s) IV Push once  diltiazem    milliGRAM(s) Oral daily  gabapentin 300 milliGRAM(s) Oral at bedtime  glucagon  Injectable 1 milliGRAM(s) IntraMuscular once  insulin lispro (ADMELOG) corrective regimen sliding scale   SubCutaneous every 6 hours  lactobacillus acidophilus 1 Tablet(s) Oral every 12 hours  pantoprazole  Injectable 40 milliGRAM(s) IV Push every 12 hours  senna 2 Tablet(s) Oral at bedtime  sodium chloride 0.9%. 1000 milliLiter(s) (50 mL/Hr) IV Continuous <Continuous>  spironolactone 50 milliGRAM(s) Oral daily  tamsulosin 0.4 milliGRAM(s) Oral at bedtime  tiotropium 2.5 MICROgram(s) Inhaler 2 Puff(s) Inhalation daily    MEDICATIONS  (PRN):  acetaminophen     Tablet .. 650 milliGRAM(s) Oral every 6 hours PRN Temp greater or equal to 38C (100.4F), Mild Pain (1 - 3)  albuterol/ipratropium for Nebulization 3 milliLiter(s) Nebulizer every 6 hours PRN Shortness of Breath and/or Wheezing  aluminum hydroxide/magnesium hydroxide/simethicone Suspension 30 milliLiter(s) Oral every 4 hours PRN Dyspepsia  dextrose Oral Gel 15 Gram(s) Oral once PRN Blood Glucose LESS THAN 70 milliGRAM(s)/deciliter  guaiFENesin Oral Liquid (Sugar-Free) 200 milliGRAM(s) Oral every 6 hours PRN Cough  melatonin 3 milliGRAM(s) Oral at bedtime PRN Insomnia  ondansetron Injectable 4 milliGRAM(s) IV Push every 8 hours PRN Nausea and/or Vomiting  traMADol 50 milliGRAM(s) Oral every 12 hours PRN Moderate Pain (4 - 6)      OBJECTIVE    T(C): 36.5 (04-11-24 @ 04:57), Max: 37.1 (04-10-24 @ 12:16)  HR: 63 (04-11-24 @ 04:57) (63 - 100)  BP: 100/59 (04-11-24 @ 04:57) (96/59 - 111/63)  RR: 18 (04-11-24 @ 04:57) (16 - 18)  SpO2: 95% (04-11-24 @ 04:57) (92% - 96%)  Wt(kg): --  I&O's Summary    10 Apr 2024 07:01  -  11 Apr 2024 07:00  --------------------------------------------------------  IN: 550 mL / OUT: 0 mL / NET: 550 mL          REVIEW OF SYSTEMS:  CONSTITUTIONAL: No fever, weight loss, or fatigue  EYES: No eye pain, visual disturbances, or discharge  ENMT:   No sinus or throat pain  NECK: No pain or stiffness  RESPIRATORY: No cough, wheezing, chills or hemoptysis; No shortness of breath  CARDIOVASCULAR: No chest pain, palpitations, dizziness, or leg swelling  GASTROINTESTINAL: No abdominal pain. No nausea, vomiting; No diarrhea or constipation. No melena or hematochezia.  GENITOURINARY: No dysuria, frequency, hematuria, or incontinence  NEUROLOGICAL: No headaches, memory loss, loss of strength, numbness, or tremors  SKIN: No itching, burning, rashes, or lesions   MUSCULOSKELETAL: No joint pain or swelling; No muscle, back, or extremity pain    PHYSICAL EXAM:  Appearance: NAD. VS past 24 hrs -as above   HEENT:   Moist oral mucosa. Conjunctiva clear b/l.   Neck : supple  Respiratory: Lungs CTAB.  Gastrointestinal:  Soft, nontender. No rebound. No rigidity. BS present	  Cardiovascular: RRR ,S1S2 present  Neurologic: Non-focal. Moving all extremities.  Extremities: No edema. No erythema. No calf tenderness.  Skin: No rashes, No ecchymoses, No cyanosis.	  wounds ,skin lesions-See skin assesment flow sheet   LABS:                        7.9    6.79  )-----------( 391      ( 11 Apr 2024 05:55 )             24.6     04-11    141  |  107  |  20  ----------------------------<  119<H>  4.0   |  27  |  1.20    Ca    8.6      11 Apr 2024 05:55  Phos  3.2     04-11  Mg     2.2     04-11    TPro  5.7<L>  /  Alb  2.6<L>  /  TBili  0.4  /  DBili  x   /  AST  10<L>  /  ALT  15  /  AlkPhos  70  04-11    CAPILLARY BLOOD GLUCOSE      POCT Blood Glucose.: 132 mg/dL (11 Apr 2024 06:22)  POCT Blood Glucose.: 110 mg/dL (11 Apr 2024 00:08)  POCT Blood Glucose.: 123 mg/dL (10 Apr 2024 19:00)    PT/INR - ( 11 Apr 2024 05:55 )   PT: 16.4 sec;   INR: 1.42 ratio         PTT - ( 10 Apr 2024 12:09 )  PTT:45.9 sec  Urinalysis Basic - ( 11 Apr 2024 05:55 )    Color: x / Appearance: x / SG: x / pH: x  Gluc: 119 mg/dL / Ketone: x  / Bili: x / Urobili: x   Blood: x / Protein: x / Nitrite: x   Leuk Esterase: x / RBC: x / WBC x   Sq Epi: x / Non Sq Epi: x / Bacteria: x        RADIOLOGY & ADDITIONAL TESTS:   reviewed elctronically  ASSESSMENT/PLAN: 	  25 minutes aggregate time was spent on this visit, 50% visit time spent in care co-ordination with other attendings and counselling patient .I have discussed care plan with patient / HCP/family member ,who expressed understanding of problems treatment and their effect and side effects, alternatives in details. I have asked if they have any questions and concerns and appropriately addressed them to best of my ability.

## 2024-04-11 NOTE — CARE COORDINATION ASSESSMENT. - OTHER PERTINENT DISCHARGE PLANNING INFORMATION:
CM met with patient and (dtr) Krystal at bedside to explain role and transitions of care. Patient lives at [facility] .  Patient receives assistance from aides in facility. DMEs in home includes RW and WC. [facility] will need the [facility] attestation form Medications sent to [facility] phone number [phone]. They uses [facility] for home care.  Family will transport patient home when ready to be discharged.    CM explained  home care expectation, process, insurance provision and home safety with good understanding. CM to make referral. Patient  verbalized understanding of plans after discharge and is in agreement.  Resource folder left at bedside.  All questions answered to the best of my abilities.  CM remains available throughout the hospital stay.

## 2024-04-11 NOTE — PHYSICAL THERAPY INITIAL EVALUATION ADULT - GAIT DEVIATIONS NOTED, PT EVAL
decreased salome/increased time in double stance/decreased step length/decreased weight-shifting ability

## 2024-04-11 NOTE — DIETITIAN INITIAL EVALUATION ADULT - PROBLEM SELECTOR PLAN 2
serial cbc , transfuse prn pt with fever x2days, vomited once tonight. also c/o body aches. no cough/congestion. denies sick contacts

## 2024-04-11 NOTE — PROGRESS NOTE ADULT - ASSESSMENT
tiana   upper gib gi evaluation  ashd - atrial fib -hold eliquis for gib  pvd - s/p angioplasty both legs  dm2  hypertension  dyslipidemia  pna- antibiotics as per pulmonary  copd  no angina - no chf -cardiac status stable low risk for upper gi endoscopy  discussed with daughter at bed side 4/10  hb 7.9 4/11

## 2024-04-11 NOTE — CARE COORDINATION ASSESSMENT. - PRO ARRIVE FROM
[facility] will needs the [facility] attestation form Medications sent to [facility] phone number [phone]. They uses [facility] for home care./assisted living facility

## 2024-04-11 NOTE — PATIENT CHOICE NOTE. - NSPTCHOICESTATE_GEN_ALL_CORE

## 2024-04-11 NOTE — CASE MANAGEMENT PROGRESS NOTE - NSCMPROGRESSNOTE_GEN_ALL_CORE
Patient from [facility]. Attestation [facility] form to return to facility left in front of chart to be completed by provider. CM remains available and will continue to follow transition of care.

## 2024-04-11 NOTE — PHYSICAL THERAPY INITIAL EVALUATION ADULT - PERTINENT HX OF CURRENT PROBLEM, REHAB EVAL
Patient is a 78-year-old male with past medical of hypertension, hyperlipidemia, diabetes, COPD, A-fib on Plavix presents with dark stool.  Patient noted for the past month he has had dark stool.  Patient reports his PCP at the [facility] has been following his hemoglobin which was noted to be low.  Patient reports he had left hand pain for the past 4 days described as stiffness.  Patient has not had a colonoscopy in years.  Patient notes a 20 pound weight loss over the past few months.  Patient recently recovered from norovirus.  Patient denies fever chest pain shortness of breath abdominal pain dysuria dizziness.

## 2024-04-11 NOTE — PROGRESS NOTE ADULT - ASSESSMENT
REASON FOR VISIT  .. Management of problems listed below        REVIEW OF SYMPTOMS   Able to give ROS  Yes     RELIABILITY +/-   CONSTITUTIONAL Weakness Yes    ENDOCRINE  No heat or cold intolerance    ALLERGY No hives  Sore throat No stridor  RESP Shortness of breath YES   NEURO New weakness No   CARDIAC   Palpitations No         PHYSICAL EXAM    HEENT Unremarkable  atraumatic   RESP Fair air entry  Harsh breath sound   CARDIAC S1 S2 No S3     NO JVD    ABDOMEN No hepatosplenomegaly   PEDAL EDEMA present No calf tenderness  NO rash       GENERAL DATA .   GOC.    ..  4/11/2024 full code   ICU STAY.    .. no   COVID.   .. SCV2 4/10/2024 (-)   BEST PRACTICE ISSUES.    HOB ELEVATN.    .. Yes  DVT PPLX.   .. 4/10/2024 GI BLEED SO PHARMAC PPLX WAS WITHELD   DIET.   .. 4/11/2024 dash    ..  4/10/2024 NPO     ALLGY.   ..   NKA    WT.   ..    BMI.   ..     PROCEDURES.   ..   IV fl.   .. 4/10/2024 NS 50   BOLUS.   ..  STRESS ULCER PPLX.   .    ..   4/10/2024 PROTONIX 40     ABGS.   .  VS/ PO/IO/ VENT/ DRIPS.   4/11/2024 afeb 63 100/50   4/11/2024 ra 91%     . CC .   . 4/10/2024 Patient is from [facility] for low HB. C/O Blood in the stool.  . SUMMARY.     . 4/10/2024 78-year-old male with past medical of hypertension, hyperlipidemia, diabetes, COPD, A-fib on Plavix presents with dark stool.  Patient noted for the past month he has had dark stool.  Patient reports his PCP at the [facility] has been following his hemoglobin which was noted to be low.  Patient reports he had left hand pain for the past 4 days described as stiffness.  Patient has not had a colonoscopy in years.  Patient notes a 20 pound weight loss over the past few months.  Patient recently recovered from norovirus.  Patient denies fever chest pain shortness of breath abdominal pain dysuria dizziness  . BASELINE STATUS.   . [facility] pily   . HOME MEDS.   · pantoprazole 40 mg oral delayed release tablet: 40 milligram(s) orally 2 times a day  · metoprolol tartrate 25 mg oral tablet: 0.5 tab(s) orally every 12 hours  · folic acid 1 mg oral tablet: 1 tab(s) orally once a day  · Multiple Vitamins oral tablet: 1 tab(s) orally once a day  · polyethylene glycol 3350 oral powder for reconstitution: 17 gram(s) orally 2 times a day  · carbidopa-levodopa 25 mg-100 mg oral tablet: 1 tab(s) orally 3 times a day  · dilTIAZem 240 mg/24 hours oral capsule, extended release: 1 cap(s) orally once a day  · Lasix 40 mg oral tablet: 1 tab(s) orally once a day  · aspirin 81 mg oral tablet: 1 orally once a day  · atorvastatin 10 mg oral tablet: 1 orally once a day  · tamsulosin 0.4 mg oral capsule: 1 cap(s) orally once a day  · cilostazol 100 mg oral tablet: 1 tab(s) orally 2 times a day  · DULoxetine 60 mg oral delayed release capsule: 1 orally once a day  · Breo Ellipta 200 mcg-25 mcg/inh inhalation powder: 1 puff(s) inhaled once a day  · Anoro Ellipta 62.5 mcg-25 mcg/inh inhalation powder: 1 puff(s) inhaled once a day  . PMH.    . PROBLEMS AT PRESENTATION.    . ER MGMT.     . PATIENT DATA .    . ACTIVE ISSUES PLAN .  INFECTION.  . PNEUMONIA   .... w 4/10-4/11/2024 w 9.8 - 6.7   .... pr 4/11/2024 pr .1   .... CXR 4/10/2024 cw 5/8/2023   .... improvd basal infiltr  .... new right perihilar infiltra   .... ct ch 4/10 cw 4/24/2013   .... Tracheobronchial secretions   .... emphysema   .... left lower lobe calcified granuloma  .... scattered linear atelectasis scarring   .... Flu ab 4/10/2024 (-)   .... rsv 4/10/2024 (-)   .. A/R  .... Low susp for pneum  .... 4/10/2024 check ct ch  and procalc    .... 4/10/2024 Rocbette luna   .... 4/11/2024 will give 5 d abio and may change to po if dc plannd     . COPD   .... 4/10/2024 symbicort   .... 4/10/2024 spiriva     . LACTIC ACID STSTUS .  .... la 4/10/2024 la 1.4  .... monitor     . CAD.  .... 4/10/2024 ATORVASTAT 10     . PVD.  .... 4/10/2024 CILOSTAZOL 100.2     . A fib.  .... 4/10/2024 AMIODARPONE 200   .... 4/10/2024 CARDIZEM    .... 4/10/2024 apixaba held   .... cardio on case     . CHF   .... pbnp 4/11/2024 pbnp 236   .... 4/10/2024 SPIRONOLACTON 50   .... 4/10/2024 check echo     . ANEMIA.  .... Hb 4/10-4/11/2024 Hb 9.1- 7.9    .... INR 4/10/2024 INR 1.98   .... On protonix   .... monitor     . GI BLEED   .... 4/10/2024 Protonix 40   .... 4/10/2024 GI felt it is likely diverticular bleed and to get ct angio if bleed persists   .... 4/10/2024 apixaba held   ..... monitor Hb     . CONSTIPATION.  .... 4/10/2024 SENNA     RENAL.  .... Na 4/10/2024 na 139  .... K 4/10/2024 K 4.5   .... CO2 4/10/2024 co2 26   .... Cr 4/10-4/11/2024 Cr 1.3 - 1.2     . BPH.  .... 4/10/2024 TAMSULOSIN .4     . PAIN.  .... GABAPENTIN 300       . OVERALL.  . 4/10/2024 78-year-old male 1 ppd smoker quit 3 weeks not on home oxygen with past medical of hypertension, hyperlipidemia, diabetes, COPD, A-fib on Plavix presents with dark stool. Recnt 20 lb wt loss Pulm consulted at admission HO copd New right perihilar infiltr on 4/10 cxr cw 5/2023 cxr     . PNEUM cxr 4/9 new rml infiltr - 4/10 ct no consolidatn scattered linear atelectasis - 4/10 rocephin azithro   . COPD - 4/10 spiriva symbicort   . A fib- 4/10 amiodarone 200 cardizem - 4/10 apixa held   . RO CHF- 4/10 spirnolact continued   . GI BLEED On 4/10/2024 was SOB (+) - 4/10 GI felt diverticular bleed   . ANEMIA. Hb 4/10-4/11 Hb 9.1- 7.9    . WEIGHT LOSS     TIME SPENT.  . Over 36 minutes aggregate care time spent on encounter; activities included   direct patient care, counseling and/or coordinating care reviewing notes, lab data/ imaging , discussion with multidisciplinary team/ patient  /family and explaining in detail risks, benefits, alternatives  of the recommendations     PATIENT.  . PEÑA ABRIL 78 m 4/10/2024 1945 DR GILL NIETO

## 2024-04-11 NOTE — DIETITIAN INITIAL EVALUATION ADULT - OTHER INFO
Patient is a 78-year-old male with past medical of hypertension, hyperlipidemia, diabetes, COPD, A-fib on Plavix presents with dark stool. Dx upper gi bleed suspect diverticular.     Pt A+Ox4 with daughter at bedside. NPO. GI bleed on admission. Possible endoscopy. Dark stool x 1 mo pta, per pt no BM > 3 days. Prior to that dark/tarry. No report N/V. Pta from [facility]. Follows TORIBIO, NCS diet at [place]. Hx pre-diabetes. Hgba1c 5.8%(4/11). Pt recently dx norovirus (~3 weeks ago) and for past few weeks lack of appetite with decreased po intake. Diet upon entering [facility] ~225lbs with gradual intentional weight loss of 25lbs (following diet and portion control) however over past 3 weeks to 1 month significant unintentional weight loss of 10-15lbs.  Current weight 183.3lbs(4/11). Upon diet resumption pt agreeable to glucerna (chocolate) 8oz BID. Encourage po intake with emphasis on HBV protein sources. Recommend consistent carbohydrate diet with hs snack.

## 2024-04-12 ENCOUNTER — RESULT REVIEW (OUTPATIENT)
Age: 79
End: 2024-04-12

## 2024-04-12 DIAGNOSIS — N40.0 BENIGN PROSTATIC HYPERPLASIA WITHOUT LOWER URINARY TRACT SYMPTOMS: ICD-10-CM

## 2024-04-12 LAB
ANION GAP SERPL CALC-SCNC: 7 MMOL/L — SIGNIFICANT CHANGE UP (ref 5–17)
APTT BLD: 37.6 SEC — HIGH (ref 24.5–35.6)
BUN SERPL-MCNC: 16 MG/DL — SIGNIFICANT CHANGE UP (ref 7–23)
CALCIUM SERPL-MCNC: 8.6 MG/DL — SIGNIFICANT CHANGE UP (ref 8.5–10.1)
CHLORIDE SERPL-SCNC: 112 MMOL/L — HIGH (ref 96–108)
CO2 SERPL-SCNC: 25 MMOL/L — SIGNIFICANT CHANGE UP (ref 22–31)
CREAT SERPL-MCNC: 1.1 MG/DL — SIGNIFICANT CHANGE UP (ref 0.5–1.3)
EGFR: 69 ML/MIN/1.73M2 — SIGNIFICANT CHANGE UP
GLUCOSE SERPL-MCNC: 145 MG/DL — HIGH (ref 70–99)
HCT VFR BLD CALC: 26.7 % — LOW (ref 39–50)
HGB BLD-MCNC: 8.5 G/DL — LOW (ref 13–17)
INR BLD: 1.2 RATIO — HIGH (ref 0.85–1.18)
MCHC RBC-ENTMCNC: 29.3 PG — SIGNIFICANT CHANGE UP (ref 27–34)
MCHC RBC-ENTMCNC: 31.8 GM/DL — LOW (ref 32–36)
MCV RBC AUTO: 92.1 FL — SIGNIFICANT CHANGE UP (ref 80–100)
NRBC # BLD: 0 /100 WBCS — SIGNIFICANT CHANGE UP (ref 0–0)
PLATELET # BLD AUTO: 387 K/UL — SIGNIFICANT CHANGE UP (ref 150–400)
POTASSIUM SERPL-MCNC: 4.2 MMOL/L — SIGNIFICANT CHANGE UP (ref 3.5–5.3)
POTASSIUM SERPL-SCNC: 4.2 MMOL/L — SIGNIFICANT CHANGE UP (ref 3.5–5.3)
PROTHROM AB SERPL-ACNC: 14 SEC — HIGH (ref 9.5–13)
RBC # BLD: 2.9 M/UL — LOW (ref 4.2–5.8)
RBC # FLD: 15 % — HIGH (ref 10.3–14.5)
S PNEUM AG UR QL: NEGATIVE — SIGNIFICANT CHANGE UP
SODIUM SERPL-SCNC: 144 MMOL/L — SIGNIFICANT CHANGE UP (ref 135–145)
WBC # BLD: 7.26 K/UL — SIGNIFICANT CHANGE UP (ref 3.8–10.5)
WBC # FLD AUTO: 7.26 K/UL — SIGNIFICANT CHANGE UP (ref 3.8–10.5)

## 2024-04-12 PROCEDURE — 99231 SBSQ HOSP IP/OBS SF/LOW 25: CPT

## 2024-04-12 PROCEDURE — 74176 CT ABD & PELVIS W/O CONTRAST: CPT | Mod: 26

## 2024-04-12 PROCEDURE — 93306 TTE W/DOPPLER COMPLETE: CPT | Mod: 26

## 2024-04-12 RX ORDER — ASCORBIC ACID 60 MG
500 TABLET,CHEWABLE ORAL
Refills: 0 | Status: DISCONTINUED | OUTPATIENT
Start: 2024-04-12 | End: 2024-04-17

## 2024-04-12 RX ORDER — MULTIVIT WITH MIN/MFOLATE/K2 340-15/3 G
296 POWDER (GRAM) ORAL ONCE
Refills: 0 | Status: COMPLETED | OUTPATIENT
Start: 2024-04-12 | End: 2024-04-12

## 2024-04-12 RX ORDER — PANTOPRAZOLE SODIUM 20 MG/1
40 TABLET, DELAYED RELEASE ORAL
Refills: 0 | Status: DISCONTINUED | OUTPATIENT
Start: 2024-04-12 | End: 2024-04-17

## 2024-04-12 RX ADMIN — IRON SUCROSE 100 MILLIGRAM(S): 20 INJECTION, SOLUTION INTRAVENOUS at 17:45

## 2024-04-12 RX ADMIN — GABAPENTIN 300 MILLIGRAM(S): 400 CAPSULE ORAL at 22:28

## 2024-04-12 RX ADMIN — PANTOPRAZOLE SODIUM 40 MILLIGRAM(S): 20 TABLET, DELAYED RELEASE ORAL at 05:50

## 2024-04-12 RX ADMIN — ATORVASTATIN CALCIUM 10 MILLIGRAM(S): 80 TABLET, FILM COATED ORAL at 22:28

## 2024-04-12 RX ADMIN — AMIODARONE HYDROCHLORIDE 200 MILLIGRAM(S): 400 TABLET ORAL at 05:49

## 2024-04-12 RX ADMIN — Medication 1 TABLET(S): at 18:26

## 2024-04-12 RX ADMIN — TAMSULOSIN HYDROCHLORIDE 0.4 MILLIGRAM(S): 0.4 CAPSULE ORAL at 22:28

## 2024-04-12 RX ADMIN — Medication 296 MILLILITER(S): at 18:26

## 2024-04-12 RX ADMIN — SENNA PLUS 2 TABLET(S): 8.6 TABLET ORAL at 22:28

## 2024-04-12 RX ADMIN — Medication 1 TABLET(S): at 05:49

## 2024-04-12 RX ADMIN — Medication 500 MILLIGRAM(S): at 17:45

## 2024-04-12 RX ADMIN — PANTOPRAZOLE SODIUM 40 MILLIGRAM(S): 20 TABLET, DELAYED RELEASE ORAL at 17:44

## 2024-04-12 RX ADMIN — Medication 180 MILLIGRAM(S): at 05:49

## 2024-04-12 RX ADMIN — SPIRONOLACTONE 50 MILLIGRAM(S): 25 TABLET, FILM COATED ORAL at 05:49

## 2024-04-12 NOTE — PROGRESS NOTE ADULT - PROBLEM SELECTOR PLAN 1
likely diverticular - serial cbc , transfuse prn , Gi cons , ppi CT abd IMPRESSION:  No acute findings on this noncontrast study..  Colonic diverticulosis without acute diverticulitis.  Stable infrarenal abdominal aortic aneurysm likely diverticular - serial cbc , transfuse prn , Gi cons , ppi CT abd IMPRESSION:  No acute findings on this noncontrast study..  Colonic diverticulosis without acute diverticulitis.  Stable infrarenal abdominal aortic Since admission - Denies GIB ,h/h stable .   Spoke to GI team , as per Dr Collins recommendation -hold AC x 2 weeks

## 2024-04-12 NOTE — PROGRESS NOTE ADULT - ASSESSMENT
REASON FOR VISIT  .. Management of problems listed below        REVIEW OF SYMPTOMS   Able to give ROS  Yes     RELIABILITY +/-   CONSTITUTIONAL Weakness Yes    ENDOCRINE  No heat or cold intolerance    ALLERGY No hives  Sore throat No stridor  RESP Shortness of breath YES   NEURO New weakness No   CARDIAC   Palpitations No         PHYSICAL EXAM    HEENT Unremarkable  atraumatic   RESP Fair air entry  Harsh breath sound   CARDIAC S1 S2 No S3     NO JVD    ABDOMEN No hepatosplenomegaly   PEDAL EDEMA present No calf tenderness  NO rash     ABGS.   .  VS/ PO/IO/ VENT/ DRIPS.   4/12/2024 afeb 60 100/60   4/12/2024 ra 92%     . CC .   . 4/10/2024 Patient is from [facility] for low HB. C/O Blood in the stool.  . SUMMARY.     . 4/10/2024 78-year-old male with past medical of hypertension, hyperlipidemia, diabetes, COPD, A-fib on Plavix presents with dark stool.  Patient noted for the past month he has had dark stool.  Patient reports his PCP at the [facility] has been following his hemoglobin which was noted to be low.  Patient reports he had left hand pain for the past 4 days described as stiffness.  Patient has not had a colonoscopy in years.  Patient notes a 20 pound weight loss over the past few months.  Patient recently recovered from norovirus.  Patient denies fever chest pain shortness of breath abdominal pain dysuria dizziness  . BASELINE STATUS.   . [facility]   . HOME MEDS.   · pantoprazole 40 mg oral delayed release tablet: 40 milligram(s) orally 2 times a day  · metoprolol tartrate 25 mg oral tablet: 0.5 tab(s) orally every 12 hours  · folic acid 1 mg oral tablet: 1 tab(s) orally once a day  · Multiple Vitamins oral tablet: 1 tab(s) orally once a day  · polyethylene glycol 3350 oral powder for reconstitution: 17 gram(s) orally 2 times a day  · carbidopa-levodopa 25 mg-100 mg oral tablet: 1 tab(s) orally 3 times a day  · dilTIAZem 240 mg/24 hours oral capsule, extended release: 1 cap(s) orally once a day  · Lasix 40 mg oral tablet: 1 tab(s) orally once a day  · aspirin 81 mg oral tablet: 1 orally once a day  · atorvastatin 10 mg oral tablet: 1 orally once a day  · tamsulosin 0.4 mg oral capsule: 1 cap(s) orally once a day  · cilostazol 100 mg oral tablet: 1 tab(s) orally 2 times a day  · DULoxetine 60 mg oral delayed release capsule: 1 orally once a day  · Breo Ellipta 200 mcg-25 mcg/inh inhalation powder: 1 puff(s) inhaled once a day  · Anoro Ellipta 62.5 mcg-25 mcg/inh inhalation powder: 1 puff(s) inhaled once a day  . PMH.    . PROBLEMS AT PRESENTATION.    . ER MGMT.     . PATIENT DATA .    . ACTIVE ISSUES PLAN .  INFECTION.  . PNEUMONIA   .... w 4/10-4/11/2024 w 9.8 - 6.7   .... pr 4/11/2024 pr .1   .... CXR 4/10/2024 cw 5/8/2023   .... improvd basal infiltr  .... new right perihilar infiltra   .... ct ch 4/10 cw 4/24/2013   .... Tracheobronchial secretions   .... emphysema   .... left lower lobe calcified granuloma  .... scattered linear atelectasis scarring   .... Flu ab 4/10/2024 (-)   .... strep pneu uag 4/12/2024 (-)   .... rsv 4/10/2024 (-)   .... bc 4/10 (-)   .. A/R  .... Low susp for pneum  .... 4/10/2024 check ct   and procalc    .... 4/10-4/11/2024 Rocephin azithro   .... 4/11/2024 will give 5 d abio and may change to po if dc plannd   .... 4/11 abio dced after dw id     . COPD   .... 4/10/2024 symbicort   .... 4/10/2024 spiriva     . LACTIC ACID STSTUS .  .... la 4/10/2024 la 1.4  .... monitor     . CAD.  .... 4/10/2024 ATORVASTAT 10     . PVD.  .... 4/10/2024 CILOSTAZOL 100.2     . A fib.  .... 4/10/2024 AMIODARPONE 200   .... 4/10/2024 CARDIZEM    .... 4/10/2024 apixaba held   .... cardio on case     . CHF   .... pbnp 4/11/2024 pbnp 236   .... 4/10/2024 SPIRONOLACTON 50   .... 4/10/2024 check echo     . ANEMIA.  .... Hb 4/10-4/11-4/12/2024 Hb 9.1- 7.9  - 8.5   .... INR 4/10/2024 INR 1.98   .... On protonix   .... monitor     . GI BLEED   .... 4/10/2024 Protonix 40   .... 4/10/2024 GI felt it is likely diverticular bleed and to get ct angio if bleed persists   .... 4/10/2024 apixaba held   .... 4/12/2024 ctap diverticulosis no ac findings   ..... monitor Hb     . CONSTIPATION.  .... 4/10/2024 SENNA     RENAL.  .... Na 4/10/2024 na 139  .... K 4/10/2024 K 4.5   .... CO2 4/10/2024 co2 26   .... Cr 4/10-4/11/2024 Cr 1.3 - 1.2     . BPH.  .... 4/10/2024 TAMSULOSIN .4     . PAIN.  .... GABAPENTIN 300       . OVERALL.  . 4/10/2024 78-year-old male 1 ppd smoker quit 3 weeks not on home oxygen with past medical of hypertension, hyperlipidemia, diabetes, COPD, A-fib on Plavix presents with dark stool. Recnt 20 lb wt loss Pulm consulted at admission HO copd New right perihilar infiltr on 4/10 cxr cw 5/2023 cxr     . PNEUM cxr 4/9 new rml infiltr - 4/10 ct no consolidatn scattered linear atelectasis - 4/10-4/11 rocephin azithro Off abio  . COPD - 4/10 spiriva symbicort   . A fib- 4/10 amiodarone 200 cardizem - 4/10 apixa held   . RO CHF- 4/10 spirnolact continued   . GI BLEED On 4/10/2024 was SOB (+) - 4/10 GI felt diverticular bleed   . ANEMIA. Hb 4/10-4/11-4/12 Hb 9.1- 7.9- 8.5     . WEIGHT LOSS     TIME SPENT.  . Over 36 minutes aggregate care time spent on encounter; activities included   direct patient care, counseling and/or coordinating care reviewing notes, lab data/ imaging , discussion with multidisciplinary team/ patient  /family and explaining in detail risks, benefits, alternatives  of the recommendations     PATIENT.  . PEÑA SAMUELS 78 m 4/10/2024 1945 DR GILL NIETO

## 2024-04-12 NOTE — PROGRESS NOTE ADULT - ASSESSMENT
Patient is a 78-year-old male with past medical of hypertension, hyperlipidemia, diabetes, COPD, A-fib on Plavix presents with dark stool.  Patient noted for the past month he has had dark stool.  Patient reports his PCP at the [facility] has been following his hemoglobin which was noted to be low.  Patient reports he had left hand pain for the past 4 days described as stiffness.  Patient has not had a colonoscopy in years.  Patient notes a 20 pound weight loss over the past few months.  Patient recently recovered from norovirus.  Patient denies fever chest pain shortness of breath abdominal pain dysuria dizziness l.Found to have FOBT positive and seen by GI team Cardiology clearance for possible colonoscopy requested .Found to have pneumonia and started on abx , seen by pulm Palliative care consult requested ,to discuss advance directives and complete MOLST

## 2024-04-12 NOTE — PROGRESS NOTE ADULT - SUBJECTIVE AND OBJECTIVE BOX
CHIEF COMPLAINT/ REASON FOR VISIT  .. Patient was seen to address the  issue listed under PROBLEM LIST which is located toward bottom of this note     ABRIL POMPA    ENMANUELV 1EAS 106 D1    Allergies    No Known Allergies    Intolerances        PAST MEDICAL & SURGICAL HISTORY:  HTN (hypertension)      HLD (hyperlipidemia)      DM (diabetes mellitus)      COPD, moderate      CAD (coronary artery disease)      S/P primary angioplasty          FAMILY HISTORY:      Home Medications:  amiodarone 200 mg oral tablet: 1 tab(s) orally once a day (10 Apr 2024 14:50)  Anoro Ellipta 62.5 mcg-25 mcg/inh inhalation powder: 1 puff(s) inhaled once a day (10 Apr 2024 15:00)  atorvastatin 10 mg oral tablet: 1 tab(s) orally once a day (at bedtime) (10 Apr 2024 14:51)  cilostazol 100 mg oral tablet: 1 tab(s) orally 2 times a day (10 Apr 2024 15:00)  DilTIAZem (Eqv-Cardizem CD) 180 mg/24 hours oral capsule, extended release: 1 cap(s) orally once a day (10 Apr 2024 14:52)  Eliquis 5 mg oral tablet: 1 tab(s) orally 2 times a day (10 Apr 2024 14:59)  gabapentin 300 mg oral capsule: 1 cap(s) orally once a day (at bedtime) (10 Apr 2024 15:01)  Lasix 40 mg oral tablet: 1 tab(s) orally once a day (10 Apr 2024 15:00)  metFORMIN 500 mg oral tablet: 1 tab(s) orally 2 times a day (10 Apr 2024 15:03)  Milk of Magnesia 1200 mg/15 mL oral liquid: 30 milliliter(s) orally once a day (10 Apr 2024 15:04)  Potassium Chloride (Eqv-Klor-Con 10) 10 mEq oral tablet, extended release: 2 tab(s) orally once a day (10 Apr 2024 15:04)  senna (sennosides) 8.6 mg oral tablet: 1 tab(s) orally once a day (at bedtime) (10 Apr 2024 15:05)  spironolactone 50 mg oral tablet: 1 tab(s) orally once a day (10 Apr 2024 15:06)  tamsulosin 0.4 mg oral capsule: 1 cap(s) orally once a day (10 Apr 2024 15:08)  traMADol 50 mg oral tablet: 1 tab(s) orally every 12 hours (10 Apr 2024 15:10)      MEDICATIONS  (STANDING):  aMIOdarone    Tablet 200 milliGRAM(s) Oral daily  atorvastatin 10 milliGRAM(s) Oral at bedtime  budesonide 160 MICROgram(s)/formoterol 4.5 MICROgram(s) Inhaler 2 Puff(s) Inhalation two times a day  dextrose 10% Bolus 125 milliLiter(s) IV Bolus once  dextrose 5%. 1000 milliLiter(s) (50 mL/Hr) IV Continuous <Continuous>  dextrose 5%. 1000 milliLiter(s) (100 mL/Hr) IV Continuous <Continuous>  dextrose 50% Injectable 25 Gram(s) IV Push once  dextrose 50% Injectable 12.5 Gram(s) IV Push once  diltiazem    milliGRAM(s) Oral daily  gabapentin 300 milliGRAM(s) Oral at bedtime  glucagon  Injectable 1 milliGRAM(s) IntraMuscular once  insulin lispro (ADMELOG) corrective regimen sliding scale   SubCutaneous at bedtime  insulin lispro (ADMELOG) corrective regimen sliding scale   SubCutaneous three times a day before meals  iron sucrose Injectable 100 milliGRAM(s) IV Push every 24 hours  lactobacillus acidophilus 1 Tablet(s) Oral every 12 hours  pantoprazole  Injectable 40 milliGRAM(s) IV Push every 12 hours  senna 2 Tablet(s) Oral at bedtime  sodium chloride 0.9%. 1000 milliLiter(s) (50 mL/Hr) IV Continuous <Continuous>  spironolactone 50 milliGRAM(s) Oral daily  tamsulosin 0.4 milliGRAM(s) Oral at bedtime  tiotropium 2.5 MICROgram(s) Inhaler 2 Puff(s) Inhalation daily    MEDICATIONS  (PRN):  acetaminophen     Tablet .. 650 milliGRAM(s) Oral every 6 hours PRN Temp greater or equal to 38C (100.4F), Mild Pain (1 - 3)  albuterol/ipratropium for Nebulization 3 milliLiter(s) Nebulizer every 6 hours PRN Shortness of Breath and/or Wheezing  aluminum hydroxide/magnesium hydroxide/simethicone Suspension 30 milliLiter(s) Oral every 4 hours PRN Dyspepsia  dextrose Oral Gel 15 Gram(s) Oral once PRN Blood Glucose LESS THAN 70 milliGRAM(s)/deciliter  guaiFENesin Oral Liquid (Sugar-Free) 200 milliGRAM(s) Oral every 6 hours PRN Cough  melatonin 3 milliGRAM(s) Oral at bedtime PRN Insomnia  ondansetron Injectable 4 milliGRAM(s) IV Push every 8 hours PRN Nausea and/or Vomiting  traMADol 50 milliGRAM(s) Oral every 12 hours PRN Moderate Pain (4 - 6)      Diet, NPO:   NPO for Procedure/Test     NPO Start Date: 11-Apr-2024,   NPO Start Time: 15:00 (04-11-24 @ 14:53) [Active]  Diet, DASH/TLC:   Sodium & Cholesterol Restricted (04-11-24 @ 12:06) [Active]          Vital Signs Last 24 Hrs  T(C): 36.7 (12 Apr 2024 04:59), Max: 36.7 (11 Apr 2024 12:35)  T(F): 98.1 (12 Apr 2024 04:59), Max: 98.1 (12 Apr 2024 04:59)  HR: 68 (12 Apr 2024 04:59) (62 - 83)  BP: 103/63 (12 Apr 2024 04:59) (103/63 - 121/64)  BP(mean): --  RR: 17 (12 Apr 2024 04:59) (17 - 18)  SpO2: 92% (12 Apr 2024 04:59) (91% - 93%)    Parameters below as of 12 Apr 2024 04:59  Patient On (Oxygen Delivery Method): room air          04-10-24 @ 07:01  -  04-11-24 @ 07:00  --------------------------------------------------------  IN: 600 mL / OUT: 0 mL / NET: 600 mL    04-11-24 @ 07:01  -  04-12-24 @ 05:58  --------------------------------------------------------  IN: 600 mL / OUT: 800 mL / NET: -200 mL              LABS:                        7.9    6.79  )-----------( 391      ( 11 Apr 2024 05:55 )             24.6     04-11    141  |  107  |  20  ----------------------------<  119<H>  4.0   |  27  |  1.20    Ca    8.6      11 Apr 2024 05:55  Phos  3.2     04-11  Mg     2.2     04-11    TPro  5.7<L>  /  Alb  2.6<L>  /  TBili  0.4  /  DBili  x   /  AST  10<L>  /  ALT  15  /  AlkPhos  70  04-11    PT/INR - ( 11 Apr 2024 05:55 )   PT: 16.4 sec;   INR: 1.42 ratio         PTT - ( 10 Apr 2024 12:09 )  PTT:45.9 sec  Urinalysis Basic - ( 11 Apr 2024 05:55 )    Color: x / Appearance: x / SG: x / pH: x  Gluc: 119 mg/dL / Ketone: x  / Bili: x / Urobili: x   Blood: x / Protein: x / Nitrite: x   Leuk Esterase: x / RBC: x / WBC x   Sq Epi: x / Non Sq Epi: x / Bacteria: x            WBC:  WBC Count: 6.79 K/uL (04-11 @ 05:55)  WBC Count: 9.83 K/uL (04-10 @ 12:09)      MICROBIOLOGY:  RECENT CULTURES:  04-10 .Blood Blood-Peripheral XXXX XXXX   No growth at 24 hours    04-10 .Blood Blood-Peripheral XXXX XXXX   No growth at 24 hours                PT/INR - ( 11 Apr 2024 05:55 )   PT: 16.4 sec;   INR: 1.42 ratio         PTT - ( 10 Apr 2024 12:09 )  PTT:45.9 sec    Sodium:  Sodium: 141 mmol/L (04-11 @ 05:55)  Sodium: 139 mmol/L (04-10 @ 12:09)      1.20 mg/dL 04-11 @ 05:55  1.30 mg/dL 04-10 @ 12:09      Hemoglobin:  Hemoglobin: 7.9 g/dL (04-11 @ 05:55)  Hemoglobin: 7.9 g/dL (04-10 @ 21:18)  Hemoglobin: 9.1 g/dL (04-10 @ 12:09)      Platelets: Platelet Count - Automated: 391 K/uL (04-11 @ 05:55)  Platelet Count - Automated: 437 K/uL (04-10 @ 12:09)      LIVER FUNCTIONS - ( 11 Apr 2024 05:55 )  Alb: 2.6 g/dL / Pro: 5.7 g/dL / ALK PHOS: 70 U/L / ALT: 15 U/L / AST: 10 U/L / GGT: x             Urinalysis Basic - ( 11 Apr 2024 05:55 )    Color: x / Appearance: x / SG: x / pH: x  Gluc: 119 mg/dL / Ketone: x  / Bili: x / Urobili: x   Blood: x / Protein: x / Nitrite: x   Leuk Esterase: x / RBC: x / WBC x   Sq Epi: x / Non Sq Epi: x / Bacteria: x        RADIOLOGY & ADDITIONAL STUDIES:      MICROBIOLOGY:  RECENT CULTURES:  04-10 .Blood Blood-Peripheral XXXX XXXX   No growth at 24 hours    04-10 .Blood Blood-Peripheral XXXX XXXX   No growth at 24 hours

## 2024-04-12 NOTE — CASE MANAGEMENT PROGRESS NOTE - NSCMPROGRESSNOTE_GEN_ALL_CORE
Patient has been identified as a CMS STAR patient.  TCM yellow card explained and given to patient. CM remains available.

## 2024-04-12 NOTE — PROGRESS NOTE ADULT - SUBJECTIVE AND OBJECTIVE BOX
PROGRESS NOTE  Patient is a 78y old  Male who presents with a chief complaint of dark stools (12 Apr 2024 10:45)    Chart and available morning labs /imaging are reviewed electronically , urgent issues addressed . More information  is being added upon completion of rounds , when more information is collected and management discussed with consultants , medical staff and social service/case management on the floor   OVERNIGHT      HPI:  Patient is a 78-year-old male with past medical of hypertension, hyperlipidemia, diabetes, COPD, A-fib on Plavix presents with dark stool.  Patient noted for the past month he has had dark stool.  Patient reports his PCP at the [facility] has been following his hemoglobin which was noted to be low.  Patient reports he had left hand pain for the past 4 days described as stiffness.  Patient has not had a colonoscopy in years.  Patient notes a 20 pound weight loss over the past few months.  Patient recently recovered from norovirus.  Patient denies fever chest pain shortness of breath abdominal pain dysuria dizziness l.Found to have FOBT positive and seen by GI team Cardiology clearance for possible colonoscopy requested .Found to have pneumonia and started on abx , seen by pulm Palliative care consult requested ,to discuss advance directives and complete MOLST  (10 Apr 2024 15:27)    PAST MEDICAL & SURGICAL HISTORY:  HTN (hypertension)      HLD (hyperlipidemia)      DM (diabetes mellitus)      COPD, moderate      CAD (coronary artery disease)      S/P primary angioplasty          MEDICATIONS  (STANDING):  aMIOdarone    Tablet 200 milliGRAM(s) Oral daily  atorvastatin 10 milliGRAM(s) Oral at bedtime  budesonide 160 MICROgram(s)/formoterol 4.5 MICROgram(s) Inhaler 2 Puff(s) Inhalation two times a day  dextrose 10% Bolus 125 milliLiter(s) IV Bolus once  dextrose 5%. 1000 milliLiter(s) (50 mL/Hr) IV Continuous <Continuous>  dextrose 5%. 1000 milliLiter(s) (100 mL/Hr) IV Continuous <Continuous>  dextrose 50% Injectable 25 Gram(s) IV Push once  dextrose 50% Injectable 12.5 Gram(s) IV Push once  diltiazem    milliGRAM(s) Oral daily  gabapentin 300 milliGRAM(s) Oral at bedtime  glucagon  Injectable 1 milliGRAM(s) IntraMuscular once  insulin lispro (ADMELOG) corrective regimen sliding scale   SubCutaneous three times a day before meals  insulin lispro (ADMELOG) corrective regimen sliding scale   SubCutaneous at bedtime  iron sucrose Injectable 100 milliGRAM(s) IV Push every 24 hours  lactobacillus acidophilus 1 Tablet(s) Oral every 12 hours  pantoprazole  Injectable 40 milliGRAM(s) IV Push every 12 hours  senna 2 Tablet(s) Oral at bedtime  sodium chloride 0.9%. 1000 milliLiter(s) (50 mL/Hr) IV Continuous <Continuous>  spironolactone 50 milliGRAM(s) Oral daily  tamsulosin 0.4 milliGRAM(s) Oral at bedtime  tiotropium 2.5 MICROgram(s) Inhaler 2 Puff(s) Inhalation daily    MEDICATIONS  (PRN):  acetaminophen     Tablet .. 650 milliGRAM(s) Oral every 6 hours PRN Temp greater or equal to 38C (100.4F), Mild Pain (1 - 3)  albuterol/ipratropium for Nebulization 3 milliLiter(s) Nebulizer every 6 hours PRN Shortness of Breath and/or Wheezing  aluminum hydroxide/magnesium hydroxide/simethicone Suspension 30 milliLiter(s) Oral every 4 hours PRN Dyspepsia  dextrose Oral Gel 15 Gram(s) Oral once PRN Blood Glucose LESS THAN 70 milliGRAM(s)/deciliter  guaiFENesin Oral Liquid (Sugar-Free) 200 milliGRAM(s) Oral every 6 hours PRN Cough  melatonin 3 milliGRAM(s) Oral at bedtime PRN Insomnia  ondansetron Injectable 4 milliGRAM(s) IV Push every 8 hours PRN Nausea and/or Vomiting  traMADol 50 milliGRAM(s) Oral every 12 hours PRN Moderate Pain (4 - 6)      OBJECTIVE    T(C): 36.7 (04-12-24 @ 04:59), Max: 36.7 (04-12-24 @ 04:59)  HR: 68 (04-12-24 @ 04:59) (62 - 68)  BP: 103/63 (04-12-24 @ 04:59) (103/63 - 105/65)  RR: 17 (04-12-24 @ 04:59) (17 - 18)  SpO2: 92% (04-12-24 @ 04:59) (92% - 93%)  Wt(kg): --  I&O's Summary    11 Apr 2024 07:01  -  12 Apr 2024 07:00  --------------------------------------------------------  IN: 600 mL / OUT: 1100 mL / NET: -500 mL          REVIEW OF SYSTEMS:  CONSTITUTIONAL: No fever, weight loss, or fatigue  EYES: No eye pain, visual disturbances, or discharge  ENMT:   No sinus or throat pain  NECK: No pain or stiffness  RESPIRATORY: No cough, wheezing, chills or hemoptysis; No shortness of breath  CARDIOVASCULAR: No chest pain, palpitations, dizziness, or leg swelling  GASTROINTESTINAL: No abdominal pain. No nausea, vomiting; No diarrhea or constipation. No melena or hematochezia.  GENITOURINARY: No dysuria, frequency, hematuria, or incontinence  NEUROLOGICAL: No headaches, memory loss, loss of strength, numbness, or tremors  SKIN: No itching, burning, rashes, or lesions   MUSCULOSKELETAL: No joint pain or swelling; No muscle, back, or extremity pain    PHYSICAL EXAM:  Appearance: NAD. VS past 24 hrs -as above   HEENT:   Moist oral mucosa. Conjunctiva clear b/l.   Neck : supple  Respiratory: Lungs CTAB.  Gastrointestinal:  Soft, nontender. No rebound. No rigidity. BS present	  Cardiovascular: RRR ,S1S2 present  Neurologic: Non-focal. Moving all extremities.  Extremities: No edema. No erythema. No calf tenderness.  Skin: No rashes, No ecchymoses, No cyanosis.	  wounds ,skin lesions-See skin assesment flow sheet   LABS:                        8.5    7.26  )-----------( 387      ( 12 Apr 2024 06:16 )             26.7     04-12    144  |  112<H>  |  16  ----------------------------<  145<H>  4.2   |  25  |  1.10    Ca    8.6      12 Apr 2024 06:16  Phos  3.2     04-11  Mg     2.2     04-11    TPro  5.7<L>  /  Alb  2.6<L>  /  TBili  0.4  /  DBili  x   /  AST  10<L>  /  ALT  15  /  AlkPhos  70  04-11    CAPILLARY BLOOD GLUCOSE      POCT Blood Glucose.: 155 mg/dL (12 Apr 2024 11:42)  POCT Blood Glucose.: 140 mg/dL (12 Apr 2024 07:38)  POCT Blood Glucose.: 126 mg/dL (11 Apr 2024 17:22)    PT/INR - ( 12 Apr 2024 06:16 )   PT: 14.0 sec;   INR: 1.20 ratio         PTT - ( 12 Apr 2024 06:16 )  PTT:37.6 sec  Urinalysis Basic - ( 12 Apr 2024 06:16 )    Color: x / Appearance: x / SG: x / pH: x  Gluc: 145 mg/dL / Ketone: x  / Bili: x / Urobili: x   Blood: x / Protein: x / Nitrite: x   Leuk Esterase: x / RBC: x / WBC x   Sq Epi: x / Non Sq Epi: x / Bacteria: x        Culture - Blood (collected 10 Apr 2024 13:30)  Source: .Blood Blood-Peripheral  Preliminary Report (11 Apr 2024 20:01):    No growth at 24 hours    Culture - Blood (collected 10 Apr 2024 13:15)  Source: .Blood Blood-Peripheral  Preliminary Report (11 Apr 2024 20:01):    No growth at 24 hours      RADIOLOGY & ADDITIONAL TESTS:< from: CT Abdomen and Pelvis No Cont (04.12.24 @ 08:25) >  Complications: None reported at time of study completion    PROCEDURE:  CT of the Abdomen and Pelvis was performed.  Sagittal and coronal reformats were performed.    FINDINGS:  Limited by lack of any exogenous oral or intravenous contrast.    LOWER CHEST: Left lower lobe calcified nodule similar to prior likely   granuloma.. Fibroatelectatic changes left lower lobe and trace left   pleural effusion. Coronary artery calcification.    LIVER: Hepatic cysts is similar to prior.  BILE DUCTS: Normal caliber.  GALLBLADDER: Within normal limits.  SPLEEN: Calcified granuloma.  PANCREAS: Within normal limits.  ADRENALS: Within normal limits.  KIDNEYS/URETERS: Too small to characterize hypodensity left kidney.    BLADDER: Within normal limits.  REPRODUCTIVE ORGANS: Enlarged prostate    BOWEL: No bowel obstruction. Colonic diverticulosis. Large colonic stool   burden.. Appendix  PERITONEUM: No ascites.  VESSELS: Stable 3.8 cm fusiform infrarenal abdominal aortic aneurysm..  RETROPERITONEUM/LYMPH NODES: No lymphadenopathy.  ABDOMINAL WALL: Mild widening bilateral inguinal ring with fat. Tiny   fat-containing umbilical hernia  BONES: Degenerative changes .  IMPRESSION:  No acute findings on this noncontrast study..  Colonic diverticulosis without acute diverticulitis.  Stable infrarenal abdominal aortic aneurysm.  Additional findings as discussed  < end of copied text >   reviewed elctronically  25 minutes aggregate time was spent on this visit, 50% visit time spent in care co-ordination with other attendings and counselling patient .I have discussed care plan with patient / HCP/family member ,who expressed understanding of problems treatment and their effect and side effects, alternatives in details. I have asked if they have any questions and concerns and appropriately addressed them to best of my ability.  PROGRESS NOTE  Patient is a 78y old  Male who presents with a chief complaint of dark stools (12 Apr 2024 10:45)  Chart and available morning labs /imaging are reviewed electronically , urgent issues addressed . More information  is being added upon completion of rounds , when more information is collected and management discussed with consultants , medical staff and social service/case management on the floor   OVERNIGHT  No new issues reported by medical staff . All above noted Patient is resting in a bed comfortably .Denies GIB ,h/h stable  .No distress noted   Spoke to GI team , as per Dr Collins recommendation -hold AC x 2 weeks   HPI:  Patient is a 78-year-old male with past medical of hypertension, hyperlipidemia, diabetes, COPD, A-fib on Plavix presents with dark stool.  Patient noted for the past month he has had dark stool.  Patient reports his PCP at the [facility] has been following his hemoglobin which was noted to be low.  Patient reports he had left hand pain for the past 4 days described as stiffness.  Patient has not had a colonoscopy in years.  Patient notes a 20 pound weight loss over the past few months.  Patient recently recovered from norovirus.  Patient denies fever chest pain shortness of breath abdominal pain dysuria dizziness l.Found to have FOBT positive and seen by GI team Cardiology clearance for possible colonoscopy requested .Found to have pneumonia and started on abx , seen by pulm Palliative care consult requested ,to discuss advance directives and complete MOLST  (10 Apr 2024 15:27)  PAST MEDICAL & SURGICAL HISTORY:  HTN (hypertension)      HLD (hyperlipidemia)      DM (diabetes mellitus)      COPD, moderate      CAD (coronary artery disease)      S/P primary angioplasty          MEDICATIONS  (STANDING):  aMIOdarone    Tablet 200 milliGRAM(s) Oral daily  atorvastatin 10 milliGRAM(s) Oral at bedtime  budesonide 160 MICROgram(s)/formoterol 4.5 MICROgram(s) Inhaler 2 Puff(s) Inhalation two times a day  dextrose 10% Bolus 125 milliLiter(s) IV Bolus once  dextrose 5%. 1000 milliLiter(s) (50 mL/Hr) IV Continuous <Continuous>  dextrose 5%. 1000 milliLiter(s) (100 mL/Hr) IV Continuous <Continuous>  dextrose 50% Injectable 25 Gram(s) IV Push once  dextrose 50% Injectable 12.5 Gram(s) IV Push once  diltiazem    milliGRAM(s) Oral daily  gabapentin 300 milliGRAM(s) Oral at bedtime  glucagon  Injectable 1 milliGRAM(s) IntraMuscular once  insulin lispro (ADMELOG) corrective regimen sliding scale   SubCutaneous three times a day before meals  insulin lispro (ADMELOG) corrective regimen sliding scale   SubCutaneous at bedtime  iron sucrose Injectable 100 milliGRAM(s) IV Push every 24 hours  lactobacillus acidophilus 1 Tablet(s) Oral every 12 hours  pantoprazole  Injectable 40 milliGRAM(s) IV Push every 12 hours  senna 2 Tablet(s) Oral at bedtime  sodium chloride 0.9%. 1000 milliLiter(s) (50 mL/Hr) IV Continuous <Continuous>  spironolactone 50 milliGRAM(s) Oral daily  tamsulosin 0.4 milliGRAM(s) Oral at bedtime  tiotropium 2.5 MICROgram(s) Inhaler 2 Puff(s) Inhalation daily    MEDICATIONS  (PRN):  acetaminophen     Tablet .. 650 milliGRAM(s) Oral every 6 hours PRN Temp greater or equal to 38C (100.4F), Mild Pain (1 - 3)  albuterol/ipratropium for Nebulization 3 milliLiter(s) Nebulizer every 6 hours PRN Shortness of Breath and/or Wheezing  aluminum hydroxide/magnesium hydroxide/simethicone Suspension 30 milliLiter(s) Oral every 4 hours PRN Dyspepsia  dextrose Oral Gel 15 Gram(s) Oral once PRN Blood Glucose LESS THAN 70 milliGRAM(s)/deciliter  guaiFENesin Oral Liquid (Sugar-Free) 200 milliGRAM(s) Oral every 6 hours PRN Cough  melatonin 3 milliGRAM(s) Oral at bedtime PRN Insomnia  ondansetron Injectable 4 milliGRAM(s) IV Push every 8 hours PRN Nausea and/or Vomiting  traMADol 50 milliGRAM(s) Oral every 12 hours PRN Moderate Pain (4 - 6)      OBJECTIVE    T(C): 36.7 (04-12-24 @ 04:59), Max: 36.7 (04-12-24 @ 04:59)  HR: 68 (04-12-24 @ 04:59) (62 - 68)  BP: 103/63 (04-12-24 @ 04:59) (103/63 - 105/65)  RR: 17 (04-12-24 @ 04:59) (17 - 18)  SpO2: 92% (04-12-24 @ 04:59) (92% - 93%)  Wt(kg): --  I&O's Summary    11 Apr 2024 07:01  -  12 Apr 2024 07:00  --------------------------------------------------------  IN: 600 mL / OUT: 1100 mL / NET: -500 mL          REVIEW OF SYSTEMS:  CONSTITUTIONAL: No fever, weight loss, or fatigue  EYES: No eye pain, visual disturbances, or discharge  ENMT:   No sinus or throat pain  NECK: No pain or stiffness  RESPIRATORY: No cough, wheezing, chills or hemoptysis; No shortness of breath  CARDIOVASCULAR: No chest pain, palpitations, dizziness, or leg swelling  GASTROINTESTINAL: No abdominal pain. No nausea, vomiting; No diarrhea or constipation. No melena or hematochezia.  GENITOURINARY: No dysuria, frequency, hematuria, or incontinence  NEUROLOGICAL: No headaches, memory loss, loss of strength, numbness, or tremors  SKIN: No itching, burning, rashes, or lesions   MUSCULOSKELETAL: No joint pain or swelling; No muscle, back, or extremity pain    PHYSICAL EXAM:  Appearance: NAD. VS past 24 hrs -as above   HEENT:   Moist oral mucosa. Conjunctiva clear b/l.   Neck : supple  Respiratory: Lungs CTAB.  Gastrointestinal:  Soft, nontender. No rebound. No rigidity. BS present	  Cardiovascular: RRR ,S1S2 present  Neurologic: Non-focal. Moving all extremities.  Extremities: No edema. No erythema. No calf tenderness.  Skin: No rashes, No ecchymoses, No cyanosis.	  wounds ,skin lesions-See skin assesment flow sheet   LABS:                        8.5    7.26  )-----------( 387      ( 12 Apr 2024 06:16 )             26.7     04-12    144  |  112<H>  |  16  ----------------------------<  145<H>  4.2   |  25  |  1.10    Ca    8.6      12 Apr 2024 06:16  Phos  3.2     04-11  Mg     2.2     04-11    TPro  5.7<L>  /  Alb  2.6<L>  /  TBili  0.4  /  DBili  x   /  AST  10<L>  /  ALT  15  /  AlkPhos  70  04-11    CAPILLARY BLOOD GLUCOSE      POCT Blood Glucose.: 155 mg/dL (12 Apr 2024 11:42)  POCT Blood Glucose.: 140 mg/dL (12 Apr 2024 07:38)  POCT Blood Glucose.: 126 mg/dL (11 Apr 2024 17:22)    PT/INR - ( 12 Apr 2024 06:16 )   PT: 14.0 sec;   INR: 1.20 ratio         PTT - ( 12 Apr 2024 06:16 )  PTT:37.6 sec  Urinalysis Basic - ( 12 Apr 2024 06:16 )    Color: x / Appearance: x / SG: x / pH: x  Gluc: 145 mg/dL / Ketone: x  / Bili: x / Urobili: x   Blood: x / Protein: x / Nitrite: x   Leuk Esterase: x / RBC: x / WBC x   Sq Epi: x / Non Sq Epi: x / Bacteria: x        Culture - Blood (collected 10 Apr 2024 13:30)  Source: .Blood Blood-Peripheral  Preliminary Report (11 Apr 2024 20:01):    No growth at 24 hours    Culture - Blood (collected 10 Apr 2024 13:15)  Source: .Blood Blood-Peripheral  Preliminary Report (11 Apr 2024 20:01):    No growth at 24 hours      RADIOLOGY & ADDITIONAL TESTS:< from: CT Abdomen and Pelvis No Cont (04.12.24 @ 08:25) >  Complications: None reported at time of study completion    PROCEDURE:  CT of the Abdomen and Pelvis was performed.  Sagittal and coronal reformats were performed.    FINDINGS:  Limited by lack of any exogenous oral or intravenous contrast.    LOWER CHEST: Left lower lobe calcified nodule similar to prior likely   granuloma.. Fibroatelectatic changes left lower lobe and trace left   pleural effusion. Coronary artery calcification.    LIVER: Hepatic cysts is similar to prior.  BILE DUCTS: Normal caliber.  GALLBLADDER: Within normal limits.  SPLEEN: Calcified granuloma.  PANCREAS: Within normal limits.  ADRENALS: Within normal limits.  KIDNEYS/URETERS: Too small to characterize hypodensity left kidney.    BLADDER: Within normal limits.  REPRODUCTIVE ORGANS: Enlarged prostate    BOWEL: No bowel obstruction. Colonic diverticulosis. Large colonic stool   burden.. Appendix  PERITONEUM: No ascites.  VESSELS: Stable 3.8 cm fusiform infrarenal abdominal aortic aneurysm..  RETROPERITONEUM/LYMPH NODES: No lymphadenopathy.  ABDOMINAL WALL: Mild widening bilateral inguinal ring with fat. Tiny   fat-containing umbilical hernia  BONES: Degenerative changes .  IMPRESSION:  No acute findings on this noncontrast study..  Colonic diverticulosis without acute diverticulitis.  Stable infrarenal abdominal aortic aneurysm.  Additional findings as discussed  < end of copied text >   reviewed elctronically  25 minutes aggregate time was spent on this visit, 50% visit time spent in care co-ordination with other attendings and counselling patient .I have discussed care plan with patient / HCP/family member ,who expressed understanding of problems treatment and their effect and side effects, alternatives in details. I have asked if they have any questions and concerns and appropriately addressed them to best of my ability.

## 2024-04-12 NOTE — PROGRESS NOTE ADULT - ASSESSMENT
diverticulosis  anemia  gi bleed    hold a/c  monitor cbc  suspect diverticular bleed  transfuse as needed  CT noted; no acute abnormalities  consider marinol if poor appetite  will follow     I reviewed the overnight course of events on the unit, re-confirming the patient history. I discussed the care with the patient  Differential diagnosis and plan of care discussed with patient after the evaluation  35 minutes spent on total encounter of which more than fifty percent of the encounter was spent counseling and/or coordinating care by the attending physician.

## 2024-04-12 NOTE — GOALS OF CARE CONVERSATION - ADVANCED CARE PLANNING - CONVERSATION DETAILS
Palliative care SW met with patient at bedside. Reviewed patient's medical and social history as well as events leading to patient's hospitalization. Writer discussed patient's current diagnosis (GIB, Anemia due to acute blood loss, PNA, HLD, DM, COPD, CAD, HTN), medical condition and management. Patient has a HCP and POA on chart. Patient's son Guzman is listed as primary health care agent and daughter Krystal as alternate agent. Patient stated that he has not discussed his wishes with his children. SW encouraged patient to do so. Patient showed insight into medical condition. All questions answered. Psychosocial support provided.

## 2024-04-12 NOTE — CASE MANAGEMENT PROGRESS NOTE - NSCMPROGRESSNOTE_GEN_ALL_CORE
Patient discussed during rounds and remains acute. Patient presented with H/H-8.5/26.7. Patient pending CT abdomen and Echo. CM will continue to collaborate with interdisciplinary team and remain available to assist.

## 2024-04-12 NOTE — PROGRESS NOTE ADULT - SUBJECTIVE AND OBJECTIVE BOX
Patient is a 78y Male with a known history of :  GIB (gastrointestinal bleeding) [K92.2]    Anemia due to acute blood loss [D62]    HTN (hypertension) [I10]    HLD (hyperlipidemia) [E78.5]    DM (diabetes mellitus) [E11.9]    COPD, moderate [J44.9]    CAD (coronary artery disease) [I25.10]    Prophylactic measure [Z29.9]    Pneumonia [J18.9]      HPI:  Patient is a 78-year-old male with past medical of hypertension, hyperlipidemia, diabetes, COPD, A-fib on Plavix presents with dark stool.  Patient noted for the past month he has had dark stool.  Patient reports his PCP at the [facility] has been following his hemoglobin which was noted to be low.  Patient reports he had left hand pain for the past 4 days described as stiffness.  Patient has not had a colonoscopy in years.  Patient notes a 20 pound weight loss over the past few months.  Patient recently recovered from norovirus.  Patient denies fever chest pain shortness of breath abdominal pain dysuria dizziness l.Found to have FOBT positive and seen by GI team Cardiology clearance for possible colonoscopy requested .Found to have pneumonia and started on abx , seen by pulm Palliative care consult requested ,to discuss advance directives and complete MOLST  (10 Apr 2024 15:27)      REVIEW OF SYSTEMS:    CONSTITUTIONAL: No fever, weight loss, or fatigue  EYES: No eye pain, visual disturbances, or discharge  ENMT:  No difficulty hearing, tinnitus, vertigo; No sinus or throat pain  NECK: No pain or stiffness  BREASTS: No pain, masses, or nipple discharge  RESPIRATORY: No cough, wheezing, chills or hemoptysis; No shortness of breath  CARDIOVASCULAR: No chest pain, palpitations, dizziness, or leg swelling  GASTROINTESTINAL: No abdominal or epigastric pain. No nausea, vomiting, or hematemesis; No diarrhea or constipation. No melena or hematochezia.  GENITOURINARY: No dysuria, frequency, hematuria, or incontinence  NEUROLOGICAL: No headaches, memory loss, loss of strength, numbness, or tremors  SKIN: No itching, burning, rashes, or lesions   LYMPH NODES: No enlarged glands  ENDOCRINE: No heat or cold intolerance; No hair loss  MUSCULOSKELETAL: No joint pain or swelling; No muscle, back, or extremity pain  PSYCHIATRIC: No depression, anxiety, mood swings, or difficulty sleeping  HEME/LYMPH: No easy bruising, or bleeding gums  ALLERGY AND IMMUNOLOGIC: No hives or eczema    MEDICATIONS  (STANDING):  aMIOdarone    Tablet 200 milliGRAM(s) Oral daily  atorvastatin 10 milliGRAM(s) Oral at bedtime  budesonide 160 MICROgram(s)/formoterol 4.5 MICROgram(s) Inhaler 2 Puff(s) Inhalation two times a day  dextrose 10% Bolus 125 milliLiter(s) IV Bolus once  dextrose 5%. 1000 milliLiter(s) (50 mL/Hr) IV Continuous <Continuous>  dextrose 5%. 1000 milliLiter(s) (100 mL/Hr) IV Continuous <Continuous>  dextrose 50% Injectable 25 Gram(s) IV Push once  dextrose 50% Injectable 12.5 Gram(s) IV Push once  diltiazem    milliGRAM(s) Oral daily  gabapentin 300 milliGRAM(s) Oral at bedtime  glucagon  Injectable 1 milliGRAM(s) IntraMuscular once  insulin lispro (ADMELOG) corrective regimen sliding scale   SubCutaneous at bedtime  insulin lispro (ADMELOG) corrective regimen sliding scale   SubCutaneous three times a day before meals  iron sucrose Injectable 100 milliGRAM(s) IV Push every 24 hours  lactobacillus acidophilus 1 Tablet(s) Oral every 12 hours  pantoprazole  Injectable 40 milliGRAM(s) IV Push every 12 hours  senna 2 Tablet(s) Oral at bedtime  sodium chloride 0.9%. 1000 milliLiter(s) (50 mL/Hr) IV Continuous <Continuous>  spironolactone 50 milliGRAM(s) Oral daily  tamsulosin 0.4 milliGRAM(s) Oral at bedtime  tiotropium 2.5 MICROgram(s) Inhaler 2 Puff(s) Inhalation daily    MEDICATIONS  (PRN):  acetaminophen     Tablet .. 650 milliGRAM(s) Oral every 6 hours PRN Temp greater or equal to 38C (100.4F), Mild Pain (1 - 3)  albuterol/ipratropium for Nebulization 3 milliLiter(s) Nebulizer every 6 hours PRN Shortness of Breath and/or Wheezing  aluminum hydroxide/magnesium hydroxide/simethicone Suspension 30 milliLiter(s) Oral every 4 hours PRN Dyspepsia  dextrose Oral Gel 15 Gram(s) Oral once PRN Blood Glucose LESS THAN 70 milliGRAM(s)/deciliter  guaiFENesin Oral Liquid (Sugar-Free) 200 milliGRAM(s) Oral every 6 hours PRN Cough  melatonin 3 milliGRAM(s) Oral at bedtime PRN Insomnia  ondansetron Injectable 4 milliGRAM(s) IV Push every 8 hours PRN Nausea and/or Vomiting  traMADol 50 milliGRAM(s) Oral every 12 hours PRN Moderate Pain (4 - 6)      ALLERGIES: No Known Allergies      FAMILY HISTORY:      PHYSICAL EXAMINATION:  -----------------------------  T(C): 36.7 (04-12-24 @ 04:59), Max: 36.7 (04-11-24 @ 12:35)  HR: 68 (04-12-24 @ 04:59) (62 - 83)  BP: 103/63 (04-12-24 @ 04:59) (103/63 - 121/64)  RR: 17 (04-12-24 @ 04:59) (17 - 18)  SpO2: 92% (04-12-24 @ 04:59) (91% - 93%)  Wt(kg): --    04-11 @ 07:01  -  04-12 @ 07:00  --------------------------------------------------------  IN:    sodium chloride 0.9%: 600 mL  Total IN: 600 mL    OUT:    Voided (mL): 1100 mL  Total OUT: 1100 mL    Total NET: -500 mL            VITALS  T(C): 36.7 (04-12-24 @ 04:59), Max: 36.7 (04-11-24 @ 12:35)  HR: 68 (04-12-24 @ 04:59) (62 - 83)  BP: 103/63 (04-12-24 @ 04:59) (103/63 - 121/64)  RR: 17 (04-12-24 @ 04:59) (17 - 18)  SpO2: 92% (04-12-24 @ 04:59) (91% - 93%)    Constitutional: well developed, normal appearance, well groomed, well nourished, no deformities and no acute distress.   Eyes: the conjunctiva exhibited no abnormalities and the eyelids demonstrated no xanthelasmas.   HEENT: normal oral mucosa, no oral pallor and no oral cyanosis.   Neck: normal jugular venous A waves present, normal jugular venous V waves present and no jugular venous esqueda A waves.   Pulmonary: no respiratory distress, normal respiratory rhythm and effort, no accessory muscle use and lungs were clear to auscultation bilaterally.   Cardiovascular: heart rate and rhythm were normal, normal S1 and S2 and no murmur, gallop, rub, heave or thrill are present.   Abdomen: soft, non-tender, no hepato-splenomegaly and no abdominal mass palpated.   Musculoskeletal: the gait could not be assessed..   Extremities: no clubbing of the fingernails, no localized cyanosis, no petechial hemorrhages and no ischemic changes.   Skin: normal skin color and pigmentation, no rash, no venous stasis, no skin lesions, no skin ulcer and no xanthoma was observed.   Psychiatric: oriented to person, place, and time, the affect was normal, the mood was normal and not feeling anxious.     LABS:   --------  04-12    144  |  112<H>  |  16  ----------------------------<  145<H>  4.2   |  25  |  1.10    Ca    8.6      12 Apr 2024 06:16  Phos  3.2     04-11  Mg     2.2     04-11    TPro  5.7<L>  /  Alb  2.6<L>  /  TBili  0.4  /  DBili  x   /  AST  10<L>  /  ALT  15  /  AlkPhos  70  04-11                         8.5    7.26  )-----------( 387      ( 12 Apr 2024 06:16 )             26.7     PT/INR - ( 12 Apr 2024 06:16 )   PT: 14.0 sec;   INR: 1.20 ratio         PTT - ( 12 Apr 2024 06:16 )  PTT:37.6 sec        Culture Results:   No growth at 24 hours (04-10 @ 13:30)  Culture Results:   No growth at 24 hours (04-10 @ 13:15)      RADIOLOGY:  -----------------    ECG:     ECHO:

## 2024-04-12 NOTE — PROGRESS NOTE ADULT - SUBJECTIVE AND OBJECTIVE BOX
[facility]  Sammy Munguia PA-C  [address] [place] [address]  [phone]      INTERVAL HPI/OVERNIGHT EVENTS:  Pt s/e  Hgb stable  CT noted d/w pt  Pt reports low appetite but otherwise no GI complaints    MEDICATIONS  (STANDING):  aMIOdarone    Tablet 200 milliGRAM(s) Oral daily  atorvastatin 10 milliGRAM(s) Oral at bedtime  budesonide 160 MICROgram(s)/formoterol 4.5 MICROgram(s) Inhaler 2 Puff(s) Inhalation two times a day  dextrose 10% Bolus 125 milliLiter(s) IV Bolus once  dextrose 5%. 1000 milliLiter(s) (100 mL/Hr) IV Continuous <Continuous>  dextrose 5%. 1000 milliLiter(s) (50 mL/Hr) IV Continuous <Continuous>  dextrose 50% Injectable 25 Gram(s) IV Push once  dextrose 50% Injectable 12.5 Gram(s) IV Push once  diltiazem    milliGRAM(s) Oral daily  gabapentin 300 milliGRAM(s) Oral at bedtime  glucagon  Injectable 1 milliGRAM(s) IntraMuscular once  insulin lispro (ADMELOG) corrective regimen sliding scale   SubCutaneous three times a day before meals  insulin lispro (ADMELOG) corrective regimen sliding scale   SubCutaneous at bedtime  iron sucrose Injectable 100 milliGRAM(s) IV Push every 24 hours  lactobacillus acidophilus 1 Tablet(s) Oral every 12 hours  pantoprazole  Injectable 40 milliGRAM(s) IV Push every 12 hours  senna 2 Tablet(s) Oral at bedtime  sodium chloride 0.9%. 1000 milliLiter(s) (50 mL/Hr) IV Continuous <Continuous>  spironolactone 50 milliGRAM(s) Oral daily  tamsulosin 0.4 milliGRAM(s) Oral at bedtime  tiotropium 2.5 MICROgram(s) Inhaler 2 Puff(s) Inhalation daily    MEDICATIONS  (PRN):  acetaminophen     Tablet .. 650 milliGRAM(s) Oral every 6 hours PRN Temp greater or equal to 38C (100.4F), Mild Pain (1 - 3)  albuterol/ipratropium for Nebulization 3 milliLiter(s) Nebulizer every 6 hours PRN Shortness of Breath and/or Wheezing  aluminum hydroxide/magnesium hydroxide/simethicone Suspension 30 milliLiter(s) Oral every 4 hours PRN Dyspepsia  dextrose Oral Gel 15 Gram(s) Oral once PRN Blood Glucose LESS THAN 70 milliGRAM(s)/deciliter  guaiFENesin Oral Liquid (Sugar-Free) 200 milliGRAM(s) Oral every 6 hours PRN Cough  melatonin 3 milliGRAM(s) Oral at bedtime PRN Insomnia  ondansetron Injectable 4 milliGRAM(s) IV Push every 8 hours PRN Nausea and/or Vomiting  traMADol 50 milliGRAM(s) Oral every 12 hours PRN Moderate Pain (4 - 6)      Allergies  No Known Allergies    PHYSICAL EXAM:   Vital Signs:  Vital Signs Last 24 Hrs  T(C): 36.7 (2024 04:59), Max: 36.7 (2024 12:35)  T(F): 98.1 (2024 04:59), Max: 98.1 (2024 04:59)  HR: 68 (2024 04:59) (62 - 83)  BP: 103/63 (2024 04:59) (103/63 - 121/64)  BP(mean): --  RR: 17 (2024 04:59) (17 - 18)  SpO2: 92% (2024 04:59) (91% - 93%)    Parameters below as of 2024 04:59  Patient On (Oxygen Delivery Method): room air      Daily     Daily Weight in k.4 (2024 04:59)    GENERAL:  Appears stated age  HEENT:  NC/AT  CHEST:  Full & symmetric excursion  HEART:  Regular rhythm  ABDOMEN:  Soft, non-tender, non-distended  EXTEREMITIES:  no cyanosis  SKIN:  No rash  NEURO:  Alert      LABS:                        8.5    7.26  )-----------( 387      ( 2024 06:16 )             26.7     04-12    144  |  112<H>  |  16  ----------------------------<  145<H>  4.2   |  25  |  1.10    Ca    8.6      2024 06:16  Phos  3.2     04-11  Mg     2.2     04-11    TPro  5.7<L>  /  Alb  2.6<L>  /  TBili  0.4  /  DBili  x   /  AST  10<L>  /  ALT  15  /  AlkPhos  70  04-11    PT/INR - ( 2024 06:16 )   PT: 14.0 sec;   INR: 1.20 ratio         PTT - ( 2024 06:16 )  PTT:37.6 sec  Urinalysis Basic - ( 2024 06:16 )    Color: x / Appearance: x / SG: x / pH: x  Gluc: 145 mg/dL / Ketone: x  / Bili: x / Urobili: x   Blood: x / Protein: x / Nitrite: x   Leuk Esterase: x / RBC: x / WBC x   Sq Epi: x / Non Sq Epi: x / Bacteria: x    RADIOLOGY:  < from: CT Abdomen and Pelvis No Cont (24 @ 08:25) >    ACC: [id number] EXAM:  CT ABDOMEN AND PELVIS   ORDERED BY: ANDERSON MARIA     PROCEDURE DATE:  2024          INTERPRETATION:  CLINICAL INFORMATION: Weight loss, dark stools.    COMPARISON: CT abdomen and pelvis 2023    CONTRAST/COMPLICATIONS:  IV Contrast: NONE  Oral Contrast: NONE  Complications: None reported at time of study completion    PROCEDURE:  CT of the Abdomen and Pelvis was performed.  Sagittal and coronal reformats were performed.    FINDINGS:  Limited by lack of any exogenous oral or intravenous contrast.    LOWER CHEST: Left lower lobe calcified nodule similar to prior likely   granuloma.. Fibroatelectatic changes left lower lobe and trace left   pleural effusion. Coronary artery calcification.    LIVER: Hepatic cysts is similar to prior.  BILE DUCTS: Normal caliber.  GALLBLADDER: Within normal limits.  SPLEEN: Calcified granuloma.  PANCREAS: Within normal limits.  ADRENALS: Within normal limits.  KIDNEYS/URETERS: Too small to characterize hypodensity left kidney.    BLADDER: Within normal limits.  REPRODUCTIVE ORGANS: Enlarged prostate    BOWEL: No bowel obstruction. Colonic diverticulosis. Large colonic stool   burden.. Appendix  PERITONEUM: No ascites.  VESSELS: Stable 3.8 cm fusiform infrarenal abdominal aortic aneurysm..  RETROPERITONEUM/LYMPH NODES: No lymphadenopathy.  ABDOMINAL WALL: Mild widening bilateral inguinal ring with fat. Tiny   fat-containing umbilical hernia  BONES: Degenerative changes .    IMPRESSION:  No acute findings on this noncontrast study..    Colonic diverticulosis without acute diverticulitis.    Stable infrarenal abdominal aortic aneurysm.    Additional findings as discussed    --- End of Report ---        JAVIER BROWNING MD; Attending Radiologist  This document has been electronically signed. 2024  8:48AM    < end of copied text >

## 2024-04-13 LAB
ANION GAP SERPL CALC-SCNC: 9 MMOL/L — SIGNIFICANT CHANGE UP (ref 5–17)
BUN SERPL-MCNC: 17 MG/DL — SIGNIFICANT CHANGE UP (ref 7–23)
CALCIUM SERPL-MCNC: 8.7 MG/DL — SIGNIFICANT CHANGE UP (ref 8.5–10.1)
CHLORIDE SERPL-SCNC: 111 MMOL/L — HIGH (ref 96–108)
CO2 SERPL-SCNC: 25 MMOL/L — SIGNIFICANT CHANGE UP (ref 22–31)
CREAT SERPL-MCNC: 0.96 MG/DL — SIGNIFICANT CHANGE UP (ref 0.5–1.3)
EGFR: 81 ML/MIN/1.73M2 — SIGNIFICANT CHANGE UP
GLUCOSE SERPL-MCNC: 122 MG/DL — HIGH (ref 70–99)
HCT VFR BLD CALC: 28.6 % — LOW (ref 39–50)
HGB BLD-MCNC: 9.2 G/DL — LOW (ref 13–17)
INR BLD: 1.11 RATIO — SIGNIFICANT CHANGE UP (ref 0.85–1.18)
LEGIONELLA AG UR QL: NEGATIVE — SIGNIFICANT CHANGE UP
MCHC RBC-ENTMCNC: 29.9 PG — SIGNIFICANT CHANGE UP (ref 27–34)
MCHC RBC-ENTMCNC: 32.2 GM/DL — SIGNIFICANT CHANGE UP (ref 32–36)
MCV RBC AUTO: 92.9 FL — SIGNIFICANT CHANGE UP (ref 80–100)
NRBC # BLD: 0 /100 WBCS — SIGNIFICANT CHANGE UP (ref 0–0)
PLATELET # BLD AUTO: 408 K/UL — HIGH (ref 150–400)
POTASSIUM SERPL-MCNC: 4.2 MMOL/L — SIGNIFICANT CHANGE UP (ref 3.5–5.3)
POTASSIUM SERPL-SCNC: 4.2 MMOL/L — SIGNIFICANT CHANGE UP (ref 3.5–5.3)
PROTHROM AB SERPL-ACNC: 12.9 SEC — SIGNIFICANT CHANGE UP (ref 9.5–13)
RBC # BLD: 3.08 M/UL — LOW (ref 4.2–5.8)
RBC # FLD: 15.3 % — HIGH (ref 10.3–14.5)
SODIUM SERPL-SCNC: 145 MMOL/L — SIGNIFICANT CHANGE UP (ref 135–145)
WBC # BLD: 7.16 K/UL — SIGNIFICANT CHANGE UP (ref 3.8–10.5)
WBC # FLD AUTO: 7.16 K/UL — SIGNIFICANT CHANGE UP (ref 3.8–10.5)

## 2024-04-13 PROCEDURE — 99231 SBSQ HOSP IP/OBS SF/LOW 25: CPT

## 2024-04-13 RX ADMIN — AMIODARONE HYDROCHLORIDE 200 MILLIGRAM(S): 400 TABLET ORAL at 05:46

## 2024-04-13 RX ADMIN — ATORVASTATIN CALCIUM 10 MILLIGRAM(S): 80 TABLET, FILM COATED ORAL at 22:28

## 2024-04-13 RX ADMIN — SODIUM CHLORIDE 50 MILLILITER(S): 9 INJECTION INTRAMUSCULAR; INTRAVENOUS; SUBCUTANEOUS at 05:55

## 2024-04-13 RX ADMIN — PANTOPRAZOLE SODIUM 40 MILLIGRAM(S): 20 TABLET, DELAYED RELEASE ORAL at 05:46

## 2024-04-13 RX ADMIN — Medication 1 TABLET(S): at 05:46

## 2024-04-13 RX ADMIN — BUDESONIDE AND FORMOTEROL FUMARATE DIHYDRATE 2 PUFF(S): 160; 4.5 AEROSOL RESPIRATORY (INHALATION) at 07:23

## 2024-04-13 RX ADMIN — SPIRONOLACTONE 50 MILLIGRAM(S): 25 TABLET, FILM COATED ORAL at 05:46

## 2024-04-13 RX ADMIN — Medication 1 TABLET(S): at 17:33

## 2024-04-13 RX ADMIN — BUDESONIDE AND FORMOTEROL FUMARATE DIHYDRATE 2 PUFF(S): 160; 4.5 AEROSOL RESPIRATORY (INHALATION) at 17:37

## 2024-04-13 RX ADMIN — TAMSULOSIN HYDROCHLORIDE 0.4 MILLIGRAM(S): 0.4 CAPSULE ORAL at 22:28

## 2024-04-13 RX ADMIN — Medication 500 MILLIGRAM(S): at 05:46

## 2024-04-13 RX ADMIN — TIOTROPIUM BROMIDE 2 PUFF(S): 18 CAPSULE ORAL; RESPIRATORY (INHALATION) at 07:23

## 2024-04-13 RX ADMIN — Medication 500 MILLIGRAM(S): at 17:32

## 2024-04-13 RX ADMIN — PANTOPRAZOLE SODIUM 40 MILLIGRAM(S): 20 TABLET, DELAYED RELEASE ORAL at 17:32

## 2024-04-13 RX ADMIN — SODIUM CHLORIDE 50 MILLILITER(S): 9 INJECTION INTRAMUSCULAR; INTRAVENOUS; SUBCUTANEOUS at 07:23

## 2024-04-13 RX ADMIN — Medication 180 MILLIGRAM(S): at 05:46

## 2024-04-13 RX ADMIN — Medication 1 TABLET(S): at 12:09

## 2024-04-13 RX ADMIN — SENNA PLUS 2 TABLET(S): 8.6 TABLET ORAL at 22:28

## 2024-04-13 RX ADMIN — GABAPENTIN 300 MILLIGRAM(S): 400 CAPSULE ORAL at 22:28

## 2024-04-13 RX ADMIN — IRON SUCROSE 100 MILLIGRAM(S): 20 INJECTION, SOLUTION INTRAVENOUS at 17:33

## 2024-04-13 NOTE — PROGRESS NOTE ADULT - SUBJECTIVE AND OBJECTIVE BOX
Interval History:    CENTRAL LINE:   [  ] YES       [  ] NO  GUZMAN:                 [  ] YES       [  ] NO         REVIEW OF SYSTEMS:  All Systems below were reviewed and are negative [  ]  HEENT:  ID:  Pulmonary:  Cardiac:  GI:  Renal:  Musculoskeletal:  All other systems above were reviewed and are negative   [  ]      MEDICATIONS  (STANDING):  aMIOdarone    Tablet 200 milliGRAM(s) Oral daily  ascorbic acid 500 milliGRAM(s) Oral two times a day  atorvastatin 10 milliGRAM(s) Oral at bedtime  budesonide 160 MICROgram(s)/formoterol 4.5 MICROgram(s) Inhaler 2 Puff(s) Inhalation two times a day  dextrose 10% Bolus 125 milliLiter(s) IV Bolus once  dextrose 5%. 1000 milliLiter(s) (50 mL/Hr) IV Continuous <Continuous>  dextrose 5%. 1000 milliLiter(s) (100 mL/Hr) IV Continuous <Continuous>  dextrose 50% Injectable 25 Gram(s) IV Push once  dextrose 50% Injectable 12.5 Gram(s) IV Push once  diltiazem    milliGRAM(s) Oral daily  gabapentin 300 milliGRAM(s) Oral at bedtime  glucagon  Injectable 1 milliGRAM(s) IntraMuscular once  insulin lispro (ADMELOG) corrective regimen sliding scale   SubCutaneous three times a day before meals  insulin lispro (ADMELOG) corrective regimen sliding scale   SubCutaneous at bedtime  lactobacillus acidophilus 1 Tablet(s) Oral every 12 hours  multivitamin 1 Tablet(s) Oral daily  pantoprazole    Tablet 40 milliGRAM(s) Oral two times a day  senna 2 Tablet(s) Oral at bedtime  sodium chloride 0.9%. 1000 milliLiter(s) (50 mL/Hr) IV Continuous <Continuous>  spironolactone 50 milliGRAM(s) Oral daily  tamsulosin 0.4 milliGRAM(s) Oral at bedtime  tiotropium 2.5 MICROgram(s) Inhaler 2 Puff(s) Inhalation daily    MEDICATIONS  (PRN):  acetaminophen     Tablet .. 650 milliGRAM(s) Oral every 6 hours PRN Temp greater or equal to 38C (100.4F), Mild Pain (1 - 3)  albuterol/ipratropium for Nebulization 3 milliLiter(s) Nebulizer every 6 hours PRN Shortness of Breath and/or Wheezing  aluminum hydroxide/magnesium hydroxide/simethicone Suspension 30 milliLiter(s) Oral every 4 hours PRN Dyspepsia  dextrose Oral Gel 15 Gram(s) Oral once PRN Blood Glucose LESS THAN 70 milliGRAM(s)/deciliter  guaiFENesin Oral Liquid (Sugar-Free) 200 milliGRAM(s) Oral every 6 hours PRN Cough  melatonin 3 milliGRAM(s) Oral at bedtime PRN Insomnia  ondansetron Injectable 4 milliGRAM(s) IV Push every 8 hours PRN Nausea and/or Vomiting  traMADol 50 milliGRAM(s) Oral every 12 hours PRN Moderate Pain (4 - 6)      Vital Signs Last 24 Hrs  T(C): 36.6 (13 Apr 2024 20:10), Max: 36.6 (13 Apr 2024 12:33)  T(F): 97.9 (13 Apr 2024 20:10), Max: 97.9 (13 Apr 2024 20:10)  HR: 55 (13 Apr 2024 20:10) (55 - 66)  BP: 119/73 (13 Apr 2024 20:10) (104/65 - 119/73)  BP(mean): --  RR: 18 (13 Apr 2024 20:10) (18 - 18)  SpO2: 95% (13 Apr 2024 20:10) (93% - 95%)    Parameters below as of 13 Apr 2024 20:10  Patient On (Oxygen Delivery Method): room air        I&O's Summary    12 Apr 2024 07:01  -  13 Apr 2024 07:00  --------------------------------------------------------  IN: 1200 mL / OUT: 660 mL / NET: 540 mL    13 Apr 2024 07:01  -  13 Apr 2024 21:41  --------------------------------------------------------  IN: 1320 mL / OUT: 1000 mL / NET: 320 mL        PHYSICAL EXAM:  HEENT: NC/AT; PERRLA  Neck: Soft; no tenderness  Lungs: CTA bilaterally; no wheezing.   Heart:  Abdomen:  Genital/ Rectal:  Extremities:  Neurologic:  Vascular:      LABORATORY:    CBC Full  -  ( 13 Apr 2024 06:06 )  WBC Count : 7.16 K/uL  RBC Count : 3.08 M/uL  Hemoglobin : 9.2 g/dL  Hematocrit : 28.6 %  Platelet Count - Automated : 408 K/uL  Mean Cell Volume : 92.9 fl  Mean Cell Hemoglobin : 29.9 pg  Mean Cell Hemoglobin Concentration : 32.2 gm/dL  Auto Neutrophil # : x  Auto Lymphocyte # : x  Auto Monocyte # : x  Auto Eosinophil # : x  Auto Basophil # : x  Auto Neutrophil % : x  Auto Lymphocyte % : x  Auto Monocyte % : x  Auto Eosinophil % : x  Auto Basophil % : x      ESR:                   04-11 @ 05:55  --    C-Reactive Protein:     04-11 @ 05:55  --    Procalcitonin:           04-11 @ 05:55   0.10      04-13    145  |  111<H>  |  17  ----------------------------<  122<H>  4.2   |  25  |  0.96    Ca    8.7      13 Apr 2024 06:06            Assessment and Plan:          Bro Pierre MD   [phone].  No fevers  Comfortable.        MEDICATIONS  (STANDING):  aMIOdarone    Tablet 200 milliGRAM(s) Oral daily  ascorbic acid 500 milliGRAM(s) Oral two times a day  atorvastatin 10 milliGRAM(s) Oral at bedtime  budesonide 160 MICROgram(s)/formoterol 4.5 MICROgram(s) Inhaler 2 Puff(s) Inhalation two times a day  dextrose 10% Bolus 125 milliLiter(s) IV Bolus once  dextrose 5%. 1000 milliLiter(s) (50 mL/Hr) IV Continuous <Continuous>  dextrose 5%. 1000 milliLiter(s) (100 mL/Hr) IV Continuous <Continuous>  dextrose 50% Injectable 25 Gram(s) IV Push once  dextrose 50% Injectable 12.5 Gram(s) IV Push once  diltiazem    milliGRAM(s) Oral daily  gabapentin 300 milliGRAM(s) Oral at bedtime  glucagon  Injectable 1 milliGRAM(s) IntraMuscular once  insulin lispro (ADMELOG) corrective regimen sliding scale   SubCutaneous three times a day before meals  insulin lispro (ADMELOG) corrective regimen sliding scale   SubCutaneous at bedtime  lactobacillus acidophilus 1 Tablet(s) Oral every 12 hours  multivitamin 1 Tablet(s) Oral daily  pantoprazole    Tablet 40 milliGRAM(s) Oral two times a day  senna 2 Tablet(s) Oral at bedtime  sodium chloride 0.9%. 1000 milliLiter(s) (50 mL/Hr) IV Continuous <Continuous>  spironolactone 50 milliGRAM(s) Oral daily  tamsulosin 0.4 milliGRAM(s) Oral at bedtime  tiotropium 2.5 MICROgram(s) Inhaler 2 Puff(s) Inhalation daily    MEDICATIONS  (PRN):  acetaminophen     Tablet .. 650 milliGRAM(s) Oral every 6 hours PRN Temp greater or equal to 38C (100.4F), Mild Pain (1 - 3)  albuterol/ipratropium for Nebulization 3 milliLiter(s) Nebulizer every 6 hours PRN Shortness of Breath and/or Wheezing  aluminum hydroxide/magnesium hydroxide/simethicone Suspension 30 milliLiter(s) Oral every 4 hours PRN Dyspepsia  dextrose Oral Gel 15 Gram(s) Oral once PRN Blood Glucose LESS THAN 70 milliGRAM(s)/deciliter  guaiFENesin Oral Liquid (Sugar-Free) 200 milliGRAM(s) Oral every 6 hours PRN Cough  melatonin 3 milliGRAM(s) Oral at bedtime PRN Insomnia  ondansetron Injectable 4 milliGRAM(s) IV Push every 8 hours PRN Nausea and/or Vomiting  traMADol 50 milliGRAM(s) Oral every 12 hours PRN Moderate Pain (4 - 6)      Vital Signs Last 24 Hrs  T(C): 36.6 (13 Apr 2024 20:10), Max: 36.6 (13 Apr 2024 12:33)  T(F): 97.9 (13 Apr 2024 20:10), Max: 97.9 (13 Apr 2024 20:10)  HR: 55 (13 Apr 2024 20:10) (55 - 66)  BP: 119/73 (13 Apr 2024 20:10) (104/65 - 119/73)  BP(mean): --  RR: 18 (13 Apr 2024 20:10) (18 - 18)  SpO2: 95% (13 Apr 2024 20:10) (93% - 95%)    Parameters below as of 13 Apr 2024 20:10  Patient On (Oxygen Delivery Method): room air        I&O's Summary    12 Apr 2024 07:01  -  13 Apr 2024 07:00  --------------------------------------------------------  IN: 1200 mL / OUT: 660 mL / NET: 540 mL    13 Apr 2024 07:01  -  13 Apr 2024 21:41  --------------------------------------------------------  IN: 1320 mL / OUT: 1000 mL / NET: 320 mL        PHYSICAL EXAM:  HEENT: NC/AT; PERRLA  Neck: Soft; no tenderness  Lungs: CTA bilaterally; no wheezing.   Heart:  Abdomen:  Genital/ Rectal:  Extremities:  Neurologic:  Vascular:      LABORATORY:    CBC Full  -  ( 13 Apr 2024 06:06 )  WBC Count : 7.16 K/uL  RBC Count : 3.08 M/uL  Hemoglobin : 9.2 g/dL  Hematocrit : 28.6 %  Platelet Count - Automated : 408 K/uL  Mean Cell Volume : 92.9 fl  Mean Cell Hemoglobin : 29.9 pg  Mean Cell Hemoglobin Concentration : 32.2 gm/dL  Auto Neutrophil # : x  Auto Lymphocyte # : x  Auto Monocyte # : x  Auto Eosinophil # : x  Auto Basophil # : x  Auto Neutrophil % : x  Auto Lymphocyte % : x  Auto Monocyte % : x  Auto Eosinophil % : x  Auto Basophil % : x      ESR:                   04-11 @ 05:55  --    C-Reactive Protein:     04-11 @ 05:55  --    Procalcitonin:           04-11 @ 05:55   0.10      04-13    145  |  111<H>  |  17  ----------------------------<  122<H>  4.2   |  25  |  0.96    Ca    8.7      13 Apr 2024 06:06    Assessment and Plan:    1. Lower GI bleed.  2. Atelectasis of lower lobes.    . Low suspicion for pneumonia. Discontinue IV Rocephin and Zithromax. Monitor off antibiotics.  . GI evaluation.  . Supportive care.          Bro Pierre MD   [phone].

## 2024-04-13 NOTE — PROGRESS NOTE ADULT - ASSESSMENT
diverticulosis  anemia  gi bleed    hold a/c  monitor cbc  suspect diverticular bleed  transfuse as needed  will plan on Colonoscopy Tuesday  CT noted; no acute abnormalities  consider marinol if poor appetite  will follow     I reviewed the overnight course of events on the unit, re-confirming the patient history. I discussed the care with the patient  Differential diagnosis and plan of care discussed with patient after the evaluation  35 minutes spent on total encounter of which more than fifty percent of the encounter was spent counseling and/or coordinating care by the attending physician.

## 2024-04-13 NOTE — PROGRESS NOTE ADULT - ASSESSMENT
Anemia with equivocal fe studies  admitted with GIB and +FOBT  has not received PRBC since admission    Recommendations:  1.  follow CBC  2.  continue GI evaluation   3.  venofer 100mg IV daily x 3  4.  Consider CT abd/pelvis for evaluation of weight loss  5.  colonoscopy scheduled for tuesday  6.  further heme recommendations pending above    discussed with Dr. Mansfield

## 2024-04-13 NOTE — PROGRESS NOTE ADULT - SUBJECTIVE AND OBJECTIVE BOX
Interval History:  no new complaints  scheduled for colonoscopy on tuesday    Chart reviewed and events noted;   Overnight events:    MEDICATIONS  (STANDING):  aMIOdarone    Tablet 200 milliGRAM(s) Oral daily  ascorbic acid 500 milliGRAM(s) Oral two times a day  atorvastatin 10 milliGRAM(s) Oral at bedtime  budesonide 160 MICROgram(s)/formoterol 4.5 MICROgram(s) Inhaler 2 Puff(s) Inhalation two times a day  dextrose 10% Bolus 125 milliLiter(s) IV Bolus once  dextrose 5%. 1000 milliLiter(s) (100 mL/Hr) IV Continuous <Continuous>  dextrose 5%. 1000 milliLiter(s) (50 mL/Hr) IV Continuous <Continuous>  dextrose 50% Injectable 25 Gram(s) IV Push once  dextrose 50% Injectable 12.5 Gram(s) IV Push once  diltiazem    milliGRAM(s) Oral daily  gabapentin 300 milliGRAM(s) Oral at bedtime  glucagon  Injectable 1 milliGRAM(s) IntraMuscular once  insulin lispro (ADMELOG) corrective regimen sliding scale   SubCutaneous at bedtime  insulin lispro (ADMELOG) corrective regimen sliding scale   SubCutaneous three times a day before meals  iron sucrose Injectable 100 milliGRAM(s) IV Push every 24 hours  lactobacillus acidophilus 1 Tablet(s) Oral every 12 hours  multivitamin 1 Tablet(s) Oral daily  pantoprazole    Tablet 40 milliGRAM(s) Oral two times a day  senna 2 Tablet(s) Oral at bedtime  sodium chloride 0.9%. 1000 milliLiter(s) (50 mL/Hr) IV Continuous <Continuous>  spironolactone 50 milliGRAM(s) Oral daily  tamsulosin 0.4 milliGRAM(s) Oral at bedtime  tiotropium 2.5 MICROgram(s) Inhaler 2 Puff(s) Inhalation daily    MEDICATIONS  (PRN):  acetaminophen     Tablet .. 650 milliGRAM(s) Oral every 6 hours PRN Temp greater or equal to 38C (100.4F), Mild Pain (1 - 3)  albuterol/ipratropium for Nebulization 3 milliLiter(s) Nebulizer every 6 hours PRN Shortness of Breath and/or Wheezing  aluminum hydroxide/magnesium hydroxide/simethicone Suspension 30 milliLiter(s) Oral every 4 hours PRN Dyspepsia  dextrose Oral Gel 15 Gram(s) Oral once PRN Blood Glucose LESS THAN 70 milliGRAM(s)/deciliter  guaiFENesin Oral Liquid (Sugar-Free) 200 milliGRAM(s) Oral every 6 hours PRN Cough  melatonin 3 milliGRAM(s) Oral at bedtime PRN Insomnia  ondansetron Injectable 4 milliGRAM(s) IV Push every 8 hours PRN Nausea and/or Vomiting  traMADol 50 milliGRAM(s) Oral every 12 hours PRN Moderate Pain (4 - 6)      Vital Signs Last 24 Hrs  T(C): 36.6 (13 Apr 2024 12:33), Max: 37 (12 Apr 2024 13:29)  T(F): 97.8 (13 Apr 2024 12:33), Max: 98.6 (12 Apr 2024 13:29)  HR: 58 (13 Apr 2024 12:33) (58 - 66)  BP: 104/65 (13 Apr 2024 12:33) (104/65 - 115/70)  BP(mean): --  RR: 18 (13 Apr 2024 12:33) (18 - 18)  SpO2: 94% (13 Apr 2024 12:33) (93% - 97%)    Parameters below as of 13 Apr 2024 12:33  Patient On (Oxygen Delivery Method): room air        PHYSICAL EXAM  General: adult in NAD  HEENT: clear oropharynx, anicteric sclera, pink conjunctivae  Neck: supple  CV: normal S1S2 with no murmur rubs or gallops  Lungs: clear to auscultation, no wheezes, no rhales  Abdomen: soft non-tender non-distended, no hepato/splenomegaly  Ext: no clubbing cyanosis or edema  Skin: no rashes and no petichiae  Neuro: alert and oriented X3 no focal deficits      LABS:  CBC Full  -  ( 13 Apr 2024 06:06 )  WBC Count : 7.16 K/uL  RBC Count : 3.08 M/uL  Hemoglobin : 9.2 g/dL  Hematocrit : 28.6 %  Platelet Count - Automated : 408 K/uL  Mean Cell Volume : 92.9 fl  Mean Cell Hemoglobin : 29.9 pg  Mean Cell Hemoglobin Concentration : 32.2 gm/dL  Auto Neutrophil # : x  Auto Lymphocyte # : x  Auto Monocyte # : x  Auto Eosinophil # : x  Auto Basophil # : x  Auto Neutrophil % : x  Auto Lymphocyte % : x  Auto Monocyte % : x  Auto Eosinophil % : x  Auto Basophil % : x    04-13    145  |  111<H>  |  17  ----------------------------<  122<H>  4.2   |  25  |  0.96    Ca    8.7      13 Apr 2024 06:06      PT/INR - ( 13 Apr 2024 06:06 )   PT: 12.9 sec;   INR: 1.11 ratio         PTT - ( 12 Apr 2024 06:16 )  PTT:37.6 sec    fe studies  Iron - Total Binding Capacity.: 250 ug/dL (04-11 @ 05:55)  Ferritin: 59 ng/mL (04-11 @ 05:55)      WBC trend  7.16 K/uL (04-13-24 @ 06:06)  7.26 K/uL (04-12-24 @ 06:16)  6.79 K/uL (04-11-24 @ 05:55)      Hgb trend  9.2 g/dL (04-13-24 @ 06:06)  8.5 g/dL (04-12-24 @ 06:16)  7.9 g/dL (04-11-24 @ 05:55)  7.9 g/dL (04-10-24 @ 21:18)      plt trend  408 K/uL (04-13-24 @ 06:06)  387 K/uL (04-12-24 @ 06:16)  391 K/uL (04-11-24 @ 05:55)        RADIOLOGY & ADDITIONAL STUDIES:

## 2024-04-13 NOTE — PROGRESS NOTE ADULT - SUBJECTIVE AND OBJECTIVE BOX
Patient is a 78y Male with a known history of :  GIB (gastrointestinal bleeding) [K92.2]    Anemia due to acute blood loss [D62]    HTN (hypertension) [I10]    HLD (hyperlipidemia) [E78.5]    DM (diabetes mellitus) [E11.9]    COPD, moderate [J44.9]    CAD (coronary artery disease) [I25.10]    Prophylactic measure [Z29.9]    Pneumonia [J18.9]    Enlarged prostate [N40.0]      HPI:  Patient is a 78-year-old male with past medical of hypertension, hyperlipidemia, diabetes, COPD, A-fib on Plavix presents with dark stool.  Patient noted for the past month he has had dark stool.  Patient reports his PCP at the [facility] has been following his hemoglobin which was noted to be low.  Patient reports he had left hand pain for the past 4 days described as stiffness.  Patient has not had a colonoscopy in years.  Patient notes a 20 pound weight loss over the past few months.  Patient recently recovered from norovirus.  Patient denies fever chest pain shortness of breath abdominal pain dysuria dizziness l.Found to have FOBT positive and seen by GI team Cardiology clearance for possible colonoscopy requested .Found to have pneumonia and started on abx , seen by pulm Palliative care consult requested ,to discuss advance directives and complete MOLST  (10 Apr 2024 15:27)      REVIEW OF SYSTEMS:    CONSTITUTIONAL: No fever, weight loss, or fatigue  EYES: No eye pain, visual disturbances, or discharge  ENMT:  No difficulty hearing, tinnitus, vertigo; No sinus or throat pain  NECK: No pain or stiffness  BREASTS: No pain, masses, or nipple discharge  RESPIRATORY: No cough, wheezing, chills or hemoptysis; No shortness of breath  CARDIOVASCULAR: No chest pain, palpitations, dizziness, or leg swelling  GASTROINTESTINAL: No abdominal or epigastric pain. No nausea, vomiting, or hematemesis; No diarrhea or constipation. No melena or hematochezia.  GENITOURINARY: No dysuria, frequency, hematuria, or incontinence  NEUROLOGICAL: No headaches, memory loss, loss of strength, numbness, or tremors  SKIN: No itching, burning, rashes, or lesions   LYMPH NODES: No enlarged glands  ENDOCRINE: No heat or cold intolerance; No hair loss  MUSCULOSKELETAL: No joint pain or swelling; No muscle, back, or extremity pain  PSYCHIATRIC: No depression, anxiety, mood swings, or difficulty sleeping  HEME/LYMPH: No easy bruising, or bleeding gums  ALLERGY AND IMMUNOLOGIC: No hives or eczema    MEDICATIONS  (STANDING):  aMIOdarone    Tablet 200 milliGRAM(s) Oral daily  ascorbic acid 500 milliGRAM(s) Oral two times a day  atorvastatin 10 milliGRAM(s) Oral at bedtime  budesonide 160 MICROgram(s)/formoterol 4.5 MICROgram(s) Inhaler 2 Puff(s) Inhalation two times a day  dextrose 10% Bolus 125 milliLiter(s) IV Bolus once  dextrose 5%. 1000 milliLiter(s) (100 mL/Hr) IV Continuous <Continuous>  dextrose 5%. 1000 milliLiter(s) (50 mL/Hr) IV Continuous <Continuous>  dextrose 50% Injectable 25 Gram(s) IV Push once  dextrose 50% Injectable 12.5 Gram(s) IV Push once  diltiazem    milliGRAM(s) Oral daily  gabapentin 300 milliGRAM(s) Oral at bedtime  glucagon  Injectable 1 milliGRAM(s) IntraMuscular once  insulin lispro (ADMELOG) corrective regimen sliding scale   SubCutaneous three times a day before meals  insulin lispro (ADMELOG) corrective regimen sliding scale   SubCutaneous at bedtime  iron sucrose Injectable 100 milliGRAM(s) IV Push every 24 hours  lactobacillus acidophilus 1 Tablet(s) Oral every 12 hours  multivitamin 1 Tablet(s) Oral daily  pantoprazole    Tablet 40 milliGRAM(s) Oral two times a day  senna 2 Tablet(s) Oral at bedtime  sodium chloride 0.9%. 1000 milliLiter(s) (50 mL/Hr) IV Continuous <Continuous>  spironolactone 50 milliGRAM(s) Oral daily  tamsulosin 0.4 milliGRAM(s) Oral at bedtime  tiotropium 2.5 MICROgram(s) Inhaler 2 Puff(s) Inhalation daily    MEDICATIONS  (PRN):  acetaminophen     Tablet .. 650 milliGRAM(s) Oral every 6 hours PRN Temp greater or equal to 38C (100.4F), Mild Pain (1 - 3)  albuterol/ipratropium for Nebulization 3 milliLiter(s) Nebulizer every 6 hours PRN Shortness of Breath and/or Wheezing  aluminum hydroxide/magnesium hydroxide/simethicone Suspension 30 milliLiter(s) Oral every 4 hours PRN Dyspepsia  dextrose Oral Gel 15 Gram(s) Oral once PRN Blood Glucose LESS THAN 70 milliGRAM(s)/deciliter  guaiFENesin Oral Liquid (Sugar-Free) 200 milliGRAM(s) Oral every 6 hours PRN Cough  melatonin 3 milliGRAM(s) Oral at bedtime PRN Insomnia  ondansetron Injectable 4 milliGRAM(s) IV Push every 8 hours PRN Nausea and/or Vomiting  traMADol 50 milliGRAM(s) Oral every 12 hours PRN Moderate Pain (4 - 6)      ALLERGIES: No Known Allergies      FAMILY HISTORY:      PHYSICAL EXAMINATION:  -----------------------------  T(C): 36.3 (04-13-24 @ 05:08), Max: 37 (04-12-24 @ 13:29)  HR: 66 (04-13-24 @ 05:08) (58 - 66)  BP: 115/62 (04-13-24 @ 05:08) (106/62 - 115/70)  RR: 18 (04-13-24 @ 05:08) (18 - 18)  SpO2: 93% (04-13-24 @ 05:08) (93% - 97%)  Wt(kg): --    04-12 @ 07:01  -  04-13 @ 07:00  --------------------------------------------------------  IN:    sodium chloride 0.9%: 1200 mL  Total IN: 1200 mL    OUT:    Voided (mL): 660 mL  Total OUT: 660 mL    Total NET: 540 mL            VITALS  T(C): 36.3 (04-13-24 @ 05:08), Max: 37 (04-12-24 @ 13:29)  HR: 66 (04-13-24 @ 05:08) (58 - 66)  BP: 115/62 (04-13-24 @ 05:08) (106/62 - 115/70)  RR: 18 (04-13-24 @ 05:08) (18 - 18)  SpO2: 93% (04-13-24 @ 05:08) (93% - 97%)    Constitutional: well developed, normal appearance, well groomed, well nourished, no deformities and no acute distress.   Eyes: the conjunctiva exhibited no abnormalities and the eyelids demonstrated no xanthelasmas.   HEENT: normal oral mucosa, no oral pallor and no oral cyanosis.   Neck: normal jugular venous A waves present, normal jugular venous V waves present and no jugular venous esqueda A waves.   Pulmonary: no respiratory distress, normal respiratory rhythm and effort, no accessory muscle use and lungs were clear to auscultation bilaterally.   Cardiovascular: heart rate and rhythm were normal, normal S1 and S2 and no murmur, gallop, rub, heave or thrill are present.   Abdomen: soft, non-tender, no hepato-splenomegaly and no abdominal mass palpated.   Musculoskeletal: the gait could not be assessed..   Extremities: no clubbing of the fingernails, no localized cyanosis, no petechial hemorrhages and no ischemic changes.   Skin: normal skin color and pigmentation, no rash, no venous stasis, no skin lesions, no skin ulcer and no xanthoma was observed.   Psychiatric: oriented to person, place, and time, the affect was normal, the mood was normal and not feeling anxious.     LABS:   --------  04-12    144  |  112<H>  |  16  ----------------------------<  145<H>  4.2   |  25  |  1.10    Ca    8.6      12 Apr 2024 06:16                           9.2    7.16  )-----------( 408      ( 13 Apr 2024 06:06 )             28.6     PT/INR - ( 12 Apr 2024 06:16 )   PT: 14.0 sec;   INR: 1.20 ratio         PTT - ( 12 Apr 2024 06:16 )  PTT:37.6 sec            RADIOLOGY:  -----------------    ECG:     ECHO:

## 2024-04-13 NOTE — PROGRESS NOTE ADULT - PROBLEM SELECTOR PLAN 1
likely diverticular - serial cbc , transfuse prn , Gi cons , ppi CT abd IMPRESSION:  No acute findings on this noncontrast study..  Colonic diverticulosis without acute diverticulitis.  Stable infrarenal abdominal aortic Since admission - Denies GIB ,h/h stable .   Spoke to GI team , as per Dr Collins recommendation -hold AC x 2 weeks   Endoscopic workup planned for 04/16/24- medically optimized and cleared by cardiologist

## 2024-04-13 NOTE — PROGRESS NOTE ADULT - ASSESSMENT
Patient is a 78-year-old male with past medical of hypertension, hyperlipidemia, diabetes, COPD, A-fib on Plavix presents with dark stool.  Patient noted for the past month he has had dark stool.  Patient reports his PCP at the [facility] has been following his hemoglobin which was noted to be low.  Patient reports he had left hand pain for the past 4 days described as stiffness.  Patient has not had a colonoscopy in years.  Patient notes a 20 pound weight loss over the past few months.  Patient recently recovered from norovirus.  Patient denies fever chest pain shortness of breath abdominal pain dysuria dizziness l.Found to have FOBT positive and seen by GI team Cardiology clearance for possible colonoscopy requested .Found to have pneumonia and started on abx , seen by pulm Palliative care consult requested ,to discuss advance directives and complete MOLST

## 2024-04-13 NOTE — PROGRESS NOTE ADULT - SUBJECTIVE AND OBJECTIVE BOX
CHIEF COMPLAINT/ REASON FOR VISIT  .. Patient was seen to address the  issue listed under PROBLEM LIST which is located toward bottom of this note     ABRIL POMPA    ENMANUELV 1EAS 106 D1    Allergies    No Known Allergies    Intolerances        PAST MEDICAL & SURGICAL HISTORY:  HTN (hypertension)      HLD (hyperlipidemia)      DM (diabetes mellitus)      COPD, moderate      CAD (coronary artery disease)      S/P primary angioplasty          FAMILY HISTORY:      Home Medications:  amiodarone 200 mg oral tablet: 1 tab(s) orally once a day (10 Apr 2024 14:50)  Anoro Ellipta 62.5 mcg-25 mcg/inh inhalation powder: 1 puff(s) inhaled once a day (10 Apr 2024 15:00)  atorvastatin 10 mg oral tablet: 1 tab(s) orally once a day (at bedtime) (10 Apr 2024 14:51)  cilostazol 100 mg oral tablet: 1 tab(s) orally 2 times a day (10 Apr 2024 15:00)  DilTIAZem (Eqv-Cardizem CD) 180 mg/24 hours oral capsule, extended release: 1 cap(s) orally once a day (10 Apr 2024 14:52)  Eliquis 5 mg oral tablet: 1 tab(s) orally 2 times a day (10 Apr 2024 14:59)  gabapentin 300 mg oral capsule: 1 cap(s) orally once a day (at bedtime) (10 Apr 2024 15:01)  Lasix 40 mg oral tablet: 1 tab(s) orally once a day (10 Apr 2024 15:00)  metFORMIN 500 mg oral tablet: 1 tab(s) orally 2 times a day (10 Apr 2024 15:03)  Milk of Magnesia 1200 mg/15 mL oral liquid: 30 milliliter(s) orally once a day (10 Apr 2024 15:04)  Potassium Chloride (Eqv-Klor-Con 10) 10 mEq oral tablet, extended release: 2 tab(s) orally once a day (10 Apr 2024 15:04)  senna (sennosides) 8.6 mg oral tablet: 1 tab(s) orally once a day (at bedtime) (10 Apr 2024 15:05)  spironolactone 50 mg oral tablet: 1 tab(s) orally once a day (10 Apr 2024 15:06)  tamsulosin 0.4 mg oral capsule: 1 cap(s) orally once a day (10 Apr 2024 15:08)  traMADol 50 mg oral tablet: 1 tab(s) orally every 12 hours (10 Apr 2024 15:10)      MEDICATIONS  (STANDING):  aMIOdarone    Tablet 200 milliGRAM(s) Oral daily  ascorbic acid 500 milliGRAM(s) Oral two times a day  atorvastatin 10 milliGRAM(s) Oral at bedtime  budesonide 160 MICROgram(s)/formoterol 4.5 MICROgram(s) Inhaler 2 Puff(s) Inhalation two times a day  dextrose 10% Bolus 125 milliLiter(s) IV Bolus once  dextrose 5%. 1000 milliLiter(s) (50 mL/Hr) IV Continuous <Continuous>  dextrose 5%. 1000 milliLiter(s) (100 mL/Hr) IV Continuous <Continuous>  dextrose 50% Injectable 25 Gram(s) IV Push once  dextrose 50% Injectable 12.5 Gram(s) IV Push once  diltiazem    milliGRAM(s) Oral daily  gabapentin 300 milliGRAM(s) Oral at bedtime  glucagon  Injectable 1 milliGRAM(s) IntraMuscular once  insulin lispro (ADMELOG) corrective regimen sliding scale   SubCutaneous three times a day before meals  insulin lispro (ADMELOG) corrective regimen sliding scale   SubCutaneous at bedtime  iron sucrose Injectable 100 milliGRAM(s) IV Push every 24 hours  lactobacillus acidophilus 1 Tablet(s) Oral every 12 hours  multivitamin 1 Tablet(s) Oral daily  pantoprazole    Tablet 40 milliGRAM(s) Oral two times a day  senna 2 Tablet(s) Oral at bedtime  sodium chloride 0.9%. 1000 milliLiter(s) (50 mL/Hr) IV Continuous <Continuous>  spironolactone 50 milliGRAM(s) Oral daily  tamsulosin 0.4 milliGRAM(s) Oral at bedtime  tiotropium 2.5 MICROgram(s) Inhaler 2 Puff(s) Inhalation daily    MEDICATIONS  (PRN):  acetaminophen     Tablet .. 650 milliGRAM(s) Oral every 6 hours PRN Temp greater or equal to 38C (100.4F), Mild Pain (1 - 3)  albuterol/ipratropium for Nebulization 3 milliLiter(s) Nebulizer every 6 hours PRN Shortness of Breath and/or Wheezing  aluminum hydroxide/magnesium hydroxide/simethicone Suspension 30 milliLiter(s) Oral every 4 hours PRN Dyspepsia  dextrose Oral Gel 15 Gram(s) Oral once PRN Blood Glucose LESS THAN 70 milliGRAM(s)/deciliter  guaiFENesin Oral Liquid (Sugar-Free) 200 milliGRAM(s) Oral every 6 hours PRN Cough  melatonin 3 milliGRAM(s) Oral at bedtime PRN Insomnia  ondansetron Injectable 4 milliGRAM(s) IV Push every 8 hours PRN Nausea and/or Vomiting  traMADol 50 milliGRAM(s) Oral every 12 hours PRN Moderate Pain (4 - 6)      Diet, DASH/TLC:   Sodium & Cholesterol Restricted  Consistent Carbohydrate Evening Snack  Supplement Feeding Modality:  Oral  Glucerna Shake Cans or Servings Per Day:  1       Frequency:  Two Times a day (04-12-24 @ 15:08) [Active]          Vital Signs Last 24 Hrs  T(C): 36.3 (13 Apr 2024 05:08), Max: 37 (12 Apr 2024 13:29)  T(F): 97.3 (13 Apr 2024 05:08), Max: 98.6 (12 Apr 2024 13:29)  HR: 66 (13 Apr 2024 05:08) (58 - 66)  BP: 115/62 (13 Apr 2024 05:08) (106/62 - 115/70)  BP(mean): --  RR: 18 (13 Apr 2024 05:08) (18 - 18)  SpO2: 93% (13 Apr 2024 05:08) (93% - 97%)    Parameters below as of 13 Apr 2024 05:08  Patient On (Oxygen Delivery Method): room air          04-12-24 @ 07:01  -  04-13-24 @ 07:00  --------------------------------------------------------  IN: 1200 mL / OUT: 660 mL / NET: 540 mL              LABS:                        9.2    7.16  )-----------( 408      ( 13 Apr 2024 06:06 )             28.6     04-13    145  |  111<H>  |  17  ----------------------------<  122<H>  4.2   |  25  |  0.96    Ca    8.7      13 Apr 2024 06:06      PT/INR - ( 13 Apr 2024 06:06 )   PT: 12.9 sec;   INR: 1.11 ratio         PTT - ( 12 Apr 2024 06:16 )  PTT:37.6 sec  Urinalysis Basic - ( 13 Apr 2024 06:06 )    Color: x / Appearance: x / SG: x / pH: x  Gluc: 122 mg/dL / Ketone: x  / Bili: x / Urobili: x   Blood: x / Protein: x / Nitrite: x   Leuk Esterase: x / RBC: x / WBC x   Sq Epi: x / Non Sq Epi: x / Bacteria: x            WBC:  WBC Count: 7.16 K/uL (04-13 @ 06:06)  WBC Count: 7.26 K/uL (04-12 @ 06:16)  WBC Count: 6.79 K/uL (04-11 @ 05:55)  WBC Count: 9.83 K/uL (04-10 @ 12:09)      MICROBIOLOGY:  RECENT CULTURES:  04-10 .Blood Blood-Peripheral XXXX XXXX   No growth at 48 Hours    04-10 .Blood Blood-Peripheral XXXX XXXX   No growth at 48 Hours                PT/INR - ( 13 Apr 2024 06:06 )   PT: 12.9 sec;   INR: 1.11 ratio         PTT - ( 12 Apr 2024 06:16 )  PTT:37.6 sec    Sodium:  Sodium: 145 mmol/L (04-13 @ 06:06)  Sodium: 144 mmol/L (04-12 @ 06:16)  Sodium: 141 mmol/L (04-11 @ 05:55)  Sodium: 139 mmol/L (04-10 @ 12:09)      0.96 mg/dL 04-13 @ 06:06  1.10 mg/dL 04-12 @ 06:16  1.20 mg/dL 04-11 @ 05:55  1.30 mg/dL 04-10 @ 12:09      Hemoglobin:  Hemoglobin: 9.2 g/dL (04-13 @ 06:06)  Hemoglobin: 8.5 g/dL (04-12 @ 06:16)  Hemoglobin: 7.9 g/dL (04-11 @ 05:55)  Hemoglobin: 7.9 g/dL (04-10 @ 21:18)  Hemoglobin: 9.1 g/dL (04-10 @ 12:09)      Platelets: Platelet Count - Automated: 408 K/uL (04-13 @ 06:06)  Platelet Count - Automated: 387 K/uL (04-12 @ 06:16)  Platelet Count - Automated: 391 K/uL (04-11 @ 05:55)  Platelet Count - Automated: 437 K/uL (04-10 @ 12:09)          Urinalysis Basic - ( 13 Apr 2024 06:06 )    Color: x / Appearance: x / SG: x / pH: x  Gluc: 122 mg/dL / Ketone: x  / Bili: x / Urobili: x   Blood: x / Protein: x / Nitrite: x   Leuk Esterase: x / RBC: x / WBC x   Sq Epi: x / Non Sq Epi: x / Bacteria: x        RADIOLOGY & ADDITIONAL STUDIES:      MICROBIOLOGY:  RECENT CULTURES:  04-10 .Blood Blood-Peripheral XXXX XXXX   No growth at 48 Hours    04-10 .Blood Blood-Peripheral XXXX XXXX   No growth at 48 Hours

## 2024-04-13 NOTE — PROGRESS NOTE ADULT - SUBJECTIVE AND OBJECTIVE BOX
PROGRESS NOTE  Patient is a 78y old  Male who presents with a chief complaint of dark stools (13 Apr 2024 13:19)  Chart and available morning labs /imaging are reviewed electronically , urgent issues addressed . More information  is being added upon completion of rounds , when more information is collected and management discussed with consultants , medical staff and social service/case management on the floor   OVERNIGHT  No new issues reported by medical staff ,patient denies gib . All above noted Patient is resting in a bed comfortably .Colonoscopy planned for tuesday , d/w gi team and patient  .No distress noted   HPI:  Patient is a 78-year-old male with past medical of hypertension, hyperlipidemia, diabetes, COPD, A-fib on Plavix presents with dark stool.  Patient noted for the past month he has had dark stool.  Patient reports his PCP at the [facility] has been following his hemoglobin which was noted to be low.  Patient reports he had left hand pain for the past 4 days described as stiffness.  Patient has not had a colonoscopy in years.  Patient notes a 20 pound weight loss over the past few months.  Patient recently recovered from norovirus.  Patient denies fever chest pain shortness of breath abdominal pain dysuria dizziness l.Found to have FOBT positive and seen by GI team Cardiology clearance for possible colonoscopy requested .Found to have pneumonia and started on abx , seen by pulm Palliative care consult requested ,to discuss advance directives and complete MOLST  (10 Apr 2024 15:27)    PAST MEDICAL & SURGICAL HISTORY:  HTN (hypertension)      HLD (hyperlipidemia)      DM (diabetes mellitus)      COPD, moderate      CAD (coronary artery disease)      S/P primary angioplasty          MEDICATIONS  (STANDING):  aMIOdarone    Tablet 200 milliGRAM(s) Oral daily  ascorbic acid 500 milliGRAM(s) Oral two times a day  atorvastatin 10 milliGRAM(s) Oral at bedtime  budesonide 160 MICROgram(s)/formoterol 4.5 MICROgram(s) Inhaler 2 Puff(s) Inhalation two times a day  dextrose 10% Bolus 125 milliLiter(s) IV Bolus once  dextrose 5%. 1000 milliLiter(s) (50 mL/Hr) IV Continuous <Continuous>  dextrose 5%. 1000 milliLiter(s) (100 mL/Hr) IV Continuous <Continuous>  dextrose 50% Injectable 25 Gram(s) IV Push once  dextrose 50% Injectable 12.5 Gram(s) IV Push once  diltiazem    milliGRAM(s) Oral daily  gabapentin 300 milliGRAM(s) Oral at bedtime  glucagon  Injectable 1 milliGRAM(s) IntraMuscular once  insulin lispro (ADMELOG) corrective regimen sliding scale   SubCutaneous three times a day before meals  insulin lispro (ADMELOG) corrective regimen sliding scale   SubCutaneous at bedtime  iron sucrose Injectable 100 milliGRAM(s) IV Push every 24 hours  lactobacillus acidophilus 1 Tablet(s) Oral every 12 hours  multivitamin 1 Tablet(s) Oral daily  pantoprazole    Tablet 40 milliGRAM(s) Oral two times a day  senna 2 Tablet(s) Oral at bedtime  sodium chloride 0.9%. 1000 milliLiter(s) (50 mL/Hr) IV Continuous <Continuous>  spironolactone 50 milliGRAM(s) Oral daily  tamsulosin 0.4 milliGRAM(s) Oral at bedtime  tiotropium 2.5 MICROgram(s) Inhaler 2 Puff(s) Inhalation daily    MEDICATIONS  (PRN):  acetaminophen     Tablet .. 650 milliGRAM(s) Oral every 6 hours PRN Temp greater or equal to 38C (100.4F), Mild Pain (1 - 3)  albuterol/ipratropium for Nebulization 3 milliLiter(s) Nebulizer every 6 hours PRN Shortness of Breath and/or Wheezing  aluminum hydroxide/magnesium hydroxide/simethicone Suspension 30 milliLiter(s) Oral every 4 hours PRN Dyspepsia  dextrose Oral Gel 15 Gram(s) Oral once PRN Blood Glucose LESS THAN 70 milliGRAM(s)/deciliter  guaiFENesin Oral Liquid (Sugar-Free) 200 milliGRAM(s) Oral every 6 hours PRN Cough  melatonin 3 milliGRAM(s) Oral at bedtime PRN Insomnia  ondansetron Injectable 4 milliGRAM(s) IV Push every 8 hours PRN Nausea and/or Vomiting  traMADol 50 milliGRAM(s) Oral every 12 hours PRN Moderate Pain (4 - 6)      OBJECTIVE    T(C): 36.6 (04-13-24 @ 12:33), Max: 36.6 (04-13-24 @ 12:33)  HR: 58 (04-13-24 @ 12:33) (58 - 66)  BP: 104/65 (04-13-24 @ 12:33) (104/65 - 115/70)  RR: 18 (04-13-24 @ 12:33) (18 - 18)  SpO2: 94% (04-13-24 @ 12:33) (93% - 94%)  Wt(kg): --  I&O's Summary    12 Apr 2024 07:01  -  13 Apr 2024 07:00  --------------------------------------------------------  IN: 1200 mL / OUT: 660 mL / NET: 540 mL          REVIEW OF SYSTEMS:  CONSTITUTIONAL: No fever, weight loss, or fatigue  EYES: No eye pain, visual disturbances, or discharge  ENMT:   No sinus or throat pain  NECK: No pain or stiffness  RESPIRATORY: No cough, wheezing, chills or hemoptysis; No shortness of breath  CARDIOVASCULAR: No chest pain, palpitations, dizziness, or leg swelling  GASTROINTESTINAL: No abdominal pain. No nausea, vomiting; No diarrhea or constipation. No melena or hematochezia.  GENITOURINARY: No dysuria, frequency, hematuria, or incontinence  NEUROLOGICAL: No headaches, memory loss, loss of strength, numbness, or tremors  SKIN: No itching, burning, rashes, or lesions   MUSCULOSKELETAL: No joint pain or swelling; No muscle, back, or extremity pain    PHYSICAL EXAM:  Appearance: NAD. VS past 24 hrs -as above   HEENT:   Moist oral mucosa. Conjunctiva clear b/l.   Neck : supple  Respiratory: Lungs CTAB.  Gastrointestinal:  Soft, nontender. No rebound. No rigidity. BS present	  Cardiovascular: RRR ,S1S2 present  Neurologic: Non-focal. Moving all extremities.  Extremities: No edema. No erythema. No calf tenderness.  Skin: No rashes, No ecchymoses, No cyanosis.	  wounds ,skin lesions-See skin assesment flow sheet   LABS:                        9.2    7.16  )-----------( 408      ( 13 Apr 2024 06:06 )             28.6     04-13    145  |  111<H>  |  17  ----------------------------<  122<H>  4.2   |  25  |  0.96    Ca    8.7      13 Apr 2024 06:06      CAPILLARY BLOOD GLUCOSE      POCT Blood Glucose.: 148 mg/dL (13 Apr 2024 11:40)  POCT Blood Glucose.: 118 mg/dL (13 Apr 2024 07:53)  POCT Blood Glucose.: 144 mg/dL (12 Apr 2024 21:16)  POCT Blood Glucose.: 140 mg/dL (12 Apr 2024 16:34)    PT/INR - ( 13 Apr 2024 06:06 )   PT: 12.9 sec;   INR: 1.11 ratio         PTT - ( 12 Apr 2024 06:16 )  PTT:37.6 sec  Urinalysis Basic - ( 13 Apr 2024 06:06 )    Color: x / Appearance: x / SG: x / pH: x  Gluc: 122 mg/dL / Ketone: x  / Bili: x / Urobili: x   Blood: x / Protein: x / Nitrite: x   Leuk Esterase: x / RBC: x / WBC x   Sq Epi: x / Non Sq Epi: x / Bacteria: x        Culture - Blood (collected 10 Apr 2024 13:30)  Source: .Blood Blood-Peripheral  Preliminary Report (12 Apr 2024 20:01):    No growth at 48 Hours    Culture - Blood (collected 10 Apr 2024 13:15)  Source: .Blood Blood-Peripheral  Preliminary Report (12 Apr 2024 20:01):    No growth at 48 Hours      RADIOLOGY & ADDITIONAL TESTS:   reviewed elctronically  ASSESSMENT/PLAN: 	  25 minutes aggregate time was spent on this visit, 50% visit time spent in care co-ordination with other attendings and counselling patient .I have discussed care plan with patient / HCP/family member ,who expressed understanding of problems treatment and their effect and side effects, alternatives in details. I have asked if they have any questions and concerns and appropriately addressed them to best of my ability.

## 2024-04-13 NOTE — PROGRESS NOTE ADULT - SUBJECTIVE AND OBJECTIVE BOX
[facility]  Sammy Munguia PA-C  [address] [place] [address]  [phone]      INTERVAL HPI/OVERNIGHT EVENTS:  Pt s/e  Hgb stable  CT noted d/w pt  Tolerating diet   No BM this morning, but otherwise no GI complaints        MEDICATIONS  (STANDING):  aMIOdarone    Tablet 200 milliGRAM(s) Oral daily  ascorbic acid 500 milliGRAM(s) Oral two times a day  atorvastatin 10 milliGRAM(s) Oral at bedtime  budesonide 160 MICROgram(s)/formoterol 4.5 MICROgram(s) Inhaler 2 Puff(s) Inhalation two times a day  dextrose 10% Bolus 125 milliLiter(s) IV Bolus once  dextrose 5%. 1000 milliLiter(s) (50 mL/Hr) IV Continuous <Continuous>  dextrose 5%. 1000 milliLiter(s) (100 mL/Hr) IV Continuous <Continuous>  dextrose 50% Injectable 25 Gram(s) IV Push once  dextrose 50% Injectable 12.5 Gram(s) IV Push once  diltiazem    milliGRAM(s) Oral daily  gabapentin 300 milliGRAM(s) Oral at bedtime  glucagon  Injectable 1 milliGRAM(s) IntraMuscular once  insulin lispro (ADMELOG) corrective regimen sliding scale   SubCutaneous three times a day before meals  insulin lispro (ADMELOG) corrective regimen sliding scale   SubCutaneous at bedtime  iron sucrose Injectable 100 milliGRAM(s) IV Push every 24 hours  lactobacillus acidophilus 1 Tablet(s) Oral every 12 hours  multivitamin 1 Tablet(s) Oral daily  pantoprazole    Tablet 40 milliGRAM(s) Oral two times a day  senna 2 Tablet(s) Oral at bedtime  sodium chloride 0.9%. 1000 milliLiter(s) (50 mL/Hr) IV Continuous <Continuous>  spironolactone 50 milliGRAM(s) Oral daily  tamsulosin 0.4 milliGRAM(s) Oral at bedtime  tiotropium 2.5 MICROgram(s) Inhaler 2 Puff(s) Inhalation daily    MEDICATIONS  (PRN):  acetaminophen     Tablet .. 650 milliGRAM(s) Oral every 6 hours PRN Temp greater or equal to 38C (100.4F), Mild Pain (1 - 3)  albuterol/ipratropium for Nebulization 3 milliLiter(s) Nebulizer every 6 hours PRN Shortness of Breath and/or Wheezing  aluminum hydroxide/magnesium hydroxide/simethicone Suspension 30 milliLiter(s) Oral every 4 hours PRN Dyspepsia  dextrose Oral Gel 15 Gram(s) Oral once PRN Blood Glucose LESS THAN 70 milliGRAM(s)/deciliter  guaiFENesin Oral Liquid (Sugar-Free) 200 milliGRAM(s) Oral every 6 hours PRN Cough  melatonin 3 milliGRAM(s) Oral at bedtime PRN Insomnia  ondansetron Injectable 4 milliGRAM(s) IV Push every 8 hours PRN Nausea and/or Vomiting  traMADol 50 milliGRAM(s) Oral every 12 hours PRN Moderate Pain (4 - 6)      Allergies  No Known Allergies    Intolerances      Vital Signs Last 24 Hrs  T(C): 36.3 (13 Apr 2024 05:08), Max: 37 (12 Apr 2024 13:29)  T(F): 97.3 (13 Apr 2024 05:08), Max: 98.6 (12 Apr 2024 13:29)  HR: 66 (13 Apr 2024 05:08) (58 - 66)  BP: 115/62 (13 Apr 2024 05:08) (106/62 - 115/70)  BP(mean): --  RR: 18 (13 Apr 2024 05:08) (18 - 18)  SpO2: 93% (13 Apr 2024 05:08) (93% - 97%)    Parameters below as of 13 Apr 2024 05:08  Patient On (Oxygen Delivery Method): room air    GENERAL:  Appears stated age  HEENT:  NC/AT  CHEST:  Full & symmetric excursion  HEART:  Regular rhythm  ABDOMEN:  Soft, non-tender, non-distended  EXTEREMITIES:  no cyanosis  SKIN:  No rash  NEURO:  Alert        LABS:                        9.2    7.16  )-----------( 408      ( 13 Apr 2024 06:06 )             28.6     04-13    145  |  111<H>  |  17  ----------------------------<  122<H>  4.2   |  25  |  0.96    Ca    8.7      13 Apr 2024 06:06      PT/INR - ( 13 Apr 2024 06:06 )   PT: 12.9 sec;   INR: 1.11 ratio         PTT - ( 12 Apr 2024 06:16 )  PTT:37.6 sec        Culture - Blood (collected 10 Apr 2024 13:30)  Source: .Blood Blood-Peripheral  Preliminary Report (12 Apr 2024 20:01):    No growth at 48 Hours    Culture - Blood (collected 10 Apr 2024 13:15)  Source: .Blood Blood-Peripheral  Preliminary Report (12 Apr 2024 20:01):    No growth at 48 Hours        RADIOLOGY       RADIOLOGY:  < from: CT Abdomen and Pelvis No Cont (04.12.24 @ 08:25) >    ACC: [id number] EXAM:  CT ABDOMEN AND PELVIS   ORDERED BY: ANDERSON MARIA     PROCEDURE DATE:  04/12/2024          INTERPRETATION:  CLINICAL INFORMATION: Weight loss, dark stools.    COMPARISON: CT abdomen and pelvis 5/5/2023    CONTRAST/COMPLICATIONS:  IV Contrast: NONE  Oral Contrast: NONE  Complications: None reported at time of study completion    PROCEDURE:  CT of the Abdomen and Pelvis was performed.  Sagittal and coronal reformats were performed.    FINDINGS:  Limited by lack of any exogenous oral or intravenous contrast.    LOWER CHEST: Left lower lobe calcified nodule similar to prior likely   granuloma.. Fibroatelectatic changes left lower lobe and trace left   pleural effusion. Coronary artery calcification.    LIVER: Hepatic cysts is similar to prior.  BILE DUCTS: Normal caliber.  GALLBLADDER: Within normal limits.  SPLEEN: Calcified granuloma.  PANCREAS: Within normal limits.  ADRENALS: Within normal limits.  KIDNEYS/URETERS: Too small to characterize hypodensity left kidney.    BLADDER: Within normal limits.  REPRODUCTIVE ORGANS: Enlarged prostate    BOWEL: No bowel obstruction. Colonic diverticulosis. Large colonic stool   burden.. Appendix  PERITONEUM: No ascites.  VESSELS: Stable 3.8 cm fusiform infrarenal abdominal aortic aneurysm..  RETROPERITONEUM/LYMPH NODES: No lymphadenopathy.  ABDOMINAL WALL: Mild widening bilateral inguinal ring with fat. Tiny   fat-containing umbilical hernia  BONES: Degenerative changes .    IMPRESSION:  No acute findings on this noncontrast study..    Colonic diverticulosis without acute diverticulitis.    Stable infrarenal abdominal aortic aneurysm.    Additional findings as discussed    --- End of Report ---        JAVIER BROWNING MD; Attending Radiologist  This document has been electronically signed. Apr 12 2024  8:48AM    < end of copied text >

## 2024-04-14 LAB
ANION GAP SERPL CALC-SCNC: 5 MMOL/L — SIGNIFICANT CHANGE UP (ref 5–17)
BUN SERPL-MCNC: 17 MG/DL — SIGNIFICANT CHANGE UP (ref 7–23)
CALCIUM SERPL-MCNC: 8.3 MG/DL — LOW (ref 8.5–10.1)
CHLORIDE SERPL-SCNC: 109 MMOL/L — HIGH (ref 96–108)
CO2 SERPL-SCNC: 28 MMOL/L — SIGNIFICANT CHANGE UP (ref 22–31)
CREAT SERPL-MCNC: 1.1 MG/DL — SIGNIFICANT CHANGE UP (ref 0.5–1.3)
EGFR: 69 ML/MIN/1.73M2 — SIGNIFICANT CHANGE UP
GLUCOSE SERPL-MCNC: 110 MG/DL — HIGH (ref 70–99)
HCT VFR BLD CALC: 30.3 % — LOW (ref 39–50)
HGB BLD-MCNC: 9.5 G/DL — LOW (ref 13–17)
MCHC RBC-ENTMCNC: 29.7 PG — SIGNIFICANT CHANGE UP (ref 27–34)
MCHC RBC-ENTMCNC: 31.4 GM/DL — LOW (ref 32–36)
MCV RBC AUTO: 94.7 FL — SIGNIFICANT CHANGE UP (ref 80–100)
NRBC # BLD: 0 /100 WBCS — SIGNIFICANT CHANGE UP (ref 0–0)
PLATELET # BLD AUTO: 424 K/UL — HIGH (ref 150–400)
POTASSIUM SERPL-MCNC: 4.4 MMOL/L — SIGNIFICANT CHANGE UP (ref 3.5–5.3)
POTASSIUM SERPL-SCNC: 4.4 MMOL/L — SIGNIFICANT CHANGE UP (ref 3.5–5.3)
RBC # BLD: 3.2 M/UL — LOW (ref 4.2–5.8)
RBC # FLD: 15.5 % — HIGH (ref 10.3–14.5)
SODIUM SERPL-SCNC: 142 MMOL/L — SIGNIFICANT CHANGE UP (ref 135–145)
WBC # BLD: 8.93 K/UL — SIGNIFICANT CHANGE UP (ref 3.8–10.5)
WBC # FLD AUTO: 8.93 K/UL — SIGNIFICANT CHANGE UP (ref 3.8–10.5)

## 2024-04-14 PROCEDURE — 99231 SBSQ HOSP IP/OBS SF/LOW 25: CPT

## 2024-04-14 RX ADMIN — TIOTROPIUM BROMIDE 2 PUFF(S): 18 CAPSULE ORAL; RESPIRATORY (INHALATION) at 07:09

## 2024-04-14 RX ADMIN — Medication 1 TABLET(S): at 17:22

## 2024-04-14 RX ADMIN — SODIUM CHLORIDE 50 MILLILITER(S): 9 INJECTION INTRAMUSCULAR; INTRAVENOUS; SUBCUTANEOUS at 22:07

## 2024-04-14 RX ADMIN — Medication 1 TABLET(S): at 12:25

## 2024-04-14 RX ADMIN — BUDESONIDE AND FORMOTEROL FUMARATE DIHYDRATE 2 PUFF(S): 160; 4.5 AEROSOL RESPIRATORY (INHALATION) at 07:09

## 2024-04-14 RX ADMIN — Medication 1 TABLET(S): at 06:36

## 2024-04-14 RX ADMIN — SPIRONOLACTONE 50 MILLIGRAM(S): 25 TABLET, FILM COATED ORAL at 06:36

## 2024-04-14 RX ADMIN — ATORVASTATIN CALCIUM 10 MILLIGRAM(S): 80 TABLET, FILM COATED ORAL at 22:07

## 2024-04-14 RX ADMIN — SENNA PLUS 2 TABLET(S): 8.6 TABLET ORAL at 22:07

## 2024-04-14 RX ADMIN — PANTOPRAZOLE SODIUM 40 MILLIGRAM(S): 20 TABLET, DELAYED RELEASE ORAL at 06:36

## 2024-04-14 RX ADMIN — PANTOPRAZOLE SODIUM 40 MILLIGRAM(S): 20 TABLET, DELAYED RELEASE ORAL at 17:22

## 2024-04-14 RX ADMIN — Medication 500 MILLIGRAM(S): at 17:25

## 2024-04-14 RX ADMIN — SODIUM CHLORIDE 50 MILLILITER(S): 9 INJECTION INTRAMUSCULAR; INTRAVENOUS; SUBCUTANEOUS at 06:36

## 2024-04-14 RX ADMIN — BUDESONIDE AND FORMOTEROL FUMARATE DIHYDRATE 2 PUFF(S): 160; 4.5 AEROSOL RESPIRATORY (INHALATION) at 17:54

## 2024-04-14 RX ADMIN — Medication 500 MILLIGRAM(S): at 06:36

## 2024-04-14 RX ADMIN — AMIODARONE HYDROCHLORIDE 200 MILLIGRAM(S): 400 TABLET ORAL at 06:36

## 2024-04-14 RX ADMIN — TAMSULOSIN HYDROCHLORIDE 0.4 MILLIGRAM(S): 0.4 CAPSULE ORAL at 22:07

## 2024-04-14 RX ADMIN — GABAPENTIN 300 MILLIGRAM(S): 400 CAPSULE ORAL at 22:07

## 2024-04-14 NOTE — PROGRESS NOTE ADULT - SUBJECTIVE AND OBJECTIVE BOX
CHIEF COMPLAINT/ REASON FOR VISIT  .. Patient was seen to address the  issue listed under PROBLEM LIST which is located toward bottom of this note     ABRIL POMPA    ENMANUELV 1EAS 104 D1    Allergies    No Known Allergies    Intolerances        PAST MEDICAL & SURGICAL HISTORY:  HTN (hypertension)      HLD (hyperlipidemia)      DM (diabetes mellitus)      COPD, moderate      CAD (coronary artery disease)      S/P primary angioplasty          FAMILY HISTORY:      Home Medications:  amiodarone 200 mg oral tablet: 1 tab(s) orally once a day (10 Apr 2024 14:50)  Anoro Ellipta 62.5 mcg-25 mcg/inh inhalation powder: 1 puff(s) inhaled once a day (10 Apr 2024 15:00)  atorvastatin 10 mg oral tablet: 1 tab(s) orally once a day (at bedtime) (10 Apr 2024 14:51)  cilostazol 100 mg oral tablet: 1 tab(s) orally 2 times a day (10 Apr 2024 15:00)  DilTIAZem (Eqv-Cardizem CD) 180 mg/24 hours oral capsule, extended release: 1 cap(s) orally once a day (10 Apr 2024 14:52)  Eliquis 5 mg oral tablet: 1 tab(s) orally 2 times a day (10 Apr 2024 14:59)  gabapentin 300 mg oral capsule: 1 cap(s) orally once a day (at bedtime) (10 Apr 2024 15:01)  Lasix 40 mg oral tablet: 1 tab(s) orally once a day (10 Apr 2024 15:00)  metFORMIN 500 mg oral tablet: 1 tab(s) orally 2 times a day (10 Apr 2024 15:03)  Milk of Magnesia 1200 mg/15 mL oral liquid: 30 milliliter(s) orally once a day (10 Apr 2024 15:04)  Potassium Chloride (Eqv-Klor-Con 10) 10 mEq oral tablet, extended release: 2 tab(s) orally once a day (10 Apr 2024 15:04)  senna (sennosides) 8.6 mg oral tablet: 1 tab(s) orally once a day (at bedtime) (10 Apr 2024 15:05)  spironolactone 50 mg oral tablet: 1 tab(s) orally once a day (10 Apr 2024 15:06)  tamsulosin 0.4 mg oral capsule: 1 cap(s) orally once a day (10 Apr 2024 15:08)  traMADol 50 mg oral tablet: 1 tab(s) orally every 12 hours (10 Apr 2024 15:10)      MEDICATIONS  (STANDING):  aMIOdarone    Tablet 200 milliGRAM(s) Oral daily  ascorbic acid 500 milliGRAM(s) Oral two times a day  atorvastatin 10 milliGRAM(s) Oral at bedtime  budesonide 160 MICROgram(s)/formoterol 4.5 MICROgram(s) Inhaler 2 Puff(s) Inhalation two times a day  dextrose 10% Bolus 125 milliLiter(s) IV Bolus once  dextrose 5%. 1000 milliLiter(s) (100 mL/Hr) IV Continuous <Continuous>  dextrose 5%. 1000 milliLiter(s) (50 mL/Hr) IV Continuous <Continuous>  dextrose 50% Injectable 25 Gram(s) IV Push once  dextrose 50% Injectable 12.5 Gram(s) IV Push once  diltiazem    milliGRAM(s) Oral daily  gabapentin 300 milliGRAM(s) Oral at bedtime  glucagon  Injectable 1 milliGRAM(s) IntraMuscular once  insulin lispro (ADMELOG) corrective regimen sliding scale   SubCutaneous at bedtime  insulin lispro (ADMELOG) corrective regimen sliding scale   SubCutaneous three times a day before meals  lactobacillus acidophilus 1 Tablet(s) Oral every 12 hours  multivitamin 1 Tablet(s) Oral daily  pantoprazole    Tablet 40 milliGRAM(s) Oral two times a day  senna 2 Tablet(s) Oral at bedtime  sodium chloride 0.9%. 1000 milliLiter(s) (50 mL/Hr) IV Continuous <Continuous>  spironolactone 50 milliGRAM(s) Oral daily  tamsulosin 0.4 milliGRAM(s) Oral at bedtime  tiotropium 2.5 MICROgram(s) Inhaler 2 Puff(s) Inhalation daily    MEDICATIONS  (PRN):  acetaminophen     Tablet .. 650 milliGRAM(s) Oral every 6 hours PRN Temp greater or equal to 38C (100.4F), Mild Pain (1 - 3)  albuterol/ipratropium for Nebulization 3 milliLiter(s) Nebulizer every 6 hours PRN Shortness of Breath and/or Wheezing  aluminum hydroxide/magnesium hydroxide/simethicone Suspension 30 milliLiter(s) Oral every 4 hours PRN Dyspepsia  dextrose Oral Gel 15 Gram(s) Oral once PRN Blood Glucose LESS THAN 70 milliGRAM(s)/deciliter  guaiFENesin Oral Liquid (Sugar-Free) 200 milliGRAM(s) Oral every 6 hours PRN Cough  melatonin 3 milliGRAM(s) Oral at bedtime PRN Insomnia  ondansetron Injectable 4 milliGRAM(s) IV Push every 8 hours PRN Nausea and/or Vomiting  traMADol 50 milliGRAM(s) Oral every 12 hours PRN Moderate Pain (4 - 6)      Diet, DASH/TLC:   Sodium & Cholesterol Restricted  Consistent Carbohydrate Evening Snack  Supplement Feeding Modality:  Oral  Glucerna Shake Cans or Servings Per Day:  1       Frequency:  Two Times a day (04-12-24 @ 15:08) [Active]          Vital Signs Last 24 Hrs  T(C): 36.7 (14 Apr 2024 05:35), Max: 36.7 (14 Apr 2024 05:35)  T(F): 98.1 (14 Apr 2024 05:35), Max: 98.1 (14 Apr 2024 05:35)  HR: 59 (14 Apr 2024 05:35) (55 - 59)  BP: 100/62 (14 Apr 2024 05:35) (100/62 - 119/73)  BP(mean): --  RR: 18 (14 Apr 2024 05:35) (18 - 18)  SpO2: 91% (14 Apr 2024 05:35) (91% - 95%)    Parameters below as of 14 Apr 2024 05:35  Patient On (Oxygen Delivery Method): room air          04-13-24 @ 07:01  -  04-14-24 @ 07:00  --------------------------------------------------------  IN: 1920 mL / OUT: 1000 mL / NET: 920 mL              LABS:                        9.5    8.93  )-----------( 424      ( 14 Apr 2024 06:50 )             30.3     04-14    142  |  109<H>  |  17  ----------------------------<  110<H>  4.4   |  28  |  1.10    Ca    8.3<L>      14 Apr 2024 06:50      PT/INR - ( 13 Apr 2024 06:06 )   PT: 12.9 sec;   INR: 1.11 ratio           Urinalysis Basic - ( 14 Apr 2024 06:50 )    Color: x / Appearance: x / SG: x / pH: x  Gluc: 110 mg/dL / Ketone: x  / Bili: x / Urobili: x   Blood: x / Protein: x / Nitrite: x   Leuk Esterase: x / RBC: x / WBC x   Sq Epi: x / Non Sq Epi: x / Bacteria: x            WBC:  WBC Count: 8.93 K/uL (04-14 @ 06:50)  WBC Count: 7.16 K/uL (04-13 @ 06:06)  WBC Count: 7.26 K/uL (04-12 @ 06:16)  WBC Count: 6.79 K/uL (04-11 @ 05:55)  WBC Count: 9.83 K/uL (04-10 @ 12:09)      MICROBIOLOGY:  RECENT CULTURES:  04-10 .Blood Blood-Peripheral XXXX XXXX   No growth at 72 Hours    04-10 .Blood Blood-Peripheral XXXX XXXX   No growth at 72 Hours                PT/INR - ( 13 Apr 2024 06:06 )   PT: 12.9 sec;   INR: 1.11 ratio             Sodium:  Sodium: 142 mmol/L (04-14 @ 06:50)  Sodium: 145 mmol/L (04-13 @ 06:06)  Sodium: 144 mmol/L (04-12 @ 06:16)  Sodium: 141 mmol/L (04-11 @ 05:55)  Sodium: 139 mmol/L (04-10 @ 12:09)      1.10 mg/dL 04-14 @ 06:50  0.96 mg/dL 04-13 @ 06:06  1.10 mg/dL 04-12 @ 06:16  1.20 mg/dL 04-11 @ 05:55  1.30 mg/dL 04-10 @ 12:09      Hemoglobin:  Hemoglobin: 9.5 g/dL (04-14 @ 06:50)  Hemoglobin: 9.2 g/dL (04-13 @ 06:06)  Hemoglobin: 8.5 g/dL (04-12 @ 06:16)  Hemoglobin: 7.9 g/dL (04-11 @ 05:55)  Hemoglobin: 7.9 g/dL (04-10 @ 21:18)  Hemoglobin: 9.1 g/dL (04-10 @ 12:09)      Platelets: Platelet Count - Automated: 424 K/uL (04-14 @ 06:50)  Platelet Count - Automated: 408 K/uL (04-13 @ 06:06)  Platelet Count - Automated: 387 K/uL (04-12 @ 06:16)  Platelet Count - Automated: 391 K/uL (04-11 @ 05:55)  Platelet Count - Automated: 437 K/uL (04-10 @ 12:09)          Urinalysis Basic - ( 14 Apr 2024 06:50 )    Color: x / Appearance: x / SG: x / pH: x  Gluc: 110 mg/dL / Ketone: x  / Bili: x / Urobili: x   Blood: x / Protein: x / Nitrite: x   Leuk Esterase: x / RBC: x / WBC x   Sq Epi: x / Non Sq Epi: x / Bacteria: x        RADIOLOGY & ADDITIONAL STUDIES:      MICROBIOLOGY:  RECENT CULTURES:  04-10 .Blood Blood-Peripheral XXXX XXXX   No growth at 72 Hours    04-10 .Blood Blood-Peripheral XXXX XXXX   No growth at 72 Hours

## 2024-04-14 NOTE — PROGRESS NOTE ADULT - SUBJECTIVE AND OBJECTIVE BOX
[facility]  Sammy Munguia PA-C  [address] [place] [address]  [phone]      INTERVAL HPI/OVERNIGHT EVENTS:  Pt seen and examined  Pt reports still having dark BM, last one this morning   Tolerating diet       MEDICATIONS  (STANDING):  aMIOdarone    Tablet 200 milliGRAM(s) Oral daily  ascorbic acid 500 milliGRAM(s) Oral two times a day  atorvastatin 10 milliGRAM(s) Oral at bedtime  budesonide 160 MICROgram(s)/formoterol 4.5 MICROgram(s) Inhaler 2 Puff(s) Inhalation two times a day  dextrose 10% Bolus 125 milliLiter(s) IV Bolus once  dextrose 5%. 1000 milliLiter(s) (50 mL/Hr) IV Continuous <Continuous>  dextrose 5%. 1000 milliLiter(s) (100 mL/Hr) IV Continuous <Continuous>  dextrose 50% Injectable 25 Gram(s) IV Push once  dextrose 50% Injectable 12.5 Gram(s) IV Push once  diltiazem    milliGRAM(s) Oral daily  gabapentin 300 milliGRAM(s) Oral at bedtime  glucagon  Injectable 1 milliGRAM(s) IntraMuscular once  insulin lispro (ADMELOG) corrective regimen sliding scale   SubCutaneous three times a day before meals  insulin lispro (ADMELOG) corrective regimen sliding scale   SubCutaneous at bedtime  lactobacillus acidophilus 1 Tablet(s) Oral every 12 hours  multivitamin 1 Tablet(s) Oral daily  pantoprazole    Tablet 40 milliGRAM(s) Oral two times a day  senna 2 Tablet(s) Oral at bedtime  sodium chloride 0.9%. 1000 milliLiter(s) (50 mL/Hr) IV Continuous <Continuous>  spironolactone 50 milliGRAM(s) Oral daily  tamsulosin 0.4 milliGRAM(s) Oral at bedtime  tiotropium 2.5 MICROgram(s) Inhaler 2 Puff(s) Inhalation daily    MEDICATIONS  (PRN):  acetaminophen     Tablet .. 650 milliGRAM(s) Oral every 6 hours PRN Temp greater or equal to 38C (100.4F), Mild Pain (1 - 3)  albuterol/ipratropium for Nebulization 3 milliLiter(s) Nebulizer every 6 hours PRN Shortness of Breath and/or Wheezing  aluminum hydroxide/magnesium hydroxide/simethicone Suspension 30 milliLiter(s) Oral every 4 hours PRN Dyspepsia  dextrose Oral Gel 15 Gram(s) Oral once PRN Blood Glucose LESS THAN 70 milliGRAM(s)/deciliter  guaiFENesin Oral Liquid (Sugar-Free) 200 milliGRAM(s) Oral every 6 hours PRN Cough  melatonin 3 milliGRAM(s) Oral at bedtime PRN Insomnia  ondansetron Injectable 4 milliGRAM(s) IV Push every 8 hours PRN Nausea and/or Vomiting  traMADol 50 milliGRAM(s) Oral every 12 hours PRN Moderate Pain (4 - 6)      Allergies  No Known Allergies    Intolerances      Vital Signs Last 24 Hrs  T(C): 36.7 (14 Apr 2024 05:35), Max: 36.7 (14 Apr 2024 05:35)  T(F): 98.1 (14 Apr 2024 05:35), Max: 98.1 (14 Apr 2024 05:35)  HR: 59 (14 Apr 2024 05:35) (55 - 59)  BP: 100/62 (14 Apr 2024 05:35) (100/62 - 119/73)  BP(mean): --  RR: 18 (14 Apr 2024 05:35) (18 - 18)  SpO2: 91% (14 Apr 2024 05:35) (91% - 95%)    Parameters below as of 14 Apr 2024 05:35  Patient On (Oxygen Delivery Method): room air        04-13-24 @ 07:01  -  04-14-24 @ 07:00  --------------------------------------------------------  IN: 1920 mL / OUT: 1000 mL / NET: 920 mL        GENERAL:  Appears stated age  HEENT:  NC/AT  CHEST:  Full & symmetric excursion  HEART:  Regular rhythm  ABDOMEN:  Soft, non-tender, non-distended  EXTEREMITIES:  no cyanosis  SKIN:  No rash  NEURO:  Alert      LABS:                        9.5    8.93  )-----------( 424      ( 14 Apr 2024 06:50 )             30.3     04-14    142  |  109<H>  |  17  ----------------------------<  110<H>  4.4   |  28  |  1.10    Ca    8.3<L>      14 Apr 2024 06:50      PT/INR - ( 13 Apr 2024 06:06 )   PT: 12.9 sec;   INR: 1.11 ratio

## 2024-04-14 NOTE — PROGRESS NOTE ADULT - ASSESSMENT
diverticulosis  anemia  gi bleed    hold a/c  monitor cbc  suspect diverticular bleed  transfuse as needed  Plan on colonoscopy Tuesday  if bleeding persist may need CT angio  will follow     Advanced care planning was discussed with patient and family.  Advanced care planning forms were reviewed and discussed.  Risks, benefits and alternatives of gastroenterologic procedures were discussed in detail and all questions were answered.    30 minutes spent.

## 2024-04-14 NOTE — PROGRESS NOTE ADULT - SUBJECTIVE AND OBJECTIVE BOX
Patient is a 78y Male with a known history of :  GIB (gastrointestinal bleeding) [K92.2]    Anemia due to acute blood loss [D62]    HTN (hypertension) [I10]    HLD (hyperlipidemia) [E78.5]    DM (diabetes mellitus) [E11.9]    COPD, moderate [J44.9]    CAD (coronary artery disease) [I25.10]    Prophylactic measure [Z29.9]    Pneumonia [J18.9]    Enlarged prostate [N40.0]      HPI:  Patient is a 78-year-old male with past medical of hypertension, hyperlipidemia, diabetes, COPD, A-fib on Plavix presents with dark stool.  Patient noted for the past month he has had dark stool.  Patient reports his PCP at the [facility] has been following his hemoglobin which was noted to be low.  Patient reports he had left hand pain for the past 4 days described as stiffness.  Patient has not had a colonoscopy in years.  Patient notes a 20 pound weight loss over the past few months.  Patient recently recovered from norovirus.  Patient denies fever chest pain shortness of breath abdominal pain dysuria dizziness l.Found to have FOBT positive and seen by GI team Cardiology clearance for possible colonoscopy requested .Found to have pneumonia and started on abx , seen by pulm Palliative care consult requested ,to discuss advance directives and complete MOLST  (10 Apr 2024 15:27)      REVIEW OF SYSTEMS:    CONSTITUTIONAL: No fever, weight loss, or fatigue  EYES: No eye pain, visual disturbances, or discharge  ENMT:  No difficulty hearing, tinnitus, vertigo; No sinus or throat pain  NECK: No pain or stiffness  BREASTS: No pain, masses, or nipple discharge  RESPIRATORY: No cough, wheezing, chills or hemoptysis; No shortness of breath  CARDIOVASCULAR: No chest pain, palpitations, dizziness, or leg swelling  GASTROINTESTINAL: No abdominal or epigastric pain. No nausea, vomiting, or hematemesis; No diarrhea or constipation. No melena or hematochezia.  GENITOURINARY: No dysuria, frequency, hematuria, or incontinence  NEUROLOGICAL: No headaches, memory loss, loss of strength, numbness, or tremors  SKIN: No itching, burning, rashes, or lesions   LYMPH NODES: No enlarged glands  ENDOCRINE: No heat or cold intolerance; No hair loss  MUSCULOSKELETAL: No joint pain or swelling; No muscle, back, or extremity pain  PSYCHIATRIC: No depression, anxiety, mood swings, or difficulty sleeping  HEME/LYMPH: No easy bruising, or bleeding gums  ALLERGY AND IMMUNOLOGIC: No hives or eczema    MEDICATIONS  (STANDING):  aMIOdarone    Tablet 200 milliGRAM(s) Oral daily  ascorbic acid 500 milliGRAM(s) Oral two times a day  atorvastatin 10 milliGRAM(s) Oral at bedtime  budesonide 160 MICROgram(s)/formoterol 4.5 MICROgram(s) Inhaler 2 Puff(s) Inhalation two times a day  dextrose 10% Bolus 125 milliLiter(s) IV Bolus once  dextrose 5%. 1000 milliLiter(s) (50 mL/Hr) IV Continuous <Continuous>  dextrose 5%. 1000 milliLiter(s) (100 mL/Hr) IV Continuous <Continuous>  dextrose 50% Injectable 25 Gram(s) IV Push once  dextrose 50% Injectable 12.5 Gram(s) IV Push once  diltiazem    milliGRAM(s) Oral daily  gabapentin 300 milliGRAM(s) Oral at bedtime  glucagon  Injectable 1 milliGRAM(s) IntraMuscular once  insulin lispro (ADMELOG) corrective regimen sliding scale   SubCutaneous at bedtime  insulin lispro (ADMELOG) corrective regimen sliding scale   SubCutaneous three times a day before meals  lactobacillus acidophilus 1 Tablet(s) Oral every 12 hours  multivitamin 1 Tablet(s) Oral daily  pantoprazole    Tablet 40 milliGRAM(s) Oral two times a day  senna 2 Tablet(s) Oral at bedtime  sodium chloride 0.9%. 1000 milliLiter(s) (50 mL/Hr) IV Continuous <Continuous>  spironolactone 50 milliGRAM(s) Oral daily  tamsulosin 0.4 milliGRAM(s) Oral at bedtime  tiotropium 2.5 MICROgram(s) Inhaler 2 Puff(s) Inhalation daily    MEDICATIONS  (PRN):  acetaminophen     Tablet .. 650 milliGRAM(s) Oral every 6 hours PRN Temp greater or equal to 38C (100.4F), Mild Pain (1 - 3)  albuterol/ipratropium for Nebulization 3 milliLiter(s) Nebulizer every 6 hours PRN Shortness of Breath and/or Wheezing  aluminum hydroxide/magnesium hydroxide/simethicone Suspension 30 milliLiter(s) Oral every 4 hours PRN Dyspepsia  dextrose Oral Gel 15 Gram(s) Oral once PRN Blood Glucose LESS THAN 70 milliGRAM(s)/deciliter  guaiFENesin Oral Liquid (Sugar-Free) 200 milliGRAM(s) Oral every 6 hours PRN Cough  melatonin 3 milliGRAM(s) Oral at bedtime PRN Insomnia  ondansetron Injectable 4 milliGRAM(s) IV Push every 8 hours PRN Nausea and/or Vomiting  traMADol 50 milliGRAM(s) Oral every 12 hours PRN Moderate Pain (4 - 6)      ALLERGIES: No Known Allergies      FAMILY HISTORY:      PHYSICAL EXAMINATION:  -----------------------------  T(C): 36.7 (04-14-24 @ 05:35), Max: 36.7 (04-14-24 @ 05:35)  HR: 59 (04-14-24 @ 05:35) (55 - 59)  BP: 100/62 (04-14-24 @ 05:35) (100/62 - 119/73)  RR: 18 (04-14-24 @ 05:35) (18 - 18)  SpO2: 91% (04-14-24 @ 05:35) (91% - 95%)  Wt(kg): --    04-13 @ 07:01  -  04-14 @ 07:00  --------------------------------------------------------  IN:    Oral Fluid: 720 mL    sodium chloride 0.9%: 1200 mL  Total IN: 1920 mL    OUT:    Voided (mL): 1000 mL  Total OUT: 1000 mL    Total NET: 920 mL            VITALS  T(C): 36.7 (04-14-24 @ 05:35), Max: 36.7 (04-14-24 @ 05:35)  HR: 59 (04-14-24 @ 05:35) (55 - 59)  BP: 100/62 (04-14-24 @ 05:35) (100/62 - 119/73)  RR: 18 (04-14-24 @ 05:35) (18 - 18)  SpO2: 91% (04-14-24 @ 05:35) (91% - 95%)    Constitutional: well developed, normal appearance, well groomed, well nourished, no deformities and no acute distress.   Eyes: the conjunctiva exhibited no abnormalities and the eyelids demonstrated no xanthelasmas.   HEENT: normal oral mucosa, no oral pallor and no oral cyanosis.   Neck: normal jugular venous A waves present, normal jugular venous V waves present and no jugular venous esqueda A waves.   Pulmonary: no respiratory distress, normal respiratory rhythm and effort, no accessory muscle use and lungs were clear to auscultation bilaterally.   Cardiovascular: heart rate and rhythm were normal, normal S1 and S2 and no murmur, gallop, rub, heave or thrill are present.   Abdomen: soft, non-tender, no hepato-splenomegaly and no abdominal mass palpated.   Musculoskeletal: the gait could not be assessed..   Extremities: no clubbing of the fingernails, no localized cyanosis, no petechial hemorrhages and no ischemic changes.   Skin: normal skin color and pigmentation, no rash, no venous stasis, no skin lesions, no skin ulcer and no xanthoma was observed.   Psychiatric: oriented to person, place, and time, the affect was normal, the mood was normal and not feeling anxious.     LABS:   --------  04-14    142  |  109<H>  |  17  ----------------------------<  110<H>  4.4   |  28  |  1.10    Ca    8.3<L>      14 Apr 2024 06:50                           9.5    8.93  )-----------( 424      ( 14 Apr 2024 06:50 )             30.3     PT/INR - ( 13 Apr 2024 06:06 )   PT: 12.9 sec;   INR: 1.11 ratio                     RADIOLOGY:  -----------------    ECG:     ECHO:

## 2024-04-14 NOTE — CHART NOTE - NSCHARTNOTEFT_GEN_A_CORE
Assessment/Follow up: Pt A+Ox4. Continues on Consistent Carbohydrate, Dash/TLC diet with glucerna BID. Pt reports improved appetite/po intake consuming % meals per pt/EMR. No report N/V. Large soft BM 4/13. Plan for colonoscopy 4/16. Encourage po fluids/dietary fiber as tolerated for optimal bowel regularity. Pt does not like glucerna supplement (4 bottles untouched at bedside). Will discontinue per pt request. Encourage po intake with emphasis on HBV protein sources, will provide double protein at meals. BS well controlled, Hgba1c 5.8%. No dietary questions/concerns. Will remain available.     Factors impacting intake: [x ] none [ ] nausea  [ ] vomiting [ ] diarrhea [ x] constipation  [ ]chewing problems [ ] swallowing issues  [ ] other:     Diet Presciption: Diet, DASH/TLC:   Sodium & Cholesterol Restricted  Consistent Carbohydrate {Evening Snack}  Supplement Feeding Modality:  Oral  Glucerna Shake Cans or Servings Per Day:  1       Frequency:  Two Times a day (04-12-24 @ 15:08)    Intake: % past 3 days    Current Weight: 187.3lbs(4/14)      Pertinent Medications: MEDICATIONS  (STANDING):  aMIOdarone    Tablet 200 milliGRAM(s) Oral daily  ascorbic acid 500 milliGRAM(s) Oral two times a day  atorvastatin 10 milliGRAM(s) Oral at bedtime  budesonide 160 MICROgram(s)/formoterol 4.5 MICROgram(s) Inhaler 2 Puff(s) Inhalation two times a day  dextrose 10% Bolus 125 milliLiter(s) IV Bolus once  dextrose 5%. 1000 milliLiter(s) (50 mL/Hr) IV Continuous <Continuous>  dextrose 5%. 1000 milliLiter(s) (100 mL/Hr) IV Continuous <Continuous>  dextrose 50% Injectable 25 Gram(s) IV Push once  dextrose 50% Injectable 12.5 Gram(s) IV Push once  diltiazem    milliGRAM(s) Oral daily  gabapentin 300 milliGRAM(s) Oral at bedtime  glucagon  Injectable 1 milliGRAM(s) IntraMuscular once  insulin lispro (ADMELOG) corrective regimen sliding scale   SubCutaneous three times a day before meals  insulin lispro (ADMELOG) corrective regimen sliding scale   SubCutaneous at bedtime  lactobacillus acidophilus 1 Tablet(s) Oral every 12 hours  multivitamin 1 Tablet(s) Oral daily  pantoprazole    Tablet 40 milliGRAM(s) Oral two times a day  senna 2 Tablet(s) Oral at bedtime  sodium chloride 0.9%. 1000 milliLiter(s) (50 mL/Hr) IV Continuous <Continuous>  spironolactone 50 milliGRAM(s) Oral daily  tamsulosin 0.4 milliGRAM(s) Oral at bedtime  tiotropium 2.5 MICROgram(s) Inhaler 2 Puff(s) Inhalation daily    MEDICATIONS  (PRN):  acetaminophen     Tablet .. 650 milliGRAM(s) Oral every 6 hours PRN Temp greater or equal to 38C (100.4F), Mild Pain (1 - 3)  albuterol/ipratropium for Nebulization 3 milliLiter(s) Nebulizer every 6 hours PRN Shortness of Breath and/or Wheezing  aluminum hydroxide/magnesium hydroxide/simethicone Suspension 30 milliLiter(s) Oral every 4 hours PRN Dyspepsia  dextrose Oral Gel 15 Gram(s) Oral once PRN Blood Glucose LESS THAN 70 milliGRAM(s)/deciliter  guaiFENesin Oral Liquid (Sugar-Free) 200 milliGRAM(s) Oral every 6 hours PRN Cough  melatonin 3 milliGRAM(s) Oral at bedtime PRN Insomnia  ondansetron Injectable 4 milliGRAM(s) IV Push every 8 hours PRN Nausea and/or Vomiting  traMADol 50 milliGRAM(s) Oral every 12 hours PRN Moderate Pain (4 - 6)    Pertinent Labs: 04-14 Na142 mmol/L Glu 110 mg/dL<H> K+ 4.4 mmol/L Cr  1.10 mg/dL BUN 17 mg/dL 04-11 Phos 3.2 mg/dL 04-11 Alb 2.6 g/dL<L>     CAPILLARY BLOOD GLUCOSE      POCT Blood Glucose.: 125 mg/dL (14 Apr 2024 07:38)  POCT Blood Glucose.: 134 mg/dL (13 Apr 2024 21:50)  POCT Blood Glucose.: 94 mg/dL (13 Apr 2024 16:50)  POCT Blood Glucose.: 148 mg/dL (13 Apr 2024 11:40)    Skin: intact. Cedric 18.    Estimated Needs:   [x ] no change since previous assessment  [ ] recalculated:     Previous Nutrition Diagnosis:   [ ] Inadequate Energy Intake [ ]Inadequate Oral Intake [ ] Excessive Energy Intake   [ ] Underweight [ ] Increased Nutrient Needs [ ] Overweight/Obesity   [x ] Altered GI Function [ ] Unintended Weight Loss [ ] Food & Nutrition Related Knowledge Deficit [x ] Malnutrition     Nutrition Diagnosis is [ x] ongoing  [ ] resolved [ ] not applicable     New Nutrition Diagnosis: [x ] not applicable       Interventions:   Recommend  [ ] Change Diet To:  [x ] Nutrition Supplement- Discontinue glucerna supplement.   [ ] Nutrition Support  [x ] Other: Encourage po intake with emphasis on HBV protein sources, double protein provided at meals. Food preferences obtained/honored.     Monitoring and Evaluation:   [x ] PO intake [ x ] Tolerance to diet prescription [ x ] weights [ x ] labs[ x ] follow up per protocol  [ ] other: Assessment/Follow up: Pt A+Ox4. Continues on Consistent Carbohydrate, Dash/TLC diet with glucerna BID. Pt reports improved appetite/po intake consuming % meals per pt/EMR. No report N/V. Large soft BM 4/13. Plan for colonoscopy 4/16. Encourage po fluids/dietary fiber as tolerated for optimal bowel regularity. Pt does not like glucerna supplement (4 bottles untouched at bedside). Will discontinue per pt request. Encourage po intake with emphasis on HBV protein sources, will provide double protein at meals. BS well controlled, Hgba1c 5.8%. No dietary questions/concerns. Will remain available.     Factors impacting intake: [x ] none [ ] nausea  [ ] vomiting [ ] diarrhea [ x] constipation  [ ]chewing problems [ ] swallowing issues  [ ] other:     Diet Presciption: Diet, DASH/TLC:   Sodium & Cholesterol Restricted  Consistent Carbohydrate {Evening Snack}  Supplement Feeding Modality:  Oral  Glucerna Shake Cans or Servings Per Day:  1       Frequency:  Two Times a day (04-12-24 @ 15:08)    Intake: % past 3 days    Current Weight: 187.3lbs(4/14)      Pertinent Medications: MEDICATIONS  (STANDING):  aMIOdarone    Tablet 200 milliGRAM(s) Oral daily  ascorbic acid 500 milliGRAM(s) Oral two times a day  atorvastatin 10 milliGRAM(s) Oral at bedtime  budesonide 160 MICROgram(s)/formoterol 4.5 MICROgram(s) Inhaler 2 Puff(s) Inhalation two times a day  dextrose 10% Bolus 125 milliLiter(s) IV Bolus once  dextrose 5%. 1000 milliLiter(s) (50 mL/Hr) IV Continuous <Continuous>  dextrose 5%. 1000 milliLiter(s) (100 mL/Hr) IV Continuous <Continuous>  dextrose 50% Injectable 25 Gram(s) IV Push once  dextrose 50% Injectable 12.5 Gram(s) IV Push once  diltiazem    milliGRAM(s) Oral daily  gabapentin 300 milliGRAM(s) Oral at bedtime  glucagon  Injectable 1 milliGRAM(s) IntraMuscular once  insulin lispro (ADMELOG) corrective regimen sliding scale   SubCutaneous three times a day before meals  insulin lispro (ADMELOG) corrective regimen sliding scale   SubCutaneous at bedtime  lactobacillus acidophilus 1 Tablet(s) Oral every 12 hours  multivitamin 1 Tablet(s) Oral daily  pantoprazole    Tablet 40 milliGRAM(s) Oral two times a day  senna 2 Tablet(s) Oral at bedtime  sodium chloride 0.9%. 1000 milliLiter(s) (50 mL/Hr) IV Continuous <Continuous>  spironolactone 50 milliGRAM(s) Oral daily  tamsulosin 0.4 milliGRAM(s) Oral at bedtime  tiotropium 2.5 MICROgram(s) Inhaler 2 Puff(s) Inhalation daily    MEDICATIONS  (PRN):  acetaminophen     Tablet .. 650 milliGRAM(s) Oral every 6 hours PRN Temp greater or equal to 38C (100.4F), Mild Pain (1 - 3)  albuterol/ipratropium for Nebulization 3 milliLiter(s) Nebulizer every 6 hours PRN Shortness of Breath and/or Wheezing  aluminum hydroxide/magnesium hydroxide/simethicone Suspension 30 milliLiter(s) Oral every 4 hours PRN Dyspepsia  dextrose Oral Gel 15 Gram(s) Oral once PRN Blood Glucose LESS THAN 70 milliGRAM(s)/deciliter  guaiFENesin Oral Liquid (Sugar-Free) 200 milliGRAM(s) Oral every 6 hours PRN Cough  melatonin 3 milliGRAM(s) Oral at bedtime PRN Insomnia  ondansetron Injectable 4 milliGRAM(s) IV Push every 8 hours PRN Nausea and/or Vomiting  traMADol 50 milliGRAM(s) Oral every 12 hours PRN Moderate Pain (4 - 6)    Pertinent Labs: 04-14 Na142 mmol/L Glu 110 mg/dL<H> K+ 4.4 mmol/L Cr  1.10 mg/dL BUN 17 mg/dL 04-11 Phos 3.2 mg/dL 04-11 Alb 2.6 g/dL<L>     CAPILLARY BLOOD GLUCOSE      POCT Blood Glucose.: 125 mg/dL (14 Apr 2024 07:38)  POCT Blood Glucose.: 134 mg/dL (13 Apr 2024 21:50)  POCT Blood Glucose.: 94 mg/dL (13 Apr 2024 16:50)  POCT Blood Glucose.: 148 mg/dL (13 Apr 2024 11:40)    Skin: intact. Cedric 18.    Estimated Needs:   [x ] no change since previous assessment  [ ] recalculated:     Previous Nutrition Diagnosis:   [ ] Inadequate Energy Intake [ ]Inadequate Oral Intake [ ] Excessive Energy Intake   [ ] Underweight [ ] Increased Nutrient Needs [ ] Overweight/Obesity   [x ] Altered GI Function [ ] Unintended Weight Loss [ ] Food & Nutrition Related Knowledge Deficit [x ] Malnutrition     Nutrition Diagnosis is [ x] ongoing  [ ] resolved [ ] not applicable     New Nutrition Diagnosis: [x ] not applicable       Interventions:   Recommend  [x ] Change Diet To: Consistent Carbohydrate with hs snack.   [x ] Nutrition Supplement- Discontinue glucerna supplement.   [ ] Nutrition Support  [x ] Other: Encourage po intake with emphasis on HBV protein sources, double protein provided at meals. Food preferences obtained/honored.     Monitoring and Evaluation:   [x ] PO intake [ x ] Tolerance to diet prescription [ x ] weights [ x ] labs[ x ] follow up per protocol  [ ] other:

## 2024-04-14 NOTE — PROGRESS NOTE ADULT - ASSESSMENT
REASON FOR VISIT  .. Management of problems listed below        REVIEW OF SYMPTOMS   Able to give ROS  Yes     RELIABILITY +/-   CONSTITUTIONAL Weakness Yes    ENDOCRINE  No heat or cold intolerance    ALLERGY No hives  Sore throat No stridor  RESP Shortness of breath YES   NEURO New weakness No   CARDIAC   Palpitations No         PHYSICAL EXAM    HEENT Unremarkable  atraumatic   RESP Fair air entry  Harsh breath sound   CARDIAC S1 S2 No S3     NO JVD    ABDOMEN No hepatosplenomegaly   PEDAL EDEMA present No calf tenderness  NO rash     GENERAL DATA .   GOC.    ..  4/11/2024 full code   ICU STAY.    .. no   COVID.   .. SCV2 4/10/2024 (-)   BEST PRACTICE ISSUES.    HOB ELEVATN.    .. Yes  DVT PPLX.   .. 4/10/2024 GI BLEED SO PHARMAC PPLX WAS WITHELD   DIET.   .. 4/11/2024 dash    ..  4/10/2024 NPO     ALLGY.   ..   NKA    WT.   ..    IV fl.   .. 4/10/2024 NS 50   BOLUS.   ..  STRESS ULCER PPLX.   .    ..   4/10/2024 PROTONIX 40   INFECTION PPLX.  ..     ABGS.   .  VS/ PO/IO/ VENT/ DRIPS.   4/14/2024 afeb 60 100/60   4/14/2024 ra 95%     . CC .   . 4/10/2024 Patient is from [facility] for low HB. C/O Blood in the stool.  . SUMMARY.     . 4/10/2024 78-year-old male with past medical of hypertension, hyperlipidemia, diabetes, COPD, A-fib on Plavix presents with dark stool.  Patient noted for the past month he has had dark stool.  Patient reports his PCP at the [facility] has been following his hemoglobin which was noted to be low.  Patient reports he had left hand pain for the past 4 days described as stiffness.  Patient has not had a colonoscopy in years.  Patient notes a 20 pound weight loss over the past few months.  Patient recently recovered from norovirus.  Patient denies fever chest pain shortness of breath abdominal pain dysuria dizziness  . BASELINE STATUS.   . [facility] retirement   . HOME MEDS.   · pantoprazole 40 mg oral delayed release tablet: 40 milligram(s) orally 2 times a day  · metoprolol tartrate 25 mg oral tablet: 0.5 tab(s) orally every 12 hours  · folic acid 1 mg oral tablet: 1 tab(s) orally once a day  · Multiple Vitamins oral tablet: 1 tab(s) orally once a day  · polyethylene glycol 3350 oral powder for reconstitution: 17 gram(s) orally 2 times a day  · carbidopa-levodopa 25 mg-100 mg oral tablet: 1 tab(s) orally 3 times a day  · dilTIAZem 240 mg/24 hours oral capsule, extended release: 1 cap(s) orally once a day  · Lasix 40 mg oral tablet: 1 tab(s) orally once a day  · aspirin 81 mg oral tablet: 1 orally once a day  · atorvastatin 10 mg oral tablet: 1 orally once a day  · tamsulosin 0.4 mg oral capsule: 1 cap(s) orally once a day  · cilostazol 100 mg oral tablet: 1 tab(s) orally 2 times a day  · DULoxetine 60 mg oral delayed release capsule: 1 orally once a day  · Breo Ellipta 200 mcg-25 mcg/inh inhalation powder: 1 puff(s) inhaled once a day  · Anoro Ellipta 62.5 mcg-25 mcg/inh inhalation powder: 1 puff(s) inhaled once a day  . PMH.    . PROBLEMS AT PRESENTATION.    . ER MGMT.     . PATIENT DATA .    . ACTIVE ISSUES PLAN .  INFECTION.  . PNEUMONIA   .... w 4/10-4/11/2024 w 9.8 - 6.7   .... pr 4/11/2024 pr .1   .... CXR 4/10/2024 cw 5/8/2023   .... improvd basal infiltr  .... new right perihilar infiltra   .... ct ch 4/10 cw 4/24/2013   .... Tracheobronchial secretions   .... emphysema   .... left lower lobe calcified granuloma  .... scattered linear atelectasis scarring   .... Flu ab 4/10/2024 (-)   .... strep pneu uag 4/12/2024 (-)   .... rsv 4/10/2024 (-)   .... bc 4/10 (-)   .. A/R  .... Low susp for pneum  .... 4/10/2024 check ct ch  and procalc    .... 4/10-4/11/2024 Rocephin azithro   .... 4/11/2024 will give 5 d abio and may change to po if dc plannd   .... 4/11 abio dced after dw id     . COPD   .... 4/10/2024 symbicort   .... 4/10/2024 spiriva     . LACTIC ACID STSTUS .  .... la 4/10/2024 la 1.4  .... monitor     . CAD.  .... 4/10/2024 ATORVASTAT 10     . PVD.  .... 4/10/2024 CILOSTAZOL 100.2     . A fib.  .... 4/10/2024 AMIODARPONE 200   .... 4/10/2024 CARDIZEM    .... 4/10/2024 apixaba held   .... cardio on case     . CHF   .... pbnp 4/11/2024 pbnp 236   .... 4/10/2024 SPIRONOLACTON 50   .... 4/10/2024 check echo     . ANEMIA.  .... Hb 4/10-4/11-4/12-4/13-4/14/2024 Hb 9.1- 7.9  - 8.5 - 9.2 - 9.5   .... INR 4/10/2024 INR 1.98   .... On protonix   .... monitor     . GI BLEED   .... 4/10/2024 Protonix 40   .... 4/10/2024 GI felt it is likely diverticular bleed and to get ct angio if bleed persists   .... 4/10/2024 apixaba held   .... 4/12/2024 ctap diverticulosis no ac findings   ..... monitor Hb     . CONSTIPATION.  .... 4/10/2024 SENNA     RENAL.  .... Na 4/10/2024 na 139  .... K 4/10/2024 K 4.5   .... CO2 4/10/2024 co2 26   .... Cr 4/10-4/11/2024 Cr 1.3 - 1.2     . BPH.  .... 4/10/2024 TAMSULOSIN .4     . PAIN.  .... GABAPENTIN 300       . OVERALL.  . 4/10/2024 78-year-old male 1 ppd smoker quit 3 weeks not on home oxygen with past medical of hypertension, hyperlipidemia, diabetes, COPD, A-fib on Plavix presents with dark stool. Recnt 20 lb wt loss Pulm consulted at admission HO copd New right perihilar infiltr on 4/10 cxr cw 5/2023 cxr     . PNEUM cxr 4/9 new rml infiltr - 4/10 ct no consolidatn scattered linear atelectasis - 4/10-4/11 rocephin azithro Off abio  . COPD - 4/10 spiriva symbicort   . A fib- 4/10 amiodarone 200 cardizem - 4/10 apixa held   . RO CHF- 4/10 spirnolact continued   . GI BLEED On 4/10/2024 was SOB (+) - 4/10 GI felt diverticular bleed   . ANEMIA. Hb 4/10-4/11-4/12 Hb 9.1- 7.9- 8.5     . WEIGHT LOSS     TIME SPENT.  . Over 36 minutes aggregate care time spent on encounter; activities included   direct patient care, counseling and/or coordinating care reviewing notes, lab data/ imaging , discussion with multidisciplinary team/ patient  /family and explaining in detail risks, benefits, alternatives  of the recommendations     PATIENT.  . PEÑA ABRIL 78 m 4/10/2024 1945 DR GILL NIETO

## 2024-04-15 LAB
ANION GAP SERPL CALC-SCNC: 11 MMOL/L — SIGNIFICANT CHANGE UP (ref 5–17)
BUN SERPL-MCNC: 23 MG/DL — SIGNIFICANT CHANGE UP (ref 7–23)
CALCIUM SERPL-MCNC: 9.4 MG/DL — SIGNIFICANT CHANGE UP (ref 8.5–10.1)
CHLORIDE SERPL-SCNC: 109 MMOL/L — HIGH (ref 96–108)
CO2 SERPL-SCNC: 23 MMOL/L — SIGNIFICANT CHANGE UP (ref 22–31)
CREAT SERPL-MCNC: 1.1 MG/DL — SIGNIFICANT CHANGE UP (ref 0.5–1.3)
CULTURE RESULTS: SIGNIFICANT CHANGE UP
CULTURE RESULTS: SIGNIFICANT CHANGE UP
EGFR: 69 ML/MIN/1.73M2 — SIGNIFICANT CHANGE UP
GLUCOSE SERPL-MCNC: 104 MG/DL — HIGH (ref 70–99)
HCT VFR BLD CALC: 31.9 % — LOW (ref 39–50)
HGB BLD-MCNC: 9.9 G/DL — LOW (ref 13–17)
MCHC RBC-ENTMCNC: 29.8 PG — SIGNIFICANT CHANGE UP (ref 27–34)
MCHC RBC-ENTMCNC: 31 GM/DL — LOW (ref 32–36)
MCV RBC AUTO: 96.1 FL — SIGNIFICANT CHANGE UP (ref 80–100)
NRBC # BLD: 0 /100 WBCS — SIGNIFICANT CHANGE UP (ref 0–0)
PLATELET # BLD AUTO: 425 K/UL — HIGH (ref 150–400)
POTASSIUM SERPL-MCNC: 4.7 MMOL/L — SIGNIFICANT CHANGE UP (ref 3.5–5.3)
POTASSIUM SERPL-SCNC: 4.7 MMOL/L — SIGNIFICANT CHANGE UP (ref 3.5–5.3)
RBC # BLD: 3.32 M/UL — LOW (ref 4.2–5.8)
RBC # FLD: 16.5 % — HIGH (ref 10.3–14.5)
SODIUM SERPL-SCNC: 143 MMOL/L — SIGNIFICANT CHANGE UP (ref 135–145)
SPECIMEN SOURCE: SIGNIFICANT CHANGE UP
SPECIMEN SOURCE: SIGNIFICANT CHANGE UP
WBC # BLD: 8.42 K/UL — SIGNIFICANT CHANGE UP (ref 3.8–10.5)
WBC # FLD AUTO: 8.42 K/UL — SIGNIFICANT CHANGE UP (ref 3.8–10.5)

## 2024-04-15 PROCEDURE — 99231 SBSQ HOSP IP/OBS SF/LOW 25: CPT

## 2024-04-15 RX ORDER — SOD SULF/SODIUM/NAHCO3/KCL/PEG
1000 SOLUTION, RECONSTITUTED, ORAL ORAL EVERY 4 HOURS
Refills: 0 | Status: COMPLETED | OUTPATIENT
Start: 2024-04-15 | End: 2024-04-15

## 2024-04-15 RX ORDER — LACTULOSE 10 G/15ML
30 SOLUTION ORAL ONCE
Refills: 0 | Status: COMPLETED | OUTPATIENT
Start: 2024-04-15 | End: 2024-04-15

## 2024-04-15 RX ADMIN — Medication 10 MILLIGRAM(S): at 21:24

## 2024-04-15 RX ADMIN — PANTOPRAZOLE SODIUM 40 MILLIGRAM(S): 20 TABLET, DELAYED RELEASE ORAL at 18:11

## 2024-04-15 RX ADMIN — Medication 1: at 12:20

## 2024-04-15 RX ADMIN — BUDESONIDE AND FORMOTEROL FUMARATE DIHYDRATE 2 PUFF(S): 160; 4.5 AEROSOL RESPIRATORY (INHALATION) at 19:14

## 2024-04-15 RX ADMIN — GABAPENTIN 300 MILLIGRAM(S): 400 CAPSULE ORAL at 21:24

## 2024-04-15 RX ADMIN — AMIODARONE HYDROCHLORIDE 200 MILLIGRAM(S): 400 TABLET ORAL at 06:09

## 2024-04-15 RX ADMIN — TIOTROPIUM BROMIDE 2 PUFF(S): 18 CAPSULE ORAL; RESPIRATORY (INHALATION) at 06:23

## 2024-04-15 RX ADMIN — Medication 10 MILLIGRAM(S): at 15:39

## 2024-04-15 RX ADMIN — TAMSULOSIN HYDROCHLORIDE 0.4 MILLIGRAM(S): 0.4 CAPSULE ORAL at 21:24

## 2024-04-15 RX ADMIN — SPIRONOLACTONE 50 MILLIGRAM(S): 25 TABLET, FILM COATED ORAL at 06:08

## 2024-04-15 RX ADMIN — BUDESONIDE AND FORMOTEROL FUMARATE DIHYDRATE 2 PUFF(S): 160; 4.5 AEROSOL RESPIRATORY (INHALATION) at 06:23

## 2024-04-15 RX ADMIN — Medication 1000 MILLILITER(S): at 21:25

## 2024-04-15 RX ADMIN — Medication 1 TABLET(S): at 11:42

## 2024-04-15 RX ADMIN — Medication 1000 MILLILITER(S): at 18:11

## 2024-04-15 RX ADMIN — PANTOPRAZOLE SODIUM 40 MILLIGRAM(S): 20 TABLET, DELAYED RELEASE ORAL at 06:08

## 2024-04-15 RX ADMIN — ATORVASTATIN CALCIUM 10 MILLIGRAM(S): 80 TABLET, FILM COATED ORAL at 21:24

## 2024-04-15 RX ADMIN — Medication 500 MILLIGRAM(S): at 06:08

## 2024-04-15 RX ADMIN — Medication 1 TABLET(S): at 18:11

## 2024-04-15 RX ADMIN — Medication 500 MILLIGRAM(S): at 18:11

## 2024-04-15 RX ADMIN — SENNA PLUS 2 TABLET(S): 8.6 TABLET ORAL at 21:24

## 2024-04-15 RX ADMIN — Medication 1 TABLET(S): at 06:08

## 2024-04-15 RX ADMIN — SODIUM CHLORIDE 50 MILLILITER(S): 9 INJECTION INTRAMUSCULAR; INTRAVENOUS; SUBCUTANEOUS at 12:20

## 2024-04-15 RX ADMIN — LACTULOSE 30 GRAM(S): 10 SOLUTION ORAL at 15:39

## 2024-04-15 NOTE — PROGRESS NOTE ADULT - SUBJECTIVE AND OBJECTIVE BOX
PROGRESS NOTE  Patient is a 78y old  Male who presents with a chief complaint of dark stools (15 Apr 2024 15:49)  Chart and available morning labs /imaging are reviewed electronically , urgent issues addressed . More information  is being added upon completion of rounds , when more information is collected and management discussed with consultants , medical staff and social service/case management on the floor   OVERNIGHT  No new issues reported by medical staff . All above noted Patient is resting in a bed comfortably .Daughter is at bedside , poc discussed .No distress noted   HPI:  Patient is a 78-year-old male with past medical of hypertension, hyperlipidemia, diabetes, COPD, A-fib on Plavix presents with dark stool.  Patient noted for the past month he has had dark stool.  Patient reports his PCP at the [facility] has been following his hemoglobin which was noted to be low.  Patient reports he had left hand pain for the past 4 days described as stiffness.  Patient has not had a colonoscopy in years.  Patient notes a 20 pound weight loss over the past few months.  Patient recently recovered from norovirus.  Patient denies fever chest pain shortness of breath abdominal pain dysuria dizziness l.Found to have FOBT positive and seen by GI team Cardiology clearance for possible colonoscopy requested .Found to have pneumonia and started on abx , seen by pulm Palliative care consult requested ,to discuss advance directives and complete MOLST  (10 Apr 2024 15:27)    PAST MEDICAL & SURGICAL HISTORY:  HTN (hypertension)      HLD (hyperlipidemia)      DM (diabetes mellitus)      COPD, moderate      CAD (coronary artery disease)      S/P primary angioplasty          MEDICATIONS  (STANDING):  aMIOdarone    Tablet 200 milliGRAM(s) Oral daily  ascorbic acid 500 milliGRAM(s) Oral two times a day  atorvastatin 10 milliGRAM(s) Oral at bedtime  bisacodyl 10 milliGRAM(s) Oral every 4 hours  budesonide 160 MICROgram(s)/formoterol 4.5 MICROgram(s) Inhaler 2 Puff(s) Inhalation two times a day  dextrose 10% Bolus 125 milliLiter(s) IV Bolus once  dextrose 5%. 1000 milliLiter(s) (50 mL/Hr) IV Continuous <Continuous>  dextrose 5%. 1000 milliLiter(s) (100 mL/Hr) IV Continuous <Continuous>  dextrose 50% Injectable 25 Gram(s) IV Push once  dextrose 50% Injectable 12.5 Gram(s) IV Push once  diltiazem    milliGRAM(s) Oral daily  gabapentin 300 milliGRAM(s) Oral at bedtime  glucagon  Injectable 1 milliGRAM(s) IntraMuscular once  insulin lispro (ADMELOG) corrective regimen sliding scale   SubCutaneous three times a day before meals  insulin lispro (ADMELOG) corrective regimen sliding scale   SubCutaneous at bedtime  lactobacillus acidophilus 1 Tablet(s) Oral every 12 hours  multivitamin 1 Tablet(s) Oral daily  pantoprazole    Tablet 40 milliGRAM(s) Oral two times a day  polyethylene glycol/electrolyte Solution 1000 milliLiter(s) Oral every 4 hours  senna 2 Tablet(s) Oral at bedtime  sodium chloride 0.9%. 1000 milliLiter(s) (50 mL/Hr) IV Continuous <Continuous>  spironolactone 50 milliGRAM(s) Oral daily  tamsulosin 0.4 milliGRAM(s) Oral at bedtime  tiotropium 2.5 MICROgram(s) Inhaler 2 Puff(s) Inhalation daily    MEDICATIONS  (PRN):  acetaminophen     Tablet .. 650 milliGRAM(s) Oral every 6 hours PRN Temp greater or equal to 38C (100.4F), Mild Pain (1 - 3)  albuterol/ipratropium for Nebulization 3 milliLiter(s) Nebulizer every 6 hours PRN Shortness of Breath and/or Wheezing  aluminum hydroxide/magnesium hydroxide/simethicone Suspension 30 milliLiter(s) Oral every 4 hours PRN Dyspepsia  dextrose Oral Gel 15 Gram(s) Oral once PRN Blood Glucose LESS THAN 70 milliGRAM(s)/deciliter  guaiFENesin Oral Liquid (Sugar-Free) 200 milliGRAM(s) Oral every 6 hours PRN Cough  melatonin 3 milliGRAM(s) Oral at bedtime PRN Insomnia  ondansetron Injectable 4 milliGRAM(s) IV Push every 8 hours PRN Nausea and/or Vomiting  traMADol 50 milliGRAM(s) Oral every 12 hours PRN Moderate Pain (4 - 6)      OBJECTIVE    T(C): 36.7 (04-15-24 @ 14:37), Max: 36.7 (04-15-24 @ 14:37)  HR: 60 (04-15-24 @ 14:37) (60 - 64)  BP: 111/69 (04-15-24 @ 14:37) (111/69 - 121/74)  RR: 18 (04-15-24 @ 14:37) (17 - 18)  SpO2: 95% (04-15-24 @ 14:37) (94% - 97%)  Wt(kg): --  I&O's Summary    14 Apr 2024 07:01  -  15 Apr 2024 07:00  --------------------------------------------------------  IN: 1330 mL / OUT: 1200 mL / NET: 130 mL          REVIEW OF SYSTEMS:  CONSTITUTIONAL: No fever, weight loss, or fatigue  EYES: No eye pain, visual disturbances, or discharge  ENMT:   No sinus or throat pain  NECK: No pain or stiffness  RESPIRATORY: No cough, wheezing, chills or hemoptysis; No shortness of breath  CARDIOVASCULAR: No chest pain, palpitations, dizziness, or leg swelling  GASTROINTESTINAL: No abdominal pain. No nausea, vomiting; No diarrhea or constipation. No melena or hematochezia.  GENITOURINARY: No dysuria, frequency, hematuria, or incontinence  NEUROLOGICAL: No headaches, memory loss, loss of strength, numbness, or tremors  SKIN: No itching, burning, rashes, or lesions   MUSCULOSKELETAL: No joint pain or swelling; No muscle, back, or extremity pain    PHYSICAL EXAM:  Appearance: NAD. VS past 24 hrs -as above   HEENT:   Moist oral mucosa. Conjunctiva clear b/l.   Neck : supple  Respiratory: Lungs CTAB.  Gastrointestinal:  Soft, nontender. No rebound. No rigidity. BS present	  Cardiovascular: RRR ,S1S2 present  Neurologic: Non-focal. Moving all extremities.  Extremities: No edema. No erythema. No calf tenderness.  Skin: No rashes, No ecchymoses, No cyanosis.	  wounds ,skin lesions-See skin assesment flow sheet   LABS:                        9.9    8.42  )-----------( 425      ( 15 Apr 2024 07:05 )             31.9     04-15    143  |  109<H>  |  23  ----------------------------<  104<H>  4.7   |  23  |  1.10    Ca    9.4      15 Apr 2024 07:05      CAPILLARY BLOOD GLUCOSE      POCT Blood Glucose.: 107 mg/dL (15 Apr 2024 16:50)  POCT Blood Glucose.: 190 mg/dL (15 Apr 2024 11:51)  POCT Blood Glucose.: 109 mg/dL (15 Apr 2024 08:16)  POCT Blood Glucose.: 125 mg/dL (14 Apr 2024 21:25)      Urinalysis Basic - ( 15 Apr 2024 07:05 )    Color: x / Appearance: x / SG: x / pH: x  Gluc: 104 mg/dL / Ketone: x  / Bili: x / Urobili: x   Blood: x / Protein: x / Nitrite: x   Leuk Esterase: x / RBC: x / WBC x   Sq Epi: x / Non Sq Epi: x / Bacteria: x        Culture - Blood (collected 10 Apr 2024 13:30)  Source: .Blood Blood-Peripheral  Preliminary Report (14 Apr 2024 20:01):    No growth at 4 days    Culture - Blood (collected 10 Apr 2024 13:15)  Source: .Blood Blood-Peripheral  Preliminary Report (14 Apr 2024 20:01):    No growth at 4 days      RADIOLOGY & ADDITIONAL TESTS:   reviewed elctronically  ASSESSMENT/PLAN: 	  25 minutes aggregate time was spent on this visit, 50% visit time spent in care co-ordination with other attendings and counselling patient .I have discussed care plan with patient / HCP/family member ,who expressed understanding of problems treatment and their effect and side effects, alternatives in details. I have asked if they have any questions and concerns and appropriately addressed them to best of my ability.

## 2024-04-15 NOTE — PROGRESS NOTE ADULT - NSPROGADDITIONALINFOA_GEN_ALL_CORE
Endoscopic workup ( colonoscopy )  planned for 04/16/24- medically optimized and cleared by cardiologist. Plan d/w daughter at bedside 04/15

## 2024-04-15 NOTE — PROGRESS NOTE ADULT - ASSESSMENT
REASON FOR VISIT  .. Management of problems listed below        REVIEW OF SYMPTOMS   Able to give ROS  Yes     RELIABILITY +/-   CONSTITUTIONAL Weakness Yes    ENDOCRINE  No heat or cold intolerance    ALLERGY No hives  Sore throat No stridor  RESP Shortness of breath YES   NEURO New weakness No   CARDIAC   Palpitations No         PHYSICAL EXAM    HEENT Unremarkable  atraumatic   RESP Fair air entry  Harsh breath sound   CARDIAC S1 S2 No S3     NO JVD    ABDOMEN No hepatosplenomegaly   PEDAL EDEMA present No calf tenderness  NO rash       GENERAL DATA .   GOC.    ..  4/11/2024 full code   ICU STAY.    .. no   COVID.   .. SCV2 4/10/2024 (-)   BEST PRACTICE ISSUES.    HOB ELEVATN.    .. Yes  DVT PPLX.   .. 4/10/2024 GI BLEED SO PHARMAC PPLX WAS WITHELD   DIET.   .. 4/15/2024 clear liq   .. 4/11/2024 dash    ..  4/10/2024 NPO     ALLGY.   ..   NKA    WT.   ..    IV fl.   .. 4/10/2024 NS 50   BOLUS.   ..  STRESS ULCER PPLX.   .    ..   4/10/2024 PROTONIX 40   INFECTION PPLX.  ..     ABGS.   .  VS/ PO/IO/ VENT/ DRIPS.   4/15/2024 afeb 60 110/60   4/15/2024 ra 95%    . CC .   . 4/10/2024 Patient is from [facility] for low HB. C/O Blood in the stool.  . SUMMARY.     . 4/10/2024 78-year-old male with past medical of hypertension, hyperlipidemia, diabetes, COPD, A-fib on Plavix presents with dark stool.  Patient noted for the past month he has had dark stool.  Patient reports his PCP at the [facility] has been following his hemoglobin which was noted to be low.  Patient reports he had left hand pain for the past 4 days described as stiffness.  Patient has not had a colonoscopy in years.  Patient notes a 20 pound weight loss over the past few months.  Patient recently recovered from norovirus.  Patient denies fever chest pain shortness of breath abdominal pain dysuria dizziness  . BASELINE STATUS.   . [facility] pily   . HOME MEDS.   · pantoprazole 40 mg oral delayed release tablet: 40 milligram(s) orally 2 times a day  · metoprolol tartrate 25 mg oral tablet: 0.5 tab(s) orally every 12 hours  · folic acid 1 mg oral tablet: 1 tab(s) orally once a day  · Multiple Vitamins oral tablet: 1 tab(s) orally once a day  · polyethylene glycol 3350 oral powder for reconstitution: 17 gram(s) orally 2 times a day  · carbidopa-levodopa 25 mg-100 mg oral tablet: 1 tab(s) orally 3 times a day  · dilTIAZem 240 mg/24 hours oral capsule, extended release: 1 cap(s) orally once a day  · Lasix 40 mg oral tablet: 1 tab(s) orally once a day  · aspirin 81 mg oral tablet: 1 orally once a day  · atorvastatin 10 mg oral tablet: 1 orally once a day  · tamsulosin 0.4 mg oral capsule: 1 cap(s) orally once a day  · cilostazol 100 mg oral tablet: 1 tab(s) orally 2 times a day  · DULoxetine 60 mg oral delayed release capsule: 1 orally once a day  · Breo Ellipta 200 mcg-25 mcg/inh inhalation powder: 1 puff(s) inhaled once a day  · Anoro Ellipta 62.5 mcg-25 mcg/inh inhalation powder: 1 puff(s) inhaled once a day  . PMH.    . PROBLEMS AT PRESENTATION.    . ER MGMT.     . PATIENT DATA .    . ACTIVE ISSUES PLAN .  INFECTION.  . PNEUMONIA   .... w 4/10-4/11/2024 w 9.8 - 6.7   .... pr 4/11/2024 pr .1   .... CXR 4/10/2024 cw 5/8/2023   .... improvd basal infiltr  .... new right perihilar infiltra   .... ct ch 4/10 cw 4/24/2013   .... Tracheobronchial secretions   .... emphysema   .... left lower lobe calcified granuloma  .... scattered linear atelectasis scarring   .... Flu ab 4/10/2024 (-)   .... strep pneu uag 4/12/2024 (-)   .... rsv 4/10/2024 (-)   .... bc 4/10 (-)   .. A/R  .... Low susp for pneum  .... 4/10/2024 check ct ch  and procalc    .... 4/10-4/11/2024 Rocephin azithro   .... 4/11/2024 will give 5 d abio and may change to po if dc plannd   .... 4/11 abio dced after dw id     . COPD   .... 4/10/2024 symbicort   .... 4/10/2024 spiriva     . LACTIC ACID STSTUS .  .... la 4/10/2024 la 1.4  .... monitor     . CAD.  .... 4/10/2024 ATORVASTAT 10     . PVD.  .... 4/10/2024 CILOSTAZOL 100.2     . A fib.  .... 4/10/2024 AMIODARPONE 200   .... 4/10/2024 CARDIZEM    .... 4/10/2024 apixaba held   .... cardio on case     . CHF   .... pbnp 4/11/2024 pbnp 236   .... 4/10/2024 SPIRONOLACTON 50   .... 4/10/2024 check echo     . ANEMIA.  .... Hb 4/10-4/11-4/12-4/13-4/14-4/15/2024 Hb 9.1- 7.9  - 8.5 - 9.2 - 9.5 - 9.9   .... INR 4/10/2024 INR 1.98   .... On protonix   .... monitor     . GI BLEED   .... 4/10/2024 Protonix 40   .... 4/10/2024 GI felt it is likely diverticular bleed and to get ct angio if bleed persists   .... 4/10/2024 apixaba held   .... 4/12/2024 ctap diverticulosis no ac findings   ..... monitor Hb     . CONSTIPATION.  .... 4/10/2024 SENNA     RENAL.  .... Na 4/10/2024 na 139  .... K 4/10/2024 K 4.5   .... CO2 4/10/2024 co2 26   .... Cr 4/10-4/11/2024 Cr 1.3 - 1.2     . BPH.  .... 4/10/2024 TAMSULOSIN .4     . PAIN.  .... GABAPENTIN 300       . OVERALL.  . 4/10/2024 78-year-old male 1 ppd smoker quit 3 weeks not on home oxygen with past medical of hypertension, hyperlipidemia, diabetes, COPD, A-fib on Plavix presents with dark stool. Recnt 20 lb wt loss Pulm consulted at admission HO copd New right perihilar infiltr on 4/10 cxr cw 5/2023 cxr     . PNEUM cxr 4/9 new rml infiltr - 4/10 ct no consolidatn scattered linear atelectasis - 4/10-4/11 rocephin azithro Off abio  . COPD - 4/10 spiriva symbicort   . A fib- 4/10 amiodarone 200 cardizem - 4/10 apixa held   . RO CHF- 4/10 spirnolact continued   . GI BLEED On 4/10/2024 was SOB (+) - 4/10 GI felt diverticular bleed ->4/15/2024 plannd colonoscopy on 4/16   . ANEMIA. Hb 4/10-4/11-4/12 Hb 9.1- 7.9- 8.5     . WEIGHT LOSS     TIME SPENT.  . Over 36 minutes aggregate care time spent on encounter; activities included   direct patient care, counseling and/or coordinating care reviewing notes, lab data/ imaging , discussion with multidisciplinary team/ patient  /family and explaining in detail risks, benefits, alternatives  of the recommendations     PATIENT.  . PEÑA SAMUELS 78 m 4/10/2024 1945 DR GILL NIETO

## 2024-04-15 NOTE — PROGRESS NOTE ADULT - ASSESSMENT
Patient is a 78-year-old male with past medical of hypertension, hyperlipidemia, diabetes, COPD, A-fib on Plavix presents with dark stool.  Patient noted for the past month he has had dark stool.  Patient reports his PCP at the [facility] has been following his hemoglobin which was noted to be low.  Patient reports he had left hand pain for the past 4 days described as stiffness.  Patient has not had a colonoscopy in years.  Patient notes a 20 pound weight loss over the past few months.  Patient recently recovered from norovirus.  Patient denies fever chest pain shortness of breath abdominal pain dysuria dizziness l.Found to have FOBT positive and seen by GI team Cardiology clearance for possible colonoscopy requested .Found to have pneumonia and started on abx , seen by pulm Palliative care consult requested ,to discuss advance directives and complete MOLST

## 2024-04-15 NOTE — PROGRESS NOTE ADULT - ASSESSMENT
Anemia with equivocal fe studies  admitted with GIB and +FOBT  has not received PRBC since admission    CT abd/pelvis for evaluation of weight loss  Colonic diverticulosis without acute diverticulitis.  Stable infrarenal abdominal aortic aneurysm.    Colonoscopy tomorrow      Recommendations:  1.  follow CBC  2.  continue GI evaluation   3.  venofer 100mg IV daily x 3    colonoscopy scheduled for Tues     further heme recommendations pending clinical course

## 2024-04-15 NOTE — PROGRESS NOTE ADULT - SUBJECTIVE AND OBJECTIVE BOX
Patient is a 78y Male with a known history of :  GIB (gastrointestinal bleeding) [K92.2]    Anemia due to acute blood loss [D62]    HTN (hypertension) [I10]    HLD (hyperlipidemia) [E78.5]    DM (diabetes mellitus) [E11.9]    COPD, moderate [J44.9]    CAD (coronary artery disease) [I25.10]    Prophylactic measure [Z29.9]    Pneumonia [J18.9]    Enlarged prostate [N40.0]      HPI:  Patient is a 78-year-old male with past medical of hypertension, hyperlipidemia, diabetes, COPD, A-fib on Plavix presents with dark stool.  Patient noted for the past month he has had dark stool.  Patient reports his PCP at the [facility] has been following his hemoglobin which was noted to be low.  Patient reports he had left hand pain for the past 4 days described as stiffness.  Patient has not had a colonoscopy in years.  Patient notes a 20 pound weight loss over the past few months.  Patient recently recovered from norovirus.  Patient denies fever chest pain shortness of breath abdominal pain dysuria dizziness l.Found to have FOBT positive and seen by GI team Cardiology clearance for possible colonoscopy requested .Found to have pneumonia and started on abx , seen by pulm Palliative care consult requested ,to discuss advance directives and complete MOLST  (10 Apr 2024 15:27)      REVIEW OF SYSTEMS:    CONSTITUTIONAL: No fever, weight loss, or fatigue  EYES: No eye pain, visual disturbances, or discharge  ENMT:  No difficulty hearing, tinnitus, vertigo; No sinus or throat pain  NECK: No pain or stiffness  BREASTS: No pain, masses, or nipple discharge  RESPIRATORY: No cough, wheezing, chills or hemoptysis; No shortness of breath  CARDIOVASCULAR: No chest pain, palpitations, dizziness, or leg swelling  GASTROINTESTINAL: No abdominal or epigastric pain. No nausea, vomiting, or hematemesis; No diarrhea or constipation. No melena or hematochezia.  GENITOURINARY: No dysuria, frequency, hematuria, or incontinence  NEUROLOGICAL: No headaches, memory loss, loss of strength, numbness, or tremors  SKIN: No itching, burning, rashes, or lesions   LYMPH NODES: No enlarged glands  ENDOCRINE: No heat or cold intolerance; No hair loss  MUSCULOSKELETAL: No joint pain or swelling; No muscle, back, or extremity pain  PSYCHIATRIC: No depression, anxiety, mood swings, or difficulty sleeping  HEME/LYMPH: No easy bruising, or bleeding gums  ALLERGY AND IMMUNOLOGIC: No hives or eczema    MEDICATIONS  (STANDING):  aMIOdarone    Tablet 200 milliGRAM(s) Oral daily  ascorbic acid 500 milliGRAM(s) Oral two times a day  atorvastatin 10 milliGRAM(s) Oral at bedtime  budesonide 160 MICROgram(s)/formoterol 4.5 MICROgram(s) Inhaler 2 Puff(s) Inhalation two times a day  dextrose 10% Bolus 125 milliLiter(s) IV Bolus once  dextrose 5%. 1000 milliLiter(s) (50 mL/Hr) IV Continuous <Continuous>  dextrose 5%. 1000 milliLiter(s) (100 mL/Hr) IV Continuous <Continuous>  dextrose 50% Injectable 25 Gram(s) IV Push once  dextrose 50% Injectable 12.5 Gram(s) IV Push once  diltiazem    milliGRAM(s) Oral daily  gabapentin 300 milliGRAM(s) Oral at bedtime  glucagon  Injectable 1 milliGRAM(s) IntraMuscular once  insulin lispro (ADMELOG) corrective regimen sliding scale   SubCutaneous at bedtime  insulin lispro (ADMELOG) corrective regimen sliding scale   SubCutaneous three times a day before meals  lactobacillus acidophilus 1 Tablet(s) Oral every 12 hours  multivitamin 1 Tablet(s) Oral daily  pantoprazole    Tablet 40 milliGRAM(s) Oral two times a day  senna 2 Tablet(s) Oral at bedtime  sodium chloride 0.9%. 1000 milliLiter(s) (50 mL/Hr) IV Continuous <Continuous>  spironolactone 50 milliGRAM(s) Oral daily  tamsulosin 0.4 milliGRAM(s) Oral at bedtime  tiotropium 2.5 MICROgram(s) Inhaler 2 Puff(s) Inhalation daily    MEDICATIONS  (PRN):  acetaminophen     Tablet .. 650 milliGRAM(s) Oral every 6 hours PRN Temp greater or equal to 38C (100.4F), Mild Pain (1 - 3)  albuterol/ipratropium for Nebulization 3 milliLiter(s) Nebulizer every 6 hours PRN Shortness of Breath and/or Wheezing  aluminum hydroxide/magnesium hydroxide/simethicone Suspension 30 milliLiter(s) Oral every 4 hours PRN Dyspepsia  dextrose Oral Gel 15 Gram(s) Oral once PRN Blood Glucose LESS THAN 70 milliGRAM(s)/deciliter  guaiFENesin Oral Liquid (Sugar-Free) 200 milliGRAM(s) Oral every 6 hours PRN Cough  melatonin 3 milliGRAM(s) Oral at bedtime PRN Insomnia  ondansetron Injectable 4 milliGRAM(s) IV Push every 8 hours PRN Nausea and/or Vomiting  traMADol 50 milliGRAM(s) Oral every 12 hours PRN Moderate Pain (4 - 6)      ALLERGIES: No Known Allergies      FAMILY HISTORY:      PHYSICAL EXAMINATION:  -----------------------------  T(C): 36.5 (04-15-24 @ 05:24), Max: 37.1 (04-14-24 @ 14:19)  HR: 61 (04-15-24 @ 05:24) (61 - 66)  BP: 117/70 (04-15-24 @ 05:24) (101/60 - 121/74)  RR: 18 (04-15-24 @ 05:24) (17 - 18)  SpO2: 97% (04-15-24 @ 05:24) (94% - 97%)  Wt(kg): --    04-14 @ 07:01  -  04-15 @ 07:00  --------------------------------------------------------  IN:    Oral Fluid: 480 mL    sodium chloride 0.9%: 850 mL  Total IN: 1330 mL    OUT:    Voided (mL): 1200 mL  Total OUT: 1200 mL    Total NET: 130 mL            VITALS  T(C): 36.5 (04-15-24 @ 05:24), Max: 37.1 (04-14-24 @ 14:19)  HR: 61 (04-15-24 @ 05:24) (61 - 66)  BP: 117/70 (04-15-24 @ 05:24) (101/60 - 121/74)  RR: 18 (04-15-24 @ 05:24) (17 - 18)  SpO2: 97% (04-15-24 @ 05:24) (94% - 97%)    Constitutional: well developed, normal appearance, well groomed, well nourished, no deformities and no acute distress.   Eyes: the conjunctiva exhibited no abnormalities and the eyelids demonstrated no xanthelasmas.   HEENT: normal oral mucosa, no oral pallor and no oral cyanosis.   Neck: normal jugular venous A waves present, normal jugular venous V waves present and no jugular venous esqueda A waves.   Pulmonary: no respiratory distress, normal respiratory rhythm and effort, no accessory muscle use and lungs were clear to auscultation bilaterally.   Cardiovascular: heart rate and rhythm were normal, normal S1 and S2 and no murmur, gallop, rub, heave or thrill are present.   Abdomen: soft, non-tender, no hepato-splenomegaly and no abdominal mass palpated.   Musculoskeletal: the gait could not be assessed..   Extremities: no clubbing of the fingernails, no localized cyanosis, no petechial hemorrhages and no ischemic changes.   Skin: normal skin color and pigmentation, no rash, no venous stasis, no skin lesions, no skin ulcer and no xanthoma was observed.   Psychiatric: oriented to person, place, and time, the affect was normal, the mood was normal and not feeling anxious.     LABS:   --------  04-14    142  |  109<H>  |  17  ----------------------------<  110<H>  4.4   |  28  |  1.10    Ca    8.3<L>      14 Apr 2024 06:50                           9.5    8.93  )-----------( 424      ( 14 Apr 2024 06:50 )             30.3                 RADIOLOGY:  -----------------    ECG:     ECHO:

## 2024-04-15 NOTE — CASE MANAGEMENT PROGRESS NOTE - NSCMPROGRESSNOTE_GEN_ALL_CORE
Patient discussed during rounds and remains acute. Colonoscopy scheduled to be performed on 04/16/24. CM will continue to collaborate with interdisciplinary team and remain available to assist.

## 2024-04-15 NOTE — PROGRESS NOTE ADULT - ASSESSMENT
diverticulosis  anemia  gi bleed    hold a/c  monitor cbc  suspect diverticular bleed  transfuse as needed  Plan on colonoscopy Tuesday  clear liquids today  NPO after midnight  bowel prep ordered  D/w pt    I reviewed the overnight course of events on the unit, re-confirming the patient history. I discussed the care with the patient  Differential diagnosis and plan of care discussed with patient after the evaluation  35 minutes spent on total encounter of which more than fifty percent of the encounter was spent counseling and/or coordinating care by the attending physician.

## 2024-04-15 NOTE — PROGRESS NOTE ADULT - SUBJECTIVE AND OBJECTIVE BOX
CHIEF COMPLAINT/ REASON FOR VISIT  .. Patient was seen to address the  issue listed under PROBLEM LIST which is located toward bottom of this note     ABRIL POMPA    ENMANUELV 1EAS 104 D1    Allergies    No Known Allergies    Intolerances        PAST MEDICAL & SURGICAL HISTORY:  HTN (hypertension)      HLD (hyperlipidemia)      DM (diabetes mellitus)      COPD, moderate      CAD (coronary artery disease)      S/P primary angioplasty          FAMILY HISTORY:      Home Medications:  amiodarone 200 mg oral tablet: 1 tab(s) orally once a day (10 Apr 2024 14:50)  Anoro Ellipta 62.5 mcg-25 mcg/inh inhalation powder: 1 puff(s) inhaled once a day (10 Apr 2024 15:00)  atorvastatin 10 mg oral tablet: 1 tab(s) orally once a day (at bedtime) (10 Apr 2024 14:51)  cilostazol 100 mg oral tablet: 1 tab(s) orally 2 times a day (10 Apr 2024 15:00)  DilTIAZem (Eqv-Cardizem CD) 180 mg/24 hours oral capsule, extended release: 1 cap(s) orally once a day (10 Apr 2024 14:52)  Eliquis 5 mg oral tablet: 1 tab(s) orally 2 times a day (10 Apr 2024 14:59)  gabapentin 300 mg oral capsule: 1 cap(s) orally once a day (at bedtime) (10 Apr 2024 15:01)  Lasix 40 mg oral tablet: 1 tab(s) orally once a day (10 Apr 2024 15:00)  metFORMIN 500 mg oral tablet: 1 tab(s) orally 2 times a day (10 Apr 2024 15:03)  Milk of Magnesia 1200 mg/15 mL oral liquid: 30 milliliter(s) orally once a day (10 Apr 2024 15:04)  Potassium Chloride (Eqv-Klor-Con 10) 10 mEq oral tablet, extended release: 2 tab(s) orally once a day (10 Apr 2024 15:04)  senna (sennosides) 8.6 mg oral tablet: 1 tab(s) orally once a day (at bedtime) (10 Apr 2024 15:05)  spironolactone 50 mg oral tablet: 1 tab(s) orally once a day (10 Apr 2024 15:06)  tamsulosin 0.4 mg oral capsule: 1 cap(s) orally once a day (10 Apr 2024 15:08)  traMADol 50 mg oral tablet: 1 tab(s) orally every 12 hours (10 Apr 2024 15:10)      MEDICATIONS  (STANDING):  aMIOdarone    Tablet 200 milliGRAM(s) Oral daily  ascorbic acid 500 milliGRAM(s) Oral two times a day  atorvastatin 10 milliGRAM(s) Oral at bedtime  bisacodyl 10 milliGRAM(s) Oral every 4 hours  budesonide 160 MICROgram(s)/formoterol 4.5 MICROgram(s) Inhaler 2 Puff(s) Inhalation two times a day  dextrose 10% Bolus 125 milliLiter(s) IV Bolus once  dextrose 5%. 1000 milliLiter(s) (100 mL/Hr) IV Continuous <Continuous>  dextrose 5%. 1000 milliLiter(s) (50 mL/Hr) IV Continuous <Continuous>  dextrose 50% Injectable 12.5 Gram(s) IV Push once  dextrose 50% Injectable 25 Gram(s) IV Push once  diltiazem    milliGRAM(s) Oral daily  gabapentin 300 milliGRAM(s) Oral at bedtime  glucagon  Injectable 1 milliGRAM(s) IntraMuscular once  insulin lispro (ADMELOG) corrective regimen sliding scale   SubCutaneous three times a day before meals  insulin lispro (ADMELOG) corrective regimen sliding scale   SubCutaneous at bedtime  lactobacillus acidophilus 1 Tablet(s) Oral every 12 hours  multivitamin 1 Tablet(s) Oral daily  pantoprazole    Tablet 40 milliGRAM(s) Oral two times a day  polyethylene glycol/electrolyte Solution 1000 milliLiter(s) Oral every 4 hours  senna 2 Tablet(s) Oral at bedtime  sodium chloride 0.9%. 1000 milliLiter(s) (50 mL/Hr) IV Continuous <Continuous>  spironolactone 50 milliGRAM(s) Oral daily  tamsulosin 0.4 milliGRAM(s) Oral at bedtime  tiotropium 2.5 MICROgram(s) Inhaler 2 Puff(s) Inhalation daily    MEDICATIONS  (PRN):  acetaminophen     Tablet .. 650 milliGRAM(s) Oral every 6 hours PRN Temp greater or equal to 38C (100.4F), Mild Pain (1 - 3)  albuterol/ipratropium for Nebulization 3 milliLiter(s) Nebulizer every 6 hours PRN Shortness of Breath and/or Wheezing  aluminum hydroxide/magnesium hydroxide/simethicone Suspension 30 milliLiter(s) Oral every 4 hours PRN Dyspepsia  dextrose Oral Gel 15 Gram(s) Oral once PRN Blood Glucose LESS THAN 70 milliGRAM(s)/deciliter  guaiFENesin Oral Liquid (Sugar-Free) 200 milliGRAM(s) Oral every 6 hours PRN Cough  melatonin 3 milliGRAM(s) Oral at bedtime PRN Insomnia  ondansetron Injectable 4 milliGRAM(s) IV Push every 8 hours PRN Nausea and/or Vomiting  traMADol 50 milliGRAM(s) Oral every 12 hours PRN Moderate Pain (4 - 6)      Diet, NPO after Midnight:      NPO Start Date: 15-Apr-2024,   NPO Start Time: 23:59  Except Medications (04-15-24 @ 09:01) [Active]  Diet, Clear Liquid:   Consistent Carbohydrate No Snacks  DASH/TLC Sodium & Cholesterol Restricted (04-15-24 @ 09:01) [Active]  Diet, Consistent Carbohydrate w/Evening Snack (04-14-24 @ 10:46) [Pending Verification By Attending]          Vital Signs Last 24 Hrs  T(C): 36.5 (15 Apr 2024 05:24), Max: 37.1 (14 Apr 2024 14:19)  T(F): 97.7 (15 Apr 2024 05:24), Max: 98.8 (14 Apr 2024 14:19)  HR: 61 (15 Apr 2024 05:24) (61 - 66)  BP: 117/70 (15 Apr 2024 05:24) (101/60 - 121/74)  BP(mean): --  RR: 18 (15 Apr 2024 05:24) (17 - 18)  SpO2: 97% (15 Apr 2024 05:24) (94% - 97%)    Parameters below as of 15 Apr 2024 05:24  Patient On (Oxygen Delivery Method): room air          04-14-24 @ 07:01  -  04-15-24 @ 07:00  --------------------------------------------------------  IN: 1330 mL / OUT: 1200 mL / NET: 130 mL              LABS:                        9.9    8.42  )-----------( 425      ( 15 Apr 2024 07:05 )             31.9     04-15    143  |  109<H>  |  23  ----------------------------<  104<H>  4.7   |  23  |  1.10    Ca    9.4      15 Apr 2024 07:05        Urinalysis Basic - ( 15 Apr 2024 07:05 )    Color: x / Appearance: x / SG: x / pH: x  Gluc: 104 mg/dL / Ketone: x  / Bili: x / Urobili: x   Blood: x / Protein: x / Nitrite: x   Leuk Esterase: x / RBC: x / WBC x   Sq Epi: x / Non Sq Epi: x / Bacteria: x            WBC:  WBC Count: 8.42 K/uL (04-15 @ 07:05)  WBC Count: 8.93 K/uL (04-14 @ 06:50)  WBC Count: 7.16 K/uL (04-13 @ 06:06)  WBC Count: 7.26 K/uL (04-12 @ 06:16)      MICROBIOLOGY:  RECENT CULTURES:  04-10 .Blood Blood-Peripheral XXXX XXXX   No growth at 4 days    04-10 .Blood Blood-Peripheral XXXX XXXX   No growth at 4 days                    Sodium:  Sodium: 143 mmol/L (04-15 @ 07:05)  Sodium: 142 mmol/L (04-14 @ 06:50)  Sodium: 145 mmol/L (04-13 @ 06:06)  Sodium: 144 mmol/L (04-12 @ 06:16)      1.10 mg/dL 04-15 @ 07:05  1.10 mg/dL 04-14 @ 06:50  0.96 mg/dL 04-13 @ 06:06  1.10 mg/dL 04-12 @ 06:16      Hemoglobin:  Hemoglobin: 9.9 g/dL (04-15 @ 07:05)  Hemoglobin: 9.5 g/dL (04-14 @ 06:50)  Hemoglobin: 9.2 g/dL (04-13 @ 06:06)  Hemoglobin: 8.5 g/dL (04-12 @ 06:16)      Platelets: Platelet Count - Automated: 425 K/uL (04-15 @ 07:05)  Platelet Count - Automated: 424 K/uL (04-14 @ 06:50)  Platelet Count - Automated: 408 K/uL (04-13 @ 06:06)  Platelet Count - Automated: 387 K/uL (04-12 @ 06:16)          Urinalysis Basic - ( 15 Apr 2024 07:05 )    Color: x / Appearance: x / SG: x / pH: x  Gluc: 104 mg/dL / Ketone: x  / Bili: x / Urobili: x   Blood: x / Protein: x / Nitrite: x   Leuk Esterase: x / RBC: x / WBC x   Sq Epi: x / Non Sq Epi: x / Bacteria: x        RADIOLOGY & ADDITIONAL STUDIES:      MICROBIOLOGY:  RECENT CULTURES:  04-10 .Blood Blood-Peripheral XXXX XXXX   No growth at 4 days    04-10 .Blood Blood-Peripheral XXXX XXXX   No growth at 4 days

## 2024-04-15 NOTE — PROGRESS NOTE ADULT - SUBJECTIVE AND OBJECTIVE BOX
[INTERVAL HX: ]  Patient seen and examined;  Chart reviewed and events noted;     [MEDICATIONS]  MEDICATIONS  (STANDING):  aMIOdarone    Tablet 200 milliGRAM(s) Oral daily  ascorbic acid 500 milliGRAM(s) Oral two times a day  atorvastatin 10 milliGRAM(s) Oral at bedtime  bisacodyl 10 milliGRAM(s) Oral every 4 hours  budesonide 160 MICROgram(s)/formoterol 4.5 MICROgram(s) Inhaler 2 Puff(s) Inhalation two times a day  dextrose 10% Bolus 125 milliLiter(s) IV Bolus once  dextrose 5%. 1000 milliLiter(s) (50 mL/Hr) IV Continuous <Continuous>  dextrose 5%. 1000 milliLiter(s) (100 mL/Hr) IV Continuous <Continuous>  dextrose 50% Injectable 25 Gram(s) IV Push once  dextrose 50% Injectable 12.5 Gram(s) IV Push once  diltiazem    milliGRAM(s) Oral daily  gabapentin 300 milliGRAM(s) Oral at bedtime  glucagon  Injectable 1 milliGRAM(s) IntraMuscular once  insulin lispro (ADMELOG) corrective regimen sliding scale   SubCutaneous three times a day before meals  insulin lispro (ADMELOG) corrective regimen sliding scale   SubCutaneous at bedtime  lactobacillus acidophilus 1 Tablet(s) Oral every 12 hours  multivitamin 1 Tablet(s) Oral daily  pantoprazole    Tablet 40 milliGRAM(s) Oral two times a day  polyethylene glycol/electrolyte Solution 1000 milliLiter(s) Oral every 4 hours  senna 2 Tablet(s) Oral at bedtime  sodium chloride 0.9%. 1000 milliLiter(s) (50 mL/Hr) IV Continuous <Continuous>  spironolactone 50 milliGRAM(s) Oral daily  tamsulosin 0.4 milliGRAM(s) Oral at bedtime  tiotropium 2.5 MICROgram(s) Inhaler 2 Puff(s) Inhalation daily    MEDICATIONS  (PRN):  acetaminophen     Tablet .. 650 milliGRAM(s) Oral every 6 hours PRN Temp greater or equal to 38C (100.4F), Mild Pain (1 - 3)  albuterol/ipratropium for Nebulization 3 milliLiter(s) Nebulizer every 6 hours PRN Shortness of Breath and/or Wheezing  aluminum hydroxide/magnesium hydroxide/simethicone Suspension 30 milliLiter(s) Oral every 4 hours PRN Dyspepsia  dextrose Oral Gel 15 Gram(s) Oral once PRN Blood Glucose LESS THAN 70 milliGRAM(s)/deciliter  guaiFENesin Oral Liquid (Sugar-Free) 200 milliGRAM(s) Oral every 6 hours PRN Cough  melatonin 3 milliGRAM(s) Oral at bedtime PRN Insomnia  ondansetron Injectable 4 milliGRAM(s) IV Push every 8 hours PRN Nausea and/or Vomiting  traMADol 50 milliGRAM(s) Oral every 12 hours PRN Moderate Pain (4 - 6)      [VITALS]  Vital Signs Last 24 Hrs  T(C): 36.7 (15 Apr 2024 14:37), Max: 36.7 (15 Apr 2024 14:37)  T(F): 98.1 (15 Apr 2024 14:37), Max: 98.1 (15 Apr 2024 14:37)  HR: 60 (15 Apr 2024 14:37) (60 - 64)  BP: 111/69 (15 Apr 2024 14:37) (111/69 - 121/74)  BP(mean): --  RR: 18 (15 Apr 2024 14:37) (17 - 18)  SpO2: 95% (15 Apr 2024 14:37) (94% - 97%)    Parameters below as of 15 Apr 2024 14:37  Patient On (Oxygen Delivery Method): room air      [WT/HT]  Daily     Daily Weight in k (15 Apr 2024 05:24)  [VENT]      [PHYSICAL EXAM]  GEN: NAD  HEENT: normocephalic and atraumatic. EOMI. PERRL.    NECK: Supple.  No lymphadenopathy   LUNGS: Clear to auscultation.  HEART: Regular rate and rhythm,  no MRG  ABDOMEN: Soft, nontender, and nondistended.  Positive bowel sounds.    : No CVA tenderness  EXTREMITIES: Without edema.  NEUROLOGIC: grossly intact.  PSYCHIATRIC: Appropriate affect .  SKIN: No rash     [LABS:]                        9.9    8.42  )-----------( 425      ( 15 Apr 2024 07:05 )             31.9     04-15    143  |  109<H>  |  23  ----------------------------<  104<H>  4.7   |  23  |  1.10    Ca    9.4      15 Apr 2024 07:05        Iron - Total Binding Capacity.: 250 ug/dL [220 - 430] (24 @ 05:55)  Ferritin: 59 ng/mL [30 - 400] (24 @ 05:55)    Reticulocyte Count (24 @ 05:55)  Reticulocyte Percent: 3.0 % *H* [0.5 - 2.5]  Absolute Reticulocytes: 78.7 K/uL [25.0 - 125.0]    Folate, Serum: 19.2 ng/mL (24 @ 05:55)  Vitamin B12, Serum: 486 pg/mL [232 - 1245] (24 @ 05:55)  Ferritin, Serum: 453 ng/mL *H* [30 - 400] (23 @ 06:50)  Vitamin B12, Serum: 298 pg/mL [232 - 1245] (23 @ 08:40)  Folate, Serum: 12.0 ng/mL (23 @ 08:40)    Urinalysis Basic - ( 15 Apr 2024 07:05 )  Color: x / Appearance: x / SG: x / pH: x  Gluc: 104 mg/dL / Ketone: x  / Bili: x / Urobili: x   Blood: x / Protein: x / Nitrite: x   Leuk Esterase: x / RBC: x / WBC x   Sq Epi: x / Non Sq Epi: x / Bacteria: x      [RADIOLOGY STUDIES:]     [INTERVAL HX: ]  Patient seen and examined;  Chart reviewed and events noted;     [MEDICATIONS]  MEDICATIONS  (STANDING):  aMIOdarone    Tablet 200 milliGRAM(s) Oral daily  ascorbic acid 500 milliGRAM(s) Oral two times a day  atorvastatin 10 milliGRAM(s) Oral at bedtime  bisacodyl 10 milliGRAM(s) Oral every 4 hours  budesonide 160 MICROgram(s)/formoterol 4.5 MICROgram(s) Inhaler 2 Puff(s) Inhalation two times a day  dextrose 10% Bolus 125 milliLiter(s) IV Bolus once  dextrose 5%. 1000 milliLiter(s) (50 mL/Hr) IV Continuous <Continuous>  dextrose 5%. 1000 milliLiter(s) (100 mL/Hr) IV Continuous <Continuous>  dextrose 50% Injectable 25 Gram(s) IV Push once  dextrose 50% Injectable 12.5 Gram(s) IV Push once  diltiazem    milliGRAM(s) Oral daily  gabapentin 300 milliGRAM(s) Oral at bedtime  glucagon  Injectable 1 milliGRAM(s) IntraMuscular once  insulin lispro (ADMELOG) corrective regimen sliding scale   SubCutaneous three times a day before meals  insulin lispro (ADMELOG) corrective regimen sliding scale   SubCutaneous at bedtime  lactobacillus acidophilus 1 Tablet(s) Oral every 12 hours  multivitamin 1 Tablet(s) Oral daily  pantoprazole    Tablet 40 milliGRAM(s) Oral two times a day  polyethylene glycol/electrolyte Solution 1000 milliLiter(s) Oral every 4 hours  senna 2 Tablet(s) Oral at bedtime  sodium chloride 0.9%. 1000 milliLiter(s) (50 mL/Hr) IV Continuous <Continuous>  spironolactone 50 milliGRAM(s) Oral daily  tamsulosin 0.4 milliGRAM(s) Oral at bedtime  tiotropium 2.5 MICROgram(s) Inhaler 2 Puff(s) Inhalation daily    MEDICATIONS  (PRN):  acetaminophen     Tablet .. 650 milliGRAM(s) Oral every 6 hours PRN Temp greater or equal to 38C (100.4F), Mild Pain (1 - 3)  albuterol/ipratropium for Nebulization 3 milliLiter(s) Nebulizer every 6 hours PRN Shortness of Breath and/or Wheezing  aluminum hydroxide/magnesium hydroxide/simethicone Suspension 30 milliLiter(s) Oral every 4 hours PRN Dyspepsia  dextrose Oral Gel 15 Gram(s) Oral once PRN Blood Glucose LESS THAN 70 milliGRAM(s)/deciliter  guaiFENesin Oral Liquid (Sugar-Free) 200 milliGRAM(s) Oral every 6 hours PRN Cough  melatonin 3 milliGRAM(s) Oral at bedtime PRN Insomnia  ondansetron Injectable 4 milliGRAM(s) IV Push every 8 hours PRN Nausea and/or Vomiting  traMADol 50 milliGRAM(s) Oral every 12 hours PRN Moderate Pain (4 - 6)      [VITALS]  Vital Signs Last 24 Hrs  T(C): 36.7 (15 Apr 2024 14:37), Max: 36.7 (15 Apr 2024 14:37)  T(F): 98.1 (15 Apr 2024 14:37), Max: 98.1 (15 Apr 2024 14:37)  HR: 60 (15 Apr 2024 14:37) (60 - 64)  BP: 111/69 (15 Apr 2024 14:37) (111/69 - 121/74)  BP(mean): --  RR: 18 (15 Apr 2024 14:37) (17 - 18)  SpO2: 95% (15 Apr 2024 14:37) (94% - 97%)    Parameters below as of 15 Apr 2024 14:37  Patient On (Oxygen Delivery Method): room air      [WT/HT]  Daily     Daily Weight in k (15 Apr 2024 05:24)  [VENT]      [PHYSICAL EXAM]  GEN: NAD  HEENT: normocephalic and atraumatic. EOMI. PERRL.    NECK: Supple.  No lymphadenopathy   LUNGS: Clear to auscultation.  HEART: Regular rate and rhythm,  no MRG  ABDOMEN: Soft, nontender, and nondistended.  Positive bowel sounds.    : No CVA tenderness  EXTREMITIES: Without edema.  NEUROLOGIC: grossly intact.  PSYCHIATRIC: Appropriate affect .  SKIN: No rash     [LABS:]                        9.9    8.42  )-----------( 425      ( 15 Apr 2024 07:05 )             31.9     04-15    143  |  109<H>  |  23  ----------------------------<  104<H>  4.7   |  23  |  1.10    Ca    9.4      15 Apr 2024 07:05        Iron - Total Binding Capacity.: 250 ug/dL [220 - 430] (24 @ 05:55)  Ferritin: 59 ng/mL [30 - 400] (24 @ 05:55)    Reticulocyte Count (24 @ 05:55)  Reticulocyte Percent: 3.0 % *H* [0.5 - 2.5]  Absolute Reticulocytes: 78.7 K/uL [25.0 - 125.0]    Folate, Serum: 19.2 ng/mL (24 @ 05:55)  Vitamin B12, Serum: 486 pg/mL [232 - 1245] (24 @ 05:55)  Ferritin, Serum: 453 ng/mL *H* [30 - 400] (23 @ 06:50)  Vitamin B12, Serum: 298 pg/mL [232 - 1245] (23 @ 08:40)  Folate, Serum: 12.0 ng/mL (23 @ 08:40)    Urinalysis Basic - ( 15 Apr 2024 07:05 )  Color: x / Appearance: x / SG: x / pH: x  Gluc: 104 mg/dL / Ketone: x  / Bili: x / Urobili: x   Blood: x / Protein: x / Nitrite: x   Leuk Esterase: x / RBC: x / WBC x   Sq Epi: x / Non Sq Epi: x / Bacteria: x      [RADIOLOGY STUDIES:]    < from: CT Abdomen and Pelvis No Cont (24 @ 08:25) >    ACC: [id number] EXAM:  CT ABDOMEN AND PELVIS   ORDERED BY: ANDERSON MARIA     PROCEDURE DATE:  2024          INTERPRETATION:  CLINICAL INFORMATION: Weight loss, dark stools.    COMPARISON: CT abdomen and pelvis 2023    CONTRAST/COMPLICATIONS:  IV Contrast: NONE  Oral Contrast: NONE  Complications: None reported at time of study completion    PROCEDURE:  CT of the Abdomen and Pelvis was performed.  Sagittal and coronal reformats were performed.    FINDINGS:  Limited by lack of any exogenous oral or intravenous contrast.    LOWER CHEST: Left lower lobe calcified nodule similar to prior likely   granuloma.. Fibroatelectatic changes left lower lobe and trace left   pleural effusion. Coronary artery calcification.    LIVER: Hepatic cysts is similar to prior.  BILE DUCTS: Normal caliber.  GALLBLADDER: Within normal limits.  SPLEEN: Calcified granuloma.  PANCREAS: Within normal limits.  ADRENALS: Within normal limits.  KIDNEYS/URETERS: Too small to characterize hypodensity left kidney.    BLADDER: Within normal limits.  REPRODUCTIVE ORGANS: Enlarged prostate    BOWEL: No bowel obstruction. Colonic diverticulosis. Large colonic stool   burden.. Appendix  PERITONEUM: No ascites.  VESSELS: Stable 3.8 cm fusiform infrarenal abdominal aortic aneurysm..  RETROPERITONEUM/LYMPH NODES: No lymphadenopathy.  ABDOMINAL WALL: Mild widening bilateral inguinal ring with fat. Tiny   fat-containing umbilical hernia  BONES: Degenerative changes .    IMPRESSION:  No acute findings on this noncontrast study..    Colonic diverticulosis without acute diverticulitis.    Stable infrarenal abdominal aortic aneurysm.    Additional findings as discussed    --- End of Report ---            JAVIER BROWNING MD; Attending Radiologist  This document has been electronically signed. 2024  8:48AM    < end of copied text >

## 2024-04-15 NOTE — PROGRESS NOTE ADULT - SUBJECTIVE AND OBJECTIVE BOX
[facility]  Sammy Munguia PA-C  [address] [place] [address]  [phone]      INTERVAL HPI/OVERNIGHT EVENTS:  Pt s/e  +BM over the weekend  Planned for colonoscopy tomorrow    MEDICATIONS  (STANDING):  aMIOdarone    Tablet 200 milliGRAM(s) Oral daily  ascorbic acid 500 milliGRAM(s) Oral two times a day  atorvastatin 10 milliGRAM(s) Oral at bedtime  bisacodyl 10 milliGRAM(s) Oral every 4 hours  budesonide 160 MICROgram(s)/formoterol 4.5 MICROgram(s) Inhaler 2 Puff(s) Inhalation two times a day  dextrose 10% Bolus 125 milliLiter(s) IV Bolus once  dextrose 5%. 1000 milliLiter(s) (50 mL/Hr) IV Continuous <Continuous>  dextrose 5%. 1000 milliLiter(s) (100 mL/Hr) IV Continuous <Continuous>  dextrose 50% Injectable 25 Gram(s) IV Push once  dextrose 50% Injectable 12.5 Gram(s) IV Push once  diltiazem    milliGRAM(s) Oral daily  gabapentin 300 milliGRAM(s) Oral at bedtime  glucagon  Injectable 1 milliGRAM(s) IntraMuscular once  insulin lispro (ADMELOG) corrective regimen sliding scale   SubCutaneous three times a day before meals  insulin lispro (ADMELOG) corrective regimen sliding scale   SubCutaneous at bedtime  lactobacillus acidophilus 1 Tablet(s) Oral every 12 hours  multivitamin 1 Tablet(s) Oral daily  pantoprazole    Tablet 40 milliGRAM(s) Oral two times a day  polyethylene glycol/electrolyte Solution 1000 milliLiter(s) Oral every 4 hours  senna 2 Tablet(s) Oral at bedtime  sodium chloride 0.9%. 1000 milliLiter(s) (50 mL/Hr) IV Continuous <Continuous>  spironolactone 50 milliGRAM(s) Oral daily  tamsulosin 0.4 milliGRAM(s) Oral at bedtime  tiotropium 2.5 MICROgram(s) Inhaler 2 Puff(s) Inhalation daily    MEDICATIONS  (PRN):  acetaminophen     Tablet .. 650 milliGRAM(s) Oral every 6 hours PRN Temp greater or equal to 38C (100.4F), Mild Pain (1 - 3)  albuterol/ipratropium for Nebulization 3 milliLiter(s) Nebulizer every 6 hours PRN Shortness of Breath and/or Wheezing  aluminum hydroxide/magnesium hydroxide/simethicone Suspension 30 milliLiter(s) Oral every 4 hours PRN Dyspepsia  dextrose Oral Gel 15 Gram(s) Oral once PRN Blood Glucose LESS THAN 70 milliGRAM(s)/deciliter  guaiFENesin Oral Liquid (Sugar-Free) 200 milliGRAM(s) Oral every 6 hours PRN Cough  melatonin 3 milliGRAM(s) Oral at bedtime PRN Insomnia  ondansetron Injectable 4 milliGRAM(s) IV Push every 8 hours PRN Nausea and/or Vomiting  traMADol 50 milliGRAM(s) Oral every 12 hours PRN Moderate Pain (4 - 6)      Allergies    No Known Allergies        PHYSICAL EXAM:   Vital Signs:  Vital Signs Last 24 Hrs  T(C): 36.5 (15 Apr 2024 05:24), Max: 37.1 (2024 14:19)  T(F): 97.7 (15 Apr 2024 05:24), Max: 98.8 (2024 14:19)  HR: 61 (15 Apr 2024 05:24) (61 - 66)  BP: 117/70 (15 Apr 2024 05:24) (101/60 - 121/74)  BP(mean): --  RR: 18 (15 Apr 2024 05:24) (17 - 18)  SpO2: 97% (15 Apr 2024 05:24) (94% - 97%)    Parameters below as of 15 Apr 2024 05:24  Patient On (Oxygen Delivery Method): room air      Daily     Daily Weight in k (15 Apr 2024 05:24)    GENERAL:  Appears stated age  HEENT:  NC/AT  CHEST:  Full & symmetric excursion  HEART:  Regular rhythm  ABDOMEN:  Soft, non-tender, non-distended  EXTEREMITIES:  no cyanosis  SKIN:  No rash  NEURO:  Alert      LABS:                        9.9    8.42  )-----------( 425      ( 15 Apr 2024 07:05 )             31.9     04-15    143  |  109<H>  |  23  ----------------------------<  104<H>  4.7   |  23  |  1.10    Ca    9.4      15 Apr 2024 07:05        Urinalysis Basic - ( 15 Apr 2024 07:05 )    Color: x / Appearance: x / SG: x / pH: x  Gluc: 104 mg/dL / Ketone: x  / Bili: x / Urobili: x   Blood: x / Protein: x / Nitrite: x   Leuk Esterase: x / RBC: x / WBC x   Sq Epi: x / Non Sq Epi: x / Bacteria: x

## 2024-04-15 NOTE — PROGRESS NOTE ADULT - ASSESSMENT
itana   upper gib gi evaluation  ashd - atrial fib -hold eliquis for gib  pvd - s/p angioplasty both legs  dm2  hypertension  dyslipidemia  pna- antibiotics as per pulmonary  copd  no angina - no chf -cardiac status stable low risk for upper gi endoscopy  discussed with daughter at bed side 4/10  hb 7.9 4/11 tiana   upper gib gi evaluation  ashd - atrial fib -hold eliquis for gib  pvd - s/p angioplasty both legs  dm2  hypertension  dyslipidemia  pna- antibiotics as per pulmonary  copd  no angina - no chf -cardiac status stable low risk for upper gi endoscopy and colonoscopy  4/15  discussed with daughter at bed side on admission

## 2024-04-16 ENCOUNTER — TRANSCRIPTION ENCOUNTER (OUTPATIENT)
Age: 79
End: 2024-04-16

## 2024-04-16 PROCEDURE — 99231 SBSQ HOSP IP/OBS SF/LOW 25: CPT

## 2024-04-16 RX ADMIN — Medication 180 MILLIGRAM(S): at 05:07

## 2024-04-16 RX ADMIN — PANTOPRAZOLE SODIUM 40 MILLIGRAM(S): 20 TABLET, DELAYED RELEASE ORAL at 05:07

## 2024-04-16 RX ADMIN — PANTOPRAZOLE SODIUM 40 MILLIGRAM(S): 20 TABLET, DELAYED RELEASE ORAL at 17:37

## 2024-04-16 RX ADMIN — ATORVASTATIN CALCIUM 10 MILLIGRAM(S): 80 TABLET, FILM COATED ORAL at 21:25

## 2024-04-16 RX ADMIN — TAMSULOSIN HYDROCHLORIDE 0.4 MILLIGRAM(S): 0.4 CAPSULE ORAL at 21:25

## 2024-04-16 RX ADMIN — AMIODARONE HYDROCHLORIDE 200 MILLIGRAM(S): 400 TABLET ORAL at 05:07

## 2024-04-16 RX ADMIN — Medication 1 TABLET(S): at 05:06

## 2024-04-16 RX ADMIN — Medication 500 MILLIGRAM(S): at 05:07

## 2024-04-16 RX ADMIN — GABAPENTIN 300 MILLIGRAM(S): 400 CAPSULE ORAL at 21:25

## 2024-04-16 RX ADMIN — SPIRONOLACTONE 50 MILLIGRAM(S): 25 TABLET, FILM COATED ORAL at 05:06

## 2024-04-16 RX ADMIN — Medication 10 MILLIGRAM(S): at 00:58

## 2024-04-16 RX ADMIN — Medication 1 TABLET(S): at 17:37

## 2024-04-16 RX ADMIN — BUDESONIDE AND FORMOTEROL FUMARATE DIHYDRATE 2 PUFF(S): 160; 4.5 AEROSOL RESPIRATORY (INHALATION) at 08:21

## 2024-04-16 RX ADMIN — TIOTROPIUM BROMIDE 2 PUFF(S): 18 CAPSULE ORAL; RESPIRATORY (INHALATION) at 08:21

## 2024-04-16 RX ADMIN — Medication 500 MILLIGRAM(S): at 17:37

## 2024-04-16 RX ADMIN — Medication 1 TABLET(S): at 12:17

## 2024-04-16 NOTE — PROGRESS NOTE ADULT - SUBJECTIVE AND OBJECTIVE BOX
CHIEF COMPLAINT/ REASON FOR VISIT  .. Patient was seen to address the  issue listed under PROBLEM LIST which is located toward bottom of this note     ABRIL POMPA    ENMANUELV 1EAS 104 D1    Allergies    No Known Allergies    Intolerances        PAST MEDICAL & SURGICAL HISTORY:  HTN (hypertension)      HLD (hyperlipidemia)      DM (diabetes mellitus)      COPD, moderate      CAD (coronary artery disease)      S/P primary angioplasty          FAMILY HISTORY:      Home Medications:  amiodarone 200 mg oral tablet: 1 tab(s) orally once a day (10 Apr 2024 14:50)  Anoro Ellipta 62.5 mcg-25 mcg/inh inhalation powder: 1 puff(s) inhaled once a day (10 Apr 2024 15:00)  atorvastatin 10 mg oral tablet: 1 tab(s) orally once a day (at bedtime) (10 Apr 2024 14:51)  cilostazol 100 mg oral tablet: 1 tab(s) orally 2 times a day (10 Apr 2024 15:00)  DilTIAZem (Eqv-Cardizem CD) 180 mg/24 hours oral capsule, extended release: 1 cap(s) orally once a day (10 Apr 2024 14:52)  Eliquis 5 mg oral tablet: 1 tab(s) orally 2 times a day (10 Apr 2024 14:59)  gabapentin 300 mg oral capsule: 1 cap(s) orally once a day (at bedtime) (10 Apr 2024 15:01)  Lasix 40 mg oral tablet: 1 tab(s) orally once a day (10 Apr 2024 15:00)  metFORMIN 500 mg oral tablet: 1 tab(s) orally 2 times a day (10 Apr 2024 15:03)  Milk of Magnesia 1200 mg/15 mL oral liquid: 30 milliliter(s) orally once a day (10 Apr 2024 15:04)  Potassium Chloride (Eqv-Klor-Con 10) 10 mEq oral tablet, extended release: 2 tab(s) orally once a day (10 Apr 2024 15:04)  senna (sennosides) 8.6 mg oral tablet: 1 tab(s) orally once a day (at bedtime) (10 Apr 2024 15:05)  spironolactone 50 mg oral tablet: 1 tab(s) orally once a day (10 Apr 2024 15:06)  tamsulosin 0.4 mg oral capsule: 1 cap(s) orally once a day (10 Apr 2024 15:08)  traMADol 50 mg oral tablet: 1 tab(s) orally every 12 hours (10 Apr 2024 15:10)      MEDICATIONS  (STANDING):  aMIOdarone    Tablet 200 milliGRAM(s) Oral daily  ascorbic acid 500 milliGRAM(s) Oral two times a day  atorvastatin 10 milliGRAM(s) Oral at bedtime  budesonide 160 MICROgram(s)/formoterol 4.5 MICROgram(s) Inhaler 2 Puff(s) Inhalation two times a day  dextrose 10% Bolus 125 milliLiter(s) IV Bolus once  dextrose 5%. 1000 milliLiter(s) (100 mL/Hr) IV Continuous <Continuous>  dextrose 5%. 1000 milliLiter(s) (50 mL/Hr) IV Continuous <Continuous>  dextrose 50% Injectable 25 Gram(s) IV Push once  dextrose 50% Injectable 12.5 Gram(s) IV Push once  diltiazem    milliGRAM(s) Oral daily  gabapentin 300 milliGRAM(s) Oral at bedtime  glucagon  Injectable 1 milliGRAM(s) IntraMuscular once  insulin lispro (ADMELOG) corrective regimen sliding scale   SubCutaneous three times a day before meals  insulin lispro (ADMELOG) corrective regimen sliding scale   SubCutaneous at bedtime  lactobacillus acidophilus 1 Tablet(s) Oral every 12 hours  multivitamin 1 Tablet(s) Oral daily  pantoprazole    Tablet 40 milliGRAM(s) Oral two times a day  senna 2 Tablet(s) Oral at bedtime  sodium chloride 0.9%. 1000 milliLiter(s) (50 mL/Hr) IV Continuous <Continuous>  spironolactone 50 milliGRAM(s) Oral daily  tamsulosin 0.4 milliGRAM(s) Oral at bedtime  tiotropium 2.5 MICROgram(s) Inhaler 2 Puff(s) Inhalation daily    MEDICATIONS  (PRN):  acetaminophen     Tablet .. 650 milliGRAM(s) Oral every 6 hours PRN Temp greater or equal to 38C (100.4F), Mild Pain (1 - 3)  albuterol/ipratropium for Nebulization 3 milliLiter(s) Nebulizer every 6 hours PRN Shortness of Breath and/or Wheezing  aluminum hydroxide/magnesium hydroxide/simethicone Suspension 30 milliLiter(s) Oral every 4 hours PRN Dyspepsia  dextrose Oral Gel 15 Gram(s) Oral once PRN Blood Glucose LESS THAN 70 milliGRAM(s)/deciliter  guaiFENesin Oral Liquid (Sugar-Free) 200 milliGRAM(s) Oral every 6 hours PRN Cough  melatonin 3 milliGRAM(s) Oral at bedtime PRN Insomnia  ondansetron Injectable 4 milliGRAM(s) IV Push every 8 hours PRN Nausea and/or Vomiting  traMADol 50 milliGRAM(s) Oral every 12 hours PRN Moderate Pain (4 - 6)      Diet, NPO after Midnight:      NPO Start Date: 15-Apr-2024,   NPO Start Time: 23:59  Except Medications (04-15-24 @ 09:01) [Active]  Diet, Clear Liquid:   Consistent Carbohydrate No Snacks  DASH/TLC Sodium & Cholesterol Restricted (04-15-24 @ 09:01) [Active]  Diet, Consistent Carbohydrate w/Evening Snack (04-14-24 @ 10:46) [Pending Verification By Attending]          Vital Signs Last 24 Hrs  T(C): 36.4 (16 Apr 2024 05:22), Max: 36.7 (15 Apr 2024 14:37)  T(F): 97.6 (16 Apr 2024 05:22), Max: 98.1 (15 Apr 2024 14:37)  HR: 65 (16 Apr 2024 05:22) (60 - 69)  BP: 112/68 (16 Apr 2024 05:22) (106/67 - 112/68)  BP(mean): --  RR: 18 (16 Apr 2024 05:22) (18 - 18)  SpO2: 97% (16 Apr 2024 05:22) (95% - 97%)    Parameters below as of 16 Apr 2024 05:22  Patient On (Oxygen Delivery Method): room air          04-15-24 @ 07:01  -  04-16-24 @ 07:00  --------------------------------------------------------  IN: 2670 mL / OUT: 2000 mL / NET: 670 mL              LABS:                        9.9    8.42  )-----------( 425      ( 15 Apr 2024 07:05 )             31.9     04-15    143  |  109<H>  |  23  ----------------------------<  104<H>  4.7   |  23  |  1.10    Ca    9.4      15 Apr 2024 07:05        Urinalysis Basic - ( 15 Apr 2024 07:05 )    Color: x / Appearance: x / SG: x / pH: x  Gluc: 104 mg/dL / Ketone: x  / Bili: x / Urobili: x   Blood: x / Protein: x / Nitrite: x   Leuk Esterase: x / RBC: x / WBC x   Sq Epi: x / Non Sq Epi: x / Bacteria: x            WBC:  WBC Count: 8.42 K/uL (04-15 @ 07:05)  WBC Count: 8.93 K/uL (04-14 @ 06:50)  WBC Count: 7.16 K/uL (04-13 @ 06:06)      MICROBIOLOGY:  RECENT CULTURES:  04-10 .Blood Blood-Peripheral XXXX XXXX   No growth at 5 days    04-10 .Blood Blood-Peripheral XXXX XXXX   No growth at 5 days                    Sodium:  Sodium: 143 mmol/L (04-15 @ 07:05)  Sodium: 142 mmol/L (04-14 @ 06:50)  Sodium: 145 mmol/L (04-13 @ 06:06)      1.10 mg/dL 04-15 @ 07:05  1.10 mg/dL 04-14 @ 06:50  0.96 mg/dL 04-13 @ 06:06      Hemoglobin:  Hemoglobin: 9.9 g/dL (04-15 @ 07:05)  Hemoglobin: 9.5 g/dL (04-14 @ 06:50)  Hemoglobin: 9.2 g/dL (04-13 @ 06:06)      Platelets: Platelet Count - Automated: 425 K/uL (04-15 @ 07:05)  Platelet Count - Automated: 424 K/uL (04-14 @ 06:50)  Platelet Count - Automated: 408 K/uL (04-13 @ 06:06)          Urinalysis Basic - ( 15 Apr 2024 07:05 )    Color: x / Appearance: x / SG: x / pH: x  Gluc: 104 mg/dL / Ketone: x  / Bili: x / Urobili: x   Blood: x / Protein: x / Nitrite: x   Leuk Esterase: x / RBC: x / WBC x   Sq Epi: x / Non Sq Epi: x / Bacteria: x        RADIOLOGY & ADDITIONAL STUDIES:      MICROBIOLOGY:  RECENT CULTURES:  04-10 .Blood Blood-Peripheral XXXX XXXX   No growth at 5 days    04-10 .Blood Blood-Peripheral XXXX XXXX   No growth at 5 days

## 2024-04-16 NOTE — PROGRESS NOTE ADULT - PROBLEM/PLAN-6
DISPLAY PLAN FREE TEXT
unknown
DISPLAY PLAN FREE TEXT

## 2024-04-16 NOTE — PROGRESS NOTE ADULT - PROBLEM SELECTOR PLAN 1
likely diverticular - serial cbc , transfuse prn , Gi cons , ppi CT abd IMPRESSION:  No acute findings on this noncontrast study..  Colonic diverticulosis without acute diverticulitis.  Stable infrarenal abdominal aortic Since admission - Denies GIB ,h/h stable .   Spoke to GI team , as per Dr Collins recommendation -hold AC x 2 weeks   Endoscopic workup planned for 04/16/24- s/p colonoscopy , had poor prep , repeat as outpatient , doesn't want to stay to have it here , requests d/c to [facility] in am

## 2024-04-16 NOTE — PROGRESS NOTE ADULT - ASSESSMENT
REASON FOR VISIT  .. Management of problems listed below        REVIEW OF SYMPTOMS   Able to give ROS  Yes     RELIABILITY +/-   CONSTITUTIONAL Weakness Yes    ENDOCRINE  No heat or cold intolerance    ALLERGY No hives  Sore throat No stridor  RESP Shortness of breath YES   NEURO New weakness No   CARDIAC   Palpitations No         PHYSICAL EXAM    HEENT Unremarkable  atraumatic   RESP Fair air entry  Harsh breath sound   CARDIAC S1 S2 No S3     NO JVD    ABDOMEN No hepatosplenomegaly   PEDAL EDEMA present No calf tenderness  NO rash     GENERAL DATA .   GOC.    ..  4/11/2024 full code   ICU STAY.    .. no   COVID.   .. SCV2 4/10/2024 (-)   BEST PRACTICE ISSUES.    HOB ELEVATN.    .. Yes  DVT PPLX.   .. 4/10/2024 GI BLEED SO PHARMAC PPLX WAS WITHELD   DIET.   .. 4/15/2024 clear liq   .. 4/11/2024 dash    ..  4/10/2024 NPO     ALLGY.   ..   NKA    WT.   ..    IV fl.   .. 4/10/2024 NS 50   BOLUS.   ..  STRESS ULCER PPLX.   .    ..   4/10/2024 PROTONIX 40   INFECTION PPLX.  ..    ..         . CC .   . 4/10/2024 Patient is from [facility] for low HB. C/O Blood in the stool.  . SUMMARY.     . 4/10/2024 78-year-old male with past medical of hypertension, hyperlipidemia, diabetes, COPD, A-fib on Plavix presents with dark stool.  Patient noted for the past month he has had dark stool.  Patient reports his PCP at the [facility] has been following his hemoglobin which was noted to be low.  Patient reports he had left hand pain for the past 4 days described as stiffness.  Patient has not had a colonoscopy in years.  Patient notes a 20 pound weight loss over the past few months.  Patient recently recovered from norovirus.  Patient denies fever chest pain shortness of breath abdominal pain dysuria dizziness  . BASELINE STATUS.   . [facility]   . HOME MEDS.   · pantoprazole 40 mg oral delayed release tablet: 40 milligram(s) orally 2 times a day  · metoprolol tartrate 25 mg oral tablet: 0.5 tab(s) orally every 12 hours  · folic acid 1 mg oral tablet: 1 tab(s) orally once a day  · Multiple Vitamins oral tablet: 1 tab(s) orally once a day  · polyethylene glycol 3350 oral powder for reconstitution: 17 gram(s) orally 2 times a day  · carbidopa-levodopa 25 mg-100 mg oral tablet: 1 tab(s) orally 3 times a day  · dilTIAZem 240 mg/24 hours oral capsule, extended release: 1 cap(s) orally once a day  · Lasix 40 mg oral tablet: 1 tab(s) orally once a day  · aspirin 81 mg oral tablet: 1 orally once a day  · atorvastatin 10 mg oral tablet: 1 orally once a day  · tamsulosin 0.4 mg oral capsule: 1 cap(s) orally once a day  · cilostazol 100 mg oral tablet: 1 tab(s) orally 2 times a day  · DULoxetine 60 mg oral delayed release capsule: 1 orally once a day  · Breo Ellipta 200 mcg-25 mcg/inh inhalation powder: 1 puff(s) inhaled once a day  · Anoro Ellipta 62.5 mcg-25 mcg/inh inhalation powder: 1 puff(s) inhaled once a day  . PMH.    . PROBLEMS AT PRESENTATION.    . ER MGMT.     . PATIENT DATA .    . ACTIVE ISSUES PLAN .  INFECTION.  . PNEUMONIA   .... w 4/10-4/11/2024 w 9.8 - 6.7   .... pr 4/11/2024 pr .1   .... CXR 4/10/2024 cw 5/8/2023   .... improvd basal infiltr  .... new right perihilar infiltra   .... ct ch 4/10 cw 4/24/2013   .... Tracheobronchial secretions   .... emphysema   .... left lower lobe calcified granuloma  .... scattered linear atelectasis scarring   .... Flu ab 4/10/2024 (-)   .... strep pneu uag 4/12/2024 (-)   .... rsv 4/10/2024 (-)   .... bc 4/10 (-)   .. A/R  .... Low susp for pneum  .... 4/10/2024 check ct ch  and procalc    .... 4/10-4/11/2024 Rocephin azithro   .... 4/11/2024 will give 5 d abio and may change to po if dc plannd   .... 4/11 abio dced after dw id     . COPD   .... 4/10/2024 symbicort   .... 4/10/2024 spiriva     . LACTIC ACID STSTUS .  .... la 4/10/2024 la 1.4  .... monitor     . CAD.  .... 4/10/2024 ATORVASTAT 10     . PVD.  .... 4/10/2024 CILOSTAZOL 100.2     . A fib.  .... 4/10/2024 AMIODARPONE 200   .... 4/10/2024 CARDIZEM    .... 4/10/2024 apixaba held   .... cardio on case     . CHF   .... pbnp 4/11/2024 pbnp 236   .... 4/10/2024 SPIRONOLACTON 50   .... 4/10/2024 check echo     . ANEMIA.  .... Hb 4/10-4/11-4/12-4/13-4/14-4/15/2024 Hb 9.1- 7.9  - 8.5 - 9.2 - 9.5 - 9.9   .... INR 4/10/2024 INR 1.98   .... On protonix   .... monitor     . GI BLEED   .... 4/10/2024 Protonix 40   .... 4/10/2024 GI felt it is likely diverticular bleed and to get ct angio if bleed persists   .... 4/10/2024 apixaba held   .... 4/12/2024 ctap diverticulosis no ac findings   ..... monitor Hb     . CONSTIPATION.  .... 4/10/2024 SENNA     RENAL.  .... Na 4/10/2024 na 139  .... K 4/10/2024 K 4.5   .... CO2 4/10/2024 co2 26   .... Cr 4/10-4/11/2024 Cr 1.3 - 1.2     . BPH.  .... 4/10/2024 TAMSULOSIN .4     . PAIN.  .... GABAPENTIN 300       . OVERALL.  . 4/10/2024 78-year-old male 1 ppd smoker quit 3 weeks not on home oxygen with past medical of hypertension, hyperlipidemia, diabetes, COPD, A-fib on Plavix presents with dark stool. Recnt 20 lb wt loss Pulm consulted at admission HO copd New right perihilar infiltr on 4/10 cxr cw 5/2023 cxr     . PNEUM cxr 4/9 new rml infiltr - 4/10 ct no consolidatn scattered linear atelectasis - 4/10-4/11 rocephin azithro Off abio  . COPD - 4/10 spiriva symbicort   . A fib- 4/10 amiodarone 200 cardizem - 4/10 apixa held   . RO CHF- 4/10 spirnolact continued   . GI BLEED On 4/10/2024 was SOB (+) - 4/10 GI felt diverticular bleed ->4/15/2024 plannd colonoscopy on 4/16   . ANEMIA. Hb 4/10-4/11-4/12 Hb 9.1- 7.9- 8.5     . WEIGHT LOSS     TIME SPENT.  . Over 36 minutes aggregate care time spent on encounter; activities included   direct patient care, counseling and/or coordinating care reviewing notes, lab data/ imaging , discussion with multidisciplinary team/ patient  /family and explaining in detail risks, benefits, alternatives  of the recommendations     PATIENT.  . PEÑA SAMUELS 78 m 4/10/2024 1945 DR GILL NIETO

## 2024-04-16 NOTE — DISCHARGE NOTE PROVIDER - CARE PROVIDER_API CALL
Som Bunn.  Nephrology  [address] [place] [address]  Phone: [phone]  Fax: [phone]  Follow Up Time: 1 week    Bobby Vargas  Cardiology  [facility] [address]  [place] [address]  Phone: [phone]  Fax: [phone]  Follow Up Time: 1 week   Som Bunn  Nephrology  [address] [place] [id number]  Phone: [phone]  Fax: [phone]  Follow Up Time: 1 week    Bobby Vargas  Cardiology  [address] [facility], [address]  [place] [address]  Phone: [phone]  Fax: [phone]  Follow Up Time: 1 week    Sammy Baires  Gastroenterology  [address] [place] [address]  Phone: [phone]  Fax: [phone]  Follow Up Time: 2 weeks    Ildefonso Love  Internal Medicine  [address] [place] [address], [facility] [address]  [place] [address]  Phone: [phone]  Fax: [phone]  Follow Up Time: 2 weeks

## 2024-04-16 NOTE — DISCHARGE NOTE PROVIDER - NSDCCPCAREPLAN_GEN_ALL_CORE_FT
PRINCIPAL DISCHARGE DIAGNOSIS  Diagnosis: GI bleed  Assessment and Plan of Treatment: Follow-up with the gastroenterologist within 2 weeks.  Continue Prilosec  Don't take aspirin, blood thinners, NSAIDS (Motrin, Aleve, ibuprofen, etc). You can Tylenol as needed for pain        SECONDARY DISCHARGE DIAGNOSES  Diagnosis: Pneumonia  Assessment and Plan of Treatment: You were admitted wt shortness of breath. CT Scan of your chest showed bilateral pneumonia. You were seen by the infectious disease doctor which recommended you start on intravenous antibiotics. Your symptoms have improved.  Please follow with your primary medical doctor on discharge.    Diagnosis: HTN (hypertension)  Assessment and Plan of Treatment: Continue current medical management.    Diagnosis: DM (diabetes mellitus)  Assessment and Plan of Treatment: Continue current medical management.    Diagnosis: HLD (hyperlipidemia)  Assessment and Plan of Treatment: Continue current medical management.     PRINCIPAL DISCHARGE DIAGNOSIS  Diagnosis: GI bleed  Assessment and Plan of Treatment: Follow-up with the gastroenterologist within 2 weeks.  You will need outpatient colonoscopy and endoscopy.            SECONDARY DISCHARGE DIAGNOSES  Diagnosis: Pneumonia  Assessment and Plan of Treatment: You were admitted wt shortness of breath. CT Scan of your chest showed bilateral pneumonia. You were seen by the infectious disease doctor which recommended you start on intravenous antibiotics. Your symptoms have improved.  Please follow with your primary medical doctor on discharge.    Diagnosis: HTN (hypertension)  Assessment and Plan of Treatment: Continue current medical management.    Diagnosis: DM (diabetes mellitus)  Assessment and Plan of Treatment: Continue current medical management.    Diagnosis: HLD (hyperlipidemia)  Assessment and Plan of Treatment: Continue current medical management.

## 2024-04-16 NOTE — PROGRESS NOTE ADULT - ASSESSMENT
tiana   upper gib gi evaluation  ashd - atrial fib -hold eliquis for gib  pvd - s/p angioplasty both legs  dm2  hypertension  dyslipidemia  pna- antibiotics as per pulmonary  copd  no angina - no chf -cardiac status stable low risk for upper gi endoscopy and colonoscopy  4/16  discussed with daughter at bed side on admission

## 2024-04-16 NOTE — DISCHARGE NOTE PROVIDER - HOSPITAL COURSE
78-year-old male with past medical of hypertension, hyperlipidemia, diabetes, COPD, A-fib on Plavix presents with dark stool.  Patient noted for the past month he has had dark stool.  Patient reports his PCP at the [facility] has been following his hemoglobin which was noted to be low.  Patient reports he had left hand pain for the past 4 days described as stiffness.  Patient has not had a colonoscopy in years.  Patient notes a 20 pound weight loss over the past few months.  Patient recently recovered from norovirus.  Patient denies fever chest pain shortness of breath abdominal pain dysuria dizziness l.Found to have FOBT positive and seen by GI team Cardiology clearance for possible colonoscopy requested .Found to have pneumonia and started on abx , seen by pulm Palliative care consult requested ,to discuss advance directives and complete MOLST     Problem/Plan - 1:  ·  Problem: GIB (gastrointestinal bleeding).   ·  Plan: likely diverticular - serial cbc , transfuse prn , Gi cons , ppi CT abd IMPRESSION:  No acute findings on this noncontrast study..  Colonic diverticulosis without acute diverticulitis.  Stable infrarenal abdominal aortic Since admission - Denies GIB ,h/h stable .   Spoke to GI team , as per Dr Collins recommendation -hold AC x 2 weeks   Endoscopic workup planned for 04/16/24- medically optimized and cleared by cardiologist.    Problem/Plan - 2:  ·  Problem: Anemia due to acute blood loss.   ·  Plan: serial cbc , transfuse prn , iv iron added by hemonc.    Problem/Plan - 3:  ·  Problem: Pneumonia.   ·  Plan: resolved , s/p iv abx , septic workup is negative  , pulm eval noted  ,pulse oximetry.    Problem/Plan - 4:  ·  Problem: HLD (hyperlipidemia).   ·  Plan: continue home medications.    Problem/Plan - 5:  ·  Problem: DM (diabetes mellitus).   ·  Plan: Accuchecks monitoring and insulin corrective regimen  sliding scale coverage with short acting inslulin, add longacting insulin as needed ,no concentrated sweets diet, serial labs ,HbA1C,education.    Problem/Plan - 6:  ·  Problem: COPD, moderate.   ·  Plan: continue home medications , pulm eval.    Problem/Plan - 7:  ·  Problem: CAD (coronary artery disease).   ·  Plan: card cons and clearance for possible endoscopy.    Problem/Plan - 8:  ·  Problem: HTN (hypertension).   ·  Plan: continue home medications.    Problem/Plan - 9:  ·  Problem: Enlarged prostate.   ·  Plan: outpatient urology f/up.    Problem/Plan - 10:  ·  Problem: Prophylactic measure.   ·  Plan; Gastrointestinal stress ulcer prophylaxis and DVT prophylaxis administered.

## 2024-04-16 NOTE — PROGRESS NOTE ADULT - PROBLEM SELECTOR PLAN 9
outpatient urology f/up
outpatient urology f/up
Gastrointestinal stress ulcer prophylaxis and DVT prophylaxis administered
outpatient urology f/up
outpatient urology f/up

## 2024-04-16 NOTE — DISCHARGE NOTE PROVIDER - CARE PROVIDERS DIRECT ADDRESSES
[email or web address],[email or web address],DirectAddress_Unknown

## 2024-04-16 NOTE — PROGRESS NOTE ADULT - PROBLEM SELECTOR PLAN 5
Accuchecks monitoring and insulin corrective regimen  sliding scale coverage with short acting inslulin, add longacting insulin as needed ,no concentrated sweets diet, serial labs ,HbA1C,education

## 2024-04-16 NOTE — PROGRESS NOTE ADULT - PROBLEM SELECTOR PLAN 3
resolved , s/p iv abx , septic workup is negative  , pulm eval noted  ,pulse oximetry
resolved , s/p iv abx , septic workup is negative  , pulm eval noted  ,pulse oximetry
iv abx , septic workup , pulm eval ,pulse oximetry
iv abx , septic workup , pulm eval ,pulse oximetry
resolved , s/p iv abx , septic workup is negative  , pulm eval noted  ,pulse oximetry

## 2024-04-16 NOTE — PROGRESS NOTE ADULT - PROBLEM SELECTOR PLAN 6
continue home medications , pulm eval

## 2024-04-16 NOTE — DISCHARGE NOTE PROVIDER - NSDCMRMEDTOKEN_GEN_ALL_CORE_FT
amiodarone 200 mg oral tablet: 1 tab(s) orally once a day  Anoro Ellipta 62.5 mcg-25 mcg/inh inhalation powder: 1 puff(s) inhaled once a day  atorvastatin 10 mg oral tablet: 1 tab(s) orally once a day (at bedtime)  cilostazol 100 mg oral tablet: 1 tab(s) orally 2 times a day  DilTIAZem (Eqv-Cardizem CD) 180 mg/24 hours oral capsule, extended release: 1 cap(s) orally once a day  Eliquis 5 mg oral tablet: 1 tab(s) orally 2 times a day  gabapentin 300 mg oral capsule: 1 cap(s) orally once a day (at bedtime)  Lasix 40 mg oral tablet: 1 tab(s) orally once a day  metFORMIN 500 mg oral tablet: 1 tab(s) orally 2 times a day  Milk of Magnesia 1200 mg/15 mL oral liquid: 30 milliliter(s) orally once a day  Potassium Chloride (Eqv-Klor-Con 10) 10 mEq oral tablet, extended release: 2 tab(s) orally once a day  senna (sennosides) 8.6 mg oral tablet: 1 tab(s) orally once a day (at bedtime)  spironolactone 50 mg oral tablet: 1 tab(s) orally once a day  tamsulosin 0.4 mg oral capsule: 1 cap(s) orally once a day  traMADol 50 mg oral tablet: 1 tab(s) orally every 12 hours   amiodarone 200 mg oral tablet: 1 tab(s) orally once a day  ascorbic acid 500 mg oral tablet: 1 tab(s) orally once a day  atorvastatin 10 mg oral tablet: 1 tab(s) orally once a day (at bedtime)  budesonide-formoterol 160 mcg-4.5 mcg/inh inhalation aerosol: 2 puff(s) inhaled 2 times a day  cilostazol 100 mg oral tablet: 1 tab(s) orally 2 times a day  DilTIAZem (Eqv-Cardizem CD) 180 mg/24 hours oral capsule, extended release: 1 cap(s) orally once a day  Eliquis 5 mg oral tablet: 1 tab(s) orally 2 times a day RESUME 04/24/24  IF OK WITH PCP , CURRENTLY IS ON HOLD AS PER GI TEAM RECOMMENDATION  gabapentin 300 mg oral capsule: 1 cap(s) orally once a day (at bedtime)  ipratropium-albuterol 0.5 mg-2.5 mg/3 mL inhalation solution: 3 milliliter(s) inhaled 3 times a day  Lasix 40 mg oral tablet: 1 tab(s) orally once a day  metFORMIN 500 mg oral tablet: 1 tab(s) orally 2 times a day  Milk of Magnesia 1200 mg/15 mL oral liquid: 30 milliliter(s) orally once a day  Multiple Vitamins oral tablet: 1 tab(s) orally once a day  pantoprazole 40 mg oral delayed release tablet: 1 tab(s) orally 2 times a day  Potassium Chloride (Eqv-Klor-Con 10) 10 mEq oral tablet, extended release: 2 tab(s) orally once a day  senna (sennosides) 8.6 mg oral tablet: 1 tab(s) orally once a day (at bedtime)  spironolactone 50 mg oral tablet: 1 tab(s) orally once a day  tamsulosin 0.4 mg oral capsule: 1 cap(s) orally once a day  tiotropium 2.5 mcg/inh inhalation aerosol: 1 inhaler(s) inhaled once a day  traMADol 50 mg oral tablet: 1 tab(s) orally every 12 hours As needed Moderate Pain (4 - 6)  Tylenol 325 mg oral tablet: 2 tab(s) orally every 6 hours as needed for  mild pain or temp above 100.4

## 2024-04-16 NOTE — DISCHARGE NOTE PROVIDER - PROVIDER TOKENS
PROVIDER:[TOKEN:[[id number]],FOLLOWUP:[1 week]],PROVIDER:[TOKEN:[[id number]],FOLLOWUP:[1 week]] PROVIDER:[TOKEN:[[id number]],FOLLOWUP:[1 week]],PROVIDER:[TOKEN:[[id number]],FOLLOWUP:[1 week]],PROVIDER:[TOKEN:[[id number]],FOLLOWUP:[2 weeks]],PROVIDER:[TOKEN:[[id number]],FOLLOWUP:[2 weeks]]

## 2024-04-16 NOTE — PROGRESS NOTE ADULT - SUBJECTIVE AND OBJECTIVE BOX
PROGRESS NOTE  Patient is a 78y old  Male who presents with a chief complaint of dark stools (16 Apr 2024 13:53)    Chart and available morning labs /imaging are reviewed electronically , urgent issues addressed . More information  is being added upon completion of rounds , when more information is collected and management discussed with consultants , medical staff and social service/case management on the floor   OVERNIGHT  s/p colonoscopy , had poor prep , repeat as outpatient , doesn't want to stay to have it here , requests d/c to [facility] in am     HPI:  Patient is a 78-year-old male with past medical of hypertension, hyperlipidemia, diabetes, COPD, A-fib on Plavix presents with dark stool.  Patient noted for the past month he has had dark stool.  Patient reports his PCP at the [facility] has been following his hemoglobin which was noted to be low.  Patient reports he had left hand pain for the past 4 days described as stiffness.  Patient has not had a colonoscopy in years.  Patient notes a 20 pound weight loss over the past few months.  Patient recently recovered from norovirus.  Patient denies fever chest pain shortness of breath abdominal pain dysuria dizziness l.Found to have FOBT positive and seen by GI team Cardiology clearance for possible colonoscopy requested .Found to have pneumonia and started on abx , seen by pulm Palliative care consult requested ,to discuss advance directives and complete MOLST  (10 Apr 2024 15:27)    PAST MEDICAL & SURGICAL HISTORY:  HTN (hypertension)      HLD (hyperlipidemia)      DM (diabetes mellitus)      COPD, moderate      CAD (coronary artery disease)      S/P primary angioplasty          MEDICATIONS  (STANDING):  aMIOdarone    Tablet 200 milliGRAM(s) Oral daily  ascorbic acid 500 milliGRAM(s) Oral two times a day  atorvastatin 10 milliGRAM(s) Oral at bedtime  budesonide 160 MICROgram(s)/formoterol 4.5 MICROgram(s) Inhaler 2 Puff(s) Inhalation two times a day  dextrose 10% Bolus 125 milliLiter(s) IV Bolus once  dextrose 5%. 1000 milliLiter(s) (50 mL/Hr) IV Continuous <Continuous>  dextrose 5%. 1000 milliLiter(s) (100 mL/Hr) IV Continuous <Continuous>  dextrose 50% Injectable 25 Gram(s) IV Push once  dextrose 50% Injectable 12.5 Gram(s) IV Push once  diltiazem    milliGRAM(s) Oral daily  gabapentin 300 milliGRAM(s) Oral at bedtime  glucagon  Injectable 1 milliGRAM(s) IntraMuscular once  insulin lispro (ADMELOG) corrective regimen sliding scale   SubCutaneous three times a day before meals  insulin lispro (ADMELOG) corrective regimen sliding scale   SubCutaneous at bedtime  lactobacillus acidophilus 1 Tablet(s) Oral every 12 hours  multivitamin 1 Tablet(s) Oral daily  pantoprazole    Tablet 40 milliGRAM(s) Oral two times a day  senna 2 Tablet(s) Oral at bedtime  sodium chloride 0.9%. 1000 milliLiter(s) (50 mL/Hr) IV Continuous <Continuous>  spironolactone 50 milliGRAM(s) Oral daily  tamsulosin 0.4 milliGRAM(s) Oral at bedtime  tiotropium 2.5 MICROgram(s) Inhaler 2 Puff(s) Inhalation daily    MEDICATIONS  (PRN):  acetaminophen     Tablet .. 650 milliGRAM(s) Oral every 6 hours PRN Temp greater or equal to 38C (100.4F), Mild Pain (1 - 3)  albuterol/ipratropium for Nebulization 3 milliLiter(s) Nebulizer every 6 hours PRN Shortness of Breath and/or Wheezing  aluminum hydroxide/magnesium hydroxide/simethicone Suspension 30 milliLiter(s) Oral every 4 hours PRN Dyspepsia  dextrose Oral Gel 15 Gram(s) Oral once PRN Blood Glucose LESS THAN 70 milliGRAM(s)/deciliter  guaiFENesin Oral Liquid (Sugar-Free) 200 milliGRAM(s) Oral every 6 hours PRN Cough  melatonin 3 milliGRAM(s) Oral at bedtime PRN Insomnia  ondansetron Injectable 4 milliGRAM(s) IV Push every 8 hours PRN Nausea and/or Vomiting  traMADol 50 milliGRAM(s) Oral every 12 hours PRN Moderate Pain (4 - 6)      OBJECTIVE    T(C): 36.6 (04-16-24 @ 13:22), Max: 36.9 (04-16-24 @ 09:20)  HR: 56 (04-16-24 @ 13:22) (56 - 69)  BP: 125/76 (04-16-24 @ 13:22) (106/67 - 125/76)  RR: 18 (04-16-24 @ 13:22) (17 - 18)  SpO2: 96% (04-16-24 @ 13:22) (95% - 97%)  Wt(kg): --  I&O's Summary    15 Apr 2024 07:01  -  16 Apr 2024 07:00  --------------------------------------------------------  IN: 2670 mL / OUT: 2000 mL / NET: 670 mL          REVIEW OF SYSTEMS:  Patient is  unable to provide any information/ROS  due to baseline mental status.     PHYSICAL EXAM:  Appearance: NAD. VS past 24 hrs -as above   HEENT:   Moist oral mucosa. Conjunctiva clear b/l.   Neck : supple  Respiratory: Lungs CTAB.  Gastrointestinal:  Soft, nontender. No rebound. No rigidity. BS present	  Cardiovascular: RRR ,S1S2 present  Neurologic: Non-focal. Moving all extremities.  Extremities: No edema. No erythema. No calf tenderness.  Skin: No rashes, No ecchymoses, No cyanosis.	  wounds ,skin lesions-See skin assesment flow sheet   LABS:                        9.9    8.42  )-----------( 425      ( 15 Apr 2024 07:05 )             31.9     04-15    143  |  109<H>  |  23  ----------------------------<  104<H>  4.7   |  23  |  1.10    Ca    9.4      15 Apr 2024 07:05      CAPILLARY BLOOD GLUCOSE      POCT Blood Glucose.: 167 mg/dL (16 Apr 2024 17:28)  POCT Blood Glucose.: 104 mg/dL (16 Apr 2024 12:23)  POCT Blood Glucose.: 97 mg/dL (16 Apr 2024 07:48)  POCT Blood Glucose.: 115 mg/dL (15 Apr 2024 21:50)      Urinalysis Basic - ( 15 Apr 2024 07:05 )    Color: x / Appearance: x / SG: x / pH: x  Gluc: 104 mg/dL / Ketone: x  / Bili: x / Urobili: x   Blood: x / Protein: x / Nitrite: x   Leuk Esterase: x / RBC: x / WBC x   Sq Epi: x / Non Sq Epi: x / Bacteria: x        Culture - Blood (collected 10 Apr 2024 13:30)  Source: .Blood Blood-Peripheral  Final Report (15 Apr 2024 20:00):    No growth at 5 days    Culture - Blood (collected 10 Apr 2024 13:15)  Source: .Blood Blood-Peripheral  Final Report (15 Apr 2024 20:00):    No growth at 5 days      RADIOLOGY & ADDITIONAL TESTS:   reviewed elctronically  ASSESSMENT/PLAN: 	    25 minutes aggregate time was spent on this visit, 50% visit time spent in care co-ordination with other attendings and counselling patient .I have discussed care plan with patient / HCP/family member ,who expressed understanding of problems treatment and their effect and side effects, alternatives in details. I have asked if they have any questions and concerns and appropriately addressed them to best of my ability.

## 2024-04-16 NOTE — DISCHARGE NOTE PROVIDER - DETAILS OF MALNUTRITION DIAGNOSIS/DIAGNOSES
This patient has been assessed with a concern for Malnutrition and was treated during this hospitalization for the following Nutrition diagnosis/diagnoses:     -  04/11/2024: Severe protein-calorie malnutrition

## 2024-04-16 NOTE — PROGRESS NOTE ADULT - SUBJECTIVE AND OBJECTIVE BOX
Patient is a 78y Male with a known history of :  GIB (gastrointestinal bleeding) [K92.2]    Anemia due to acute blood loss [D62]    HTN (hypertension) [I10]    HLD (hyperlipidemia) [E78.5]    DM (diabetes mellitus) [E11.9]    COPD, moderate [J44.9]    CAD (coronary artery disease) [I25.10]    Prophylactic measure [Z29.9]    Pneumonia [J18.9]    Enlarged prostate [N40.0]      HPI:  Patient is a 78-year-old male with past medical of hypertension, hyperlipidemia, diabetes, COPD, A-fib on Plavix presents with dark stool.  Patient noted for the past month he has had dark stool.  Patient reports his PCP at the [facility] has been following his hemoglobin which was noted to be low.  Patient reports he had left hand pain for the past 4 days described as stiffness.  Patient has not had a colonoscopy in years.  Patient notes a 20 pound weight loss over the past few months.  Patient recently recovered from norovirus.  Patient denies fever chest pain shortness of breath abdominal pain dysuria dizziness l.Found to have FOBT positive and seen by GI team Cardiology clearance for possible colonoscopy requested .Found to have pneumonia and started on abx , seen by pulm Palliative care consult requested ,to discuss advance directives and complete MOLST  (10 Apr 2024 15:27)      REVIEW OF SYSTEMS:    CONSTITUTIONAL: No fever, weight loss, or fatigue  EYES: No eye pain, visual disturbances, or discharge  ENMT:  No difficulty hearing, tinnitus, vertigo; No sinus or throat pain  NECK: No pain or stiffness  BREASTS: No pain, masses, or nipple discharge  RESPIRATORY: No cough, wheezing, chills or hemoptysis; No shortness of breath  CARDIOVASCULAR: No chest pain, palpitations, dizziness, or leg swelling  GASTROINTESTINAL: No abdominal or epigastric pain. No nausea, vomiting, or hematemesis; No diarrhea or constipation. No melena or hematochezia.  GENITOURINARY: No dysuria, frequency, hematuria, or incontinence  NEUROLOGICAL: No headaches, memory loss, loss of strength, numbness, or tremors  SKIN: No itching, burning, rashes, or lesions   LYMPH NODES: No enlarged glands  ENDOCRINE: No heat or cold intolerance; No hair loss  MUSCULOSKELETAL: No joint pain or swelling; No muscle, back, or extremity pain  PSYCHIATRIC: No depression, anxiety, mood swings, or difficulty sleeping  HEME/LYMPH: No easy bruising, or bleeding gums  ALLERGY AND IMMUNOLOGIC: No hives or eczema    MEDICATIONS  (STANDING):  aMIOdarone    Tablet 200 milliGRAM(s) Oral daily  ascorbic acid 500 milliGRAM(s) Oral two times a day  atorvastatin 10 milliGRAM(s) Oral at bedtime  budesonide 160 MICROgram(s)/formoterol 4.5 MICROgram(s) Inhaler 2 Puff(s) Inhalation two times a day  dextrose 10% Bolus 125 milliLiter(s) IV Bolus once  dextrose 5%. 1000 milliLiter(s) (50 mL/Hr) IV Continuous <Continuous>  dextrose 5%. 1000 milliLiter(s) (100 mL/Hr) IV Continuous <Continuous>  dextrose 50% Injectable 25 Gram(s) IV Push once  dextrose 50% Injectable 12.5 Gram(s) IV Push once  diltiazem    milliGRAM(s) Oral daily  gabapentin 300 milliGRAM(s) Oral at bedtime  glucagon  Injectable 1 milliGRAM(s) IntraMuscular once  insulin lispro (ADMELOG) corrective regimen sliding scale   SubCutaneous three times a day before meals  insulin lispro (ADMELOG) corrective regimen sliding scale   SubCutaneous at bedtime  lactobacillus acidophilus 1 Tablet(s) Oral every 12 hours  multivitamin 1 Tablet(s) Oral daily  pantoprazole    Tablet 40 milliGRAM(s) Oral two times a day  senna 2 Tablet(s) Oral at bedtime  sodium chloride 0.9%. 1000 milliLiter(s) (50 mL/Hr) IV Continuous <Continuous>  spironolactone 50 milliGRAM(s) Oral daily  tamsulosin 0.4 milliGRAM(s) Oral at bedtime  tiotropium 2.5 MICROgram(s) Inhaler 2 Puff(s) Inhalation daily    MEDICATIONS  (PRN):  acetaminophen     Tablet .. 650 milliGRAM(s) Oral every 6 hours PRN Temp greater or equal to 38C (100.4F), Mild Pain (1 - 3)  albuterol/ipratropium for Nebulization 3 milliLiter(s) Nebulizer every 6 hours PRN Shortness of Breath and/or Wheezing  aluminum hydroxide/magnesium hydroxide/simethicone Suspension 30 milliLiter(s) Oral every 4 hours PRN Dyspepsia  dextrose Oral Gel 15 Gram(s) Oral once PRN Blood Glucose LESS THAN 70 milliGRAM(s)/deciliter  guaiFENesin Oral Liquid (Sugar-Free) 200 milliGRAM(s) Oral every 6 hours PRN Cough  melatonin 3 milliGRAM(s) Oral at bedtime PRN Insomnia  ondansetron Injectable 4 milliGRAM(s) IV Push every 8 hours PRN Nausea and/or Vomiting  traMADol 50 milliGRAM(s) Oral every 12 hours PRN Moderate Pain (4 - 6)      ALLERGIES: No Known Allergies      FAMILY HISTORY:      PHYSICAL EXAMINATION:  -----------------------------  T(C): 36.4 (04-16-24 @ 05:22), Max: 36.7 (04-15-24 @ 14:37)  HR: 65 (04-16-24 @ 05:22) (60 - 69)  BP: 112/68 (04-16-24 @ 05:22) (106/67 - 112/68)  RR: 18 (04-16-24 @ 05:22) (18 - 18)  SpO2: 97% (04-16-24 @ 05:22) (95% - 97%)  Wt(kg): --    04-15 @ 07:01  -  04-16 @ 07:00  --------------------------------------------------------  IN:    Oral Fluid: 1720 mL    sodium chloride 0.9%: 950 mL  Total IN: 2670 mL    OUT:    Voided (mL): 2000 mL  Total OUT: 2000 mL    Total NET: 670 mL            VITALS  T(C): 36.4 (04-16-24 @ 05:22), Max: 36.7 (04-15-24 @ 14:37)  HR: 65 (04-16-24 @ 05:22) (60 - 69)  BP: 112/68 (04-16-24 @ 05:22) (106/67 - 112/68)  RR: 18 (04-16-24 @ 05:22) (18 - 18)  SpO2: 97% (04-16-24 @ 05:22) (95% - 97%)    Constitutional: well developed, normal appearance, well groomed, well nourished, no deformities and no acute distress.   Eyes: the conjunctiva exhibited no abnormalities and the eyelids demonstrated no xanthelasmas.   HEENT: normal oral mucosa, no oral pallor and no oral cyanosis.   Neck: normal jugular venous A waves present, normal jugular venous V waves present and no jugular venous esqueda A waves.   Pulmonary: no respiratory distress, normal respiratory rhythm and effort, no accessory muscle use and lungs were clear to auscultation bilaterally.   Cardiovascular: heart rate and rhythm were normal, normal S1 and S2 and no murmur, gallop, rub, heave or thrill are present.   Abdomen: soft, non-tender, no hepato-splenomegaly and no abdominal mass palpated.   Musculoskeletal: the gait could not be assessed..   Extremities: no clubbing of the fingernails, no localized cyanosis, no petechial hemorrhages and no ischemic changes.   Skin: normal skin color and pigmentation, no rash, no venous stasis, no skin lesions, no skin ulcer and no xanthoma was observed.   Psychiatric: oriented to person, place, and time, the affect was normal, the mood was normal and not feeling anxious.     LABS:   --------  04-15    143  |  109<H>  |  23  ----------------------------<  104<H>  4.7   |  23  |  1.10    Ca    9.4      15 Apr 2024 07:05                           9.9    8.42  )-----------( 425      ( 15 Apr 2024 07:05 )             31.9                 RADIOLOGY:  -----------------    ECG:     ECHO:

## 2024-04-16 NOTE — PROGRESS NOTE ADULT - PROBLEM SELECTOR PLAN 7
card cons and clearance for possible endoscopy

## 2024-04-16 NOTE — DISCHARGE NOTE PROVIDER - NSDCCAREPROVSEEN_GEN_ALL_CORE_FT
Dara, Feli Cantu, Zoe Baires, Sammy Reyes, Kirill Pierre, Bro Mansfield, Love Collins, Faisal Weil, Indira Munguia, Kellie

## 2024-04-16 NOTE — PROGRESS NOTE ADULT - ASSESSMENT
Patient is a 78-year-old male with past medical of hypertension, hyperlipidemia, diabetes, COPD, A-fib on Plavix presents with dark stool.  Patient noted for the past month he has had dark stool.  Patient reports his PCP at the [facility] has been following his hemoglobin which was noted to be low.  Patient reports he had left hand pain for the past 4 days described as stiffness.  Patient has not had a colonoscopy in years.  Patient notes a 20 pound weight loss over the past few months.  Patient recently recovered from norovirus.  Patient denies fever chest pain shortness of breath abdominal pain dysuria dizziness l.Found to have FOBT positive and seen by GI team Cardiology clearance for possible colonoscopy requested .Found to have pneumonia and started on abx , seen by pulm Palliative care consult requested ,to discuss advance directives and complete MOLST

## 2024-04-17 ENCOUNTER — TRANSCRIPTION ENCOUNTER (OUTPATIENT)
Age: 79
End: 2024-04-17

## 2024-04-17 VITALS
OXYGEN SATURATION: 94 % | SYSTOLIC BLOOD PRESSURE: 109 MMHG | TEMPERATURE: 98 F | RESPIRATION RATE: 17 BRPM | HEART RATE: 63 BPM | DIASTOLIC BLOOD PRESSURE: 71 MMHG

## 2024-04-17 PROCEDURE — 97116 GAIT TRAINING THERAPY: CPT

## 2024-04-17 PROCEDURE — 83735 ASSAY OF MAGNESIUM: CPT

## 2024-04-17 PROCEDURE — 96374 THER/PROPH/DIAG INJ IV PUSH: CPT

## 2024-04-17 PROCEDURE — 93005 ELECTROCARDIOGRAM TRACING: CPT

## 2024-04-17 PROCEDURE — 83605 ASSAY OF LACTIC ACID: CPT

## 2024-04-17 PROCEDURE — 74176 CT ABD & PELVIS W/O CONTRAST: CPT | Mod: MC

## 2024-04-17 PROCEDURE — 83540 ASSAY OF IRON: CPT

## 2024-04-17 PROCEDURE — 93306 TTE W/DOPPLER COMPLETE: CPT

## 2024-04-17 PROCEDURE — 97162 PT EVAL MOD COMPLEX 30 MIN: CPT

## 2024-04-17 PROCEDURE — 87637 SARSCOV2&INF A&B&RSV AMP PRB: CPT

## 2024-04-17 PROCEDURE — 83880 ASSAY OF NATRIURETIC PEPTIDE: CPT

## 2024-04-17 PROCEDURE — 82272 OCCULT BLD FECES 1-3 TESTS: CPT

## 2024-04-17 PROCEDURE — 36415 COLL VENOUS BLD VENIPUNCTURE: CPT

## 2024-04-17 PROCEDURE — 87040 BLOOD CULTURE FOR BACTERIA: CPT

## 2024-04-17 PROCEDURE — 97530 THERAPEUTIC ACTIVITIES: CPT

## 2024-04-17 PROCEDURE — 83550 IRON BINDING TEST: CPT

## 2024-04-17 PROCEDURE — 85018 HEMOGLOBIN: CPT

## 2024-04-17 PROCEDURE — 71045 X-RAY EXAM CHEST 1 VIEW: CPT

## 2024-04-17 PROCEDURE — 82962 GLUCOSE BLOOD TEST: CPT

## 2024-04-17 PROCEDURE — 86901 BLOOD TYPING SEROLOGIC RH(D): CPT

## 2024-04-17 PROCEDURE — 87449 NOS EACH ORGANISM AG IA: CPT

## 2024-04-17 PROCEDURE — 71250 CT THORAX DX C-: CPT | Mod: MC

## 2024-04-17 PROCEDURE — 85027 COMPLETE CBC AUTOMATED: CPT

## 2024-04-17 PROCEDURE — 87899 AGENT NOS ASSAY W/OPTIC: CPT

## 2024-04-17 PROCEDURE — 99285 EMERGENCY DEPT VISIT HI MDM: CPT

## 2024-04-17 PROCEDURE — 82728 ASSAY OF FERRITIN: CPT

## 2024-04-17 PROCEDURE — 85730 THROMBOPLASTIN TIME PARTIAL: CPT

## 2024-04-17 PROCEDURE — 85610 PROTHROMBIN TIME: CPT

## 2024-04-17 PROCEDURE — 84145 PROCALCITONIN (PCT): CPT

## 2024-04-17 PROCEDURE — 94640 AIRWAY INHALATION TREATMENT: CPT

## 2024-04-17 PROCEDURE — 86900 BLOOD TYPING SEROLOGIC ABO: CPT

## 2024-04-17 PROCEDURE — 84100 ASSAY OF PHOSPHORUS: CPT

## 2024-04-17 PROCEDURE — 82607 VITAMIN B-12: CPT

## 2024-04-17 PROCEDURE — 82746 ASSAY OF FOLIC ACID SERUM: CPT

## 2024-04-17 PROCEDURE — 85014 HEMATOCRIT: CPT

## 2024-04-17 PROCEDURE — 85025 COMPLETE CBC W/AUTO DIFF WBC: CPT

## 2024-04-17 PROCEDURE — 99231 SBSQ HOSP IP/OBS SF/LOW 25: CPT

## 2024-04-17 PROCEDURE — 85045 AUTOMATED RETICULOCYTE COUNT: CPT

## 2024-04-17 PROCEDURE — 83036 HEMOGLOBIN GLYCOSYLATED A1C: CPT

## 2024-04-17 PROCEDURE — 80053 COMPREHEN METABOLIC PANEL: CPT

## 2024-04-17 PROCEDURE — 86850 RBC ANTIBODY SCREEN: CPT

## 2024-04-17 PROCEDURE — 80048 BASIC METABOLIC PNL TOTAL CA: CPT

## 2024-04-17 RX ORDER — IPRATROPIUM/ALBUTEROL SULFATE 18-103MCG
3 AEROSOL WITH ADAPTER (GRAM) INHALATION
Qty: 0 | Refills: 0 | DISCHARGE
Start: 2024-04-17

## 2024-04-17 RX ORDER — TRAMADOL HYDROCHLORIDE 50 MG/1
1 TABLET ORAL
Qty: 0 | Refills: 0 | DISCHARGE
Start: 2024-04-17

## 2024-04-17 RX ORDER — PANTOPRAZOLE SODIUM 20 MG/1
1 TABLET, DELAYED RELEASE ORAL
Qty: 0 | Refills: 0 | DISCHARGE
Start: 2024-04-17

## 2024-04-17 RX ORDER — BUDESONIDE AND FORMOTEROL FUMARATE DIHYDRATE 160; 4.5 UG/1; UG/1
2 AEROSOL RESPIRATORY (INHALATION)
Qty: 0 | Refills: 0 | DISCHARGE
Start: 2024-04-17

## 2024-04-17 RX ORDER — APIXABAN 2.5 MG/1
1 TABLET, FILM COATED ORAL
Qty: 0 | Refills: 0 | DISCHARGE

## 2024-04-17 RX ORDER — ASCORBIC ACID 60 MG
1 TABLET,CHEWABLE ORAL
Qty: 0 | Refills: 0 | DISCHARGE
Start: 2024-04-17

## 2024-04-17 RX ORDER — ACETAMINOPHEN 500 MG
2 TABLET ORAL
Qty: 0 | Refills: 0 | DISCHARGE
Start: 2024-04-17

## 2024-04-17 RX ORDER — AMIODARONE HYDROCHLORIDE 400 MG/1
1 TABLET ORAL
Qty: 0 | Refills: 0 | DISCHARGE
Start: 2024-04-17

## 2024-04-17 RX ORDER — TRAMADOL HYDROCHLORIDE 50 MG/1
1 TABLET ORAL
Refills: 0 | DISCHARGE

## 2024-04-17 RX ORDER — AMIODARONE HYDROCHLORIDE 400 MG/1
1 TABLET ORAL
Refills: 0 | DISCHARGE

## 2024-04-17 RX ORDER — UMECLIDINIUM BROMIDE AND VILANTEROL TRIFENATATE 62.5; 25 UG/1; UG/1
1 POWDER RESPIRATORY (INHALATION)
Refills: 0 | DISCHARGE

## 2024-04-17 RX ORDER — TIOTROPIUM BROMIDE 18 UG/1
1 CAPSULE ORAL; RESPIRATORY (INHALATION)
Qty: 0 | Refills: 0 | DISCHARGE
Start: 2024-04-17

## 2024-04-17 RX ADMIN — SPIRONOLACTONE 50 MILLIGRAM(S): 25 TABLET, FILM COATED ORAL at 05:45

## 2024-04-17 RX ADMIN — BUDESONIDE AND FORMOTEROL FUMARATE DIHYDRATE 2 PUFF(S): 160; 4.5 AEROSOL RESPIRATORY (INHALATION) at 08:00

## 2024-04-17 RX ADMIN — AMIODARONE HYDROCHLORIDE 200 MILLIGRAM(S): 400 TABLET ORAL at 05:45

## 2024-04-17 RX ADMIN — PANTOPRAZOLE SODIUM 40 MILLIGRAM(S): 20 TABLET, DELAYED RELEASE ORAL at 05:45

## 2024-04-17 RX ADMIN — Medication 500 MILLIGRAM(S): at 05:45

## 2024-04-17 RX ADMIN — TIOTROPIUM BROMIDE 2 PUFF(S): 18 CAPSULE ORAL; RESPIRATORY (INHALATION) at 08:00

## 2024-04-17 RX ADMIN — Medication 1 TABLET(S): at 05:45

## 2024-04-17 RX ADMIN — Medication 1 TABLET(S): at 12:44

## 2024-04-17 RX ADMIN — Medication 180 MILLIGRAM(S): at 05:45

## 2024-04-17 NOTE — DISCHARGE NOTE NURSING/CASE MANAGEMENT/SOCIAL WORK - NSDCPEFALRISK_GEN_ALL_CORE
For information on Fall & Injury Prevention, visit: [email or web address] OR  [email or web address] OR  https://www.cdc.gov/steadi/patient.html

## 2024-04-17 NOTE — PROGRESS NOTE ADULT - REASON FOR ADMISSION
dark stools

## 2024-04-17 NOTE — PROGRESS NOTE ADULT - SUBJECTIVE AND OBJECTIVE BOX
CHIEF COMPLAINT/ REASON FOR VISIT  .. Patient was seen to address the  issue listed under PROBLEM LIST which is located toward bottom of this note     ABRIL POMPA    ENMANUELV 1EAS 104 D1    Allergies    No Known Allergies    Intolerances        PAST MEDICAL & SURGICAL HISTORY:  HTN (hypertension)      HLD (hyperlipidemia)      DM (diabetes mellitus)      COPD, moderate      CAD (coronary artery disease)      S/P primary angioplasty          FAMILY HISTORY:      Home Medications:  amiodarone 200 mg oral tablet: 1 tab(s) orally once a day (10 Apr 2024 14:50)  Anoro Ellipta 62.5 mcg-25 mcg/inh inhalation powder: 1 puff(s) inhaled once a day (10 Apr 2024 15:00)  atorvastatin 10 mg oral tablet: 1 tab(s) orally once a day (at bedtime) (10 Apr 2024 14:51)  cilostazol 100 mg oral tablet: 1 tab(s) orally 2 times a day (10 Apr 2024 15:00)  DilTIAZem (Eqv-Cardizem CD) 180 mg/24 hours oral capsule, extended release: 1 cap(s) orally once a day (10 Apr 2024 14:52)  Eliquis 5 mg oral tablet: 1 tab(s) orally 2 times a day (10 Apr 2024 14:59)  gabapentin 300 mg oral capsule: 1 cap(s) orally once a day (at bedtime) (10 Apr 2024 15:01)  Lasix 40 mg oral tablet: 1 tab(s) orally once a day (10 Apr 2024 15:00)  metFORMIN 500 mg oral tablet: 1 tab(s) orally 2 times a day (10 Apr 2024 15:03)  Milk of Magnesia 1200 mg/15 mL oral liquid: 30 milliliter(s) orally once a day (10 Apr 2024 15:04)  Potassium Chloride (Eqv-Klor-Con 10) 10 mEq oral tablet, extended release: 2 tab(s) orally once a day (10 Apr 2024 15:04)  senna (sennosides) 8.6 mg oral tablet: 1 tab(s) orally once a day (at bedtime) (10 Apr 2024 15:05)  spironolactone 50 mg oral tablet: 1 tab(s) orally once a day (10 Apr 2024 15:06)  tamsulosin 0.4 mg oral capsule: 1 cap(s) orally once a day (10 Apr 2024 15:08)  traMADol 50 mg oral tablet: 1 tab(s) orally every 12 hours (10 Apr 2024 15:10)      MEDICATIONS  (STANDING):  aMIOdarone    Tablet 200 milliGRAM(s) Oral daily  ascorbic acid 500 milliGRAM(s) Oral two times a day  atorvastatin 10 milliGRAM(s) Oral at bedtime  budesonide 160 MICROgram(s)/formoterol 4.5 MICROgram(s) Inhaler 2 Puff(s) Inhalation two times a day  dextrose 10% Bolus 125 milliLiter(s) IV Bolus once  dextrose 5%. 1000 milliLiter(s) (50 mL/Hr) IV Continuous <Continuous>  dextrose 5%. 1000 milliLiter(s) (100 mL/Hr) IV Continuous <Continuous>  dextrose 50% Injectable 25 Gram(s) IV Push once  dextrose 50% Injectable 12.5 Gram(s) IV Push once  diltiazem    milliGRAM(s) Oral daily  gabapentin 300 milliGRAM(s) Oral at bedtime  glucagon  Injectable 1 milliGRAM(s) IntraMuscular once  insulin lispro (ADMELOG) corrective regimen sliding scale   SubCutaneous three times a day before meals  insulin lispro (ADMELOG) corrective regimen sliding scale   SubCutaneous at bedtime  lactobacillus acidophilus 1 Tablet(s) Oral every 12 hours  multivitamin 1 Tablet(s) Oral daily  pantoprazole    Tablet 40 milliGRAM(s) Oral two times a day  senna 2 Tablet(s) Oral at bedtime  sodium chloride 0.9%. 1000 milliLiter(s) (50 mL/Hr) IV Continuous <Continuous>  spironolactone 50 milliGRAM(s) Oral daily  tamsulosin 0.4 milliGRAM(s) Oral at bedtime  tiotropium 2.5 MICROgram(s) Inhaler 2 Puff(s) Inhalation daily    MEDICATIONS  (PRN):  acetaminophen     Tablet .. 650 milliGRAM(s) Oral every 6 hours PRN Temp greater or equal to 38C (100.4F), Mild Pain (1 - 3)  albuterol/ipratropium for Nebulization 3 milliLiter(s) Nebulizer every 6 hours PRN Shortness of Breath and/or Wheezing  aluminum hydroxide/magnesium hydroxide/simethicone Suspension 30 milliLiter(s) Oral every 4 hours PRN Dyspepsia  dextrose Oral Gel 15 Gram(s) Oral once PRN Blood Glucose LESS THAN 70 milliGRAM(s)/deciliter  guaiFENesin Oral Liquid (Sugar-Free) 200 milliGRAM(s) Oral every 6 hours PRN Cough  melatonin 3 milliGRAM(s) Oral at bedtime PRN Insomnia  ondansetron Injectable 4 milliGRAM(s) IV Push every 8 hours PRN Nausea and/or Vomiting  traMADol 50 milliGRAM(s) Oral every 12 hours PRN Moderate Pain (4 - 6)      Diet, DASH/TLC:   Sodium & Cholesterol Restricted  Consistent Carbohydrate Evening Snack  Supplement Feeding Modality:  Oral  Glucerna Shake Cans or Servings Per Day:  1       Frequency:  Two Times a day (04-16-24 @ 14:02) [Active]          Vital Signs Last 24 Hrs  T(C): 36.9 (17 Apr 2024 05:11), Max: 36.9 (16 Apr 2024 09:20)  T(F): 98.4 (17 Apr 2024 05:11), Max: 98.4 (16 Apr 2024 09:20)  HR: 56 (17 Apr 2024 05:11) (52 - 67)  BP: 107/63 (17 Apr 2024 05:11) (107/63 - 125/76)  BP(mean): --  RR: 18 (17 Apr 2024 05:11) (17 - 18)  SpO2: 97% (17 Apr 2024 05:11) (92% - 97%)    Parameters below as of 17 Apr 2024 05:11  Patient On (Oxygen Delivery Method): room air                  LABS:                    WBC:  WBC Count: 8.42 K/uL (04-15 @ 07:05)  WBC Count: 8.93 K/uL (04-14 @ 06:50)      MICROBIOLOGY:  RECENT CULTURES:  04-10 .Blood Blood-Peripheral XXXX XXXX   No growth at 5 days    04-10 .Blood Blood-Peripheral XXXX XXXX   No growth at 5 days                    Sodium:  Sodium: 143 mmol/L (04-15 @ 07:05)  Sodium: 142 mmol/L (04-14 @ 06:50)      1.10 mg/dL 04-15 @ 07:05  1.10 mg/dL 04-14 @ 06:50      Hemoglobin:  Hemoglobin: 9.9 g/dL (04-15 @ 07:05)  Hemoglobin: 9.5 g/dL (04-14 @ 06:50)      Platelets: Platelet Count - Automated: 425 K/uL (04-15 @ 07:05)  Platelet Count - Automated: 424 K/uL (04-14 @ 06:50)              RADIOLOGY & ADDITIONAL STUDIES:      MICROBIOLOGY:  RECENT CULTURES:  04-10 .Blood Blood-Peripheral XXXX XXXX   No growth at 5 days    04-10 .Blood Blood-Peripheral XXXX XXXX   No growth at 5 days

## 2024-04-17 NOTE — PROGRESS NOTE ADULT - ASSESSMENT
IMPRESSION    Anemia with equivocal Fe studies  admitted with GIB and +FOBT  has not received PRBC since admission    CT abd/pelvis for evaluation of weight loss  Colonic diverticulosis without acute diverticulitis.  Stable infrarenal abdominal aortic aneurysm.    Colonoscopy stopped after 10cm  Poor prep.     RECOMMENDATION    1.  follow CBC  2.  continue GI evaluation   3. Complete venofer 100mg IV daily x 3  Further dosing outpt    Planned repeat colonoscopy outpt.       To be DC to [facility].     Further heme recommendations pending clinical course     IMPRESSION    Anemia with equivocal Fe studies  admitted with GIB and +FOBT  has not received PRBC since admission    CT abd/pelvis for evaluation of weight loss  Colonic diverticulosis without acute diverticulitis.  Stable infrarenal abdominal aortic aneurysm.    Colonoscopy stopped after 10cm  Poor prep.   s/p MoviPrep    Had constipation prior however.     RECOMMENDATION    1.  follow CBC weekly whilst at rehab, if stable then q2wk, then as per discretion of physician there.   2.  Outpt  GI evaluation   Planned repeat colonoscopy outpt.     3. Complete Venofer 100mg IV daily x 3  Further dosing outpt  outp hematology followup post DC from rehab.   [facility]     Thank you for consulting us.   No additional recommendations at current time.   Will sign off on case for now.   Please call, or re-consult if needed.     d/w GI Dr Collins  d/w medicine NP TATO Coffey

## 2024-04-17 NOTE — PROGRESS NOTE ADULT - SUBJECTIVE AND OBJECTIVE BOX
Patient is a 78y Male with a known history of :  GIB (gastrointestinal bleeding) [K92.2]    Anemia due to acute blood loss [D62]    HTN (hypertension) [I10]    HLD (hyperlipidemia) [E78.5]    DM (diabetes mellitus) [E11.9]    COPD, moderate [J44.9]    CAD (coronary artery disease) [I25.10]    Prophylactic measure [Z29.9]    Pneumonia [J18.9]    Enlarged prostate [N40.0]      HPI:  Patient is a 78-year-old male with past medical of hypertension, hyperlipidemia, diabetes, COPD, A-fib on Plavix presents with dark stool.  Patient noted for the past month he has had dark stool.  Patient reports his PCP at the [facility] has been following his hemoglobin which was noted to be low.  Patient reports he had left hand pain for the past 4 days described as stiffness.  Patient has not had a colonoscopy in years.  Patient notes a 20 pound weight loss over the past few months.  Patient recently recovered from norovirus.  Patient denies fever chest pain shortness of breath abdominal pain dysuria dizziness l.Found to have FOBT positive and seen by GI team Cardiology clearance for possible colonoscopy requested .Found to have pneumonia and started on abx , seen by pulm Palliative care consult requested ,to discuss advance directives and complete MOLST  (10 Apr 2024 15:27)      REVIEW OF SYSTEMS:    CONSTITUTIONAL: No fever, weight loss, or fatigue  EYES: No eye pain, visual disturbances, or discharge  ENMT:  No difficulty hearing, tinnitus, vertigo; No sinus or throat pain  NECK: No pain or stiffness  BREASTS: No pain, masses, or nipple discharge  RESPIRATORY: No cough, wheezing, chills or hemoptysis; No shortness of breath  CARDIOVASCULAR: No chest pain, palpitations, dizziness, or leg swelling  GASTROINTESTINAL: No abdominal or epigastric pain. No nausea, vomiting, or hematemesis; No diarrhea or constipation. No melena or hematochezia.  GENITOURINARY: No dysuria, frequency, hematuria, or incontinence  NEUROLOGICAL: No headaches, memory loss, loss of strength, numbness, or tremors  SKIN: No itching, burning, rashes, or lesions   LYMPH NODES: No enlarged glands  ENDOCRINE: No heat or cold intolerance; No hair loss  MUSCULOSKELETAL: No joint pain or swelling; No muscle, back, or extremity pain  PSYCHIATRIC: No depression, anxiety, mood swings, or difficulty sleeping  HEME/LYMPH: No easy bruising, or bleeding gums  ALLERGY AND IMMUNOLOGIC: No hives or eczema    MEDICATIONS  (STANDING):  aMIOdarone    Tablet 200 milliGRAM(s) Oral daily  ascorbic acid 500 milliGRAM(s) Oral two times a day  atorvastatin 10 milliGRAM(s) Oral at bedtime  budesonide 160 MICROgram(s)/formoterol 4.5 MICROgram(s) Inhaler 2 Puff(s) Inhalation two times a day  dextrose 10% Bolus 125 milliLiter(s) IV Bolus once  dextrose 5%. 1000 milliLiter(s) (50 mL/Hr) IV Continuous <Continuous>  dextrose 5%. 1000 milliLiter(s) (100 mL/Hr) IV Continuous <Continuous>  dextrose 50% Injectable 25 Gram(s) IV Push once  dextrose 50% Injectable 12.5 Gram(s) IV Push once  diltiazem    milliGRAM(s) Oral daily  gabapentin 300 milliGRAM(s) Oral at bedtime  glucagon  Injectable 1 milliGRAM(s) IntraMuscular once  insulin lispro (ADMELOG) corrective regimen sliding scale   SubCutaneous at bedtime  insulin lispro (ADMELOG) corrective regimen sliding scale   SubCutaneous three times a day before meals  lactobacillus acidophilus 1 Tablet(s) Oral every 12 hours  multivitamin 1 Tablet(s) Oral daily  pantoprazole    Tablet 40 milliGRAM(s) Oral two times a day  senna 2 Tablet(s) Oral at bedtime  sodium chloride 0.9%. 1000 milliLiter(s) (50 mL/Hr) IV Continuous <Continuous>  spironolactone 50 milliGRAM(s) Oral daily  tamsulosin 0.4 milliGRAM(s) Oral at bedtime  tiotropium 2.5 MICROgram(s) Inhaler 2 Puff(s) Inhalation daily    MEDICATIONS  (PRN):  acetaminophen     Tablet .. 650 milliGRAM(s) Oral every 6 hours PRN Temp greater or equal to 38C (100.4F), Mild Pain (1 - 3)  albuterol/ipratropium for Nebulization 3 milliLiter(s) Nebulizer every 6 hours PRN Shortness of Breath and/or Wheezing  aluminum hydroxide/magnesium hydroxide/simethicone Suspension 30 milliLiter(s) Oral every 4 hours PRN Dyspepsia  dextrose Oral Gel 15 Gram(s) Oral once PRN Blood Glucose LESS THAN 70 milliGRAM(s)/deciliter  guaiFENesin Oral Liquid (Sugar-Free) 200 milliGRAM(s) Oral every 6 hours PRN Cough  melatonin 3 milliGRAM(s) Oral at bedtime PRN Insomnia  ondansetron Injectable 4 milliGRAM(s) IV Push every 8 hours PRN Nausea and/or Vomiting  traMADol 50 milliGRAM(s) Oral every 12 hours PRN Moderate Pain (4 - 6)      ALLERGIES: No Known Allergies      FAMILY HISTORY:      PHYSICAL EXAMINATION:  -----------------------------  T(C): 36.9 (04-17-24 @ 05:11), Max: 36.9 (04-16-24 @ 09:20)  HR: 56 (04-17-24 @ 05:11) (52 - 67)  BP: 107/63 (04-17-24 @ 05:11) (107/63 - 125/76)  RR: 18 (04-17-24 @ 05:11) (17 - 18)  SpO2: 97% (04-17-24 @ 05:11) (92% - 97%)  Wt(kg): --        VITALS  T(C): 36.9 (04-17-24 @ 05:11), Max: 36.9 (04-16-24 @ 09:20)  HR: 56 (04-17-24 @ 05:11) (52 - 67)  BP: 107/63 (04-17-24 @ 05:11) (107/63 - 125/76)  RR: 18 (04-17-24 @ 05:11) (17 - 18)  SpO2: 97% (04-17-24 @ 05:11) (92% - 97%)    Constitutional: well developed, normal appearance, well groomed, well nourished, no deformities and no acute distress.   Eyes: the conjunctiva exhibited no abnormalities and the eyelids demonstrated no xanthelasmas.   HEENT: normal oral mucosa, no oral pallor and no oral cyanosis.   Neck: normal jugular venous A waves present, normal jugular venous V waves present and no jugular venous esqueda A waves.   Pulmonary: no respiratory distress, normal respiratory rhythm and effort, no accessory muscle use and lungs were clear to auscultation bilaterally.   Cardiovascular: heart rate and rhythm were normal, normal S1 and S2 and no murmur, gallop, rub, heave or thrill are present.   Abdomen: soft, non-tender, no hepato-splenomegaly and no abdominal mass palpated.   Musculoskeletal: the gait could not be assessed..   Extremities: no clubbing of the fingernails, no localized cyanosis, no petechial hemorrhages and no ischemic changes.   Skin: normal skin color and pigmentation, no rash, no venous stasis, no skin lesions, no skin ulcer and no xanthoma was observed.   Psychiatric: oriented to person, place, and time, the affect was normal, the mood was normal and not feeling anxious.     LABS:   --------                     RADIOLOGY:  -----------------    ECG:     ECHO:

## 2024-04-17 NOTE — SOCIAL WORK PROGRESS NOTE - NSSWPROGRESSNOTE_GEN_ALL_CORE
pt for dc to [facility] sena. [facility] ambulette  pt at 4:30pm. pt and dtr in agreement. All paperwork to accompany patient. No further sw services indicated at this time. plan: dc to [facility] at 4:30 pm.

## 2024-04-17 NOTE — PROGRESS NOTE ADULT - ASSESSMENT
REASON FOR VISIT  .. Management of problems listed below        REVIEW OF SYMPTOMS   Able to give ROS  Yes     RELIABILITY +/-   CONSTITUTIONAL Weakness Yes    ENDOCRINE  No heat or cold intolerance    ALLERGY No hives  Sore throat No stridor  RESP Shortness of breath YES   NEURO New weakness No   CARDIAC   Palpitations No         PHYSICAL EXAM    HEENT Unremarkable  atraumatic   RESP Fair air entry  Harsh breath sound   CARDIAC S1 S2 No S3     NO JVD    ABDOMEN No hepatosplenomegaly   PEDAL EDEMA present No calf tenderness  NO rash       GENERAL DATA .   GOC.    ..  4/11/2024 full code   ICU STAY.    .. no   COVID.   .. SCV2 4/10/2024 (-)   BEST PRACTICE ISSUES.    HOB ELEVATN.    .. Yes  DVT PPLX.   .. 4/10/2024 GI BLEED SO PHARMAC PPLX WAS WITHELD   DIET.   .. 4/15/2024 clear liq   .. 4/11/2024 dash    ..  4/10/2024 NPO     ALLGY.   ..   NKA    WT.   ..    IV fl.   .. 4/10/2024 NS 50   BOLUS.   ..  STRESS ULCER PPLX.   .    ..   4/10/2024 PROTONIX 40   INFECTION PPLX.  ..    ..         . CC .   . 4/10/2024 Patient is from [facility] for low HB. C/O Blood in the stool.  . SUMMARY.     . 4/10/2024 78-year-old male with past medical of hypertension, hyperlipidemia, diabetes, COPD, A-fib on Plavix presents with dark stool.  Patient noted for the past month he has had dark stool.  Patient reports his PCP at the [facility] has been following his hemoglobin which was noted to be low.  Patient reports he had left hand pain for the past 4 days described as stiffness.  Patient has not had a colonoscopy in years.  Patient notes a 20 pound weight loss over the past few months.  Patient recently recovered from norovirus.  Patient denies fever chest pain shortness of breath abdominal pain dysuria dizziness  . BASELINE STATUS.   . [facility]   . HOME MEDS.   · pantoprazole 40 mg oral delayed release tablet: 40 milligram(s) orally 2 times a day  · metoprolol tartrate 25 mg oral tablet: 0.5 tab(s) orally every 12 hours  · folic acid 1 mg oral tablet: 1 tab(s) orally once a day  · Multiple Vitamins oral tablet: 1 tab(s) orally once a day  · polyethylene glycol 3350 oral powder for reconstitution: 17 gram(s) orally 2 times a day  · carbidopa-levodopa 25 mg-100 mg oral tablet: 1 tab(s) orally 3 times a day  · dilTIAZem 240 mg/24 hours oral capsule, extended release: 1 cap(s) orally once a day  · Lasix 40 mg oral tablet: 1 tab(s) orally once a day  · aspirin 81 mg oral tablet: 1 orally once a day  · atorvastatin 10 mg oral tablet: 1 orally once a day  · tamsulosin 0.4 mg oral capsule: 1 cap(s) orally once a day  · cilostazol 100 mg oral tablet: 1 tab(s) orally 2 times a day  · DULoxetine 60 mg oral delayed release capsule: 1 orally once a day  · Breo Ellipta 200 mcg-25 mcg/inh inhalation powder: 1 puff(s) inhaled once a day  · Anoro Ellipta 62.5 mcg-25 mcg/inh inhalation powder: 1 puff(s) inhaled once a day  . PMH.    . PROBLEMS AT PRESENTATION.    . ER MGMT.     . PATIENT DATA .    . ACTIVE ISSUES PLAN .  INFECTION.  . PNEUMONIA   .... w 4/10-4/11/2024 w 9.8 - 6.7   .... pr 4/11/2024 pr .1   .... CXR 4/10/2024 cw 5/8/2023   .... improvd basal infiltr  .... new right perihilar infiltra   .... ct ch 4/10 cw 4/24/2013   .... Tracheobronchial secretions   .... emphysema   .... left lower lobe calcified granuloma  .... scattered linear atelectasis scarring   .... Flu ab 4/10/2024 (-)   .... strep pneu uag 4/12/2024 (-)   .... rsv 4/10/2024 (-)   .... bc 4/10 (-)   .. A/R  .... Low susp for pneum  .... 4/10/2024 check ct ch  and procalc    .... 4/10-4/11/2024 Rocephin azithro   .... 4/11/2024 will give 5 d abio and may change to po if dc plannd   .... 4/11 abio dced after dw id     . COPD   .... 4/10/2024 symbicort   .... 4/10/2024 spiriva     . LACTIC ACID STSTUS .  .... la 4/10/2024 la 1.4  .... monitor     . CAD.  .... 4/10/2024 ATORVASTAT 10     . PVD.  .... 4/10/2024 CILOSTAZOL 100.2     . A fib.  .... 4/10/2024 AMIODARPONE 200   .... 4/10/2024 CARDIZEM    .... 4/10/2024 apixaba held   .... cardio on case     . CHF   .... pbnp 4/11/2024 pbnp 236   .... 4/10/2024 SPIRONOLACTON 50   .... 4/10/2024 check echo     . ANEMIA.  .... Hb 4/10-4/11-4/12-4/13-4/14-4/15/2024 Hb 9.1- 7.9  - 8.5 - 9.2 - 9.5 - 9.9   .... INR 4/10/2024 INR 1.98   .... On protonix   .... monitor     . GI BLEED   .... 4/10/2024 Protonix 40   .... 4/10/2024 GI felt it is likely diverticular bleed and to get ct angio if bleed persists   .... 4/10/2024 apixaba held   .... 4/12/2024 ctap diverticulosis no ac findings   ..... monitor Hb     . CONSTIPATION.  .... 4/10/2024 SENNA     RENAL.  .... Na 4/10/2024 na 139  .... K 4/10/2024 K 4.5   .... CO2 4/10/2024 co2 26   .... Cr 4/10-4/11/2024 Cr 1.3 - 1.2     . BPH.  .... 4/10/2024 TAMSULOSIN .4     . PAIN.  .... GABAPENTIN 300       . OVERALL.  . 4/10/2024 78-year-old male 1 ppd smoker quit 3 weeks not on home oxygen with past medical of hypertension, hyperlipidemia, diabetes, COPD, A-fib on Plavix presents with dark stool. Recnt 20 lb wt loss Pulm consulted at admission HO copd New right perihilar infiltr on 4/10 cxr cw 5/2023 cxr     . PNEUM cxr 4/9 new rml infiltr - 4/10 ct no consolidatn scattered linear atelectasis - 4/10-4/11 rocephin azithro Off abio  . COPD - 4/10 spiriva symbicort   . A fib- 4/10 amiodarone 200 cardizem - 4/10 apixa held   . RO CHF- 4/10 spirnolact continued   . GI BLEED On 4/10/2024 was SOB (+) - 4/10 GI felt diverticular bleed ->4/15/2024 plannd colonoscopy on 4/16   . ANEMIA. Hb 4/10-4/11-4/12 Hb 9.1- 7.9- 8.5     . WEIGHT LOSS     TIME SPENT.  . Over 36 minutes aggregate care time spent on encounter; activities included   direct patient care, counseling and/or coordinating care reviewing notes, lab data/ imaging , discussion with multidisciplinary team/ patient  /family and explaining in detail risks, benefits, alternatives  of the recommendations     PATIENT.  . PEÑA SAMUELS 78 m 4/10/2024 1945 DR GILL NIETO

## 2024-04-17 NOTE — PROGRESS NOTE ADULT - SUBJECTIVE AND OBJECTIVE BOX
[INTERVAL HX: ]  Patient seen and examined;  Chart reviewed and events noted;     [MEDICATIONS]  MEDICATIONS  (STANDING):  aMIOdarone    Tablet 200 milliGRAM(s) Oral daily  ascorbic acid 500 milliGRAM(s) Oral two times a day  atorvastatin 10 milliGRAM(s) Oral at bedtime  budesonide 160 MICROgram(s)/formoterol 4.5 MICROgram(s) Inhaler 2 Puff(s) Inhalation two times a day  dextrose 10% Bolus 125 milliLiter(s) IV Bolus once  dextrose 5%. 1000 milliLiter(s) (50 mL/Hr) IV Continuous <Continuous>  dextrose 5%. 1000 milliLiter(s) (100 mL/Hr) IV Continuous <Continuous>  dextrose 50% Injectable 25 Gram(s) IV Push once  dextrose 50% Injectable 12.5 Gram(s) IV Push once  diltiazem    milliGRAM(s) Oral daily  gabapentin 300 milliGRAM(s) Oral at bedtime  glucagon  Injectable 1 milliGRAM(s) IntraMuscular once  insulin lispro (ADMELOG) corrective regimen sliding scale   SubCutaneous at bedtime  insulin lispro (ADMELOG) corrective regimen sliding scale   SubCutaneous three times a day before meals  lactobacillus acidophilus 1 Tablet(s) Oral every 12 hours  multivitamin 1 Tablet(s) Oral daily  pantoprazole    Tablet 40 milliGRAM(s) Oral two times a day  senna 2 Tablet(s) Oral at bedtime  sodium chloride 0.9%. 1000 milliLiter(s) (50 mL/Hr) IV Continuous <Continuous>  spironolactone 50 milliGRAM(s) Oral daily  tamsulosin 0.4 milliGRAM(s) Oral at bedtime  tiotropium 2.5 MICROgram(s) Inhaler 2 Puff(s) Inhalation daily    MEDICATIONS  (PRN):  acetaminophen     Tablet .. 650 milliGRAM(s) Oral every 6 hours PRN Temp greater or equal to 38C (100.4F), Mild Pain (1 - 3)  albuterol/ipratropium for Nebulization 3 milliLiter(s) Nebulizer every 6 hours PRN Shortness of Breath and/or Wheezing  aluminum hydroxide/magnesium hydroxide/simethicone Suspension 30 milliLiter(s) Oral every 4 hours PRN Dyspepsia  dextrose Oral Gel 15 Gram(s) Oral once PRN Blood Glucose LESS THAN 70 milliGRAM(s)/deciliter  guaiFENesin Oral Liquid (Sugar-Free) 200 milliGRAM(s) Oral every 6 hours PRN Cough  melatonin 3 milliGRAM(s) Oral at bedtime PRN Insomnia  ondansetron Injectable 4 milliGRAM(s) IV Push every 8 hours PRN Nausea and/or Vomiting  traMADol 50 milliGRAM(s) Oral every 12 hours PRN Moderate Pain (4 - 6)      [VITALS]  Vital Signs Last 24 Hrs  T(C): 36.9 (2024 05:11), Max: 36.9 (2024 05:11)  T(F): 98.4 (2024 05:11), Max: 98.4 (2024 05:11)  HR: 56 (2024 05:11) (52 - 56)  BP: 107/63 (2024 05:11) (107/63 - 125/76)  BP(mean): --  RR: 18 (2024 05:11) (18 - 18)  SpO2: 97% (2024 05:11) (92% - 97%)    Parameters below as of 2024 05:11  Patient On (Oxygen Delivery Method): room air      [WT/HT]  Daily     Daily Weight in k.8 (2024 05:11)  [VENT]      [PHYSICAL EXAM]  GEN: NAD  HEENT: normocephalic and atraumatic. EOMI. PERRL.    NECK: Supple.  No lymphadenopathy   LUNGS: Clear to auscultation.  HEART: Regular rate and rhythm,  no MRG  ABDOMEN: Soft, nontender, and nondistended.  Positive bowel sounds.    : No CVA tenderness  EXTREMITIES: Without edema.  NEUROLOGIC: grossly intact.  PSYCHIATRIC: Appropriate affect .  SKIN: No rash     [LABS:]                Iron - Total Binding Capacity.: 250 ug/dL [220 - 430] (24 @ 05:55)    Ferritin: 59 ng/mL [30 - 400] (24 @ 05:55)    Reticulocyte Count (24 @ 05:55)  Reticulocyte Percent: 3.0 % *H* [0.5 - 2.5]  Absolute Reticulocytes: 78.7 K/uL [25.0 - 125.0]    Folate, Serum: 19.2 ng/mL (24 @ 05:55)    Vitamin B12, Serum: 486 pg/mL [232 - 1245] (24 @ 05:55)    Ferritin, Serum: 453 ng/mL *H* [30 - 400] (23 @ 06:50)    Vitamin B12, Serum: 298 pg/mL [232 - 1245] (23 @ 08:40)    Folate, Serum: 12.0 ng/mL (23 @ 08:40)                  [RADIOLOGY STUDIES:]     [INTERVAL HX: ]  Patient seen and examined;  Chart reviewed and events noted;     no CP, no SOB    [MEDICATIONS]  MEDICATIONS  (STANDING):  aMIOdarone    Tablet 200 milliGRAM(s) Oral daily  ascorbic acid 500 milliGRAM(s) Oral two times a day  atorvastatin 10 milliGRAM(s) Oral at bedtime  budesonide 160 MICROgram(s)/formoterol 4.5 MICROgram(s) Inhaler 2 Puff(s) Inhalation two times a day  dextrose 10% Bolus 125 milliLiter(s) IV Bolus once  dextrose 5%. 1000 milliLiter(s) (50 mL/Hr) IV Continuous <Continuous>  dextrose 5%. 1000 milliLiter(s) (100 mL/Hr) IV Continuous <Continuous>  dextrose 50% Injectable 25 Gram(s) IV Push once  dextrose 50% Injectable 12.5 Gram(s) IV Push once  diltiazem    milliGRAM(s) Oral daily  gabapentin 300 milliGRAM(s) Oral at bedtime  glucagon  Injectable 1 milliGRAM(s) IntraMuscular once  insulin lispro (ADMELOG) corrective regimen sliding scale   SubCutaneous at bedtime  insulin lispro (ADMELOG) corrective regimen sliding scale   SubCutaneous three times a day before meals  lactobacillus acidophilus 1 Tablet(s) Oral every 12 hours  multivitamin 1 Tablet(s) Oral daily  pantoprazole    Tablet 40 milliGRAM(s) Oral two times a day  senna 2 Tablet(s) Oral at bedtime  sodium chloride 0.9%. 1000 milliLiter(s) (50 mL/Hr) IV Continuous <Continuous>  spironolactone 50 milliGRAM(s) Oral daily  tamsulosin 0.4 milliGRAM(s) Oral at bedtime  tiotropium 2.5 MICROgram(s) Inhaler 2 Puff(s) Inhalation daily    MEDICATIONS  (PRN):  acetaminophen     Tablet .. 650 milliGRAM(s) Oral every 6 hours PRN Temp greater or equal to 38C (100.4F), Mild Pain (1 - 3)  albuterol/ipratropium for Nebulization 3 milliLiter(s) Nebulizer every 6 hours PRN Shortness of Breath and/or Wheezing  aluminum hydroxide/magnesium hydroxide/simethicone Suspension 30 milliLiter(s) Oral every 4 hours PRN Dyspepsia  dextrose Oral Gel 15 Gram(s) Oral once PRN Blood Glucose LESS THAN 70 milliGRAM(s)/deciliter  guaiFENesin Oral Liquid (Sugar-Free) 200 milliGRAM(s) Oral every 6 hours PRN Cough  melatonin 3 milliGRAM(s) Oral at bedtime PRN Insomnia  ondansetron Injectable 4 milliGRAM(s) IV Push every 8 hours PRN Nausea and/or Vomiting  traMADol 50 milliGRAM(s) Oral every 12 hours PRN Moderate Pain (4 - 6)      [VITALS]  Vital Signs Last 24 Hrs  T(C): 36.9 (2024 05:11), Max: 36.9 (2024 05:11)  T(F): 98.4 (2024 05:11), Max: 98.4 (2024 05:11)  HR: 56 (2024 05:11) (52 - 56)  BP: 107/63 (2024 05:11) (107/63 - 125/76)  BP(mean): --  RR: 18 (2024 05:11) (18 - 18)  SpO2: 97% (2024 05:11) (92% - 97%)    Parameters below as of 2024 05:11  Patient On (Oxygen Delivery Method): room air      [WT/HT]  Daily     Daily Weight in k.8 (2024 05:11)  [VENT]      [PHYSICAL EXAM]  GEN: NAD  HEENT: normocephalic and atraumatic. EOMI. PERRL.    NECK: Supple.  No lymphadenopathy   LUNGS: Clear to auscultation.  HEART: Regular rate and rhythm,  no MRG  ABDOMEN: Soft, nontender, and nondistended.  Positive bowel sounds.    : No CVA tenderness  EXTREMITIES: Without edema.  NEUROLOGIC: grossly intact.  PSYCHIATRIC: Appropriate affect .  SKIN: No rash     [LABS:]                Iron - Total Binding Capacity.: 250 ug/dL [220 - 430] (24 @ 05:55)    Ferritin: 59 ng/mL [30 - 400] (24 @ 05:55)    Reticulocyte Count (24 @ 05:55)  Reticulocyte Percent: 3.0 % *H* [0.5 - 2.5]  Absolute Reticulocytes: 78.7 K/uL [25.0 - 125.0]    Folate, Serum: 19.2 ng/mL (24 @ 05:55)    Vitamin B12, Serum: 486 pg/mL [232 - 1245] (24 @ 05:55)    Ferritin, Serum: 453 ng/mL *H* [30 - 400] (23 @ 06:50)    Vitamin B12, Serum: 298 pg/mL [232 - 1245] (23 @ 08:40)    Folate, Serum: 12.0 ng/mL (23 @ 08:40)                  [RADIOLOGY STUDIES:]

## 2024-04-17 NOTE — PROGRESS NOTE ADULT - ASSESSMENT
tiana   upper gib gi evaluation  ashd - atrial fib -hold eliquis for gib  pvd - s/p angioplasty both legs  dm2  hypertension  dyslipidemia  pna- antibiotics as per pulmonary  copd  no angina - no chf -cardiac status stable low risk for upper gi endoscopy and colonoscopy  4/16  discussed with daughter at bed side on admission  colonoscopy could not be done poor preparation 4/17

## 2024-04-17 NOTE — PROGRESS NOTE ADULT - NUTRITIONAL ASSESSMENT
This patient has been assessed with a concern for Malnutrition and has been determined to have a diagnosis/diagnoses of Severe protein-calorie malnutrition.    This patient is being managed with:   Diet DASH/TLC-  Sodium & Cholesterol Restricted  Consistent Carbohydrate {Evening Snack}  Supplement Feeding Modality:  Oral  Glucerna Shake Cans or Servings Per Day:  1       Frequency:  Two Times a day  Entered: Apr 16 2024  2:02PM  
This patient has been assessed with a concern for Malnutrition and has been determined to have a diagnosis/diagnoses of Severe protein-calorie malnutrition.    This patient is being managed with:   Diet NPO after Midnight-     NPO Start Date: 15-Apr-2024   NPO Start Time: 23:59  Except Medications  Entered: Apr 15 2024  8:59AM    Diet Clear Liquid-  Consistent Carbohydrate {No Snacks}  DASH/TLC {Sodium & Cholesterol Restricted}  Entered: Apr 15 2024  8:59AM    The following pending diet order is being considered for treatment of Severe protein-calorie malnutrition:  Diet Consistent Carbohydrate w/Evening Snack-  Entered: Apr 14 2024 10:46AM  
This patient has been assessed with a concern for Malnutrition and has been determined to have a diagnosis/diagnoses of Severe protein-calorie malnutrition.    This patient is being managed with:   Diet DASH/TLC-  Sodium & Cholesterol Restricted  Consistent Carbohydrate {Evening Snack}  Supplement Feeding Modality:  Oral  Glucerna Shake Cans or Servings Per Day:  1       Frequency:  Two Times a day  Entered: Apr 12 2024  3:08PM  
This patient has been assessed with a concern for Malnutrition and has been determined to have a diagnosis/diagnoses of Severe protein-calorie malnutrition.    This patient is being managed with:   Diet NPO-  Except Medications  Entered: Apr 10 2024  2:57PM  
This patient has been assessed with a concern for Malnutrition and has been determined to have a diagnosis/diagnoses of Severe protein-calorie malnutrition.    This patient is being managed with:   Diet DASH/TLC-  Sodium & Cholesterol Restricted  Entered: Apr 11 2024 12:06PM  
This patient has been assessed with a concern for Malnutrition and has been determined to have a diagnosis/diagnoses of Severe protein-calorie malnutrition.    This patient is being managed with:   Diet DASH/TLC-  Sodium & Cholesterol Restricted  Consistent Carbohydrate {Evening Snack}  Supplement Feeding Modality:  Oral  Glucerna Shake Cans or Servings Per Day:  1       Frequency:  Two Times a day  Entered: Apr 16 2024  2:02PM

## 2024-04-17 NOTE — PROGRESS NOTE ADULT - SUBJECTIVE AND OBJECTIVE BOX
PROGRESS NOTE  Patient is a 78y old  Male who presents with a chief complaint of dark stools (17 Apr 2024 11:39)  Chart and available morning labs /imaging are reviewed electronically , urgent issues addressed . More information  is being added upon completion of rounds , when more information is collected and management discussed with consultants , medical staff and social service/case management on the floor   OVERNIGHT  No new issues reported by medical staff . All above noted Refused labs , would like to go to [facility] .Spoke to Dr Sheikh Kahlil leslie x 14 days since day of admission , the rest of rx resumed including pletal , patient is advised to schedule outpatient colonoscopy asap  HPI:  Patient is a 78-year-old male with past medical of hypertension, hyperlipidemia, diabetes, COPD, A-fib on Plavix presents with dark stool.  Patient noted for the past month he has had dark stool.  Patient reports his PCP at the [facility] has been following his hemoglobin which was noted to be low.  Patient reports he had left hand pain for the past 4 days described as stiffness.  Patient has not had a colonoscopy in years.  Patient notes a 20 pound weight loss over the past few months.  Patient recently recovered from norovirus.  Patient denies fever chest pain shortness of breath abdominal pain dysuria dizziness l.Found to have FOBT positive and seen by GI team Cardiology clearance for possible colonoscopy requested .Found to have pneumonia and started on abx , seen by pulm Palliative care consult requested ,to discuss advance directives and complete MOLST  (10 Apr 2024 15:27)    PAST MEDICAL & SURGICAL HISTORY:  HTN (hypertension)      HLD (hyperlipidemia)      DM (diabetes mellitus)      COPD, moderate      CAD (coronary artery disease)      S/P primary angioplasty          MEDICATIONS  (STANDING):  aMIOdarone    Tablet 200 milliGRAM(s) Oral daily  ascorbic acid 500 milliGRAM(s) Oral two times a day  atorvastatin 10 milliGRAM(s) Oral at bedtime  budesonide 160 MICROgram(s)/formoterol 4.5 MICROgram(s) Inhaler 2 Puff(s) Inhalation two times a day  dextrose 10% Bolus 125 milliLiter(s) IV Bolus once  dextrose 5%. 1000 milliLiter(s) (50 mL/Hr) IV Continuous <Continuous>  dextrose 5%. 1000 milliLiter(s) (100 mL/Hr) IV Continuous <Continuous>  dextrose 50% Injectable 25 Gram(s) IV Push once  dextrose 50% Injectable 12.5 Gram(s) IV Push once  diltiazem    milliGRAM(s) Oral daily  gabapentin 300 milliGRAM(s) Oral at bedtime  glucagon  Injectable 1 milliGRAM(s) IntraMuscular once  insulin lispro (ADMELOG) corrective regimen sliding scale   SubCutaneous three times a day before meals  insulin lispro (ADMELOG) corrective regimen sliding scale   SubCutaneous at bedtime  lactobacillus acidophilus 1 Tablet(s) Oral every 12 hours  multivitamin 1 Tablet(s) Oral daily  pantoprazole    Tablet 40 milliGRAM(s) Oral two times a day  senna 2 Tablet(s) Oral at bedtime  sodium chloride 0.9%. 1000 milliLiter(s) (50 mL/Hr) IV Continuous <Continuous>  spironolactone 50 milliGRAM(s) Oral daily  tamsulosin 0.4 milliGRAM(s) Oral at bedtime  tiotropium 2.5 MICROgram(s) Inhaler 2 Puff(s) Inhalation daily    MEDICATIONS  (PRN):  acetaminophen     Tablet .. 650 milliGRAM(s) Oral every 6 hours PRN Temp greater or equal to 38C (100.4F), Mild Pain (1 - 3)  albuterol/ipratropium for Nebulization 3 milliLiter(s) Nebulizer every 6 hours PRN Shortness of Breath and/or Wheezing  aluminum hydroxide/magnesium hydroxide/simethicone Suspension 30 milliLiter(s) Oral every 4 hours PRN Dyspepsia  dextrose Oral Gel 15 Gram(s) Oral once PRN Blood Glucose LESS THAN 70 milliGRAM(s)/deciliter  guaiFENesin Oral Liquid (Sugar-Free) 200 milliGRAM(s) Oral every 6 hours PRN Cough  melatonin 3 milliGRAM(s) Oral at bedtime PRN Insomnia  ondansetron Injectable 4 milliGRAM(s) IV Push every 8 hours PRN Nausea and/or Vomiting  traMADol 50 milliGRAM(s) Oral every 12 hours PRN Moderate Pain (4 - 6)      OBJECTIVE    T(C): 36.6 (04-17-24 @ 13:32), Max: 36.9 (04-17-24 @ 05:11)  HR: 63 (04-17-24 @ 13:32) (52 - 63)  BP: 109/71 (04-17-24 @ 13:32) (107/63 - 109/71)  RR: 17 (04-17-24 @ 13:32) (17 - 18)  SpO2: 94% (04-17-24 @ 13:32) (92% - 97%)  Wt(kg): --  I&O's Summary        REVIEW OF SYSTEMS:  CONSTITUTIONAL: No fever, weight loss, or fatigue  EYES: No eye pain, visual disturbances, or discharge  ENMT:   No sinus or throat pain  NECK: No pain or stiffness  RESPIRATORY: No cough, wheezing, chills or hemoptysis; No shortness of breath  CARDIOVASCULAR: No chest pain, palpitations, dizziness, or leg swelling  GASTROINTESTINAL: No abdominal pain. No nausea, vomiting; No diarrhea or constipation. No melena or hematochezia.  GENITOURINARY: No dysuria, frequency, hematuria, or incontinence  NEUROLOGICAL: No headaches, memory loss, loss of strength, numbness, or tremors  SKIN: No itching, burning, rashes, or lesions   MUSCULOSKELETAL: No joint pain or swelling; No muscle, back, or extremity pain    PHYSICAL EXAM:  Appearance: NAD. VS past 24 hrs -as above   HEENT:   Moist oral mucosa. Conjunctiva clear b/l.   Neck : supple  Respiratory: Lungs CTAB.  Gastrointestinal:  Soft, nontender. No rebound. No rigidity. BS present	  Cardiovascular: RRR ,S1S2 present  Neurologic: Non-focal. Moving all extremities.  Extremities: No edema. No erythema. No calf tenderness.  Skin: No rashes, No ecchymoses, No cyanosis.	  wounds ,skin lesions-See skin assesment flow sheet   LABS:          CAPILLARY BLOOD GLUCOSE      POCT Blood Glucose.: 115 mg/dL (17 Apr 2024 11:56)  POCT Blood Glucose.: 108 mg/dL (17 Apr 2024 08:08)  POCT Blood Glucose.: 116 mg/dL (16 Apr 2024 21:54)  POCT Blood Glucose.: 167 mg/dL (16 Apr 2024 17:28)          Culture - Blood (collected 10 Apr 2024 13:30)  Source: .Blood Blood-Peripheral  Final Report (15 Apr 2024 20:00):    No growth at 5 days    Culture - Blood (collected 10 Apr 2024 13:15)  Source: .Blood Blood-Peripheral  Final Report (15 Apr 2024 20:00):    No growth at 5 days      RADIOLOGY & ADDITIONAL TESTS:   reviewed elctronically  ASSESSMENT/PLAN: 	    Patient was seen and examined on a day of discharge . Plan of care , discharge medications and recommendations discussed with consultants and clearance for discharge obtained .Social service , case management  and medical staff are aware of plan. Family is notified. Discharge summary  is  prepared electronically-see separate document prepared by me .75minutes spent on this visit, 50% visit time spent in care co-ordination with other attendings and counselling patient  I have discussed care plan with patient and HCP,expressed understanding of problems treatment and their effect and side effects, alternatives in detail,I have asked if they have any questions and concerns and appropriately addressed them to best of my ability

## 2024-04-17 NOTE — PROGRESS NOTE ADULT - ASSESSMENT
diverticulosis  anemia  gi bleed    s/p colonoscopy attempt; poor prep  hold a/c 2 weeks  monitor cbc  suspect diverticular bleed  transfuse as needed  can consider outpatient follow up  D/w pt    I reviewed the overnight course of events on the unit, re-confirming the patient history. I discussed the care with the patient  Differential diagnosis and plan of care discussed with patient after the evaluation  35 minutes spent on total encounter of which more than fifty percent of the encounter was spent counseling and/or coordinating care by the attending physician.

## 2024-04-17 NOTE — PROGRESS NOTE ADULT - PROVIDER SPECIALTY LIST ADULT
Cardiology
Gastroenterology
Pulmonology
Pulmonology
Cardiology
Gastroenterology
Hospitalist
Infectious Disease
Pulmonology
Cardiology
Gastroenterology
Gastroenterology
Heme/Onc
Infectious Disease
Pulmonology
Pulmonology
Cardiology
Gastroenterology
Gastroenterology
Heme/Onc
Hospitalist
Cardiology
Cardiology
Internal Medicine
Heme/Onc
Hospitalist

## 2024-04-17 NOTE — DISCHARGE NOTE NURSING/CASE MANAGEMENT/SOCIAL WORK - PATIENT PORTAL LINK FT
You can access the FollowMyHealth Patient Portal offered by [facility] by registering at the following website: [email or web address]. By joining [facility]’s FollowMyHealth portal, you will also be able to view your health information using other applications (apps) compatible with our system.

## 2024-04-17 NOTE — PROGRESS NOTE ADULT - SUBJECTIVE AND OBJECTIVE BOX
[facility]  Sammy Munguia PA-C  [address] [place] [address]  [phone]      INTERVAL HPI/OVERNIGHT EVENTS:  Pt s/e  Colonoscopy unable to be performed due to poor prep  No GI complaints    MEDICATIONS  (STANDING):  aMIOdarone    Tablet 200 milliGRAM(s) Oral daily  ascorbic acid 500 milliGRAM(s) Oral two times a day  atorvastatin 10 milliGRAM(s) Oral at bedtime  budesonide 160 MICROgram(s)/formoterol 4.5 MICROgram(s) Inhaler 2 Puff(s) Inhalation two times a day  dextrose 10% Bolus 125 milliLiter(s) IV Bolus once  dextrose 5%. 1000 milliLiter(s) (50 mL/Hr) IV Continuous <Continuous>  dextrose 5%. 1000 milliLiter(s) (100 mL/Hr) IV Continuous <Continuous>  dextrose 50% Injectable 25 Gram(s) IV Push once  dextrose 50% Injectable 12.5 Gram(s) IV Push once  diltiazem    milliGRAM(s) Oral daily  gabapentin 300 milliGRAM(s) Oral at bedtime  glucagon  Injectable 1 milliGRAM(s) IntraMuscular once  insulin lispro (ADMELOG) corrective regimen sliding scale   SubCutaneous three times a day before meals  insulin lispro (ADMELOG) corrective regimen sliding scale   SubCutaneous at bedtime  lactobacillus acidophilus 1 Tablet(s) Oral every 12 hours  multivitamin 1 Tablet(s) Oral daily  pantoprazole    Tablet 40 milliGRAM(s) Oral two times a day  senna 2 Tablet(s) Oral at bedtime  sodium chloride 0.9%. 1000 milliLiter(s) (50 mL/Hr) IV Continuous <Continuous>  spironolactone 50 milliGRAM(s) Oral daily  tamsulosin 0.4 milliGRAM(s) Oral at bedtime  tiotropium 2.5 MICROgram(s) Inhaler 2 Puff(s) Inhalation daily    MEDICATIONS  (PRN):  acetaminophen     Tablet .. 650 milliGRAM(s) Oral every 6 hours PRN Temp greater or equal to 38C (100.4F), Mild Pain (1 - 3)  albuterol/ipratropium for Nebulization 3 milliLiter(s) Nebulizer every 6 hours PRN Shortness of Breath and/or Wheezing  aluminum hydroxide/magnesium hydroxide/simethicone Suspension 30 milliLiter(s) Oral every 4 hours PRN Dyspepsia  dextrose Oral Gel 15 Gram(s) Oral once PRN Blood Glucose LESS THAN 70 milliGRAM(s)/deciliter  guaiFENesin Oral Liquid (Sugar-Free) 200 milliGRAM(s) Oral every 6 hours PRN Cough  melatonin 3 milliGRAM(s) Oral at bedtime PRN Insomnia  ondansetron Injectable 4 milliGRAM(s) IV Push every 8 hours PRN Nausea and/or Vomiting  traMADol 50 milliGRAM(s) Oral every 12 hours PRN Moderate Pain (4 - 6)      Allergies    No Known Allergies      PHYSICAL EXAM:   Vital Signs:  Vital Signs Last 24 Hrs  T(C): 36.9 (2024 05:11), Max: 36.9 (2024 05:11)  T(F): 98.4 (2024 05:11), Max: 98.4 (2024 05:11)  HR: 56 (2024 05:11) (52 - 56)  BP: 107/63 (2024 05:11) (107/63 - 125/76)  BP(mean): --  RR: 18 (2024 05:11) (18 - 18)  SpO2: 97% (2024 05:11) (92% - 97%)    Parameters below as of 2024 05:11  Patient On (Oxygen Delivery Method): room air      Daily     Daily Weight in k.8 (2024 05:11)    GENERAL:  Appears stated age  HEENT:  NC/AT  CHEST:  Full & symmetric excursion  HEART:  Regular rhythm  ABDOMEN:  Soft, non-tender, non-distended  EXTEREMITIES:  no cyanosis  SKIN:  No rash  NEURO:  Alert

## 2024-04-18 ENCOUNTER — TRANSCRIPTION ENCOUNTER (OUTPATIENT)
Age: 79
End: 2024-04-18

## 2025-06-27 NOTE — DISCHARGE NOTE PROVIDER - ATTENDING ATTESTATION STATEMENT
No pertinent past medical history I have personally seen and examined the patient. I have collaborated with and supervised the 27-Jun-2025 13:26

## (undated) DEVICE — CATH IV SAFE BC 20G X 1.16" (PINK)

## (undated) DEVICE — POLY TRAP ETRAP

## (undated) DEVICE — FORMALIN CUPS 10% BUFFERED

## (undated) DEVICE — ENDOCUFF VISION SZ 3 SM PRPL

## (undated) DEVICE — TUBING CANNULA SALTER LABS NASAL ADULT 7FT

## (undated) DEVICE — TUBING IV SET GRAVITY 3Y 100" MACRO

## (undated) DEVICE — VALVE BIOPSY

## (undated) DEVICE — TRAP SPECIMEN SPUTUM 40CC

## (undated) DEVICE — CLAMP BX HOT RAD JAW 3

## (undated) DEVICE — TRAP QUICK CATCH  SINGL CHAMBER

## (undated) DEVICE — SYR IV POSIFLUSH NS 3ML 30/TY

## (undated) DEVICE — PACK IV START WITH CHG

## (undated) DEVICE — TUBE RECTAL 24FR

## (undated) DEVICE — CANISTER SUCTION 1200CC 10/SL

## (undated) DEVICE — RETRIEVER ROTH NET PLATINUM-UNIVERSAL

## (undated) DEVICE — TUBE O2 SUPL CRUSH RESIS CONN SOUTHSIDE ONLY

## (undated) DEVICE — CATH IV SAFE BC 22G X 1" (BLUE)

## (undated) DEVICE — SYR LUER SLIP TIP 30CC

## (undated) DEVICE — Device

## (undated) DEVICE — SET IV PUMP BLOOD 1VALVE 180FILTER NON-DEHP

## (undated) DEVICE — ELCTR GROUNDING PAD ADULT COVIDIEN

## (undated) DEVICE — NDL INJ SCLERO INTERJECT 23G

## (undated) DEVICE — SOL IRR NS 0.9% 250ML

## (undated) DEVICE — SUCTION YANKAUER TAPERED BULBOUS NO VENT

## (undated) DEVICE — TUBING SUCTION CONN 6FT STERILE

## (undated) DEVICE — FORCEP RADIAL JAW 4 JUMBO 2.8MM 3.2MM 240CM ORANGE DISP

## (undated) DEVICE — SNARE POLYP SENS 27MM 240CM

## (undated) DEVICE — MARKER ENDO SPOT EX

## (undated) DEVICE — TUBING IV SET SECONDARY 34"

## (undated) DEVICE — MASK LRG MED AND HIGH O2 CONC M TO M 10FT

## (undated) DEVICE — SOL IRR POUR H2O 500ML

## (undated) DEVICE — SUT HEWSON RETRIEVER

## (undated) DEVICE — SYR LUER SLIP TIP 50CC

## (undated) DEVICE — SENSOR O2 FINGER XL ADULT 24/BX 6BX/CA

## (undated) DEVICE — FORCEP RADIAL JAW 4 W NDL 2.4MM 2.8MM 240CM ORANGE DISP

## (undated) DEVICE — CATH ELECHMSTAT  INJ 7FR 210CM

## (undated) DEVICE — ENDOCUFF VISION SZ 2 LG GRN

## (undated) DEVICE — CATH ELCTR GLIDE PRB 7FR

## (undated) DEVICE — BRUSH COLONOSCOPY CYTOLOGY

## (undated) DEVICE — SNARE LRG

## (undated) DEVICE — STERIS DEFENDO 3-PIECE KIT (AIR/WATER, SUCTION & BIOPSY VALVES)

## (undated) DEVICE — MASK O2 NON REBREATH 3IN1 ADULT